# Patient Record
Sex: MALE | Race: WHITE | NOT HISPANIC OR LATINO | Employment: UNEMPLOYED | URBAN - METROPOLITAN AREA
[De-identification: names, ages, dates, MRNs, and addresses within clinical notes are randomized per-mention and may not be internally consistent; named-entity substitution may affect disease eponyms.]

---

## 2021-01-19 ENCOUNTER — OFFICE VISIT (OUTPATIENT)
Dept: URGENT CARE | Facility: CLINIC | Age: 51
End: 2021-01-19
Payer: COMMERCIAL

## 2021-01-19 ENCOUNTER — APPOINTMENT (OUTPATIENT)
Dept: RADIOLOGY | Facility: IMAGING CENTER | Age: 51
End: 2021-01-19
Attending: PHYSICIAN ASSISTANT
Payer: COMMERCIAL

## 2021-01-19 VITALS
HEART RATE: 76 BPM | WEIGHT: 199 LBS | HEIGHT: 70 IN | SYSTOLIC BLOOD PRESSURE: 128 MMHG | RESPIRATION RATE: 16 BRPM | TEMPERATURE: 98.4 F | BODY MASS INDEX: 28.49 KG/M2 | DIASTOLIC BLOOD PRESSURE: 62 MMHG | OXYGEN SATURATION: 97 %

## 2021-01-19 DIAGNOSIS — R06.02 SOB (SHORTNESS OF BREATH): ICD-10-CM

## 2021-01-19 DIAGNOSIS — R07.81 PAIN IN RIB: ICD-10-CM

## 2021-01-19 DIAGNOSIS — S23.41XA SPRAIN OF COSTAL CARTILAGE, INITIAL ENCOUNTER: ICD-10-CM

## 2021-01-19 PROCEDURE — 99204 OFFICE O/P NEW MOD 45 MIN: CPT | Performed by: PHYSICIAN ASSISTANT

## 2021-01-19 PROCEDURE — 71101 X-RAY EXAM UNILAT RIBS/CHEST: CPT | Mod: TC,LT | Performed by: PHYSICIAN ASSISTANT

## 2021-01-19 ASSESSMENT — ENCOUNTER SYMPTOMS
SENSORY CHANGE: 0
CHILLS: 0
WHEEZING: 0
COUGH: 0
TINGLING: 0
SHORTNESS OF BREATH: 1
FEVER: 0

## 2021-01-19 NOTE — PROGRESS NOTES
"Subjective:      Bishop Bucio is a 50 y.o. male who presents with rib pain          Of note patient was at work when he noted the discomfort however is very adamant that he does not want to conduct visit under Workmen's Comp. at this time.      Patient is a 50-year-old male who presents to urgent care with left-sided lower rib discomfort after reaching up putting a hook on a forklift this morning.  He admits that he immediately had slight discomfort to the left lower ribs-side with associated shortness of breath.  He admits that he sits still he does not have the discomfort more short of breath however if he begins to lean or apply pressure to this region he does have associated pain along with the shortness of breath.  He feels as though at that time he is unable to take a deep breath.  He has not taken anything for his symptoms.  He denies history of same.  He did not feel a pop that he is aware of.  He denies any abdominal pain.    Rib Injury  This is a new problem. The current episode started today. The problem occurs constantly. The problem has been unchanged. Pertinent negatives include no chest pain, chills, coughing or fever. Exacerbated by: Movement. He has tried nothing for the symptoms.       Review of Systems   Constitutional: Negative for chills and fever.   Respiratory: Positive for shortness of breath. Negative for cough and wheezing.    Cardiovascular: Negative for chest pain and leg swelling.   Neurological: Negative for tingling and sensory change.   All other systems reviewed and are negative.         Objective:     /62   Pulse 76   Temp 36.9 °C (98.4 °F)   Resp 16   Ht 1.778 m (5' 10\")   Wt 90.3 kg (199 lb)   SpO2 97%   BMI 28.55 kg/m²      Physical Exam  Vitals signs reviewed.   Constitutional:       General: He is not in acute distress.     Appearance: He is well-developed.   HENT:      Head: Normocephalic and atraumatic.   Eyes:      Conjunctiva/sclera: Conjunctivae normal.      " Pupils: Pupils are equal, round, and reactive to light.   Neck:      Musculoskeletal: Normal range of motion and neck supple.      Trachea: No tracheal deviation.   Cardiovascular:      Rate and Rhythm: Normal rate and regular rhythm.      Heart sounds: No murmur.   Pulmonary:      Effort: Pulmonary effort is normal. No respiratory distress.      Breath sounds: Normal breath sounds.       Chest:      Chest wall: Tenderness present.       Abdominal:          Comments: Diffuse tenderness to the Left lower ribs- without bony step off or deformity.    Musculoskeletal: Normal range of motion.   Skin:     General: Skin is warm.      Findings: No rash.   Neurological:      Mental Status: He is alert and oriented to person, place, and time.      Coordination: Coordination normal.   Psychiatric:         Behavior: Behavior normal.         Thought Content: Thought content normal.         Judgment: Judgment normal.                 RADIOLOGY RESULTS   [id number]-[id number] (with 1-view Cxr) Left    Result Date: 1/19/2021 1/19/2021 9:55 AM HISTORY/REASON FOR EXAM:  Left sided rib pain. TECHNIQUE/EXAM DESCRIPTION AND NUMBER OF VIEWS:  3 images of the left ribs and chest. COMPARISON: None. FINDINGS: No acute displaced rib fractures are identified. No pneumothorax. The lungs are clear, except for linear atelectasis/scarring in the left lower lung. Unremarkable cardiomediastinal silhouette.     No displaced rib fractures or pneumothorax. No acute cardiopulmonary abnormality.       Reviewed x-ray of which I agree with the official read.  Discussed such with the patient today.  Assessment/Plan:        1. Pain in rib  - [id number] (WITH 1-VIEW CXR) LEFT; Future    2. SOB (shortness of breath)  - [id number] (WITH 1-VIEW CXR) LEFT; Future    3. Sprain of costal cartilage, initial encounter    Discussed importance of NSAID utilization for discomfort, along with breathing exercises.  No evidence of bony abnormality to the rib  or the lungs indicating pneumo.  Encouraged ibuprofen 600 to 800 mg every 8 hours as patient denies prior history of renal insufficiency.  Work note was provided.  Patient and/or guardian given precautionary s/sx that mandate immediate follow up and evaluation in the ED. Advised of risks of not doing so.  Side effects of OTC or prescribed medications discussed.   DDX, Supportive care, and indications for immediate follow-up discussed with patient.    Instructed to return to clinic or nearest emergency department if we are not available for any change in condition, further concerns, or worsening of symptoms.    The patient and/or guardian demonstrated a good understanding and agreed with the treatment plan.    Please note that this dictation was created using voice recognition software. I have made every reasonable attempt to correct obvious errors, but I expect that there are errors of grammar and possibly content that I did not discover before finalizing the note.

## 2021-01-19 NOTE — LETTER
January 19, 2021         Patient: Bishop Bucio   YOB: 1970   Date of Visit: 1/19/2021           To Whom it May Concern:    Bishop Bucio was seen in my clinic on 1/19/2021. Please excuse this patient from work due to recent injury- he will be out through Thursday.   If you have any questions or concerns, please don't hesitate to call.        Sincerely,           Aristeo Partida P.A.-C.  Electronically Signed

## 2021-04-08 ENCOUNTER — OCCUPATIONAL MEDICINE (OUTPATIENT)
Dept: OCCUPATIONAL MEDICINE | Facility: CLINIC | Age: 51
End: 2021-04-08
Payer: COMMERCIAL

## 2021-04-08 ENCOUNTER — APPOINTMENT (OUTPATIENT)
Dept: RADIOLOGY | Facility: IMAGING CENTER | Age: 51
End: 2021-04-08
Attending: PREVENTIVE MEDICINE
Payer: COMMERCIAL

## 2021-04-08 VITALS
BODY MASS INDEX: 28.92 KG/M2 | HEART RATE: 76 BPM | OXYGEN SATURATION: 96 % | SYSTOLIC BLOOD PRESSURE: 126 MMHG | RESPIRATION RATE: 16 BRPM | TEMPERATURE: 97.9 F | WEIGHT: 202 LBS | HEIGHT: 70 IN | DIASTOLIC BLOOD PRESSURE: 84 MMHG

## 2021-04-08 DIAGNOSIS — S60.032A CONTUSION OF LEFT MIDDLE FINGER WITHOUT DAMAGE TO NAIL, INITIAL ENCOUNTER: ICD-10-CM

## 2021-04-08 PROCEDURE — 73140 X-RAY EXAM OF FINGER(S): CPT | Mod: TC,LT | Performed by: PREVENTIVE MEDICINE

## 2021-04-08 PROCEDURE — 99203 OFFICE O/P NEW LOW 30 MIN: CPT | Performed by: PREVENTIVE MEDICINE

## 2021-04-08 NOTE — PROGRESS NOTES
"Subjective:     Bishop Bucio is a 50 y.o. male who presents for Follow-Up (WC DOI: 4/8/2021 Middle finger; Same RM 17)      DOI 3/29/2021: 49 yo injured worker presents with left hand injury. SAJI: He was pushing plastic into a bale machine when the plastic bounced back and hit his fingers on the left hand.  He states the pain was mild to moderate but he was able to continue to work.  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  He states the pain is mild at rest but more severe when trying to flex the left middle finger.  Had a little bit of intermittent numbness and tingling in the left middle finger as well.  Denies prior history of injuries to this hand.  Not taking any medications for this condition.    ROS: All systems were reviewed on intake form, form was reviewed and signed. See scanned documents in media. Pertinent positives and negatives included in HPI.    PMH: No pertinent past medical history to this problem  MEDS: Medications were reviewed in Epic  ALLERGIES:   Allergies   Allergen Reactions   • Pcn [Penicillins] Rash     Per patient      SOCHX: Works as day lead at [employer]  FH: No pertinent family history to this problem       Objective:     /84   Pulse 76   Temp 36.6 °C (97.9 °F)   Resp 16   Ht 1.778 m (5' 10\")   Wt 91.6 kg (202 lb)   SpO2 96%   BMI 28.98 kg/m²     Constitutional: Patient is in no acute distress. Appears well-developed and well-nourished.   HENT: Normocephalic and atraumatic. EOM are normal. No scleral icterus.   Cardiovascular: Normal rate.    Pulmonary/Chest: Effort normal. No respiratory distress.   Neurological: Patient is alert and oriented to person, place, and time.   Skin: Skin is warm " and dry.   Psychiatric: Normal mood and affect. Behavior is normal.     Left hand: No gross deformity.  Mild to moderate tenderness over the PIP joint of the left middle finger.  Somewhat reduced flexion of the PIP joint with pain with resisted movements of this joint as well.  No other pain or tenderness at the other fingers.    X-ray left middle finger: No acute deformity    Assessment/Plan:       1. Contusion of left middle finger without damage to nail, initial encounter  - DX-FINGER(S) 2+ LEFT; Future    Released to Restricted Duty FROM 4/8/2021 TO 4/16/2021  Limit forceful gripping and grasping of the left hand as tolerated.  Limit left hand to less than 25 pounds lift/push/pull  Recommend gentle range of motion exercises demonstrated  OTC ibuprofen as needed  Ice as needed  Restricted duty  Follow-up 1 week    Differential diagnosis, natural history, supportive care, and indications for immediate follow-up discussed.    Approximately 35 minutes were spent in reviewing notes, preparing for visit, obtaining history, exam and evaluation, patient counseling/education and post visit documentation/orders.

## 2021-04-08 NOTE — LETTER
"EMPLOYEE’S CLAIM FOR COMPENSATION/ REPORT OF INITIAL TREATMENT  FORM C-4    EMPLOYEE’S CLAIM - PROVIDE ALL INFORMATION REQUESTED   First Name  Bishop Last Name  Rupinder Birthdate                    1970                Sex  male Claim Number   Home Address  [address] Age  50 y.o. 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 (if applicable)  filling bale machine    How did this injury or occupational disease occur? (Be specific an answer in detail. Use additional sheet if necessary)  I was pushing plastic in bale machine and some of it bounced back and got my fingers   If you believe that you have an occupational disease, when did you first have knowledge of the disability and it relationship to your employment?  NA Witnesses to the Accident  NA      Nature of Injury or Occupational Disease  Contusion  Part(s) of Body Injured or Affected  Hand (L), Finger (L),     I certify that the above is true and correct to the best of my knowledge and that I have provided this information in order to obtain the benefits " of [place]  I hereby authorize any physician, chiropractor, surgeon, practitioner, or other person, any hospital, including [facility], any medical service organization, any insurance company, or other institution or organization to release to each other, any medical or other information, including benefits paid or payable, pertinent to this injury or disease, except information relative to diagnosis, treatment and/or counseling for AIDS, psychological conditions, alcohol or controlled substances, for which I must give specific authorization.  A Photostat of this authorization shall be as valid as the original.     Date   Place   Employee’s Signature   THIS REPORT MUST BE COMPLETED AND MAILED WITHIN 3 WORKING DAYS OF TREATMENT   Place  [facility]  Name of [facility]   Date  4/8/2021 Diagnosis  (S60.032A) Contusion of left middle finger without damage to nail, initial encounter Is there evidence the injured employee was under the              influence of alcohol and/or another controlled substance at the time of accident?   Hour  10:50 AM Description of Injury or Disease  The encounter diagnosis was Contusion of left middle finger without damage to nail, initial encounter. No   Treatment  None  Have you advised the patient to remain off work five days or     more? No   X-Ray Findings  Negative   If Yes   From Date  To Date      From information given by the employee, together with medical evidence, can you directly connect this injury or occupational disease as job incurred?  Yes If No Full Duty    No Modified Duty  Yes   Is additional medical care by a physician indicated?  Yes If Modified Duty, Specify any Limitations / Restrictions  <25 lbs left hand lift/push/pull   Do you know of any previous injury or disease contributing to this condition  "or occupational disease?                            No   Date  4/8/2021 Print Doctor’s Name   Urbano Spence D.O. I certify the employer’s copy of  this form was mailed on:   Address  [address],   [address] Insurer’s Use Only     [place] Zip  [address]    Provider’s Tax ID Number  [id number] Telephone  Dept: [phone]      e-SignTAYLORURBANO D.O.  Signature:     Degree          ORIGINAL-TREATING PHYSICIAN OR CHIROPRACTOR    PAGE 2-INSURER/TPA    PAGE 3-EMPLOYER    PAGE 4-EMPLOYEE        Form C-4 (rev.10/07)           BRIEF DESCRIPTION OF RIGHTS AND BENEFITS  (Pursuant to [place] 616[place])    Notice of Injury or Occupational Disease (Incident Report Form C-1): If an injury or occupational disease (OD) arises out of and in the course of employment, you must provide written notice to your employer as soon as practicable, but no later than 7 days after the accident or OD. Your employer shall maintain a sufficient supply of the required forms.    Claim for Compensation (Form C-4): If medical treatment is sought, the form C-4 is available at the place of initial treatment. A completed \"Claim for Compensation\" (Form C-4) must be filed within 90 days after an accident or OD. The treating physician or chiropractor must, within 3 working days after treatment, complete and mail to the employer, the employer's insurer and third-party , the Claim for Compensation.    Medical Treatment: If you require medical treatment for your on-the-job injury or OD, you may be required to select a physician or chiropractor from a list provided by your workers’ compensation insurer, if it has contracted with an Organization for Managed Care (MCO) or Preferred Provider Organization (PPO) or providers of health care. If your employer has not entered into a contract with an MCO or PPO, you may select a physician or chiropractor from the Panel of Physicians and Chiropractors. Any medical costs related to your industrial " injury or OD will be paid by your insurer.    Temporary Total Disability (TTD): If your doctor has certified that you are unable to work for a period of at least 5 consecutive days, or 5 cumulative days in a 20-day period, or places restrictions on you that your employer does not accommodate, you may be entitled to TTD compensation.    Temporary Partial Disability (TPD): If the wage you receive upon reemployment is less than the compensation for TTD to which you are entitled, the insurer may be required to pay you TPD compensation to make up the difference. TPD can only be paid for a maximum of 24 months.    Permanent Partial Disability (PPD): When your medical condition is stable and there is an indication of a PPD as a result of your injury or OD, within 30 days, your insurer must arrange for an evaluation by a rating physician or chiropractor to determine the degree of your PPD. The amount of your PPD award depends on the date of injury, the results of the PPD evaluation, your age and wage.    Permanent Total Disability (PTD): If you are medically certified by a treating physician or chiropractor as permanently and totally disabled and have been granted a PTD status by your insurer, you are entitled to receive monthly benefits not to exceed 66 2/3% of your average monthly wage. The amount of your PTD payments is subject to reduction if you previously received a lump-sum PPD award.    Vocational Rehabilitation Services: You may be eligible for vocational rehabilitation services if you are unable to return to the job due to a permanent physical impairment or permanent restrictions as a result of your injury or occupational disease.    Transportation and Per Barbara Reimbursement: You may be eligible for travel expenses and per barbara associated with medical treatment.    Reopening: You may be able to reopen your claim if your condition worsens after claim closure.     Appeal Process: If you disagree with a written  determination issued by the insurer or the insurer does not respond to your request, you may appeal to the Department of Administration, , by following the instructions contained in your determination letter. You must appeal the determination within 70 days from the date of the determination letter at [address], [address], [place], [place] [address], or [address], [address], [place], [place] [address]. If you disagree with the  decision, you may appeal to the Department of Administration, . You must file your appeal within 30 days from the date of the  decision letter at [address], [address], [place], [place] [address], or [address], [address], [place], [place] [address]. If you disagree with a decision of an , you may file a petition for judicial review with the District Court. You must do so within 30 days of the Appeal Officer’s decision. You may be represented by an  at your own expense or you may contact the [facility] for possible representation.    [place]  for Injured Workers (NAIW): If you disagree with a  decision, you may request that NAIW represent you without charge at an  Hearing. For information regarding denial of benefits, you may contact the [facility] at: [address], [address], [place] [address], [phone], or [address], [address], [place] [address], [phone]    To File a Complaint with the Division: If you wish to file a complaint with the  of the Division of Industrial Relations (DIR),  please contact the Workers’ Compensation Section, [address], [address], [place], [place] [address], telephone ([phone]) [phone], or [address], [address], [place], [place] [address], telephone [phone].    For assistance with Workers’ Compensation Issues: You may contact the [facility]  Office for Consumer Health Assistance, [address] [place] [address], [facility] [address], [place] [address], Toll Free 1-793.707.5944, Web site: http://[facility].[place].gov/Programs/LUIS E-mail: luis@[email or web address].nv.[facility]              __________________________________________________________________                                    _________________            Employee Name / Signature                                                                                                                            Date                                                                                                                                                                                                                              D-2 (rev. 10/20)

## 2021-04-08 NOTE — LETTER
[address],   [address] [place] [address]  Phone:  [phone] - Fax:  [phone]   Occupational Health [facility] Progress Report and Disability Certification  Date of Service: 4/8/2021   No Show:  No  Date / Time of Next Visit: 4/16/2021 @ 10am   Claim Information   Patient Name: Bishop Bucio  Claim Number:     Employer:   [employer] Date of Injury: 3/29/2021     Insurer / TPA: Rema Soto [facility]  ID / SSN:     Occupation: DAY SHIFT LEAD  Diagnosis: The encounter diagnosis was Contusion of left middle finger without damage to nail, initial encounter.    Medical Information   Related to Industrial Injury? Yes    Subjective Complaints:  DOI 3/29/2021: 51 yo injured worker presents with left hand injury. SAJI: He was pushing plastic into a bale machine when the plastic bounced back and hit his fingers on the left hand.  He states the pain was mild to moderate but he was able to continue to work.  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  He states the pain is mild at rest but more severe when trying to flex the left middle finger.  Had a little bit of intermittent numbness and tingling in the left middle finger as well.  Denies prior history of injuries to this hand.  Not taking any medications for this condition.   Objective Findings: Left hand: No gross deformity.  Mild to moderate tenderness over the PIP joint of the left middle finger.  Somewhat reduced flexion of the PIP joint with pain with resisted movements of this joint as well.  No other pain or tenderness at the other fingers.    X-ray left middle finger: No acute deformity   Pre-Existing Condition(s):     Assessment:    Initial Visit    Status: Additional Care Required  Permanent Disability:No    Plan:      Diagnostics:      Comments:  Recommend gentle range of motion exercises demonstrated  OTC ibuprofen as needed  Ice as needed  Restricted duty  Follow-up 1 week    Disability Information   Status: Released to Restricted Duty    From:  4/8/2021  Through: 4/16/2021 Restrictions are: Temporary   Physical Restrictions   Sitting:    Standing:    Stooping:    Bending:      Squatting:    Walking:    Climbing:    Pushing:      Pulling:    Other:    Reaching Above Shoulder (L):   Reaching Above Shoulder (R):       Reaching Below Shoulder (L):    Reaching Below Shoulder (R):      Not to exceed Weight Limits   Carrying(hrs):   Weight Limit(lb):   Lifting(hrs):   Weight  Limit(lb):     Comments: Limit forceful gripping and grasping of the left hand as tolerated.  Limit left hand to less than 25 pounds lift/push/pull    Repetitive Actions   Hands: i.e. Fine Manipulations from Grasping:     Feet: i.e. Operating Foot Controls:     Driving / Operate Machinery:     Provider Name:   Urbano Spence D.O. Physician Signature:  Physician Name:     Clinic Name / Location: [address],   [address] [address] Clinic Phone Number: Dept: [phone]   Appointment Time: 10:45 Am Visit Start Time: 10:50 AM   Check-In Time:  10:47 Am Visit Discharge Time:  11:50am   Original-Treating Physician or Chiropractor    Page 2-Insurer/TPA    Page 3-Employer    Page 4-Employee

## 2021-04-16 ENCOUNTER — OCCUPATIONAL MEDICINE (OUTPATIENT)
Dept: OCCUPATIONAL MEDICINE | Facility: CLINIC | Age: 51
End: 2021-04-16
Payer: COMMERCIAL

## 2021-04-16 VITALS
WEIGHT: 202 LBS | SYSTOLIC BLOOD PRESSURE: 116 MMHG | HEART RATE: 80 BPM | OXYGEN SATURATION: 97 % | RESPIRATION RATE: 16 BRPM | DIASTOLIC BLOOD PRESSURE: 84 MMHG | HEIGHT: 70 IN | TEMPERATURE: 97.8 F | BODY MASS INDEX: 28.92 KG/M2

## 2021-04-16 DIAGNOSIS — S60.032A CONTUSION OF LEFT MIDDLE FINGER WITHOUT DAMAGE TO NAIL, INITIAL ENCOUNTER: ICD-10-CM

## 2021-04-16 PROCEDURE — 99212 OFFICE O/P EST SF 10 MIN: CPT | Performed by: PREVENTIVE MEDICINE

## 2021-04-16 NOTE — PROGRESS NOTES
"Subjective:     Bishop Bucio is a 50 y.o. 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 SAJI: He was pushing plastic into a bale machine when the plastic bounced back and hit his fingers on the left hand.  Patient states overall symptoms are about the same.  He has little bit of improvement with full extension but still has pain with flexion of the middle finger.  He has been working light duty.  He is doing the range of motion exercises daily.    ROS  .       Objective:     /84   Pulse 80   Temp 36.6 °C (97.8 °F)   Resp 16   Ht 1.778 m (5' 10\")   Wt 91.6 kg (202 lb)   SpO2 97%   BMI 28.98 kg/m²     Constitutional: Patient is in no acute distress. Appears well-developed and well-nourished.   Cardiovascular: Normal rate.    Pulmonary/Chest: Effort normal. No respiratory distress.   Neurological: Patient is alert and oriented to person, place, and time.   Skin: Skin is warm and dry.   Psychiatric: Normal mood and affect. Behavior is normal.     Left hand: No gross deformity.  Mild to moderate tenderness over the PIP joint of the left middle finger.  Somewhat reduced flexion of the PIP joint with pain with resisted movements of this joint as well.  Full extension of the joint without pain or difficulty.      Assessment/Plan:       1. Contusion of left middle finger without damage to nail, initial encounter    Released to Restricted Duty FROM 4/16/2021 TO 4/30/2021  Limit forceful gripping and grasping of the left hand as tolerated.  Limit left hand to less than 25 pounds lift/push/pull   Continue gentle range of motion exercises demonstrated  OTC ibuprofen as needed  Ice as needed  Restricted duty  Follow-up 2 weeks    Differential diagnosis, natural history, supportive care, and indications for immediate follow-up discussed.    Approximately 15 minutes was spent in preparing for visit, obtaining history, " exam and evaluation, patient counseling/education and post visit documentation/orders.

## 2021-04-16 NOTE — LETTER
[address],   [address] [place] [address]  Phone:  [phone] - Fax:  [phone]   Occupational Health [facility] Progress Report and Disability Certification  Date of Service: 4/16/2021   No Show:  No  Date / Time of Next Visit: 4/30/2021 @ 10 AM    Claim Information   Patient Name: Bishop Bucio  Claim Number:     Employer:  [employer]  Date of Injury: 3/29/2021     Insurer / TPA: Rema Soto [facility]  ID / SSN:     Occupation: DAY SHIFT LEAD  Diagnosis: The encounter diagnosis was Contusion of left middle finger without damage to nail, initial encounter.    Medical Information   Related to Industrial Injury? 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 SAJI: He was pushing plastic into a bale machine when the plastic bounced back and hit his fingers on the left hand.  Patient states overall symptoms are about the same.  He has little bit of improvement with full extension but still has pain with flexion of the middle finger.  He has been working light duty.  He is doing the range of motion exercises daily.   Objective Findings: Left hand: No gross deformity.  Mild to moderate tenderness over the PIP joint of the left middle finger.  Somewhat reduced flexion of the PIP joint with pain with resisted movements of this joint as well.  No other pain or tenderness at the other fingers.   Pre-Existing Condition(s):     Assessment:   Condition Same    Status: Additional Care Required  Permanent Disability:No    Plan:      Diagnostics:      Comments:  Continue gentle range of motion exercises demonstrated  OTC ibuprofen as needed  Ice as needed  Restricted duty  Follow-up 2 weeks    Disability Information   Status: Released to Restricted Duty    From:  4/16/2021  Through: 4/30/2021 Restrictions are: Temporary   Physical Restrictions   Sitting:    Standing:    Stooping:    Bending:         Squatting:    Walking:    Climbing:    Pushing:      Pulling:    Other:    Reaching Above Shoulder (L):   Reaching Above Shoulder (R):       Reaching Below Shoulder (L):    Reaching Below Shoulder (R):      Not to exceed Weight Limits   Carrying(hrs):   Weight Limit(lb):   Lifting(hrs):   Weight  Limit(lb):     Comments: Limit forceful gripping and grasping of the left hand as tolerated.  Limit left hand to less than 25 pounds lift/push/pull     Repetitive Actions   Hands: i.e. Fine Manipulations from Grasping:     Feet: i.e. Operating Foot Controls:     Driving / Operate Machinery:     Provider Name:   Urbano Spence D.O. Physician Signature:  Physician Name:     Clinic Name / Location: [address],   [address] [address] Clinic Phone Number: Dept: [phone]   Appointment Time: 10:00 Am Visit Start Time: 9:50 AM   Check-In Time:  9:47 Am Visit Discharge Time: 10:15 AM    Original-Treating Physician or Chiropractor    Page 2-Insurer/TPA    Page 3-Employer    Page 4-Employee

## 2021-04-30 ENCOUNTER — OCCUPATIONAL MEDICINE (OUTPATIENT)
Dept: OCCUPATIONAL MEDICINE | Facility: CLINIC | Age: 51
End: 2021-04-30
Payer: COMMERCIAL

## 2021-04-30 VITALS
DIASTOLIC BLOOD PRESSURE: 90 MMHG | HEART RATE: 84 BPM | RESPIRATION RATE: 16 BRPM | SYSTOLIC BLOOD PRESSURE: 142 MMHG | HEIGHT: 70 IN | TEMPERATURE: 97.3 F | WEIGHT: 205 LBS | OXYGEN SATURATION: 96 % | BODY MASS INDEX: 29.35 KG/M2

## 2021-04-30 DIAGNOSIS — S60.032A CONTUSION OF LEFT MIDDLE FINGER WITHOUT DAMAGE TO NAIL, INITIAL ENCOUNTER: ICD-10-CM

## 2021-04-30 PROCEDURE — 99212 OFFICE O/P EST SF 10 MIN: CPT | Performed by: PREVENTIVE MEDICINE

## 2021-04-30 NOTE — PROGRESS NOTES
"Subjective:     Bishop Bucio is a 50 y.o. 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 SAJI: He was pushing plastic into a bale machine when the plastic bounced back and hit his fingers on the left hand.  Patient states that overall pain in the middle finger has been gradually improving.  He still has 1 area of sensitivity over the PIP joint.  He notes minimal pain with use.  Feels ready for release and full duty.    ROS         Objective:     /90   Pulse 84   Temp 36.3 °C (97.3 °F)   Resp 16   Ht 1.778 m (5' 10\")   Wt 93 kg (205 lb)   SpO2 96%   BMI 29.41 kg/m²     Constitutional: Patient is in no acute distress. Appears well-developed and well-nourished.   Cardiovascular: Normal rate.    Pulmonary/Chest: Effort normal. No respiratory distress.   Neurological: Patient is alert and oriented to person, place, and time.   Skin: Skin is warm and dry.   Psychiatric: Normal mood and affect. Behavior is normal.     Left hand: No gross deformity.  Mild tenderness over the PIP joint of the left middle finger.  Minimally reduced range of motion at PIP joint with flexion.     Assessment/Plan:       1. Contusion of left middle finger without damage to nail, initial encounter    Released to Full Duty FROM 4/30/2021 TO       Released from care  Full duty  Follow-up as needed  Expect gradual resolution of symptoms over the next 1 to 2 months.    Differential diagnosis, natural history, supportive care, and indications for immediate follow-up discussed.    Approximately 15 minutes was spent in preparing for visit, obtaining history, exam and evaluation, patient counseling/education and post visit documentation/orders.  "

## 2021-04-30 NOTE — LETTER
[address],   [address] [place] [address]  Phone:  [phone] - Fax:  [phone]   Occupational Health [facility] Progress Report and Disability Certification  Date of Service: 4/30/2021   No Show:  No  Date / Time of Next Visit:  Release from care   Claim Information   Patient Name: Bishop Bucio  Claim Number:     Employer:    [employer]  Date of Injury: 3/29/2021     Insurer / TPA: [employer] [facility]  ID / SSN:     Occupation: DAY SHIFT LEAD  Diagnosis: The encounter diagnosis was Contusion of left middle finger without damage to nail, initial encounter.    Medical Information   Related to Industrial Injury? 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 SAJI: He was pushing plastic into a bale machine when the plastic bounced back and hit his fingers on the left hand.  Patient states that overall pain in the middle finger has been gradually improving.  He still has 1 area of sensitivity over the PIP joint.  He notes minimal pain with use.  Feels ready for release and full duty.   Objective Findings: Left hand: No gross deformity.  Mild tenderness over the PIP joint of the left middle finger.  Minimally reduced range of motion at PIP joint with flexion.    Pre-Existing Condition(s):     Assessment:   Condition Improved    Status: Discharged /  MMI  Permanent Disability:No    Plan:      Diagnostics:      Comments:  Released from care  Full duty  Follow-up as needed  Expect gradual resolution of symptoms over the next 1 to 2 months.    Disability Information   Status: Released to Full Duty    From:  4/30/2021  Through:   Restrictions are:     Physical Restrictions   Sitting:    Standing:    Stooping:    Bending:      Squatting:    Walking:    Climbing:    Pushing:      Pulling:    Other:    Reaching Above Shoulder (L):   Reaching Above Shoulder (R):       Reaching Below Shoulder (L):     Reaching Below Shoulder (R):      Not to exceed Weight Limits   Carrying(hrs):   Weight Limit(lb):   Lifting(hrs):   Weight  Limit(lb):     Comments:      Repetitive Actions   Hands: i.e. Fine Manipulations from Grasping:     Feet: i.e. Operating Foot Controls:     Driving / Operate Machinery:     Provider Name:   Urbano Spence D.O. Physician Signature:  Physician Name:     Clinic Name / Location: [address],   [address] [place] [address] Clinic Phone Number: Dept: [phone]   Appointment Time: 10:00 Am Visit Start Time: 10:01 AM   Check-In Time:  9:57 Am Visit Discharge Time:  10:20 AM    Original-Treating Physician or Chiropractor    Page 2-Insurer/TPA    Page 3-Employer    Page 4-Employee

## 2022-08-29 ENCOUNTER — APPOINTMENT (OUTPATIENT)
Dept: URGENT CARE | Facility: CLINIC | Age: 52
End: 2022-08-29
Payer: COMMERCIAL

## 2022-08-30 ENCOUNTER — HOSPITAL ENCOUNTER (EMERGENCY)
Facility: MEDICAL CENTER | Age: 52
End: 2022-08-30
Attending: EMERGENCY MEDICINE
Payer: COMMERCIAL

## 2022-08-30 VITALS
SYSTOLIC BLOOD PRESSURE: 134 MMHG | HEART RATE: 94 BPM | DIASTOLIC BLOOD PRESSURE: 88 MMHG | RESPIRATION RATE: 16 BRPM | OXYGEN SATURATION: 95 % | TEMPERATURE: 98 F | BODY MASS INDEX: 30.02 KG/M2 | WEIGHT: 209.66 LBS | HEIGHT: 70 IN

## 2022-08-30 DIAGNOSIS — R19.7 DIARRHEA, UNSPECIFIED TYPE: ICD-10-CM

## 2022-08-30 LAB
ALBUMIN SERPL BCP-MCNC: 4.4 G/DL (ref 3.2–4.9)
ALBUMIN/GLOB SERPL: 1.8 G/DL
ALP SERPL-CCNC: 70 U/L (ref 30–99)
ALT SERPL-CCNC: 38 U/L (ref 2–50)
ANION GAP SERPL CALC-SCNC: 12 MMOL/L (ref 7–16)
AST SERPL-CCNC: 29 U/L (ref 12–45)
BASOPHILS # BLD AUTO: 0.3 % (ref 0–1.8)
BASOPHILS # BLD: 0.03 K/UL (ref 0–0.12)
BILIRUB SERPL-MCNC: 0.5 MG/DL (ref 0.1–1.5)
BUN SERPL-MCNC: 10 MG/DL (ref 8–22)
CALCIUM SERPL-MCNC: 9.2 MG/DL (ref 8.5–10.5)
CHLORIDE SERPL-SCNC: 103 MMOL/L (ref 96–112)
CO2 SERPL-SCNC: 23 MMOL/L (ref 20–33)
CREAT SERPL-MCNC: 0.86 MG/DL (ref 0.5–1.4)
EOSINOPHIL # BLD AUTO: 0.04 K/UL (ref 0–0.51)
EOSINOPHIL NFR BLD: 0.4 % (ref 0–6.9)
ERYTHROCYTE [DISTWIDTH] IN BLOOD BY AUTOMATED COUNT: 42.6 FL (ref 35.9–50)
GFR SERPLBLD CREATININE-BSD FMLA CKD-EPI: 103 ML/MIN/1.73 M 2
GLOBULIN SER CALC-MCNC: 2.4 G/DL (ref 1.9–3.5)
GLUCOSE SERPL-MCNC: 95 MG/DL (ref 65–99)
HCT VFR BLD AUTO: 43.8 % (ref 42–52)
HGB BLD-MCNC: 15.8 G/DL (ref 14–18)
IMM GRANULOCYTES # BLD AUTO: 0.05 K/UL (ref 0–0.11)
IMM GRANULOCYTES NFR BLD AUTO: 0.5 % (ref 0–0.9)
LIPASE SERPL-CCNC: 50 U/L (ref 11–82)
LYMPHOCYTES # BLD AUTO: 1.83 K/UL (ref 1–4.8)
LYMPHOCYTES NFR BLD: 16.8 % (ref 22–41)
MAGNESIUM SERPL-MCNC: 1.9 MG/DL (ref 1.5–2.5)
MCH RBC QN AUTO: 32.1 PG (ref 27–33)
MCHC RBC AUTO-ENTMCNC: 36.1 G/DL (ref 33.7–35.3)
MCV RBC AUTO: 89 FL (ref 81.4–97.8)
MONOCYTES # BLD AUTO: 0.61 K/UL (ref 0–0.85)
MONOCYTES NFR BLD AUTO: 5.6 % (ref 0–13.4)
NEUTROPHILS # BLD AUTO: 8.36 K/UL (ref 1.82–7.42)
NEUTROPHILS NFR BLD: 76.4 % (ref 44–72)
NRBC # BLD AUTO: 0 K/UL
NRBC BLD-RTO: 0 /100 WBC
PLATELET # BLD AUTO: 289 K/UL (ref 164–446)
PMV BLD AUTO: 9.9 FL (ref 9–12.9)
POTASSIUM SERPL-SCNC: 3.8 MMOL/L (ref 3.6–5.5)
PROT SERPL-MCNC: 6.8 G/DL (ref 6–8.2)
RBC # BLD AUTO: 4.92 M/UL (ref 4.7–6.1)
SODIUM SERPL-SCNC: 138 MMOL/L (ref 135–145)
WBC # BLD AUTO: 10.9 K/UL (ref 4.8–10.8)

## 2022-08-30 PROCEDURE — 85025 COMPLETE CBC W/AUTO DIFF WBC: CPT

## 2022-08-30 PROCEDURE — 36415 COLL VENOUS BLD VENIPUNCTURE: CPT

## 2022-08-30 PROCEDURE — 99283 EMERGENCY DEPT VISIT LOW MDM: CPT

## 2022-08-30 PROCEDURE — 83690 ASSAY OF LIPASE: CPT

## 2022-08-30 PROCEDURE — 83735 ASSAY OF MAGNESIUM: CPT

## 2022-08-30 PROCEDURE — 80053 COMPREHEN METABOLIC PANEL: CPT

## 2022-08-30 RX ORDER — LOPERAMIDE HYDROCHLORIDE 2 MG/1
2 CAPSULE ORAL 4 TIMES DAILY PRN
Qty: 30 CAPSULE | Refills: 0 | Status: SHIPPED | OUTPATIENT
Start: 2022-08-30 | End: 2022-08-30 | Stop reason: SDUPTHER

## 2022-08-30 RX ORDER — LOPERAMIDE HYDROCHLORIDE 2 MG/1
2 CAPSULE ORAL 4 TIMES DAILY PRN
Qty: 30 CAPSULE | Refills: 0 | Status: SHIPPED | OUTPATIENT
Start: 2022-08-30 | End: 2022-09-04

## 2022-08-30 NOTE — ED NOTES
Patient presents after a friend had a stomach bug about 3 weeks ago. He reports that he has since been having decreased appetite, N/D

## 2022-08-30 NOTE — ED PROVIDER NOTES
ED Provider Note    ER PROVIDER NOTE      CHIEF COMPLAINT  Chief Complaint   Patient presents with    Diarrhea       HPI  Bishop Bucio is a 52 y.o. male who presents to the emergency department complaining of diarrhea.  Patient has had symptoms for 3 weeks, initially with nausea, vomiting and diarrhea for 3-4 days.  He states he had contacts with similar symptoms as well.  He has had no nausea or vomiting since his initial few days and states he has been eating and drinking although with decreased appetite.  He continues to have daily diarrhea although he did have a normal bowel movement this morning.  No blood in his stool or dark tarry stool.  He reports no abdominal pain although he does have a bloating sensation sometimes.  No fevers or chills.  No recent travel or camping.  No recent antibiotic use.  No other recent illness, cough, congestion, chest pain or shortness of breath    REVIEW OF SYSTEMS  Pertinent positives include diarrhea. Pertinent negatives include no abdominal pain. See HPI for details. All other systems reviewed and are negative.    PAST MEDICAL HISTORY       SURGICAL HISTORY  patient denies any surgical history    FAMILY HISTORY  History reviewed. No pertinent family history.    SOCIAL HISTORY  Social History     Socioeconomic History    Marital status: Single   Tobacco Use    Smoking status: Never    Smokeless tobacco: Never   Substance and Sexual Activity    Alcohol use: Never    Drug use: Never      Social History     Substance and Sexual Activity   Drug Use Never       CURRENT MEDICATIONS  Home Medications       Reviewed by Jenny Herrera R.N. (Registered Nurse) on 08/30/22 at 153  Med List Status: Partial     Medication Last Dose Status        Patient Horacio Taking any Medications                           ALLERGIES  Allergies   Allergen Reactions    Pcn [Penicillins] Rash     Per patient        PHYSICAL EXAM  VITAL SIGNS: /88   Pulse 94   Temp 36.7 °C (98 °F) (Temporal)    "Resp 16   Ht 1.778 m (5' 10\")   Wt 95.1 kg (209 lb 10.5 oz)   SpO2 95%   BMI 30.08 kg/m²   Pulse ox interpretation: I interpret this pulse ox as normal.    Constitutional: Alert in no apparent distress.  HENT: No signs of trauma, Bilateral external ears normal, Nose normal.   Eyes: Pupils are equal and reactive, Conjunctiva normal, Non-icteric.   Neck: Normal range of motion, No tenderness, Supple, No stridor.   Cardiovascular: tachycardic, no murmurs.   Thorax & Lungs: Normal breath sounds, No respiratory distress, No wheezing, No chest tenderness.   Abdomen: Bowel sounds normal, Soft, No tenderness, No masses, No pulsatile masses. No peritoneal signs.  Skin: Warm, Dry, No erythema, No rash.   Back: No bony tenderness, No CVA tenderness.   Extremities: Intact distal pulses, No edema, No tenderness, No cyanosis, Negative Josemanuel's sign.  Musculoskeletal: Good range of motion in all major joints. No tenderness to palpation or major deformities noted.   Neurologic: Alert , Normal motor function, Normal sensory function, No focal deficits noted.   Psychiatric: Affect normal, Judgment normal, Mood normal.     DIAGNOSTIC STUDIES / PROCEDURES        LABS  Labs Reviewed   CBC WITH DIFFERENTIAL - Abnormal; Notable for the following components:       Result Value    WBC 10.9 (*)     MCHC 36.1 (*)     Neutrophils-Polys 76.40 (*)     Lymphocytes 16.80 (*)     Neutrophils (Absolute) 8.36 (*)     All other components within normal limits   COMP METABOLIC PANEL   LIPASE   MAGNESIUM   ESTIMATED GFR   CULTURE STOOL   COMPLETE O&P       All labs reviewed by me.    RADIOLOGY  No orders to display     The radiologist's interpretation of all radiological studies have been reviewed and images independently viewed by me.    COURSE & MEDICAL DECISION MAKING  Nursing notes, VS, PMSFHx reviewed in chart.    4:19 PM Patient seen and examined at bedside. Patient will be treated with IV fluid. Ordered for CBC, CMP, lipase, stool culture and " ova and parasites to evaluate his symptoms.     745 PM  Patient is reevaluated, updated on results, he is comfortable at this time, his not been able to provide a stool sample, he is agreeable to discharge    HYDRATION: Based on the patient's presentation of Acute Diarrhea the patient was given IV fluids. IV Hydration was used because oral hydration was not as rapid as required. Upon recheck following hydration, the patient was improved.    Decision Making:  This is a 52 y.o. male present with diarrhea over the last 3 weeks.  He is overall well-appearing without any fevers and appears well-hydrated.  He has no abdominal pain or tenderness suggestive of surgical intra-abdominal pathology at this time.  He has no red flags for enteroinvasive disease.  No metabolic or electrolyte derangement.  He is started on loperamide, advised on probiotics appropriate hydration and food choices and our  will call to arrange for primary care follow-up    The patient will return for new or worsening symptoms and is stable at the time of discharge.    The patient is referred to a primary physician for blood pressure management, diabetic screening, and for all other preventative health concerns.        DISPOSITION:  Patient will be discharged home in stable condition.    FOLLOW UP:  [address] [address]  [phone]  Schedule an appointment as soon as possible for a visit       OUTPATIENT MEDICATIONS:  New Prescriptions    LOPERAMIDE (IMODIUM) 2 MG CAP    Take 1 Capsule by mouth 4 times a day as needed for Diarrhea for up to 5 days.         FINAL IMPRESSION  1. Diarrhea, unspecified type      The note accurately reflects work and decisions made by me.  Luis Carlos Song M.D.  8/30/2022  8:08 PM

## 2022-09-01 ENCOUNTER — PATIENT OUTREACH (OUTPATIENT)
Dept: SCHEDULING | Facility: IMAGING CENTER | Age: 52
End: 2022-09-01
Payer: COMMERCIAL

## 2023-05-23 ENCOUNTER — APPOINTMENT (OUTPATIENT)
Dept: RADIOLOGY | Facility: MEDICAL CENTER | Age: 53
DRG: 917 | End: 2023-05-23
Attending: INTERNAL MEDICINE
Payer: MEDICAID

## 2023-05-23 ENCOUNTER — APPOINTMENT (OUTPATIENT)
Dept: RADIOLOGY | Facility: MEDICAL CENTER | Age: 53
DRG: 917 | End: 2023-05-23
Attending: EMERGENCY MEDICINE
Payer: MEDICAID

## 2023-05-23 ENCOUNTER — HOSPITAL ENCOUNTER (INPATIENT)
Facility: MEDICAL CENTER | Age: 53
LOS: 29 days | DRG: 917 | End: 2023-06-21
Attending: EMERGENCY MEDICINE | Admitting: INTERNAL MEDICINE
Payer: MEDICAID

## 2023-05-23 DIAGNOSIS — R41.82 ALTERED MENTAL STATUS, UNSPECIFIED ALTERED MENTAL STATUS TYPE: ICD-10-CM

## 2023-05-23 DIAGNOSIS — F19.929: ICD-10-CM

## 2023-05-23 PROBLEM — R74.8 ELEVATED CPK: Status: ACTIVE | Noted: 2023-05-23

## 2023-05-23 PROBLEM — E87.20 METABOLIC ACIDOSIS: Status: ACTIVE | Noted: 2023-05-23

## 2023-05-23 PROBLEM — J96.01 ACUTE RESPIRATORY FAILURE WITH HYPOXIA (HCC): Status: ACTIVE | Noted: 2023-05-23

## 2023-05-23 PROBLEM — E87.6 HYPOKALEMIA: Status: ACTIVE | Noted: 2023-05-23

## 2023-05-23 LAB
ALBUMIN SERPL BCP-MCNC: 4.5 G/DL (ref 3.2–4.9)
ALBUMIN/GLOB SERPL: 1.5 G/DL
ALP SERPL-CCNC: 82 U/L (ref 30–99)
ALT SERPL-CCNC: 45 U/L (ref 2–50)
AMPHET UR QL SCN: NEGATIVE
ANION GAP SERPL CALC-SCNC: 18 MMOL/L (ref 7–16)
APAP SERPL-MCNC: <5 UG/ML (ref 10–30)
APPEARANCE UR: CLEAR
AST SERPL-CCNC: 39 U/L (ref 12–45)
BARBITURATES UR QL SCN: NEGATIVE
BASE EXCESS BLDA CALC-SCNC: -1 MMOL/L (ref -4–3)
BASE EXCESS BLDA CALC-SCNC: -1 MMOL/L (ref -4–3)
BASE EXCESS BLDA CALC-SCNC: -2 MMOL/L (ref -4–3)
BASOPHILS # BLD AUTO: 0.3 % (ref 0–1.8)
BASOPHILS # BLD: 0.03 K/UL (ref 0–0.12)
BENZODIAZ UR QL SCN: NEGATIVE
BILIRUB SERPL-MCNC: 0.9 MG/DL (ref 0.1–1.5)
BILIRUB UR QL STRIP.AUTO: NEGATIVE
BODY TEMPERATURE: ABNORMAL DEGREES
BREATHS SETTING VENT: 18
BREATHS SETTING VENT: 18
BREATHS SETTING VENT: 20
BUN SERPL-MCNC: 13 MG/DL (ref 8–22)
BZE UR QL SCN: NEGATIVE
CALCIUM ALBUM COR SERPL-MCNC: 9 MG/DL (ref 8.5–10.5)
CALCIUM SERPL-MCNC: 9.4 MG/DL (ref 8.5–10.5)
CANNABINOIDS UR QL SCN: POSITIVE
CHLORIDE SERPL-SCNC: 102 MMOL/L (ref 96–112)
CK SERPL-CCNC: 1939 U/L (ref 0–154)
CK SERPL-CCNC: 2258 U/L (ref 0–154)
CK SERPL-CCNC: 734 U/L (ref 0–154)
CO2 BLDA-SCNC: 23 MMOL/L (ref 20–33)
CO2 BLDA-SCNC: 24 MMOL/L (ref 20–33)
CO2 BLDA-SCNC: 24 MMOL/L (ref 20–33)
CO2 SERPL-SCNC: 19 MMOL/L (ref 20–33)
COLOR UR: YELLOW
CREAT SERPL-MCNC: 0.94 MG/DL (ref 0.5–1.4)
DELSYS IDSYS: ABNORMAL
EKG IMPRESSION: NORMAL
END TIDAL CARBON DIOXIDE IECO2: 30 MMHG
EOSINOPHIL # BLD AUTO: 0.01 K/UL (ref 0–0.51)
EOSINOPHIL NFR BLD: 0.1 % (ref 0–6.9)
ERYTHROCYTE [DISTWIDTH] IN BLOOD BY AUTOMATED COUNT: 40.3 FL (ref 35.9–50)
ETHANOL BLD-MCNC: <10.1 MG/DL
FENTANYL UR QL: NEGATIVE
GFR SERPLBLD CREATININE-BSD FMLA CKD-EPI: 96 ML/MIN/1.73 M 2
GLOBULIN SER CALC-MCNC: 3.1 G/DL (ref 1.9–3.5)
GLUCOSE BLD STRIP.AUTO-MCNC: 81 MG/DL (ref 65–99)
GLUCOSE BLD STRIP.AUTO-MCNC: 85 MG/DL (ref 65–99)
GLUCOSE SERPL-MCNC: 152 MG/DL (ref 65–99)
GLUCOSE UR STRIP.AUTO-MCNC: NEGATIVE MG/DL
HCO3 BLDA-SCNC: 21.7 MMOL/L (ref 17–25)
HCO3 BLDA-SCNC: 23.3 MMOL/L (ref 17–25)
HCO3 BLDA-SCNC: 23.3 MMOL/L (ref 17–25)
HCT VFR BLD AUTO: 45.4 % (ref 42–52)
HGB BLD-MCNC: 16 G/DL (ref 14–18)
HOROWITZ INDEX BLDA+IHG-RTO: 240 MM[HG]
HOROWITZ INDEX BLDA+IHG-RTO: 295 MM[HG]
HOROWITZ INDEX BLDA+IHG-RTO: 325 MM[HG]
IMM GRANULOCYTES # BLD AUTO: 0.05 K/UL (ref 0–0.11)
IMM GRANULOCYTES NFR BLD AUTO: 0.5 % (ref 0–0.9)
KETONES UR STRIP.AUTO-MCNC: ABNORMAL MG/DL
LACTATE BLD-SCNC: 0.6 MMOL/L (ref 0.5–2)
LACTATE BLD-SCNC: 1.1 MMOL/L (ref 0.5–2)
LACTATE SERPL-SCNC: 1.4 MMOL/L (ref 0.5–2)
LACTATE SERPL-SCNC: 2.9 MMOL/L (ref 0.5–2)
LEUKOCYTE ESTERASE UR QL STRIP.AUTO: NEGATIVE
LYMPHOCYTES # BLD AUTO: 1.36 K/UL (ref 1–4.8)
LYMPHOCYTES NFR BLD: 12.3 % (ref 22–41)
MAGNESIUM SERPL-MCNC: 2.2 MG/DL (ref 1.5–2.5)
MCH RBC QN AUTO: 30.8 PG (ref 27–33)
MCHC RBC AUTO-ENTMCNC: 35.2 G/DL (ref 32.3–36.5)
MCV RBC AUTO: 87.5 FL (ref 81.4–97.8)
METHADONE UR QL SCN: NEGATIVE
MICRO URNS: ABNORMAL
MODE IMODE: ABNORMAL
MONOCYTES # BLD AUTO: 0.39 K/UL (ref 0–0.85)
MONOCYTES NFR BLD AUTO: 3.5 % (ref 0–13.4)
NEUTROPHILS # BLD AUTO: 9.26 K/UL (ref 1.82–7.42)
NEUTROPHILS NFR BLD: 83.3 % (ref 44–72)
NITRITE UR QL STRIP.AUTO: NEGATIVE
NRBC # BLD AUTO: 0 K/UL
NRBC BLD-RTO: 0 /100 WBC (ref 0–0.2)
O2/TOTAL GAS SETTING VFR VENT: 40 %
OPIATES UR QL SCN: NEGATIVE
OSMOLALITY SERPL: 292 MOSM/KG H2O (ref 278–298)
OXYCODONE UR QL SCN: NEGATIVE
PCO2 BLDA: 30.5 MMHG (ref 26–37)
PCO2 BLDA: 37.1 MMHG (ref 26–37)
PCO2 BLDA: 38.7 MMHG (ref 26–37)
PCO2 TEMP ADJ BLDA: 28.7 MMHG (ref 26–37)
PCO2 TEMP ADJ BLDA: 35.2 MMHG (ref 26–37)
PCO2 TEMP ADJ BLDA: 36.9 MMHG (ref 26–37)
PCP UR QL SCN: NEGATIVE
PEEP END EXPIRATORY PRESSURE IPEEP: 8 CMH20
PH BLDA: 7.39 [PH] (ref 7.4–7.5)
PH BLDA: 7.41 [PH] (ref 7.4–7.5)
PH BLDA: 7.46 [PH] (ref 7.4–7.5)
PH TEMP ADJ BLDA: 7.4 [PH] (ref 7.4–7.5)
PH TEMP ADJ BLDA: 7.42 [PH] (ref 7.4–7.5)
PH TEMP ADJ BLDA: 7.48 [PH] (ref 7.4–7.5)
PH UR STRIP.AUTO: 6.5 [PH] (ref 5–8)
PHOSPHATE SERPL-MCNC: 3.7 MG/DL (ref 2.5–4.5)
PLATELET # BLD AUTO: 340 K/UL (ref 164–446)
PMV BLD AUTO: 9.6 FL (ref 9–12.9)
PO2 BLDA: 118 MMHG (ref 64–87)
PO2 BLDA: 130 MMHG (ref 64–87)
PO2 BLDA: 96 MMHG (ref 64–87)
PO2 TEMP ADJ BLDA: 111 MMHG (ref 64–87)
PO2 TEMP ADJ BLDA: 121 MMHG (ref 64–87)
PO2 TEMP ADJ BLDA: 90 MMHG (ref 64–87)
POTASSIUM SERPL-SCNC: 2.8 MMOL/L (ref 3.6–5.5)
POTASSIUM SERPL-SCNC: 3.7 MMOL/L (ref 3.6–5.5)
PROPOXYPH UR QL SCN: NEGATIVE
PROT SERPL-MCNC: 7.6 G/DL (ref 6–8.2)
PROT UR QL STRIP: NEGATIVE MG/DL
RBC # BLD AUTO: 5.19 M/UL (ref 4.7–6.1)
RBC UR QL AUTO: NEGATIVE
SALICYLATES SERPL-MCNC: <1 MG/DL (ref 15–25)
SAO2 % BLDA: 97 % (ref 93–99)
SAO2 % BLDA: 99 % (ref 93–99)
SAO2 % BLDA: 99 % (ref 93–99)
SODIUM SERPL-SCNC: 139 MMOL/L (ref 135–145)
SP GR UR STRIP.AUTO: 1.01
SPECIMEN DRAWN FROM PATIENT: ABNORMAL
TIDAL VOLUME IVT: 40 ML
TIDAL VOLUME IVT: 440 ML
TIDAL VOLUME IVT: 440 ML
TROPONIN T SERPL-MCNC: <6 NG/L (ref 6–19)
UROBILINOGEN UR STRIP.AUTO-MCNC: 0.2 MG/DL
WBC # BLD AUTO: 11.1 K/UL (ref 4.8–10.8)

## 2023-05-23 PROCEDURE — 51702 INSERT TEMP BLADDER CATH: CPT

## 2023-05-23 PROCEDURE — 85025 COMPLETE CBC W/AUTO DIFF WBC: CPT

## 2023-05-23 PROCEDURE — 82077 ASSAY SPEC XCP UR&BREATH IA: CPT

## 2023-05-23 PROCEDURE — 700111 HCHG RX REV CODE 636 W/ 250 OVERRIDE (IP): Performed by: INTERNAL MEDICINE

## 2023-05-23 PROCEDURE — 93005 ELECTROCARDIOGRAM TRACING: CPT | Performed by: EMERGENCY MEDICINE

## 2023-05-23 PROCEDURE — 700111 HCHG RX REV CODE 636 W/ 250 OVERRIDE (IP): Performed by: EMERGENCY MEDICINE

## 2023-05-23 PROCEDURE — 80053 COMPREHEN METABOLIC PANEL: CPT

## 2023-05-23 PROCEDURE — 83930 ASSAY OF BLOOD OSMOLALITY: CPT

## 2023-05-23 PROCEDURE — 84100 ASSAY OF PHOSPHORUS: CPT

## 2023-05-23 PROCEDURE — 84484 ASSAY OF TROPONIN QUANT: CPT

## 2023-05-23 PROCEDURE — 94002 VENT MGMT INPAT INIT DAY: CPT

## 2023-05-23 PROCEDURE — 700105 HCHG RX REV CODE 258: Performed by: INTERNAL MEDICINE

## 2023-05-23 PROCEDURE — 96365 THER/PROPH/DIAG IV INF INIT: CPT

## 2023-05-23 PROCEDURE — 700105 HCHG RX REV CODE 258: Performed by: EMERGENCY MEDICINE

## 2023-05-23 PROCEDURE — 83605 ASSAY OF LACTIC ACID: CPT | Mod: 91

## 2023-05-23 PROCEDURE — 71045 X-RAY EXAM CHEST 1 VIEW: CPT

## 2023-05-23 PROCEDURE — 82962 GLUCOSE BLOOD TEST: CPT | Mod: 91

## 2023-05-23 PROCEDURE — 96376 TX/PRO/DX INJ SAME DRUG ADON: CPT

## 2023-05-23 PROCEDURE — 99291 CRITICAL CARE FIRST HOUR: CPT

## 2023-05-23 PROCEDURE — 87040 BLOOD CULTURE FOR BACTERIA: CPT

## 2023-05-23 PROCEDURE — 80143 DRUG ASSAY ACETAMINOPHEN: CPT

## 2023-05-23 PROCEDURE — 84132 ASSAY OF SERUM POTASSIUM: CPT

## 2023-05-23 PROCEDURE — 302214 INTUBATION BOX: Performed by: INTERNAL MEDICINE

## 2023-05-23 PROCEDURE — 4A00X4Z MEASUREMENT OF CENTRAL NERVOUS ELECTRICAL ACTIVITY, EXTERNAL APPROACH: ICD-10-PCS | Performed by: STUDENT IN AN ORGANIZED HEALTH CARE EDUCATION/TRAINING PROGRAM

## 2023-05-23 PROCEDURE — 83735 ASSAY OF MAGNESIUM: CPT

## 2023-05-23 PROCEDURE — 770022 HCHG ROOM/CARE - ICU (200)

## 2023-05-23 PROCEDURE — 82803 BLOOD GASES ANY COMBINATION: CPT | Mod: 91

## 2023-05-23 PROCEDURE — 99291 CRITICAL CARE FIRST HOUR: CPT | Performed by: INTERNAL MEDICINE

## 2023-05-23 PROCEDURE — 94760 N-INVAS EAR/PLS OXIMETRY 1: CPT

## 2023-05-23 PROCEDURE — 95720 EEG PHY/QHP EA INCR W/VEEG: CPT | Performed by: STUDENT IN AN ORGANIZED HEALTH CARE EDUCATION/TRAINING PROGRAM

## 2023-05-23 PROCEDURE — 94003 VENT MGMT INPAT SUBQ DAY: CPT

## 2023-05-23 PROCEDURE — 95714 VEEG EA 12-26 HR UNMNTR: CPT | Performed by: STUDENT IN AN ORGANIZED HEALTH CARE EDUCATION/TRAINING PROGRAM

## 2023-05-23 PROCEDURE — 5A1935Z RESPIRATORY VENTILATION, LESS THAN 24 CONSECUTIVE HOURS: ICD-10-PCS | Performed by: EMERGENCY MEDICINE

## 2023-05-23 PROCEDURE — 36415 COLL VENOUS BLD VENIPUNCTURE: CPT

## 2023-05-23 PROCEDURE — 0BH17EZ INSERTION OF ENDOTRACHEAL AIRWAY INTO TRACHEA, VIA NATURAL OR ARTIFICIAL OPENING: ICD-10-PCS | Performed by: EMERGENCY MEDICINE

## 2023-05-23 PROCEDURE — 96367 TX/PROPH/DG ADDL SEQ IV INF: CPT

## 2023-05-23 PROCEDURE — 80048 BASIC METABOLIC PNL TOTAL CA: CPT

## 2023-05-23 PROCEDURE — 95700 EEG CONT REC W/VID EEG TECH: CPT | Performed by: STUDENT IN AN ORGANIZED HEALTH CARE EDUCATION/TRAINING PROGRAM

## 2023-05-23 PROCEDURE — 80307 DRUG TEST PRSMV CHEM ANLYZR: CPT

## 2023-05-23 PROCEDURE — 80179 DRUG ASSAY SALICYLATE: CPT

## 2023-05-23 PROCEDURE — 96375 TX/PRO/DX INJ NEW DRUG ADDON: CPT

## 2023-05-23 PROCEDURE — 81003 URINALYSIS AUTO W/O SCOPE: CPT

## 2023-05-23 PROCEDURE — 36600 WITHDRAWAL OF ARTERIAL BLOOD: CPT

## 2023-05-23 PROCEDURE — 70450 CT HEAD/BRAIN W/O DYE: CPT

## 2023-05-23 PROCEDURE — 93005 ELECTROCARDIOGRAM TRACING: CPT | Performed by: INTERNAL MEDICINE

## 2023-05-23 PROCEDURE — 82550 ASSAY OF CK (CPK): CPT

## 2023-05-23 PROCEDURE — 31500 INSERT EMERGENCY AIRWAY: CPT

## 2023-05-23 PROCEDURE — 99254 IP/OBS CNSLTJ NEW/EST MOD 60: CPT | Mod: 25 | Performed by: PSYCHIATRY & NEUROLOGY

## 2023-05-23 PROCEDURE — 303105 HCHG CATHETER EXTRA

## 2023-05-23 RX ORDER — SODIUM CHLORIDE 9 MG/ML
1000 INJECTION, SOLUTION INTRAVENOUS ONCE
Status: COMPLETED | OUTPATIENT
Start: 2023-05-23 | End: 2023-05-23

## 2023-05-23 RX ORDER — AMOXICILLIN 250 MG
2 CAPSULE ORAL 2 TIMES DAILY
Status: DISCONTINUED | OUTPATIENT
Start: 2023-05-23 | End: 2023-05-25

## 2023-05-23 RX ORDER — SODIUM CHLORIDE, SODIUM LACTATE, POTASSIUM CHLORIDE, AND CALCIUM CHLORIDE .6; .31; .03; .02 G/100ML; G/100ML; G/100ML; G/100ML
30 INJECTION, SOLUTION INTRAVENOUS ONCE
Status: COMPLETED | OUTPATIENT
Start: 2023-05-23 | End: 2023-05-23

## 2023-05-23 RX ORDER — PROPOFOL 10 MG/ML
80 INJECTION, EMULSION INTRAVENOUS ONCE
Status: COMPLETED | OUTPATIENT
Start: 2023-05-23 | End: 2023-05-23

## 2023-05-23 RX ORDER — LORAZEPAM 2 MG/ML
1 INJECTION INTRAMUSCULAR
Status: DISCONTINUED | OUTPATIENT
Start: 2023-05-23 | End: 2023-05-24

## 2023-05-23 RX ORDER — LORAZEPAM 2 MG/ML
2 INJECTION INTRAMUSCULAR
Status: DISCONTINUED | OUTPATIENT
Start: 2023-05-23 | End: 2023-05-24

## 2023-05-23 RX ORDER — LEVETIRACETAM 500 MG/5ML
1500 INJECTION, SOLUTION, CONCENTRATE INTRAVENOUS ONCE
Status: COMPLETED | OUTPATIENT
Start: 2023-05-23 | End: 2023-05-23

## 2023-05-23 RX ORDER — POLYETHYLENE GLYCOL 3350 17 G/17G
1 POWDER, FOR SOLUTION ORAL
Status: DISCONTINUED | OUTPATIENT
Start: 2023-05-23 | End: 2023-05-25

## 2023-05-23 RX ORDER — POTASSIUM CHLORIDE 7.45 MG/ML
10 INJECTION INTRAVENOUS
Status: DISPENSED | OUTPATIENT
Start: 2023-05-23 | End: 2023-05-23

## 2023-05-23 RX ORDER — SUCCINYLCHOLINE CHLORIDE 20 MG/ML
150 INJECTION INTRAMUSCULAR; INTRAVENOUS ONCE
Status: COMPLETED | OUTPATIENT
Start: 2023-05-23 | End: 2023-05-23

## 2023-05-23 RX ORDER — LEVETIRACETAM 500 MG/5ML
1000 INJECTION, SOLUTION, CONCENTRATE INTRAVENOUS EVERY 12 HOURS
Status: DISCONTINUED | OUTPATIENT
Start: 2023-05-23 | End: 2023-05-24

## 2023-05-23 RX ORDER — POTASSIUM CHLORIDE 7.45 MG/ML
10 INJECTION INTRAVENOUS ONCE
Status: COMPLETED | OUTPATIENT
Start: 2023-05-23 | End: 2023-05-23

## 2023-05-23 RX ORDER — HEPARIN SODIUM 5000 [USP'U]/ML
5000 INJECTION, SOLUTION INTRAVENOUS; SUBCUTANEOUS EVERY 8 HOURS
Status: DISCONTINUED | OUTPATIENT
Start: 2023-05-24 | End: 2023-05-24

## 2023-05-23 RX ORDER — LORAZEPAM 2 MG/ML
1 INJECTION INTRAMUSCULAR ONCE
Status: COMPLETED | OUTPATIENT
Start: 2023-05-23 | End: 2023-05-23

## 2023-05-23 RX ORDER — SODIUM CHLORIDE, SODIUM LACTATE, POTASSIUM CHLORIDE, CALCIUM CHLORIDE 600; 310; 30; 20 MG/100ML; MG/100ML; MG/100ML; MG/100ML
INJECTION, SOLUTION INTRAVENOUS CONTINUOUS
Status: DISCONTINUED | OUTPATIENT
Start: 2023-05-23 | End: 2023-05-24

## 2023-05-23 RX ORDER — FAMOTIDINE 20 MG/1
20 TABLET, FILM COATED ORAL EVERY 12 HOURS
Status: DISCONTINUED | OUTPATIENT
Start: 2023-05-23 | End: 2023-05-24

## 2023-05-23 RX ORDER — LORAZEPAM 2 MG/ML
2 INJECTION INTRAMUSCULAR ONCE
Status: COMPLETED | OUTPATIENT
Start: 2023-05-23 | End: 2023-05-23

## 2023-05-23 RX ORDER — BISACODYL 10 MG
10 SUPPOSITORY, RECTAL RECTAL
Status: DISCONTINUED | OUTPATIENT
Start: 2023-05-23 | End: 2023-05-25

## 2023-05-23 RX ADMIN — LEVETIRACETAM 1500 MG: 100 INJECTION, SOLUTION, CONCENTRATE INTRAVENOUS at 09:21

## 2023-05-23 RX ADMIN — FENTANYL CITRATE 200 MCG: 50 INJECTION, SOLUTION INTRAMUSCULAR; INTRAVENOUS at 12:12

## 2023-05-23 RX ADMIN — POTASSIUM CHLORIDE 10 MEQ: 7.46 INJECTION, SOLUTION INTRAVENOUS at 14:31

## 2023-05-23 RX ADMIN — SODIUM CHLORIDE, POTASSIUM CHLORIDE, SODIUM LACTATE AND CALCIUM CHLORIDE 125 ML: 600; 310; 30; 20 INJECTION, SOLUTION INTRAVENOUS at 14:51

## 2023-05-23 RX ADMIN — POTASSIUM CHLORIDE 10 MEQ: 7.46 INJECTION, SOLUTION INTRAVENOUS at 10:59

## 2023-05-23 RX ADMIN — PROPOFOL 80 MG: 10 INJECTION, EMULSION INTRAVENOUS at 12:15

## 2023-05-23 RX ADMIN — PROPOFOL 5 MCG/KG/MIN: 10 INJECTION, EMULSION INTRAVENOUS at 11:58

## 2023-05-23 RX ADMIN — Medication 50 MCG/HR: at 12:52

## 2023-05-23 RX ADMIN — PROPOFOL 50 MCG/KG/MIN: 10 INJECTION, EMULSION INTRAVENOUS at 15:14

## 2023-05-23 RX ADMIN — POTASSIUM CHLORIDE 10 MEQ: 7.46 INJECTION, SOLUTION INTRAVENOUS at 16:09

## 2023-05-23 RX ADMIN — SODIUM CHLORIDE, POTASSIUM CHLORIDE, SODIUM LACTATE AND CALCIUM CHLORIDE 2844 ML: 600; 310; 30; 20 INJECTION, SOLUTION INTRAVENOUS at 11:41

## 2023-05-23 RX ADMIN — PROPOFOL 40 MCG/KG/MIN: 10 INJECTION, EMULSION INTRAVENOUS at 22:11

## 2023-05-23 RX ADMIN — SUCCINYLCHOLINE CHLORIDE 150 MG: 20 INJECTION, SOLUTION INTRAMUSCULAR; INTRAVENOUS at 11:46

## 2023-05-23 RX ADMIN — PROPOFOL 60 MCG/KG/MIN: 10 INJECTION, EMULSION INTRAVENOUS at 13:27

## 2023-05-23 RX ADMIN — LEVETIRACETAM 1000 MG: 100 INJECTION, SOLUTION, CONCENTRATE INTRAVENOUS at 18:08

## 2023-05-23 RX ADMIN — SODIUM CHLORIDE 1000 ML: 9 INJECTION, SOLUTION INTRAVENOUS at 08:44

## 2023-05-23 RX ADMIN — LORAZEPAM 2 MG: 2 INJECTION INTRAMUSCULAR; INTRAVENOUS at 09:18

## 2023-05-23 RX ADMIN — FAMOTIDINE 20 MG: 10 INJECTION, SOLUTION INTRAVENOUS at 18:09

## 2023-05-23 RX ADMIN — PROPOFOL 15 MCG/KG/MIN: 10 INJECTION, EMULSION INTRAVENOUS at 12:07

## 2023-05-23 RX ADMIN — LORAZEPAM 1 MG: 2 INJECTION INTRAMUSCULAR; INTRAVENOUS at 08:44

## 2023-05-23 ASSESSMENT — PAIN DESCRIPTION - PAIN TYPE
TYPE: ACUTE PAIN

## 2023-05-23 ASSESSMENT — FIBROSIS 4 INDEX
FIB4 SCORE: 0.91
FIB4 SCORE: 0.86

## 2023-05-23 NOTE — ED NOTES
Pt becoming increasingly more altered at this time, pt awake to painful stimuli but not answering questions. Respiratory called to intubate

## 2023-05-23 NOTE — CONSULTS
Pulmonary/Critical Care Consultation    Date of consult: 5/23/2023    Referring Physician  Susanne Gonzalez M.D.    Reason for Consultation  Suspected seizures/ epileptic?    History of Presenting Illness  52 y.o. male who presented 5/23/2023 with a hx of [place], incontinent of urine and was brought by EMS to the ED. At initial interaction was described the patient with AMS but alert and oriented to self.  Later a relative/friend reported that he apparently bought benadryl and empty boxes were found. It was suspected a diphenhydramine intoxication and poison control was called.  At my initial interaction I found patient with Franny 6 pts E1, V1, M4 for what I discussed with ED provider to intubate the patient for airway protection.    Code Status  Full Code    Review of Systems  Review of Systems   Unable to perform ROS: Mental acuity       Past Medical History   has no past medical history on file.    Surgical History   has no past surgical history on file.    Family History  family history is not on file.    Social History   reports that he has never smoked. He has never used smokeless tobacco. He reports that he does not drink alcohol and does not use drugs.    Medications  Home Medications       Reviewed by Gabriela Forbes (Pharmacy Tech) on 05/23/23 at 0946  Med List Status: Complete     Medication Last Dose Status        Patient Horacio Taking any Medications                         Current Facility-Administered Medications   Medication Dose Route Frequency Provider Last Rate Last Admin    Respiratory Therapy Consult   Nebulization Continuous RT Reggie Kay M.D.        famotidine (PEPCID) tablet 20 mg  20 mg Enteral Tube Q12HRS Reggie Kay M.D.        Or    famotidine (PEPCID) injection 20 mg  20 mg Intravenous Q12HRS Reggie Kay M.D.   20 mg at 05/23/23 5690    MD Alert...ICU Electrolyte Replacement per Pharmacy   Other PHARMACY TO DOSE  Reggie Kay M.D.        lidocaine (XYLOCAINE) 1 % injection 2 mL  2 mL Tracheal Tube Q30 MIN PRN Reggie Kay M.D.        senna-docusate (PERICOLACE or SENOKOT S) 8.6-50 MG per tablet 2 Tablet  2 Tablet Enteral Tube BID Reggie Kay M.D.        And    polyethylene glycol/lytes (MIRALAX) PACKET 1 Packet  1 Packet Enteral Tube QDAY PRN Reggie Kay M.D.        And    magnesium hydroxide (MILK OF MAGNESIA) suspension 30 mL  30 mL Enteral Tube QDAY PRN Reggie Kay M.D.        And    bisacodyl (DULCOLAX) suppository 10 mg  10 mg Rectal QDAY PRN Reggie Kay M.D.        lactated ringers infusion   Intravenous Continuous Reggie Kay M.D. 125 mL/hr at 05/23/23 1451 125 mL at 05/23/23 1451    [START ON 5/24/2023] heparin injection 5,000 Units  5,000 Units Subcutaneous Q8HRS Reggie Kay M.D.        fentaNYL (SUBLIMAZE) injection 100 mcg  100 mcg Intravenous Q15 MIN PRN Reggie Kay M.D.        And    fentaNYL (SUBLIMAZE) injection 200 mcg  200 mcg Intravenous Q15 MIN PRN Reggie Kay M.D.   200 mcg at 05/23/23 1212    And    fentaNYL (SUBLIMAZE) 50 mcg/mL in 50mL (Continuous Infusion)   Intravenous Continuous Reggie Kay M.D. 1 mL/hr at 05/23/23 1252 50 mcg/hr at 05/23/23 1252    And    propofol (DIPRIVAN) injection  0-80 mcg/kg/min (Ideal) Intravenous Continuous Reggie Kay M.D. 21.9 mL/hr at 05/23/23 1514 50 mcg/kg/min at 05/23/23 1514    insulin regular (HumuLIN R,NovoLIN R) injection  1-6 Units Subcutaneous Q6HRS Reggie Kay M.D.        And    dextrose 10 % BOLUS 25 g  25 g Intravenous Q15 MIN PRN Reggie Kay M.D.        LORazepam (ATIVAN) injection 1 mg  1 mg Intravenous Once PRN Reggie Kay M.D.        Or    LORazepam (ATIVAN) injection 2 mg  2 mg Intravenous Once PRN Reggie Kay M.D.        levETIRAcetam (Keppra) injection 1,000 mg  1,000 mg Intravenous Q12HRS Reggie Kay M.D.   1,000 mg at  05/23/23 1808       Allergies  Allergies   Allergen Reactions    Pcn [Penicillins] Rash     Per patient        Vital Signs last 24 hours  Temp:  [35.9 °C (96.6 °F)-36.4 °C (97.6 °F)] 36 °C (96.8 °F)  Pulse:  [] 55  Resp:  [16-30] 18  BP: (107-180)/() 107/69  SpO2:  [93 %-100 %] 99 %    Physical Exam  Physical Exam  Vitals and nursing note reviewed.   Constitutional:       General: He is not in acute distress.     Appearance: He is ill-appearing. He is not toxic-appearing.   HENT:      Head: Normocephalic and atraumatic.      Nose: Nose normal.      Mouth/Throat:      Mouth: Mucous membranes are dry.   Eyes:      Comments: Dilated pupils reactive to light and accomodation ~4mm   Cardiovascular:      Rate and Rhythm: Normal rate and regular rhythm.      Pulses: Normal pulses.      Heart sounds: No murmur heard.     No friction rub. No gallop.   Pulmonary:      Effort: Pulmonary effort is normal. No respiratory distress.      Breath sounds: Normal breath sounds. No stridor. No wheezing or rhonchi.   Abdominal:      General: There is no distension.      Tenderness: There is no abdominal tenderness. There is no guarding or rebound.   Musculoskeletal:      Cervical back: Normal range of motion. No rigidity.      Right lower leg: No edema.      Left lower leg: No edema.   Skin:     General: Skin is warm.      Capillary Refill: Capillary refill takes less than 2 seconds.      Findings: No rash.   Neurological:      Comments: Franny 6 pts E1, V1, M4         Fluids    Intake/Output Summary (Last 24 hours) at 5/23/2023 1951  Last data filed at 5/23/2023 1800  Gross per 24 hour   Intake 1672.85 ml   Output 1525 ml   Net 147.85 ml       Laboratory  Recent Results (from the past 48 hour(s))   CBC WITH DIFFERENTIAL    Collection Time: 05/23/23  8:30 AM   Result Value Ref Range    WBC 11.1 (H) 4.8 - 10.8 K/uL    RBC 5.19 4.70 - 6.10 M/uL    Hemoglobin 16.0 14.0 - 18.0 g/dL    Hematocrit 45.4 42.0 - 52.0 %    MCV 87.5  81.4 - 97.8 fL    MCH 30.8 27.0 - 33.0 pg    MCHC 35.2 32.3 - 36.5 g/dL    RDW 40.3 35.9 - 50.0 fL    Platelet Count 340 164 - 446 K/uL    MPV 9.6 9.0 - 12.9 fL    Neutrophils-Polys 83.30 (H) 44.00 - 72.00 %    Lymphocytes 12.30 (L) 22.00 - 41.00 %    Monocytes 3.50 0.00 - 13.40 %    Eosinophils 0.10 0.00 - 6.90 %    Basophils 0.30 0.00 - 1.80 %    Immature Granulocytes 0.50 0.00 - 0.90 %    Nucleated RBC 0.00 0.00 - 0.20 /100 WBC    Neutrophils (Absolute) 9.26 (H) 1.82 - 7.42 K/uL    Lymphs (Absolute) 1.36 1.00 - 4.80 K/uL    Monos (Absolute) 0.39 0.00 - 0.85 K/uL    Eos (Absolute) 0.01 0.00 - 0.51 K/uL    Baso (Absolute) 0.03 0.00 - 0.12 K/uL    Immature Granulocytes (abs) 0.05 0.00 - 0.11 K/uL    NRBC (Absolute) 0.00 K/uL   COMP METABOLIC PANEL    Collection Time: 05/23/23  8:30 AM   Result Value Ref Range    Sodium 139 135 - 145 mmol/L    Potassium 2.8 (L) 3.6 - 5.5 mmol/L    Chloride 102 96 - 112 mmol/L    Co2 19 (L) 20 - 33 mmol/L    Anion Gap 18.0 (H) 7.0 - 16.0    Glucose 152 (H) 65 - 99 mg/dL    Bun 13 8 - 22 mg/dL    Creatinine 0.94 0.50 - 1.40 mg/dL    Calcium 9.4 8.5 - 10.5 mg/dL    AST(SGOT) 39 12 - 45 U/L    ALT(SGPT) 45 2 - 50 U/L    Alkaline Phosphatase 82 30 - 99 U/L    Total Bilirubin 0.9 0.1 - 1.5 mg/dL    Albumin 4.5 3.2 - 4.9 g/dL    Total Protein 7.6 6.0 - 8.2 g/dL    Globulin 3.1 1.9 - 3.5 g/dL    A-G Ratio 1.5 g/dL   TROPONIN    Collection Time: 05/23/23  8:30 AM   Result Value Ref Range    Troponin T <6 6 - 19 ng/L   LACTIC ACID    Collection Time: 05/23/23  8:30 AM   Result Value Ref Range    Lactic Acid 2.9 (H) 0.5 - 2.0 mmol/L   CREATINE KINASE    Collection Time: 05/23/23  8:30 AM   Result Value Ref Range    CPK Total 734 (H) 0 - 154 U/L   MAGNESIUM    Collection Time: 05/23/23  8:30 AM   Result Value Ref Range    Magnesium 2.2 1.5 - 2.5 mg/dL   DIAGNOSTIC ALCOHOL    Collection Time: 05/23/23  8:30 AM   Result Value Ref Range    Diagnostic Alcohol <10.1 <10.1 mg/dL   CORRECTED CALCIUM     Collection Time: 23  8:30 AM   Result Value Ref Range    Correct Calcium 9.0 8.5 - 10.5 mg/dL   ESTIMATED GFR    Collection Time: 23  8:30 AM   Result Value Ref Range    GFR (CKD-EPI) 96 >60 mL/min/1.73 m 2   ACETAMINOPHEN    Collection Time: 23  9:14 AM   Result Value Ref Range    Acetaminophen -Tylenol <5.0 (L) 10.0 - 30.0 ug/mL   Salicylate    Collection Time: 23  9:14 AM   Result Value Ref Range    Salicylates, Quant. <1.0 (L) 15.0 - 25.0 mg/dL   URINALYSIS CULTURE, IF INDICATED    Collection Time: 23  9:30 AM    Specimen: Urine, Clean Catch; Blood   Result Value Ref Range    Color Yellow     Character Clear     Specific Gravity 1.009 <1.035    Ph 6.5 5.0 - 8.0    Glucose Negative Negative mg/dL    Ketones Trace (A) Negative mg/dL    Protein Negative Negative mg/dL    Bilirubin Negative Negative    Urobilinogen, Urine 0.2 Negative    Nitrite Negative Negative    Leukocyte Esterase Negative Negative    Occult Blood Negative Negative    Micro Urine Req see below    URINE DRUG SCREEN    Collection Time: 23  9:30 AM   Result Value Ref Range    Amphetamines Urine Negative Negative    Barbiturates Negative Negative    Benzodiazepines Negative Negative    Cocaine Metabolite Negative Negative    Fentanyl, Urine Negative Negative    Methadone Negative Negative    Opiates Negative Negative    Oxycodone Negative Negative    Phencyclidine -Pcp Negative Negative    Propoxyphene Negative Negative    Cannabinoid Metab Positive (A) Negative   EKG    Collection Time: 23 11:10 AM   Result Value Ref Range    Report       [facility]    Test Date:  2023  Pt Name:    MARIVEL VILLAFANA                Department: ER  MRN:        [id number]                      Room:       [facility]  Gender:     Male                         Technician: [id number]  :        1970                   Requested By:JULIANA CHILDERS  Order #:    [id number]                    Arsalan MD:  JULIANA CHILDERS MD    Measurements  Intervals                                Axis  Rate:       83                           P:          52  OH:         137                          QRS:        19  QRSD:       80                           T:          6  QT:         408  QTc:        480    Interpretive Statements  Sinus rhythm  Probable left atrial enlargement  Borderline T abnormalities, inferior leads  Borderline prolonged QT interval  No previous ECG available for comparison  Electronically Signed On 5- 11:27:40 PDT by JULIANA CHILDERS MD     POCT glucose device results    Collection Time: 05/23/23 12:44 PM   Result Value Ref Range    POC Glucose, Blood 85 65 - 99 mg/dL   Lactic Acid    Collection Time: 05/23/23  1:03 PM   Result Value Ref Range    Lactic Acid 1.4 0.5 - 2.0 mmol/L   CREATINE KINASE    Collection Time: 05/23/23  1:03 PM   Result Value Ref Range    CPK Total 2258 (HH) 0 - 154 U/L   OSMOLALITY SERUM    Collection Time: 05/23/23  1:03 PM   Result Value Ref Range    Osmolality Serum 292 278 - 298 mOsm/kg H2O   PHOSPHORUS    Collection Time: 05/23/23  1:03 PM   Result Value Ref Range    Phosphorus 3.7 2.5 - 4.5 mg/dL   POCT arterial blood gas device results    Collection Time: 05/23/23  2:21 PM   Result Value Ref Range    Ph 7.460 7.400 - 7.500    Pco2 30.5 26.0 - 37.0 mmHg    Po2 130 (H) 64 - 87 mmHg    Tco2 23 20 - 33 mmol/L    S02 99 93 - 99 %    Hco3 21.7 17.0 - 25.0 mmol/L    BE -1 -4 - 3 mmol/L    Body Temp 96.1 F degrees    O2 Therapy 40 %    iPF Ratio 325     Ph Temp [place] 7.481 7.400 - 7.500    Pco2 Temp Co 28.7 26.0 - 37.0 mmHg    Po2 Temp Cor 121 (H) 64 - 87 mmHg    Specimen Arterial     DelSys Vent     Tidal Volume 440 mL    Peep End Expiratory Pressure 8 cmh20    Set Rate 20     Mode APV-CMV    POCT lactate device results    Collection Time: 05/23/23  2:21 PM   Result Value Ref Range    iStat Lactate 1.1 0.5 - 2.0 mmol/L   POCT arterial blood gas device results    Collection Time:  05/23/23  4:29 PM   Result Value Ref Range    Ph 7.406 7.400 - 7.500    Pco2 37.1 (H) 26.0 - 37.0 mmHg    Po2 118 (H) 64 - 87 mmHg    Tco2 24 20 - 33 mmol/L    S02 99 93 - 99 %    Hco3 23.3 17.0 - 25.0 mmol/L    BE -1 -4 - 3 mmol/L    Body Temp 96.5 F degrees    O2 Therapy 40 %    iPF Ratio 295     Ph Temp [place] 7.423 7.400 - 7.500    Pco2 Temp Co 35.2 26.0 - 37.0 mmHg    Po2 Temp Cor 111 (H) 64 - 87 mmHg    Specimen Arterial     DelSys Vent     End Tidal Carbon Dioxide 30 mmhg    Tidal Volume 40 mL    Peep End Expiratory Pressure 8 cmh20    Set Rate 18     Mode APV-CMV    POCT lactate device results    Collection Time: 05/23/23  4:29 PM   Result Value Ref Range    iStat Lactate 0.6 0.5 - 2.0 mmol/L   POCT glucose device results    Collection Time: 05/23/23  6:17 PM   Result Value Ref Range    POC Glucose, Blood 81 65 - 99 mg/dL       Imaging  DX-CHEST-PORTABLE (1 VIEW)   Final Result      1.  Interval intubation; the endotracheal tube tip is just above the level of the clavicular heads   2.  Lung volumes are low.      CT-HEAD W/O   Final Result      1.  No CT evidence of acute infarct, hemorrhage or mass.   2.  Moderate mucosal thickening of the maxillary, sphenoid and ethmoid sinuses.      DX-CHEST-PORTABLE (1 VIEW)   Final Result      Predominately linear bibasilar opacities, likely atelectasis. No focal consolidation. No effusions.         MR-BRAIN-W/O    (Results Pending)   DX-ABDOMEN FOR TUBE PLACEMENT    (Results Pending)       Assessment/Plan  * Drug intoxication with complication, with unspecified complication (HCC)- (present on admission)  Assessment & Plan  Patient was admitted with suspected dyphenhydramine intoxication with unknown amount of tablets and unknown time of normal last seen. Symptoms related includes cutaneous vasodilation (more obvious in the face), mydriasis, and reported halluciations with possible urinary retention. Certainly he developed AMS and respiratory failure. Differential  diagnosis include other intoxications, seizures, meningitis, sepsis, etc.  For now will base management on supportive care, consider physostigmine but given suspected seizure and possible concomitant other drug intoxication, will hold for now.  Will treat seizures with benzos, consider keppra  Poison control case #[id number]  If QRS is greater than 120 will treat with bicarb ggt.  Blood glucose was wnl  CT head wo contrast  UDS  EKG revieed with borderline prolonged QT interval, borderline t abnormalities.  Will obtain a continous EEG  Consult neurology  Consider brain MRI  He was intubated for airway protection  Get acetaminophen levels, salicilate levels  Get diagnostic alcohol  Obtain troponins      AMS (altered mental status)  Assessment & Plan  Most likely due to underlying drug intoxication  No liver problems  Metabolic acidosis as below.  No clear source of infection obvious at this point    Hypokalemia- (present on admission)  Assessment & Plan  Replace electrolytes as needed  Will re check labs in PM    Metabolic acidosis- (present on admission)  Assessment & Plan  Mild anion gap metabolic acidosis  CO2 19  Gap 18-10=8  Check ABG  Will obtain osmolality  Alcohol levels  -I believe the underlying process is increased generation of acid as there are no signs of diarrhea they will make us think increase bicarbonate loss and he does not seem to have a renal tubular acidosis (inability to excrete acid).  -Current albumin is 4.5, so there is no need to correct the anion gap.   -respiratory status: intubated as above.  -consider to Hold any and all sources of acetaminophen in case there is a component of oxoproline causing acidosis  -differential diagnosis of high anion gap metabolic acidosis include Glycols intoxication (propylene and ethylene), oxoproline (prior glutamic acid), lactate, D lactate (short-bowel syndrome), methanol, aspirin intoxication, renal failure, rhabdomyolysis and ketoacidosis.    Elevated CPK-  (present on admission)  Assessment & Plan  Initial value of 2258  Will continue to trend  S/p IVF  Will continue with LR    Acute respiratory failure with hypoxia (HCC)  Assessment & Plan  Most likely due to drug intoxication  Continue with mechanical ventilation  Lung protective strategies  ABCDEF bundle per protocol and AST-SBT as appropriate  cxr in am  Get abg and titrate accordingly        Discussed patient condition and risk of morbidity and/or mortality with RN, RT, and Pharmacy.    The patient remains critically ill.  Critical care time = 64 minutes in directly providing and coordinating critical care and extensive data review.  No time overlap and excludes procedures.    Reggie Kay MD FACP  Pulmonary/Critical Care

## 2023-05-23 NOTE — ED NOTES
Pharmacy Note: Poison Control updated for presumed DIPHENHYDRAMINE overdose  Poison control case #: [id number]    [place], altered and twitching. FSBS 129. Patient had a seizure after arrival to the ER. Multiple receipts from recent purchases of diphenhydramine, as well as many empty blister packets of diphenhydramine were found in his belongings. He has been admitted for a presumed diphenhydramine overdose.     Labs:       05/23/23  0830   SODIUM 139   POTASSIUM 2.8*   CHLORIDE 102   CO2 19*   BUN 13   CREATININE 0.94   GLUCOSE 152*   CALCIUM 9.4   MAGNESIUM 2.2   ASTSGOT 39   ALTSGPT 45   ALBUMIN 4.5   TBILIRUBIN 0.9         05/23/23 09:30   Amphetamines Urine Negative   Barbiturates Negative   Benzodiazepines Negative   Cannabinoid Metab Positive !   Cocaine Metabolite Negative   Methadone Negative   Opiates Negative   Oxycodone Negative   Phencyclidine -Pcp Negative   Propoxyphene Negative       Levels:   05/23/23 08:30 05/23/23 09:14   Acetaminophen -Tylenol  <5.0 (L)   Diagnostic Alcohol <10.1    Salicylates, Quant.  <1.0 (L)       EKG:   - Sinus rhythm, rate 83 bpm   - QRS 80, QTc 480       Recommended Plan:  1. If QRS widens to > 120 msec, push  mEq of sodium bicarbonate. If bicarbonate administration is necessary, repeat EKG to ensure that QRS narrows to less than 120 msec. If it remains long, repeat bicarbonate administration.   2. Treat seizures, acute agitation with benzodiazepines.   3. Supportive care (IVFs for BP support as needed, control hyperthermia, maintain oxygenation, electrolyte replacement when indicated).   4. Physostigmine is no longer manufactured or available for use.     Simon Robb, PharmD, BCCCP

## 2023-05-23 NOTE — ED NOTES
Kd and RN at bedside attempting to get an EKG, pt appears to be having a seizure, ERP at bedside. Pt given ativan and keppra

## 2023-05-23 NOTE — ED TRIAGE NOTES
Pt to rm B21 .  Chief Complaint   Patient presents with    ALOC     Pt [place] with AMS and twitching. Pt is awake, alert, and oriented x 1. Incontinent of urine. Pt denies hst of SZ

## 2023-05-23 NOTE — ED PROVIDER NOTES
"ED Provider Note    CHIEF COMPLAINT  Chief Complaint   Patient presents with    ALOC     Pt found by river with AMS and twitching. Pt is awake, alert, and oriented x 1. Incontinent of urine. Pt denies hst of SZ        EXTERNAL RECORDS REVIEWED  ER visit from 8/30/2022 reviewed.  Patient presented with diarrhea at that time.  No seizure history noted.    HPI/ROS  LIMITATION TO HISTORY   Select: Altered mental status / Confusion  OUTSIDE HISTORIAN(S):  EMS as below    Bishop Bucoi is a 52 y.o. male who presents for evaluation of altered mental state.  Patient was found by the river (not in the river) twitching and altered.  Patient was incontinent of urine.        Patient unable to offer history.    PAST MEDICAL HISTORY     No sz history    SURGICAL HISTORY  patient denies any surgical history    FAMILY HISTORY  No family history on file.    SOCIAL HISTORY  Social History     Tobacco Use    Smoking status: Never    Smokeless tobacco: Never   Substance and Sexual Activity    Alcohol use: Never    Drug use: Never    Sexual activity: Not on file       CURRENT MEDICATIONS  Home Medications       Reviewed by Gabriela Forbes (Pharmacy Tech) on 05/23/23 at 0946  Med List Status: Complete     Medication Last Dose Status        Patient Horacio Taking any Medications                           ALLERGIES  Allergies   Allergen Reactions    Pcn [Penicillins] Rash     Per patient        PHYSICAL EXAM  VITAL SIGNS: BP (!) 151/92   Pulse 86   Temp 36.1 °C (97 °F) (Temporal)   Resp 16   Ht 1.778 m (5' 10\")   Wt 94.8 kg (209 lb)   SpO2 100%   BMI 29.99 kg/m²    General:  WDWN male, appears confused, pulling at lines; anxious; alert, able to state the month and that he is at the hospital, V/S as above; tachycardic and hypertensive, not hypoxic  Skin: warm and dry; good color; no rash  HEENT: NCAT; EOMs intact; pupils are 4 mm and equal; PERRL; no scleral icterus; no oral trauma  Neck: FROM; no LAD, no stridor, no " meningismus  Cardiovascular: Tachycardic heart rate and regular rhythm.  No murmurs, rubs, or gallops; pulses 2+ bilaterally radially and DP areas  Lungs: No respiratory distress or tachypnea; Clear to auscultation with good air movement bilaterally.  No wheezes, rhonchi, or rales.   Abdomen: BS present; soft; NTND; no rebound, guarding, or rigidity.  No organomegaly or pulsatile mass; no CVAT   Extremities: JOHNS x 4; cool legs without mottling; no e/o trauma; no pedal edema  Neurologic: CNs III-XII grossly intact; speech stuttering; distal sensation intact; strength 5/5 UE/LEs  Psychiatric: Anxious mood, startles, twitching    DIAGNOSTIC STUDIES / PROCEDURES  EKG  I have independently interpreted this EKG. no acute ST elevation.  Minimal ST depression laterally.  No QRS widening.    LABS  Results for orders placed or performed during the hospital encounter of 05/23/23   CBC WITH DIFFERENTIAL   Result Value Ref Range    WBC 11.1 (H) 4.8 - 10.8 K/uL    RBC 5.19 4.70 - 6.10 M/uL    Hemoglobin 16.0 14.0 - 18.0 g/dL    Hematocrit 45.4 42.0 - 52.0 %    MCV 87.5 81.4 - 97.8 fL    MCH 30.8 27.0 - 33.0 pg    MCHC 35.2 32.3 - 36.5 g/dL    RDW 40.3 35.9 - 50.0 fL    Platelet Count 340 164 - 446 K/uL    MPV 9.6 9.0 - 12.9 fL    Neutrophils-Polys 83.30 (H) 44.00 - 72.00 %    Lymphocytes 12.30 (L) 22.00 - 41.00 %    Monocytes 3.50 0.00 - 13.40 %    Eosinophils 0.10 0.00 - 6.90 %    Basophils 0.30 0.00 - 1.80 %    Immature Granulocytes 0.50 0.00 - 0.90 %    Nucleated RBC 0.00 0.00 - 0.20 /100 WBC    Neutrophils (Absolute) 9.26 (H) 1.82 - 7.42 K/uL    Lymphs (Absolute) 1.36 1.00 - 4.80 K/uL    Monos (Absolute) 0.39 0.00 - 0.85 K/uL    Eos (Absolute) 0.01 0.00 - 0.51 K/uL    Baso (Absolute) 0.03 0.00 - 0.12 K/uL    Immature Granulocytes (abs) 0.05 0.00 - 0.11 K/uL    NRBC (Absolute) 0.00 K/uL   COMP METABOLIC PANEL   Result Value Ref Range    Sodium 139 135 - 145 mmol/L    Potassium 2.8 (L) 3.6 - 5.5 mmol/L    Chloride 102 96 - 112  mmol/L    Co2 19 (L) 20 - 33 mmol/L    Anion Gap 18.0 (H) 7.0 - 16.0    Glucose 152 (H) 65 - 99 mg/dL    Bun 13 8 - 22 mg/dL    Creatinine 0.94 0.50 - 1.40 mg/dL    Calcium 9.4 8.5 - 10.5 mg/dL    AST(SGOT) 39 12 - 45 U/L    ALT(SGPT) 45 2 - 50 U/L    Alkaline Phosphatase 82 30 - 99 U/L    Total Bilirubin 0.9 0.1 - 1.5 mg/dL    Albumin 4.5 3.2 - 4.9 g/dL    Total Protein 7.6 6.0 - 8.2 g/dL    Globulin 3.1 1.9 - 3.5 g/dL    A-G Ratio 1.5 g/dL   TROPONIN   Result Value Ref Range    Troponin T <6 6 - 19 ng/L   LACTIC ACID   Result Value Ref Range    Lactic Acid 2.9 (H) 0.5 - 2.0 mmol/L   URINALYSIS CULTURE, IF INDICATED    Specimen: Urine, Clean Catch; Blood   Result Value Ref Range    Color Yellow     Character Clear     Specific Gravity 1.009 <1.035    Ph 6.5 5.0 - 8.0    Glucose Negative Negative mg/dL    Ketones Trace (A) Negative mg/dL    Protein Negative Negative mg/dL    Bilirubin Negative Negative    Urobilinogen, Urine 0.2 Negative    Nitrite Negative Negative    Leukocyte Esterase Negative Negative    Occult Blood Negative Negative    Micro Urine Req see below    CREATINE KINASE   Result Value Ref Range    CPK Total 734 (H) 0 - 154 U/L   MAGNESIUM   Result Value Ref Range    Magnesium 2.2 1.5 - 2.5 mg/dL   DIAGNOSTIC ALCOHOL   Result Value Ref Range    Diagnostic Alcohol <10.1 <10.1 mg/dL   URINE DRUG SCREEN   Result Value Ref Range    Amphetamines Urine Negative Negative    Barbiturates Negative Negative    Benzodiazepines Negative Negative    Cocaine Metabolite Negative Negative    Fentanyl, Urine Negative Negative    Methadone Negative Negative    Opiates Negative Negative    Oxycodone Negative Negative    Phencyclidine -Pcp Negative Negative    Propoxyphene Negative Negative    Cannabinoid Metab Positive (A) Negative   CORRECTED CALCIUM   Result Value Ref Range    Correct Calcium 9.0 8.5 - 10.5 mg/dL   ESTIMATED GFR   Result Value Ref Range    GFR (CKD-EPI) 96 >60 mL/min/1.73 m 2   EKG   Result Value Ref  Range    Report       [facility] Emergency Dept.    Test Date:  2023  Pt Name:    MARIVEL VILLAFANA                Department: ER  MRN:        [id number]                      Room:       BL 21  Gender:     Male                         Technician: [id number]  :        1970                   Requested By:JULIANA CHILDERS  Order #:    [id number]                    Reading MD: JULIANA CHILDERS MD    Measurements  Intervals                                Axis  Rate:       83                           P:          52  IA:         137                          QRS:        19  QRSD:       80                           T:          6  QT:         408  QTc:        480    Interpretive Statements  Sinus rhythm  Probable left atrial enlargement  Borderline T abnormalities, inferior leads  Borderline prolonged QT interval  No previous ECG available for comparison  Electronically Signed On 2023 11:27:40 PDT by JULIANA CHILDERS MD           RADIOLOGY  I have independently interpreted the diagnostic imaging associated with this visit and am waiting the final reading from the radiologist.   My preliminary interpretation is as follows: No intracranial hemorrhage noted  Radiologist interpretation:   CT-HEAD W/O   Final Result      1.  No CT evidence of acute infarct, hemorrhage or mass.   2.  Moderate mucosal thickening of the maxillary, sphenoid and ethmoid sinuses.      DX-CHEST-PORTABLE (1 VIEW)   Final Result      Predominately linear bibasilar opacities, likely atelectasis. No focal consolidation. No effusions.         DX-CHEST-PORTABLE (1 VIEW)    (Results Pending)         COURSE & MEDICAL DECISION MAKING    ED Observation Status? Yes; I am placing the patient in to an observation status due to a diagnostic uncertainty as well as therapeutic intensity. Patient placed in observation status at 8:42 AM, 2023.     Observation plan is as follows: Labs, imaging, rehydration, observation for improvement of  altered mental state    Upon Reevaluation, the patient's condition has: not improved; and will be escalated to hospitalization.    Patient discharged from ED Observation status at 11:45 AM  (Time) 5/23/23 (Date).     INITIAL ASSESSMENT, COURSE AND PLAN  Care Narrative: This is a 52-year-old male who presents via EMS for evaluation of altered mental state.  Patient was noted to be shaking by someone across the river from him.  Unclear if this was a seizure.  Patient is tachycardic and hypertensive.  Seems to be clearing with time and better able to answer questions.  Ativan 1 mg IV and normal saline ordered along with labs, imaging, EKG to investigate for electrolyte abnormality, dehydration, intracranial process, intoxication, sepsis.  I do not suspect meningitis.    9:18 AM  Patient with deviated gaze and nonverbal per RN and tach.  Suspect seizure.  Ativan 2 mg IV and Keppra loading dose ordered.  Paging neurology for EEG.    10:20 AM  Patient now returned from CT.  No obvious ICH on my read.  I discussed the case with the ICU physician.  We will await CT head results before dispo, ICU versus neuro ICU.    10:27 AM  Social work used the patient's phone to contact the patient's brother who states the patient has no seizure history but has been depressed recently.  Further inspection of the patient's belongings reveals empty packet of Benadryl 25mg #6 as well as a receipt for Benadryl Ultratabs x 2 for $7.99 each.  Calling Poison Control.  Added Acetaminophen and Salicylate.    I suspect patient's lactic acidosis is secondary to dehydration.  He has trace ketones in the urine and a CPK of 734 with a low potassium.    Potassium ordered    10:42 AM  [facility] case #[id number]  If QRS is greater than 120, bicarb indicated.  Otherwise benzodiazepines and supportive care.    11:28 AM  Neurology has seen the patient.  Agrees with benzos and Keppra.  Dr. Kay from the ICU was here to evaluate the patient.  He recommends  intubation.  Pharmacy aware.  RT aware.    11:49 AM  Pt intubated.  CXR pending.  Aspirin and salicylate levels are still pending.  ED is positive for cannabinoids.    Intubation Procedure Note    Indication: potential airway compromise and airway protection    Consent: Unable to be obtained due to patient's condition.    Medications Used: propofol 80 mg intravenously and succinycholine 100 mg intravenously    Procedure: The patient was placed in the appropriate position.  Cricoid pressure was not required.  Intubation was performed by direct laryngoscopy using a 4-0 MAC laryngoscope and an 8.0 cuffed endotracheal tube.  The cuff was then inflated and the tube was secured appropriately at a distance of 24 cm to the dental ridge.  Initial confirmation of placement included bilateral breath sounds, an end tidal CO2 detector, absence of sounds over the stomach, tube fogging, adequate chest rise, and adequate pulse oximetry reading.  A chest x-ray to verify correct placement of the tube has been ordered but is still pending.    The patient tolerated the procedure well.     Complications: None; dentures removed prior to intubation      The total critical care time spent caring for this patient totaled 61 minutes due to the high probability of imminent deterioration and life-threatening condition. This time included frequent reassessments and intervening with necessary action such as benzodiazepines, Keppra, having conversations with multiple consultants including neurology, intensivist, poison control,, social work.    HYDRATION: Based on the patient's presentation of Tachycardia the patient was given IV fluids. IV Hydration was used because oral hydration failed due to altered mental state. Upon recheck following hydration, the patient was less tachy.      DISPOSITION AND DISCUSSIONS  I have discussed management of the patient with the following physicians and YOCASTA's:    Holly Kay    Discussion of management with  other QHP or appropriate source(s): Pharmacy for meds, RT intubation, and Social Work speaking with family      FINAL DIAGNOSIS  1. Altered mental status, unspecified altered mental status type      Electronically signed by: Susanne Gonzalez M.D., 5/23/2023 8:26 AM

## 2023-05-23 NOTE — PROGRESS NOTES
4 Eyes Skin Assessment Completed by HUBER Arreguin and HUBER Carty.    Head EEG wrap already in place, will need to re-evaluate once wrap is off.   Ears WDL  Nose WDL  Mouth WDL  Neck WDL  Breast/Chest Rash  Shoulder Blades WDL  Spine WDL  (R) Arm/Elbow/Hand Redness and Blanching  (L) Arm/Elbow/Hand Redness and Blanching  Abdomen WDL  Groin WDL  Scrotum/Coccyx/Buttocks Redness and Blanching  (R) Leg WDL  (L) Leg WDL  (R) Heel/Foot/Toe WDL  (L) Heel/Foot/Toe  right heel abrasion          Devices In Places ECG, Blood Pressure Cuff, Pulse Ox, Chavez, SCD's, and EEG      Interventions In Place Gray Ear Foams, Sacral Mepilex, Pillows, Q2 Turns, Low Air Loss Mattress, and Heels Loaded W/Pillows    Possible Skin Injury No    Pictures Uploaded Into Epic Yes  Wound Consult Placed N/A  RN Wound Prevention Protocol Ordered No

## 2023-05-23 NOTE — CARE PLAN
Problem: Ventilation  Goal: Ability to achieve and maintain unassisted ventilation or tolerate decreased levels of ventilator support  Description: Target End Date:  4 days     Document on Vent flowsheet    1.  Support and monitor invasive and noninvasive mechanical ventilation  2.  Monitor ventilator weaning response  3.  Perform ventilator associated pneumonia prevention interventions  4.  Manage ventilation therapy by monitoring diagnostic test results  Outcome: Not Met     Ventilator Daily Summary    Vent Day #1    ETT:  8.0 @ 24 gum     Ventilator settings:   CMV R20 440 8 40%  Weaning trials:  None  Respiratory Procedures:  None   Plan: Continue current ventilator settings and wean mechanical ventilation as tolerated per physician orders.

## 2023-05-23 NOTE — DISCHARGE PLANNING
Pt brother Isaias arrived to Emergency Department. He was taken to the family room where he was updated Medically by ERP. He was taken to the [facility] ICU waiting room.    SW will update floor .

## 2023-05-23 NOTE — ED NOTES
Pt leaning to left and having what appears to be off/on seizure activity, with snoring respirations. MD at bedside. Nasal trumpet placed along with oxy mask at 6 liters. Bed lined with seizure pads.

## 2023-05-23 NOTE — PROCEDURES
CONTINUOUS VIDEO ELECTROENCEPHALOGRAM REPORT    REFERRING PROVIDER: Dr. Mckeon  DOS: 5/23/2023  ROOM: [facility]/   TOTAL RECORDING TIME: 20 hours and 27 minutes of total recording time    INDICATION:  Bishop Bucio 52 y.o. male presenting with altered mental status    CURRENT ANTI-SEIZURE MEDICATIONS AND OTHER RELEVANT TREATMENTS:   sodium phosphate 15 mmol in dextrose 5% 250 mL ivpb, 15 mmol, Once  famotidine, 20 mg, Q12HRS   Or  famotidine, 20 mg, Q12HRS  MD Alert...Adult ICU Electrolyte Replacement per Pharmacy, , PHARMACY TO DOSE  senna-docusate, 2 Tablet, BID  heparin, 5,000 Units, Q8HRS  insulin regular, 1-6 Units, Q6HRS  levETIRAcetam (Keppra) IV, 1,000 mg, Q12HRS      LR, Last Rate: 125 mL/hr at 05/24/23 0613  fentaNYL, Last Rate: Stopped (05/24/23 0609)   And  propofol, Last Rate: Stopped (05/24/23 0607)        TECHNIQUE:   CVEEG was set up by a Neurodiagnostic technologist who performed education to the patient and staff. A minimum of 23 electrodes and 23 channel recording was setup and performed by Neurodiagnostic technologist, in accordance with the international 10-20 system. Impedence, electrode integrity, and technical impressions were documented a minimum of every 2-24 hour period by a Neurodiagnostic Technologist and reviewed by Interpreting Physician. The study was reviewed in bipolar and referential montages. The recording examined the patient in the awake, drowsy/sleep and/or comatose state(s).     DESCRIPTION OF THE RECORD:  The background was intermittently discontinuous, symmetrical, low voltage, with mostly delta waveforms with overriding alpha/beta activity with higher doses of sedation.  With the lowering and eventual discontinuation of sedation the background became more organized, with the emergence of a anterior posterior gradient, a 9 to 10 Hz posterior dominant rhythm, and faster frequencies during maximal wakefulness.  Also with the lowering and stoppage of sedation reactivity and state  changes were present.  Normal N2 sleep architecture in the form of vertex waves and sleep spindles were seen.    ICTAL AND INTERICTAL FINDINGS:   No focal or generalized epileptiform activity noted.     No persistent regional slowing was seen during this routine study.      No electroclinical or electrographic seizures were reported or recorded during the study.     ACTIVATION PROCEDURES:   Intermittent Photic stimulation was performed in a stepwise fashion from 1 to 30 Hz and did not elicited any abnormalities on EEG.     EKG: Sampling of the EKG recording did not demonstrate any abnormalities    EVENTS:  No clinical events recorded or reported    INTERPRETATION:  Normal video EEG recording in the sedated, drowsy, sleep, awake states:  -Moderate background slowing with seen throughout the study due to sedation effects.  With the lowering and then eventual discontinuation of the sedation the background normalized.  -No persistent focal asymmetry, epileptiform activity, or seizures were seen.            Brayden Euceda MD  Department of Neurology at [facility]  General Neurologist and Epileptologist  Director of [facility]'s Level III Comprehensive Epilepsy Program  Professor of Clinical Neurology, [facility].   Phone: [phone]  Fax: [phone]  E-mail: [email or web address]

## 2023-05-23 NOTE — CONSULTS
Neurology Initial Consult H&P  Neurohospitalist Service, [facility]    Referring Physician: Reggie Kay M.D.    Chief Complaint   Patient presents with    ALOC     Pt found by river with AMS and twitching. Pt is awake, alert, and oriented x 1. Incontinent of urine. Pt denies hst of SZ        HPI:   51 yo man found down with generalized convulsion by the river. He was taken to the ED and proceeded to have multiple additional GTCs with L gaze deviation. There was a receipt in his pocket for benadryl, and an overdose is suspected. Discussed with Dr. Gonzalez, who states that he improved briefly before seizing again, and during that interval was able to speak and following commands, moving all extremities. Currently, the patient is obtunded and unable to participate in the history.     I spoke with his brother, who denies hx of seizures, TBI, meningoencephalitis, etoh abuse, cocaine and amphetamine use.     Review of systems: In addition to what is detailed in the HPI above, (and scanned into the chart if and when applicable), all other systems reviewed and are negative.    Past Medical History:    has no past medical history on file.    FHx:  family history is not on file.    SHx:   reports that he has never smoked. He has never used smokeless tobacco. He reports that he does not drink alcohol and does not use drugs.    Allergies:  Allergies   Allergen Reactions    Pcn [Penicillins] Rash     Per patient        Medications:    Current Facility-Administered Medications:     Respiratory Therapy Consult, , Nebulization, Continuous RT, Reggie Kay M.D.    famotidine (PEPCID) tablet 20 mg, 20 mg, Enteral Tube, Q12HRS **OR** famotidine (PEPCID) injection 20 mg, 20 mg, Intravenous, Q12HRS, Reggie Kay M.D.    MD Alert...ICU Electrolyte Replacement per Pharmacy, , Other, PHARMACY TO DOSE, Reggie Kay M.D.    lidocaine (XYLOCAINE) 1 % injection 2 mL, 2 mL, Tracheal Tube, Q30  MIN PRN, Reggie Kay M.D.    senna-docusate (PERICOLACE or SENOKOT S) 8.6-50 MG per tablet 2 Tablet, 2 Tablet, Enteral Tube, BID **AND** polyethylene glycol/lytes (MIRALAX) PACKET 1 Packet, 1 Packet, Enteral Tube, QDAY PRN **AND** magnesium hydroxide (MILK OF MAGNESIA) suspension 30 mL, 30 mL, Enteral Tube, QDAY PRN **AND** bisacodyl (DULCOLAX) suppository 10 mg, 10 mg, Rectal, QDAY PRN, Reggie Kay M.D.    lactated ringers infusion, , Intravenous, Continuous, Reggie Kay M.D.    [START ON 5/24/2023] heparin injection 5,000 Units, 5,000 Units, Subcutaneous, Q8HRS, Reggie Kay M.D.    fentaNYL (SUBLIMAZE) injection 100 mcg, 100 mcg, Intravenous, Q15 MIN PRN **AND** fentaNYL (SUBLIMAZE) injection 200 mcg, 200 mcg, Intravenous, Q15 MIN PRN, 200 mcg at 05/23/23 1212 **AND** fentaNYL (SUBLIMAZE) 50 mcg/mL in 50mL (Continuous Infusion), , Intravenous, Continuous **AND** propofol (DIPRIVAN) injection, 0-80 mcg/kg/min (Ideal), Intravenous, Continuous **AND** [START ON 5/24/2023] Triglyceride, , , Every 3 Days (0300), Reggie Kay M.D.    insulin regular (HumuLIN R,NovoLIN R) injection, 1-6 Units, Subcutaneous, Q6HRS **AND** POC blood glucose manual result, , , Q6H **AND** NOTIFY MD and PharmD, , , Once **AND** Administer 20 grams of glucose (approximately 8 ounces of fruit juice) every 15 minutes PRN FSBG less than 70 mg/dL, , , PRN **AND** dextrose 10 % BOLUS 25 g, 25 g, Intravenous, Q15 MIN PRN, Reggie Kay M.D.    LORazepam (ATIVAN) injection 1 mg, 1 mg, Intravenous, Once PRN **OR** LORazepam (ATIVAN) injection 2 mg, 2 mg, Intravenous, Once PRN, Reggie Kay M.D.    potassium chloride (KCL) ivpb 10 mEq, 10 mEq, Intravenous, Q HOUR, Reggie Kay M.D.    Physical Examination:     Vitals:    05/23/23 1208 05/23/23 1225 05/23/23 1235 05/23/23 1238   BP: (!) 180/113      Pulse: 100 68     Resp:  20     Temp:   36.1 °C (96.9 °F)    TempSrc:   Temporal    SpO2: 98%  "100%     Weight:   95.3 kg (210 lb 1.6 oz)    Height:    1.778 m (5' 10\")       General: Patient is awake and in no acute distress    NEUROLOGICAL EXAM:     MS: eyes closed; no response to voice; grimace and withdraws to pain  CN: PEERL, slight side to side movements - no gaze preference, unable to test VF, no obvious facial asymmetry; unable to test uvular lift and tongue protrusion, SCM and trap; Hearing intact grossly.  Fundus not visualized  MOT: Nl bulk and tone.  Moves all extremities to noxious stimulation  SENS: responds to pain throughout as above  DTR: 2+ and symmetric; toes down  COOR: unable to test  Gait: unable to test      Objective Data:    Labs:  No results found for: PROTHROMBTM, INR   Lab Results   Component Value Date/Time    WBC 11.1 (H) 05/23/2023 08:30 AM    RBC 5.19 05/23/2023 08:30 AM    HEMOGLOBIN 16.0 05/23/2023 08:30 AM    HEMATOCRIT 45.4 05/23/2023 08:30 AM    MCV 87.5 05/23/2023 08:30 AM    MCH 30.8 05/23/2023 08:30 AM    MCHC 35.2 05/23/2023 08:30 AM    MPV 9.6 05/23/2023 08:30 AM    NEUTSPOLYS 83.30 (H) 05/23/2023 08:30 AM    LYMPHOCYTES 12.30 (L) 05/23/2023 08:30 AM    MONOCYTES 3.50 05/23/2023 08:30 AM    EOSINOPHILS 0.10 05/23/2023 08:30 AM    BASOPHILS 0.30 05/23/2023 08:30 AM      Lab Results   Component Value Date/Time    SODIUM 139 05/23/2023 08:30 AM    POTASSIUM 2.8 (L) 05/23/2023 08:30 AM    CHLORIDE 102 05/23/2023 08:30 AM    CO2 19 (L) 05/23/2023 08:30 AM    GLUCOSE 152 (H) 05/23/2023 08:30 AM    BUN 13 05/23/2023 08:30 AM    CREATININE 0.94 05/23/2023 08:30 AM      No results found for: CHOLSTRLTOT, LDL, HDL, TRIGLYCERIDE    Lab Results   Component Value Date/Time    ALKPHOSPHAT 82 05/23/2023 08:30 AM    ASTSGOT 39 05/23/2023 08:30 AM    ALTSGPT 45 05/23/2023 08:30 AM    TBILIRUBIN 0.9 05/23/2023 08:30 AM        Imaging/Testing:  CT head unremarkable    Assessment and Plan:    Acute, recurrent generalized convulsion with intervening periods of lethargy and obtundation - " generalized seizures, possibly due to benadryl intoxication. EEG pending. Agree with keppra load. Also recommend MRI brain when able.     Plan:  MRI brain  EEG  Keppra 1000 mg bid  No driving, operating heavy machinery, swimming alone, climbing ladders, or engaging in any activity that could increase the risk of injury or death in the event of a seizure   Continue supportive care.        This chart was partially generated using voice recognition technology and sound alike word replacement may be present, best efforts were made to make the chart accurate.    Bartolome Barrera MD  Board Certified Neurology, ABPN

## 2023-05-23 NOTE — DISCHARGE PLANNING
RODRICK asked to assist in locating family:     [employer]     Juan PAO Lockwood  : Sep 1952   Age: 70   [phone] (number not in service)   [email or web address]   [address]  [place] [address]  (10/27/2016 - CURRENT)        Jonathan Rodriguez  : Mar 1972   Age: 51   [phone] (voice mail left)   None found   [address]  [place] [address]  (3/30/2005 - CURRENT)     RODRICK went to Pt room and found backpack-RODRICK went through Backpack and found a cell phone. Cell phone has 13 missed calls from Isaias. RODRICK able to contact Isaias [phone].  Isaias states he is Pt. Brother. He states no medical history for Pt. , no medications. States Pt does not drink alcohol, smokes a little weed. Pt has been depressed. Isaias is en route to the hospital. Contact sheet has been updated.

## 2023-05-23 NOTE — ED NOTES
at bedside, pts phone found and brother contacted who is on his way. Pt's brother states he has no medical hst and does not take any medication. Pt has been recently depressed per brother. Two receipts found in backpack indicating he bought 2 packages of benadryl ultra tabs and a bottle of Nyquil concern that he may have taken unknown amount of benadryl at this time. ERP aware and contacting poison control.

## 2023-05-24 ENCOUNTER — APPOINTMENT (OUTPATIENT)
Dept: RADIOLOGY | Facility: MEDICAL CENTER | Age: 53
DRG: 917 | End: 2023-05-24
Attending: INTERNAL MEDICINE
Payer: MEDICAID

## 2023-05-24 LAB
ANION GAP SERPL CALC-SCNC: 8 MMOL/L (ref 7–16)
ANION GAP SERPL CALC-SCNC: 8 MMOL/L (ref 7–16)
ANION GAP SERPL CALC-SCNC: 9 MMOL/L (ref 7–16)
BASE EXCESS BLDA CALC-SCNC: -1 MMOL/L (ref -4–3)
BASOPHILS # BLD AUTO: 0.2 % (ref 0–1.8)
BASOPHILS # BLD: 0.02 K/UL (ref 0–0.12)
BODY TEMPERATURE: ABNORMAL DEGREES
BREATHS SETTING VENT: 18
BUN SERPL-MCNC: 6 MG/DL (ref 8–22)
BUN SERPL-MCNC: 8 MG/DL (ref 8–22)
BUN SERPL-MCNC: 9 MG/DL (ref 8–22)
CALCIUM SERPL-MCNC: 7.6 MG/DL (ref 8.5–10.5)
CALCIUM SERPL-MCNC: 8 MG/DL (ref 8.5–10.5)
CALCIUM SERPL-MCNC: 8.2 MG/DL (ref 8.5–10.5)
CHLORIDE SERPL-SCNC: 106 MMOL/L (ref 96–112)
CHLORIDE SERPL-SCNC: 108 MMOL/L (ref 96–112)
CHLORIDE SERPL-SCNC: 115 MMOL/L (ref 96–112)
CK SERPL-CCNC: 1074 U/L (ref 0–154)
CK SERPL-CCNC: 1098 U/L (ref 0–154)
CK SERPL-CCNC: 1606 U/L (ref 0–154)
CK SERPL-CCNC: 2145 U/L (ref 0–154)
CO2 BLDA-SCNC: 25 MMOL/L (ref 20–33)
CO2 SERPL-SCNC: 22 MMOL/L (ref 20–33)
CO2 SERPL-SCNC: 23 MMOL/L (ref 20–33)
CO2 SERPL-SCNC: 24 MMOL/L (ref 20–33)
CREAT SERPL-MCNC: 0.91 MG/DL (ref 0.5–1.4)
CREAT SERPL-MCNC: 0.91 MG/DL (ref 0.5–1.4)
CREAT SERPL-MCNC: 1.06 MG/DL (ref 0.5–1.4)
DELSYS IDSYS: ABNORMAL
EOSINOPHIL # BLD AUTO: 0.06 K/UL (ref 0–0.51)
EOSINOPHIL NFR BLD: 0.6 % (ref 0–6.9)
ERYTHROCYTE [DISTWIDTH] IN BLOOD BY AUTOMATED COUNT: 42.8 FL (ref 35.9–50)
GFR SERPLBLD CREATININE-BSD FMLA CKD-EPI: 100 ML/MIN/1.73 M 2
GFR SERPLBLD CREATININE-BSD FMLA CKD-EPI: 100 ML/MIN/1.73 M 2
GFR SERPLBLD CREATININE-BSD FMLA CKD-EPI: 84 ML/MIN/1.73 M 2
GLUCOSE BLD STRIP.AUTO-MCNC: 104 MG/DL (ref 65–99)
GLUCOSE BLD STRIP.AUTO-MCNC: 99 MG/DL (ref 65–99)
GLUCOSE SERPL-MCNC: 103 MG/DL (ref 65–99)
GLUCOSE SERPL-MCNC: 85 MG/DL (ref 65–99)
GLUCOSE SERPL-MCNC: 90 MG/DL (ref 65–99)
HCO3 BLDA-SCNC: 24.1 MMOL/L (ref 17–25)
HCT VFR BLD AUTO: 35.7 % (ref 42–52)
HGB BLD-MCNC: 12 G/DL (ref 14–18)
HOROWITZ INDEX BLDA+IHG-RTO: 230 MM[HG]
IMM GRANULOCYTES # BLD AUTO: 0.04 K/UL (ref 0–0.11)
IMM GRANULOCYTES NFR BLD AUTO: 0.4 % (ref 0–0.9)
LYMPHOCYTES # BLD AUTO: 1.3 K/UL (ref 1–4.8)
LYMPHOCYTES NFR BLD: 13.8 % (ref 22–41)
MAGNESIUM SERPL-MCNC: 1.8 MG/DL (ref 1.5–2.5)
MAGNESIUM SERPL-MCNC: 2.5 MG/DL (ref 1.5–2.5)
MCH RBC QN AUTO: 30.6 PG (ref 27–33)
MCHC RBC AUTO-ENTMCNC: 33.6 G/DL (ref 32.3–36.5)
MCV RBC AUTO: 91.1 FL (ref 81.4–97.8)
MODE IMODE: ABNORMAL
MONOCYTES # BLD AUTO: 0.65 K/UL (ref 0–0.85)
MONOCYTES NFR BLD AUTO: 6.9 % (ref 0–13.4)
NEUTROPHILS # BLD AUTO: 7.37 K/UL (ref 1.82–7.42)
NEUTROPHILS NFR BLD: 78.1 % (ref 44–72)
NRBC # BLD AUTO: 0 K/UL
NRBC BLD-RTO: 0 /100 WBC (ref 0–0.2)
O2/TOTAL GAS SETTING VFR VENT: 40 %
PCO2 BLDA: 42.4 MMHG (ref 26–37)
PCO2 TEMP ADJ BLDA: 40.6 MMHG (ref 26–37)
PEEP END EXPIRATORY PRESSURE IPEEP: 8 CMH20
PH BLDA: 7.36 [PH] (ref 7.4–7.5)
PH TEMP ADJ BLDA: 7.38 [PH] (ref 7.4–7.5)
PHOSPHATE SERPL-MCNC: 2.3 MG/DL (ref 2.5–4.5)
PHOSPHATE SERPL-MCNC: 2.8 MG/DL (ref 2.5–4.5)
PLATELET # BLD AUTO: 256 K/UL (ref 164–446)
PMV BLD AUTO: 9.8 FL (ref 9–12.9)
PO2 BLDA: 92 MMHG (ref 64–87)
PO2 TEMP ADJ BLDA: 87 MMHG (ref 64–87)
POTASSIUM SERPL-SCNC: 3.9 MMOL/L (ref 3.6–5.5)
POTASSIUM SERPL-SCNC: 4 MMOL/L (ref 3.6–5.5)
POTASSIUM SERPL-SCNC: 4 MMOL/L (ref 3.6–5.5)
RBC # BLD AUTO: 3.92 M/UL (ref 4.7–6.1)
SAO2 % BLDA: 97 % (ref 93–99)
SODIUM SERPL-SCNC: 137 MMOL/L (ref 135–145)
SODIUM SERPL-SCNC: 139 MMOL/L (ref 135–145)
SODIUM SERPL-SCNC: 147 MMOL/L (ref 135–145)
SPECIMEN DRAWN FROM PATIENT: ABNORMAL
TIDAL VOLUME IVT: 440 ML
WBC # BLD AUTO: 9.4 K/UL (ref 4.8–10.8)

## 2023-05-24 PROCEDURE — 94003 VENT MGMT INPAT SUBQ DAY: CPT

## 2023-05-24 PROCEDURE — 700105 HCHG RX REV CODE 258: Performed by: NURSE PRACTITIONER

## 2023-05-24 PROCEDURE — 770022 HCHG ROOM/CARE - ICU (200)

## 2023-05-24 PROCEDURE — 700101 HCHG RX REV CODE 250: Performed by: EMERGENCY MEDICINE

## 2023-05-24 PROCEDURE — 84100 ASSAY OF PHOSPHORUS: CPT

## 2023-05-24 PROCEDURE — 82550 ASSAY OF CK (CPK): CPT

## 2023-05-24 PROCEDURE — A9270 NON-COVERED ITEM OR SERVICE: HCPCS | Performed by: INTERNAL MEDICINE

## 2023-05-24 PROCEDURE — 82803 BLOOD GASES ANY COMBINATION: CPT

## 2023-05-24 PROCEDURE — 99232 SBSQ HOSP IP/OBS MODERATE 35: CPT | Performed by: PSYCHIATRY & NEUROLOGY

## 2023-05-24 PROCEDURE — 700111 HCHG RX REV CODE 636 W/ 250 OVERRIDE (IP): Performed by: EMERGENCY MEDICINE

## 2023-05-24 PROCEDURE — 85025 COMPLETE CBC W/AUTO DIFF WBC: CPT

## 2023-05-24 PROCEDURE — 82962 GLUCOSE BLOOD TEST: CPT

## 2023-05-24 PROCEDURE — 700105 HCHG RX REV CODE 258: Performed by: EMERGENCY MEDICINE

## 2023-05-24 PROCEDURE — 700102 HCHG RX REV CODE 250 W/ 637 OVERRIDE(OP): Performed by: EMERGENCY MEDICINE

## 2023-05-24 PROCEDURE — 80048 BASIC METABOLIC PNL TOTAL CA: CPT

## 2023-05-24 PROCEDURE — 36600 WITHDRAWAL OF ARTERIAL BLOOD: CPT

## 2023-05-24 PROCEDURE — 700111 HCHG RX REV CODE 636 W/ 250 OVERRIDE (IP): Performed by: INTERNAL MEDICINE

## 2023-05-24 PROCEDURE — 83735 ASSAY OF MAGNESIUM: CPT

## 2023-05-24 PROCEDURE — 70551 MRI BRAIN STEM W/O DYE: CPT

## 2023-05-24 PROCEDURE — 700102 HCHG RX REV CODE 250 W/ 637 OVERRIDE(OP): Performed by: INTERNAL MEDICINE

## 2023-05-24 PROCEDURE — 99291 CRITICAL CARE FIRST HOUR: CPT | Performed by: EMERGENCY MEDICINE

## 2023-05-24 PROCEDURE — A9270 NON-COVERED ITEM OR SERVICE: HCPCS | Performed by: EMERGENCY MEDICINE

## 2023-05-24 PROCEDURE — 700111 HCHG RX REV CODE 636 W/ 250 OVERRIDE (IP): Performed by: NURSE PRACTITIONER

## 2023-05-24 PROCEDURE — 94150 VITAL CAPACITY TEST: CPT

## 2023-05-24 RX ORDER — ACETAMINOPHEN 325 MG/1
650 TABLET ORAL EVERY 4 HOURS PRN
Status: DISCONTINUED | OUTPATIENT
Start: 2023-05-24 | End: 2023-06-21 | Stop reason: HOSPADM

## 2023-05-24 RX ORDER — ENOXAPARIN SODIUM 100 MG/ML
40 INJECTION SUBCUTANEOUS DAILY
Status: DISCONTINUED | OUTPATIENT
Start: 2023-05-24 | End: 2023-06-21 | Stop reason: HOSPADM

## 2023-05-24 RX ORDER — MAGNESIUM SULFATE HEPTAHYDRATE 40 MG/ML
2 INJECTION, SOLUTION INTRAVENOUS ONCE
Status: COMPLETED | OUTPATIENT
Start: 2023-05-24 | End: 2023-05-24

## 2023-05-24 RX ORDER — LEVETIRACETAM 500 MG/5ML
1000 INJECTION, SOLUTION, CONCENTRATE INTRAVENOUS EVERY 12 HOURS
Status: DISCONTINUED | OUTPATIENT
Start: 2023-05-24 | End: 2023-05-25

## 2023-05-24 RX ORDER — ESCITALOPRAM OXALATE 10 MG/1
10 TABLET ORAL DAILY
Status: DISCONTINUED | OUTPATIENT
Start: 2023-05-24 | End: 2023-05-28

## 2023-05-24 RX ORDER — SODIUM CHLORIDE, SODIUM LACTATE, POTASSIUM CHLORIDE, AND CALCIUM CHLORIDE .6; .31; .03; .02 G/100ML; G/100ML; G/100ML; G/100ML
1000 INJECTION, SOLUTION INTRAVENOUS ONCE
Status: COMPLETED | OUTPATIENT
Start: 2023-05-24 | End: 2023-05-24

## 2023-05-24 RX ADMIN — SODIUM PHOSPHATE, MONOBASIC, MONOHYDRATE AND SODIUM PHOSPHATE, DIBASIC, ANHYDROUS 15 MMOL: 276; 142 INJECTION, SOLUTION INTRAVENOUS at 08:10

## 2023-05-24 RX ADMIN — HEPARIN SODIUM 5000 UNITS: 5000 INJECTION, SOLUTION INTRAVENOUS; SUBCUTANEOUS at 05:26

## 2023-05-24 RX ADMIN — PROPOFOL 30 MCG/KG/MIN: 10 INJECTION, EMULSION INTRAVENOUS at 03:58

## 2023-05-24 RX ADMIN — FAMOTIDINE 20 MG: 20 TABLET, FILM COATED ORAL at 05:26

## 2023-05-24 RX ADMIN — SENNOSIDES AND DOCUSATE SODIUM 2 TABLET: 50; 8.6 TABLET ORAL at 05:26

## 2023-05-24 RX ADMIN — SODIUM CHLORIDE, POTASSIUM CHLORIDE, SODIUM LACTATE AND CALCIUM CHLORIDE 1000 ML: 600; 310; 30; 20 INJECTION, SOLUTION INTRAVENOUS at 01:17

## 2023-05-24 RX ADMIN — ACETAMINOPHEN 650 MG: 325 TABLET, FILM COATED ORAL at 17:18

## 2023-05-24 RX ADMIN — MAGNESIUM SULFATE HEPTAHYDRATE 2 G: 2 INJECTION, SOLUTION INTRAVENOUS at 01:20

## 2023-05-24 RX ADMIN — ENOXAPARIN SODIUM 40 MG: 100 INJECTION SUBCUTANEOUS at 17:19

## 2023-05-24 RX ADMIN — LEVETIRACETAM 1000 MG: 100 INJECTION, SOLUTION, CONCENTRATE INTRAVENOUS at 05:26

## 2023-05-24 RX ADMIN — LEVETIRACETAM 1000 MG: 100 INJECTION, SOLUTION, CONCENTRATE INTRAVENOUS at 17:19

## 2023-05-24 RX ADMIN — ESCITALOPRAM OXALATE 10 MG: 10 TABLET ORAL at 17:18

## 2023-05-24 ASSESSMENT — PATIENT HEALTH QUESTIONNAIRE - PHQ9
9. THOUGHTS THAT YOU WOULD BE BETTER OFF DEAD, OR OF HURTING YOURSELF: NEARLY EVERY DAY
3. TROUBLE FALLING OR STAYING ASLEEP OR SLEEPING TOO MUCH: NEARLY EVERY DAY
1. LITTLE INTEREST OR PLEASURE IN DOING THINGS: MORE THAN HALF THE DAYS
4. FEELING TIRED OR HAVING LITTLE ENERGY: NOT AT ALL
SUM OF ALL RESPONSES TO PHQ9 QUESTIONS 1 AND 2: 5
SUM OF ALL RESPONSES TO PHQ QUESTIONS 1-9: 16
7. TROUBLE CONCENTRATING ON THINGS, SUCH AS READING THE NEWSPAPER OR WATCHING TELEVISION: NOT AT ALL
2. FEELING DOWN, DEPRESSED, IRRITABLE, OR HOPELESS: NEARLY EVERY DAY
5. POOR APPETITE OR OVEREATING: MORE THAN HALF THE DAYS
8. MOVING OR SPEAKING SO SLOWLY THAT OTHER PEOPLE COULD HAVE NOTICED. OR THE OPPOSITE, BEING SO FIGETY OR RESTLESS THAT YOU HAVE BEEN MOVING AROUND A LOT MORE THAN USUAL: SEVERAL DAYS
6. FEELING BAD ABOUT YOURSELF - OR THAT YOU ARE A FAILURE OR HAVE LET YOURSELF OR YOUR FAMILY DOWN: MORE THAN HALF THE DAYS

## 2023-05-24 ASSESSMENT — PAIN DESCRIPTION - PAIN TYPE
TYPE: ACUTE PAIN

## 2023-05-24 ASSESSMENT — PULMONARY FUNCTION TESTS: FVC: 1.1

## 2023-05-24 ASSESSMENT — FIBROSIS 4 INDEX: FIB4 SCORE: 0.91

## 2023-05-24 ASSESSMENT — COPD QUESTIONNAIRES
DO YOU EVER COUGH UP ANY MUCUS OR PHLEGM?: NO/ONLY WITH OCCASIONAL COLDS OR INFECTIONS
COPD SCREENING SCORE: 1
DURING THE PAST 4 WEEKS HOW MUCH DID YOU FEEL SHORT OF BREATH: NONE/LITTLE OF THE TIME
HAVE YOU SMOKED AT LEAST 100 CIGARETTES IN YOUR ENTIRE LIFE: NO/DON'T KNOW

## 2023-05-24 NOTE — PROGRESS NOTES
tech from Lab called with critical result of CPK 1939 at 2012. Critical lab result read back to HUBER Marino.   Dr. Leone notified of critical lab result at 2116.  Critical lab result read back by Dr. Leone. No changes at this time to current treatment plan per Vasu. CPK trending down      tech from Lab called with critical result of CPK 2145 at 0102. Critical lab result read back to HUBER Marino.   BEAR Hoffmann notified of critical lab result at 0104.  Critical lab result read back by BEAR Hoffmann.  -1L bolus ordered

## 2023-05-24 NOTE — ASSESSMENT & PLAN NOTE
Mild anion gap metabolic acidosis  Metabolic profile and acid-base profile improved after resuscitation      -Current albumin is 4.5, so there is no need to correct the anion gap.   -respiratory status: intubated as above.  -consider to Hold any and all sources of acetaminophen in case there is a component of oxoproline causing acidosis

## 2023-05-24 NOTE — CARE PLAN
Problem: Ventilation  Goal: Ability to achieve and maintain unassisted ventilation or tolerate decreased levels of ventilator support  Description: Target End Date:  4 days     Document on Vent flowsheet    1.  Support and monitor invasive and noninvasive mechanical ventilation  2.  Monitor ventilator weaning response  3.  Perform ventilator associated pneumonia prevention interventions  4.  Manage ventilation therapy by monitoring diagnostic test results  Outcome: Not Met     Ventilator Daily Summary    Vent Day # 2    ETT: 8@26    Ventilator settings: APVcmv/18/440/+8/40%    Plan: Continue current ventilator settings and wean mechanical ventilation as tolerated per physician orders.

## 2023-05-24 NOTE — ASSESSMENT & PLAN NOTE
Downtrending with fluid resuscitation, no evidence of kidney injury  S/p IVF  Will continue with LR

## 2023-05-24 NOTE — PROGRESS NOTES
"Critical Care Progress Note    Date of admission  5/23/2023    Chief Complaint  \"52 y.o. male who presented 5/23/2023 with a [place], incontinent of urine and was brought by EMS to the ED. At initial interaction was described the patient with AMS but alert and oriented to self.  Later a relative/friend reported that he apparently bought benadryl and empty boxes were found. It was suspected a diphenhydramine intoxication and poison control was called.  At my initial interaction I found patient with Franny 6 pts E1, V1, M4 for what I discussed with ED provider to intubate the patient for airway protection.\"    Hospital Course  5/24: extubated, endorses SI, placed on hold    Interval Problem Update  Reviewed last 24 hour events:  No events on mechanical ventilation overnight, no further seizures, no benzo requirement    Neuro:   Prop 30-40 overnight  No seizures overnight    CV:  HR 60-80s  SBP     Resp:   APV  18/400/8/0.4    GI:     I/O: +2.5L  UOP: 2535    Tmax: AF  Heme: WBC 9.4  Abx: NI  Micro: NA  Endo: BG ok    LDA: PIVs, Chavez  SUP: H2RA  VTE: SQH  Diet: N.p.o.        Review of Systems  Review of Systems   All other systems reviewed and are negative.       Vital Signs for last 24 hours   Temp:  [35.9 °C (96.6 °F)-36.7 °C (98 °F)] 36.5 °C (97.7 °F)  Pulse:  [55-99] 76  Resp:  [14-29] 20  BP: ()/(52-86) 131/77  SpO2:  [94 %-100 %] 96 %    Hemodynamic parameters for last 24 hours       Respiratory Information for the last 24 hours  Vent Mode: Spont  Rate (breaths/min): 18  Vt Target (mL): 440  PEEP/CPAP: 8  P Support: 5  MAP: 10  Length of Weaning Trial (Hours): 1 hour  Control VTE (exp VT): 642    Physical Exam   Physical Exam  Constitutional:       Comments: Mildly sedated but alert and responsive   HENT:      Head: Normocephalic and atraumatic.      Nose: Nose normal.      Mouth/Throat:      Mouth: Mucous membranes are moist.      Pharynx: No " oropharyngeal exudate.   Eyes:      Extraocular Movements: Extraocular movements intact.      Pupils: Pupils are equal, round, and reactive to light.   Cardiovascular:      Rate and Rhythm: Normal rate and regular rhythm.      Pulses: Normal pulses.   Pulmonary:      Effort: Pulmonary effort is normal.      Breath sounds: Normal breath sounds.   Abdominal:      General: Abdomen is flat.      Palpations: Abdomen is soft.   Musculoskeletal:         General: No swelling. Normal range of motion.      Cervical back: Neck supple.      Right lower leg: No edema.      Left lower leg: No edema.   Skin:     General: Skin is warm.      Capillary Refill: Capillary refill takes less than 2 seconds.   Neurological:      General: No focal deficit present.      Mental Status: He is alert and oriented to person, place, and time. Mental status is at baseline.         Medications  Current Facility-Administered Medications   Medication Dose Route Frequency Provider Last Rate Last Admin    enoxaparin (Lovenox) inj 40 mg  40 mg Subcutaneous DAILY AT 1800 Joe Mckeon M.D.        escitalopram (Lexapro) tablet 10 mg  10 mg Oral DAILY Joe Mcekon M.D.        Respiratory Therapy Consult   Nebulization Continuous RT Reggie aKy M.D.        MD Alert...ICU Electrolyte Replacement per Pharmacy   Other PHARMACY TO DOSE Reggie Kay M.D.        senna-docusate (PERICOLACE or SENOKOT S) 8.6-50 MG per tablet 2 Tablet  2 Tablet Enteral Tube BID Reggie Kay M.D.   2 Tablet at 05/24/23 0526    And    polyethylene glycol/lytes (MIRALAX) PACKET 1 Packet  1 Packet Enteral Tube QDAY PRN Reggie Kay M.D.        And    magnesium hydroxide (MILK OF MAGNESIA) suspension 30 mL  30 mL Enteral Tube QDAY PRN Reggie Kay M.D.        And    bisacodyl (DULCOLAX) suppository 10 mg  10 mg Rectal QDAY PRN Reggie Kay M.D.        lactated ringers infusion   Intravenous Continuous Reggie Kay M.D. 125  mL/hr at 05/24/23 0613 Rate Verify at 05/24/23 0613    LORazepam (ATIVAN) injection 1 mg  1 mg Intravenous Once PRN Reggie Kay M.D.        Or    LORazepam (ATIVAN) injection 2 mg  2 mg Intravenous Once PRN Reggie Kay M.D.        levETIRAcetam (Keppra) injection 1,000 mg  1,000 mg Intravenous Q12HRS Reggie Kay M.D.   1,000 mg at 05/24/23 0526       Fluids    Intake/Output Summary (Last 24 hours) at 5/24/2023 1555  Last data filed at 5/24/2023 1200  Gross per 24 hour   Intake 4619.59 ml   Output 3510 ml   Net 1109.59 ml       Laboratory  Recent Labs     05/23/23  1629 05/23/23  2056 05/24/23  0408   ISTATAPH 7.406 7.387* 7.363*   ISTATAPCO2 37.1* 38.7* 42.4*   ISTATAPO2 118* 96* 92*   ISTATATCO2 24 24 25   TVNFZYQ9LYC 99 97 97   ISTATARTHCO3 23.3 23.3 24.1   ISTATARTBE -1 -2 -1   ISTATTEMP 96.5 F 96.6 F 96.8 F   ISTATFIO2 40 40 40   ISTATSPEC Arterial Arterial Arterial   ISTATAPHTC 7.423 7.403 7.377*   UCCQBMEC1DR 111* 90* 87     Recent Labs     05/23/23  2355 05/24/23  0535 05/24/23  1143   CPKTOTAL 2145* 1606* 1098*     Recent Labs     05/23/23  0830 05/23/23  1303 05/23/23  1818 05/23/23  2355 05/24/23  0535   SODIUM 139  --   --  137 139   POTASSIUM 2.8*  --  3.7 3.9 4.0   CHLORIDE 102  --   --  106 108   CO2 19*  --   --  22 23   BUN 13  --   --  9 8   CREATININE 0.94  --   --  0.91 1.06   MAGNESIUM 2.2  --   --  1.8 2.5   PHOSPHORUS  --  3.7  --  2.8 2.3*   CALCIUM 9.4  --   --  8.0* 8.2*     Recent Labs     05/23/23  0830 05/23/23 2355 05/24/23 0535   ALTSGPT 45  --   --    ASTSGOT 39  --   --    ALKPHOSPHAT 82  --   --    TBILIRUBIN 0.9  --   --    GLUCOSE 152* 85 103*     Recent Labs     05/23/23  0830 05/24/23 0535   WBC 11.1* 9.4   NEUTSPOLYS 83.30* 78.10*   LYMPHOCYTES 12.30* 13.80*   MONOCYTES 3.50 6.90   EOSINOPHILS 0.10 0.60   BASOPHILS 0.30 0.20   ASTSGOT 39  --    ALTSGPT 45  --    ALKPHOSPHAT 82  --    TBILIRUBIN 0.9  --      Recent Labs     05/23/23 0830 05/24/23 0535    RBC 5.19 3.92*   HEMOGLOBIN 16.0 12.0*   HEMATOCRIT 45.4 35.7*   PLATELETCT 340 256       Imaging  X-Ray:  I have personally reviewed the images and compared with prior images.    Assessment/Plan  * Drug intoxication with complication, with unspecified complication (HCC)- (present on admission)  Assessment & Plan  Patient was admitted with suspected dyphenhydramine intoxication with unknown amount of tablets and unknown time of normal last seen. Symptoms related includes cutaneous vasodilation (more obvious in the face), mydriasis, and reported halluciations with possible urinary retention. Certainly he developed AMS and respiratory failure. Differential diagnosis include other intoxications, seizures, meningitis, sepsis, etc.  For now will base management on supportive care, consider physostigmine but given suspected seizure and possible concomitant other drug intoxication, will hold for now.  Will treat seizures with benzos, consider keppra  Poison control case #[id number]  If QRS is greater than 120 will treat with bicarb ggt.  Blood glucose was wnl  CT head wo contrast  UDS  EKG revieed with borderline prolonged QT interval, borderline t abnormalities.  Will obtain a continous EEG  Consult neurology  Consider brain MRI  He was intubated for airway protection  Get acetaminophen levels, salicilate levels  Get diagnostic alcohol  Obtain troponins      AMS (altered mental status)  Assessment & Plan  Resolved this morning normal mental status    Hypokalemia- (present on admission)  Assessment & Plan  Replace electrolytes as needed  Will re check labs in PM    Metabolic acidosis- (present on admission)  Assessment & Plan  Mild anion gap metabolic acidosis  Metabolic profile and acid-base profile improved after resuscitation      -Current albumin is 4.5, so there is no need to correct the anion gap.   -respiratory status: intubated as above.  -consider to Hold any and all sources of acetaminophen in case there is a  component of oxoproline causing acidosis      Elevated CPK- (present on admission)  Assessment & Plan  Downtrending with fluid resuscitation, no evidence of kidney injury  S/p IVF  Will continue with LR    Acute respiratory failure with hypoxia (HCC)  Assessment & Plan  Most likely due to drug intoxication  Continue with mechanical ventilation  Lung protective strategies  ABCDEF bundle per protocol and AST-SBT as appropriate  cxr in am  Get abg and titrate accordingly         VTE:  Lovenox  Ulcer: H2 Antagonist  Lines: Chavez Catheter  Ongoing indication addressed    I have performed a physical exam and reviewed and updated ROS and Plan today (5/24/2023). In review of yesterday's note (5/23/2023), there are no changes except as documented above.     Discussed patient condition and risk of morbidity and/or mortality with RN, RT, Therapies, Pharmacy, and Patient  The patient remains critically ill.  Critical care time = 40 minutes in directly providing and coordinating critical care and extensive data review.  No time overlap and excludes procedures.

## 2023-05-24 NOTE — ASSESSMENT & PLAN NOTE
Most likely due to drug intoxication  Continue with mechanical ventilation  Lung protective strategies  ABCDEF bundle per protocol and AST-SBT as appropriate  cxr in am  Get abg and titrate accordingly

## 2023-05-24 NOTE — PROGRESS NOTES
Patient extubated this morning to room air, no events.  The patient's EEG was negative overnight, EEG leads discontinued.  The patient was endorsing suicidality and major depressive symptoms following extubation after I was able to talk to him.    Was placed on a medical hold, psychiatry consultation was initiated    The patient was started on escitalopram 10 mg daily per recommendations from psychiatry    Patient's medical illness has resolved, his electrolyte and acid-base abnormalities have resolved, no evidence of kidney injury, CPK is downtrending, no EKG or conduction disease or arrhythmias.  Neurologically he is at baseline intact.  Neurology has recommended the patient be placed on Keppra, can probably discontinue this tomorrow as this was likely a tox induced event, will discuss with neurology.    Patient is medically cleared for psychiatric transfer/evaluation and admission as soon as able.

## 2023-05-24 NOTE — CARE PLAN
The patient is Watcher - Medium risk of patient condition declining or worsening    Shift Goals  Clinical Goals: absence of seizures, wean sedation  Patient Goals: RAINA  Family Goals: RAINA    Progress made toward(s) clinical / shift goals:    Problem: Safety - Medical Restraint  Goal: Remains free of injury from restraints (Restraint for Interference with Medical Device)  Description: INTERVENTIONS:  1. Determine that other, less restrictive measures have been tried or would not be effective before applying the restraint  2. Evaluate the patient's condition at the time of restraint application  3. Educate patient/family regarding the reason for restraint  4. Q2H: Monitor safety, psychosocial status, comfort, circulation, respiratory status, LOC, nutrition and hydration  Outcome: Progressing     Problem: Knowledge Deficit - Standard  Goal: Patient and family/care givers will demonstrate understanding of plan of care, disease process/condition, diagnostic tests and medications  Description: Target End Date:  1-3 days or as soon as patient condition allows    Document in Patient Education    1.  Patient and family/caregiver oriented to unit, equipment, visitation policy and means for communicating concern  2.  Complete/review Learning Assessment  3.  Assess knowledge level of disease process/condition, treatment plan, diagnostic tests and medications  4.  Explain disease process/condition, treatment plan, diagnostic tests and medications  Outcome: Progressing     Problem: Pain - Standard  Goal: Alleviation of pain or a reduction in pain to the patient’s comfort goal  Description: Target End Date:  Prior to discharge or change in level of care    Document on Vitals flowsheet    1.  Document pain using the appropriate pain scale per order or unit policy  2.  Educate and implement non-pharmacologic comfort measures (i.e. relaxation, distraction, massage, cold/heat therapy, etc.)  3.  Pain management medications as  ordered  4.  Reassess pain after pain med administration per policy  5.  If opiods administered assess patient's response to pain medication is appropriate per POSS sedation scale  6.  Follow pain management plan developed in collaboration with patient and interdisciplinary team (including palliative care or pain specialists if applicable)  Outcome: Progressing       Patient is not progressing towards the following goals:
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
## 2023-05-24 NOTE — PROGRESS NOTES
Patient's UO elevated. MD Mckeon made aware. IVF discontinued and BMP ordered with next set of labs at 1800.

## 2023-05-24 NOTE — PROGRESS NOTES
Neurology Progress Note  Neurohospitalist Service, [facility]    Referring Physician: Joe Mckeon M.D.    Chief Complaint   Patient presents with    ALOC     Pt found by river with AMS and twitching. Pt is awake, alert, and oriented x 1. Incontinent of urine. Pt denies hst of SZ        HPI: Refer to initial documented Neurology H&P, as detailed in the patient's chart.    Interval History:   Extubated this am. Awake and talking; admits to benadryl OD. No further seizures overnight.     Review of systems: In addition to what is detailed in the HPI and/or updated in the interval history, all other systems reviewed and are negative.    Past Medical History:    has no past medical history on file.    FHx:  family history is not on file.    SHx:   reports that he has never smoked. He has never used smokeless tobacco. He reports that he does not drink alcohol and does not use drugs.    Medications:    Current Facility-Administered Medications:     enoxaparin (Lovenox) inj 40 mg, 40 mg, Subcutaneous, DAILY AT 1800, Joe Mckeon M.D.    Respiratory Therapy Consult, , Nebulization, Continuous RT, Reggie Kay M.D.    MD Alert...ICU Electrolyte Replacement per Pharmacy, , Other, PHARMACY TO DOSE, Reggie Kay M.D.    senna-docusate (PERICOLACE or SENOKOT S) 8.6-50 MG per tablet 2 Tablet, 2 Tablet, Enteral Tube, BID, 2 Tablet at 05/24/23 0526 **AND** polyethylene glycol/lytes (MIRALAX) PACKET 1 Packet, 1 Packet, Enteral Tube, QDAY PRN **AND** magnesium hydroxide (MILK OF MAGNESIA) suspension 30 mL, 30 mL, Enteral Tube, QDAY PRN **AND** bisacodyl (DULCOLAX) suppository 10 mg, 10 mg, Rectal, QDAY PRN, Reggie Kay M.D.    lactated ringers infusion, , Intravenous, Continuous, Reggie Kay M.D., Last Rate: 125 mL/hr at 05/24/23 0613, Rate Verify at 05/24/23 0613    LORazepam (ATIVAN) injection 1 mg, 1 mg, Intravenous, Once PRN **OR** LORazepam (ATIVAN) injection 2 mg,  2 mg, Intravenous, Once PRN, Reggie Kay M.D.    levETIRAcetam (Keppra) injection 1,000 mg, 1,000 mg, Intravenous, Q12HRS, Reggie Kay M.D., 1,000 mg at 05/24/23 0526    Physical Examination:     Vitals:    05/24/23 0900 05/24/23 1000 05/24/23 1100 05/24/23 1200   BP: (!) 140/72 127/70 132/71 131/77   Pulse: 77 73 97 76   Resp: 14 16 14 20   Temp:  36.5 °C (97.7 °F)     TempSrc:  Temporal     SpO2: 99% 94% 96% 96%   Weight:       Height:           General: Patient is awake and in no acute distress    NEUROLOGICAL EXAM:     Mental status: Awake, alert and fully oriented, follows commands  Speech and language: speech is clear and fluent. The patient is able to name and repeat.  Cranial nerve exam: Pupils are equal, round and reactive to light bilaterally. Visual fields are full. Extraocular muscles are intact. Sensation in the face is intact to light touch. Face is symmetric. Hearing to finger rub equal. Palate elevates symmetrically. Shoulder shrug is full. Tongue is midline.  Motor exam: antigravity strength throughout  Sensory exam: No sensory deficits identified       Objective Data:    Labs:  No results found for: PROTHROMBTM, INR   Lab Results   Component Value Date/Time    WBC 9.4 05/24/2023 05:35 AM    RBC 3.92 (L) 05/24/2023 05:35 AM    HEMOGLOBIN 12.0 (L) 05/24/2023 05:35 AM    HEMATOCRIT 35.7 (L) 05/24/2023 05:35 AM    MCV 91.1 05/24/2023 05:35 AM    MCH 30.6 05/24/2023 05:35 AM    MCHC 33.6 05/24/2023 05:35 AM    MPV 9.8 05/24/2023 05:35 AM    NEUTSPOLYS 78.10 (H) 05/24/2023 05:35 AM    LYMPHOCYTES 13.80 (L) 05/24/2023 05:35 AM    MONOCYTES 6.90 05/24/2023 05:35 AM    EOSINOPHILS 0.60 05/24/2023 05:35 AM    BASOPHILS 0.20 05/24/2023 05:35 AM      Lab Results   Component Value Date/Time    SODIUM 139 05/24/2023 05:35 AM    POTASSIUM 4.0 05/24/2023 05:35 AM    CHLORIDE 108 05/24/2023 05:35 AM    CO2 23 05/24/2023 05:35 AM    GLUCOSE 103 (H) 05/24/2023 05:35 AM    BUN 8 05/24/2023 05:35 AM     CREATININE 1.06 05/24/2023 05:35 AM      No results found for: CHOLSTRLTOT, LDL, HDL, TRIGLYCERIDE    Lab Results   Component Value Date/Time    ALKPHOSPHAT 82 05/23/2023 08:30 AM    ASTSGOT 39 05/23/2023 08:30 AM    ALTSGPT 45 05/23/2023 08:30 AM    TBILIRUBIN 0.9 05/23/2023 08:30 AM        Imaging/Testing:    MRI brain pending    Assessment and Plan:    Acute, recurrent generalized convulsion with intervening periods of lethargy and obtundation - generalized seizures, likely provoked by benadryl intoxication. EEG unremarkable. MRI brain pending. Continue keppra until tomorrow - plan to taper      Plan:  MRI brain pending  EEG as above  Keppra 1000 mg bid; taper tomorrow  No driving, operating heavy machinery, swimming alone, climbing ladders, or engaging in any activity that could increase the risk of injury or death in the event of a seizure   Continue supportive care.  Will follow      This chart was partially generated using voice recognition technology and sound alike word replacement may be present, best efforts were made to make the chart accurate.    Bartolome Barrera MD  Board Certified Neurology, ABPN

## 2023-05-24 NOTE — PROGRESS NOTES
tech from Lab called with critical result of CPK 1606 at 0630. Critical lab result read back to HUBER Marino.   Dr. Sheppard notified of critical lab result at 0638.  Critical lab result read back by Dr. Mckeon.  No interventions at this time

## 2023-05-24 NOTE — EEG PROGRESS NOTE
Patient was hooked for continuous video EEG with CT compatible/MRI conditional leads. These can go to both CT and MRI when disconnected properly.

## 2023-05-24 NOTE — ASSESSMENT & PLAN NOTE
Patient was admitted with suspected dyphenhydramine intoxication with unknown amount of tablets and unknown time of normal last seen. Symptoms related includes cutaneous vasodilation (more obvious in the face), mydriasis, and reported halluciations with possible urinary retention. Certainly he developed AMS and respiratory failure. Differential diagnosis include other intoxications, seizures, meningitis, sepsis, etc.  For now will base management on supportive care, consider physostigmine but given suspected seizure and possible concomitant other drug intoxication, will hold for now.  Will treat seizures with benzos, consider keppra  Poison control case #[id number]  If QRS is greater than 120 will treat with bicarb ggt.  Blood glucose was wnl  CT head wo contrast  UDS  EKG revieed with borderline prolonged QT interval, borderline t abnormalities.  Will obtain a continous EEG  Consult neurology  Consider brain MRI  He was intubated for airway protection  Get acetaminophen levels, salicilate levels  Get diagnostic alcohol  Obtain troponins

## 2023-05-24 NOTE — PROGRESS NOTES
Initial neuro assessment for pt 2 mm pupils, and brisk positive corneals, no cough or gag. No movement to painful stimuli to BUE, withdrawal to pain to BLE. Pt is sedated on propofol and fentanyl. Per Jose, if pt is not having seizures, ok to wean sedation. Per Kinsey no seizure activity. Will begin weaning sedation at this time.

## 2023-05-25 PROBLEM — T50.902A SUICIDE ATTEMPT BY DRUG INGESTION (HCC): Status: ACTIVE | Noted: 2023-05-25

## 2023-05-25 LAB
ANION GAP SERPL CALC-SCNC: 10 MMOL/L (ref 7–16)
BASOPHILS # BLD AUTO: 0.2 % (ref 0–1.8)
BASOPHILS # BLD: 0.02 K/UL (ref 0–0.12)
BUN SERPL-MCNC: 8 MG/DL (ref 8–22)
CALCIUM SERPL-MCNC: 8.4 MG/DL (ref 8.5–10.5)
CHLORIDE SERPL-SCNC: 107 MMOL/L (ref 96–112)
CK SERPL-CCNC: 622 U/L (ref 0–154)
CK SERPL-CCNC: 820 U/L (ref 0–154)
CO2 SERPL-SCNC: 23 MMOL/L (ref 20–33)
CREAT SERPL-MCNC: 0.94 MG/DL (ref 0.5–1.4)
EKG IMPRESSION: NORMAL
EOSINOPHIL # BLD AUTO: 0.03 K/UL (ref 0–0.51)
EOSINOPHIL NFR BLD: 0.3 % (ref 0–6.9)
ERYTHROCYTE [DISTWIDTH] IN BLOOD BY AUTOMATED COUNT: 43.8 FL (ref 35.9–50)
GFR SERPLBLD CREATININE-BSD FMLA CKD-EPI: 96 ML/MIN/1.73 M 2
GLUCOSE SERPL-MCNC: 93 MG/DL (ref 65–99)
HCT VFR BLD AUTO: 40.4 % (ref 42–52)
HGB BLD-MCNC: 13.3 G/DL (ref 14–18)
IMM GRANULOCYTES # BLD AUTO: 0.04 K/UL (ref 0–0.11)
IMM GRANULOCYTES NFR BLD AUTO: 0.5 % (ref 0–0.9)
LYMPHOCYTES # BLD AUTO: 1.46 K/UL (ref 1–4.8)
LYMPHOCYTES NFR BLD: 16.8 % (ref 22–41)
MAGNESIUM SERPL-MCNC: 2.3 MG/DL (ref 1.5–2.5)
MCH RBC QN AUTO: 30.6 PG (ref 27–33)
MCHC RBC AUTO-ENTMCNC: 32.9 G/DL (ref 32.3–36.5)
MCV RBC AUTO: 93.1 FL (ref 81.4–97.8)
MONOCYTES # BLD AUTO: 0.54 K/UL (ref 0–0.85)
MONOCYTES NFR BLD AUTO: 6.2 % (ref 0–13.4)
NEUTROPHILS # BLD AUTO: 6.58 K/UL (ref 1.82–7.42)
NEUTROPHILS NFR BLD: 76 % (ref 44–72)
NRBC # BLD AUTO: 0 K/UL
NRBC BLD-RTO: 0 /100 WBC (ref 0–0.2)
PHOSPHATE SERPL-MCNC: 2.1 MG/DL (ref 2.5–4.5)
PLATELET # BLD AUTO: 260 K/UL (ref 164–446)
PMV BLD AUTO: 10 FL (ref 9–12.9)
POTASSIUM SERPL-SCNC: 4.1 MMOL/L (ref 3.6–5.5)
RBC # BLD AUTO: 4.34 M/UL (ref 4.7–6.1)
SODIUM SERPL-SCNC: 140 MMOL/L (ref 135–145)
WBC # BLD AUTO: 8.7 K/UL (ref 4.8–10.8)

## 2023-05-25 PROCEDURE — 99255 IP/OBS CONSLTJ NEW/EST HI 80: CPT | Performed by: HOSPITALIST

## 2023-05-25 PROCEDURE — 93010 ELECTROCARDIOGRAM REPORT: CPT | Performed by: INTERNAL MEDICINE

## 2023-05-25 PROCEDURE — 700111 HCHG RX REV CODE 636 W/ 250 OVERRIDE (IP): Performed by: EMERGENCY MEDICINE

## 2023-05-25 PROCEDURE — 83735 ASSAY OF MAGNESIUM: CPT

## 2023-05-25 PROCEDURE — 99232 SBSQ HOSP IP/OBS MODERATE 35: CPT | Performed by: PSYCHIATRY & NEUROLOGY

## 2023-05-25 PROCEDURE — 700102 HCHG RX REV CODE 250 W/ 637 OVERRIDE(OP): Performed by: HOSPITALIST

## 2023-05-25 PROCEDURE — 84100 ASSAY OF PHOSPHORUS: CPT

## 2023-05-25 PROCEDURE — A9270 NON-COVERED ITEM OR SERVICE: HCPCS | Performed by: EMERGENCY MEDICINE

## 2023-05-25 PROCEDURE — 770001 HCHG ROOM/CARE - MED/SURG/GYN PRIV*

## 2023-05-25 PROCEDURE — 700102 HCHG RX REV CODE 250 W/ 637 OVERRIDE(OP): Performed by: EMERGENCY MEDICINE

## 2023-05-25 PROCEDURE — 80048 BASIC METABOLIC PNL TOTAL CA: CPT

## 2023-05-25 PROCEDURE — 85025 COMPLETE CBC W/AUTO DIFF WBC: CPT

## 2023-05-25 PROCEDURE — 82550 ASSAY OF CK (CPK): CPT | Mod: 91

## 2023-05-25 PROCEDURE — A9270 NON-COVERED ITEM OR SERVICE: HCPCS | Performed by: HOSPITALIST

## 2023-05-25 RX ORDER — BISACODYL 10 MG
10 SUPPOSITORY, RECTAL RECTAL
Status: DISCONTINUED | OUTPATIENT
Start: 2023-05-25 | End: 2023-06-18

## 2023-05-25 RX ORDER — LEVETIRACETAM 500 MG/5ML
500 INJECTION, SOLUTION, CONCENTRATE INTRAVENOUS EVERY 12 HOURS
Status: DISCONTINUED | OUTPATIENT
Start: 2023-05-25 | End: 2023-05-25

## 2023-05-25 RX ORDER — AMOXICILLIN 250 MG
2 CAPSULE ORAL 2 TIMES DAILY
Status: DISCONTINUED | OUTPATIENT
Start: 2023-05-25 | End: 2023-06-18

## 2023-05-25 RX ORDER — POLYETHYLENE GLYCOL 3350 17 G/17G
1 POWDER, FOR SOLUTION ORAL
Status: DISCONTINUED | OUTPATIENT
Start: 2023-05-25 | End: 2023-06-18

## 2023-05-25 RX ORDER — LEVETIRACETAM 250 MG/1
250 TABLET ORAL 2 TIMES DAILY
Status: COMPLETED | OUTPATIENT
Start: 2023-05-26 | End: 2023-05-27

## 2023-05-25 RX ORDER — LEVETIRACETAM 500 MG/1
500 TABLET ORAL 2 TIMES DAILY
Status: COMPLETED | OUTPATIENT
Start: 2023-05-25 | End: 2023-05-26

## 2023-05-25 RX ADMIN — LEVETIRACETAM 1000 MG: 100 INJECTION, SOLUTION, CONCENTRATE INTRAVENOUS at 05:35

## 2023-05-25 RX ADMIN — ENOXAPARIN SODIUM 40 MG: 100 INJECTION SUBCUTANEOUS at 17:52

## 2023-05-25 RX ADMIN — LEVETIRACETAM 500 MG: 500 TABLET, FILM COATED ORAL at 17:51

## 2023-05-25 RX ADMIN — SENNOSIDES AND DOCUSATE SODIUM 2 TABLET: 50; 8.6 TABLET ORAL at 17:51

## 2023-05-25 RX ADMIN — DIBASIC SODIUM PHOSPHATE, MONOBASIC POTASSIUM PHOSPHATE AND MONOBASIC SODIUM PHOSPHATE 500 MG: 852; 155; 130 TABLET ORAL at 17:51

## 2023-05-25 RX ADMIN — DIBASIC SODIUM PHOSPHATE, MONOBASIC POTASSIUM PHOSPHATE AND MONOBASIC SODIUM PHOSPHATE 500 MG: 852; 155; 130 TABLET ORAL at 10:30

## 2023-05-25 RX ADMIN — ESCITALOPRAM OXALATE 10 MG: 10 TABLET ORAL at 05:35

## 2023-05-25 RX ADMIN — ACETAMINOPHEN 650 MG: 325 TABLET, FILM COATED ORAL at 17:57

## 2023-05-25 ASSESSMENT — COGNITIVE AND FUNCTIONAL STATUS - GENERAL
DRESSING REGULAR UPPER BODY CLOTHING: A LITTLE
MOVING FROM LYING ON BACK TO SITTING ON SIDE OF FLAT BED: A LITTLE
SUGGESTED CMS G CODE MODIFIER MOBILITY: CK
TOILETING: A LITTLE
DRESSING REGULAR LOWER BODY CLOTHING: A LITTLE
MOBILITY SCORE: 19
SUGGESTED CMS G CODE MODIFIER DAILY ACTIVITY: CJ
DAILY ACTIVITIY SCORE: 20
HELP NEEDED FOR BATHING: A LITTLE
WALKING IN HOSPITAL ROOM: A LITTLE
STANDING UP FROM CHAIR USING ARMS: A LITTLE
CLIMB 3 TO 5 STEPS WITH RAILING: A LOT

## 2023-05-25 ASSESSMENT — FIBROSIS 4 INDEX: FIB4 SCORE: 1.19

## 2023-05-25 ASSESSMENT — PAIN DESCRIPTION - PAIN TYPE: TYPE: ACUTE PAIN

## 2023-05-25 ASSESSMENT — ENCOUNTER SYMPTOMS
DEPRESSION: 1
WEAKNESS: 1
MUSCULOSKELETAL NEGATIVE: 1
CARDIOVASCULAR NEGATIVE: 1
EYES NEGATIVE: 1
RESPIRATORY NEGATIVE: 1
GASTROINTESTINAL NEGATIVE: 1

## 2023-05-25 ASSESSMENT — LIFESTYLE VARIABLES: SUBSTANCE_ABUSE: 1

## 2023-05-25 NOTE — ASSESSMENT & PLAN NOTE
RESOLVED  Downtrending with fluid resuscitation, no evidence of kidney injury  -Currently s/p IVF  - (WNL)

## 2023-05-25 NOTE — ASSESSMENT & PLAN NOTE
RESOLVED  Secondary to overdose in form of Benadryl. Was reported to poison control  -Resolved with supportive care

## 2023-05-25 NOTE — ASSESSMENT & PLAN NOTE
RESOLVED  Patient was admitted with suspected dyphenhydramine intoxication with unknown amount of tablets and unknown time of normal last seen. Symptoms related includes cutaneous vasodilation (more obvious in the face), mydriasis, and reported halluciations with possible urinary retention. Certainly he developed AMS and respiratory failure. Differential diagnosis include other intoxications, seizures, meningitis, sepsis, etc.  For now will base management on supportive care, consider physostigmine but given suspected seizure and possible concomitant other drug intoxication, will hold for now.  -Initial treatment for seizures with benzos, Keppra  -Poison control case #[id number]  -CT head, MRI negative  -UDS reported  -Neurology, psychiatry consulted  -Continue to appreciate recommendations from psychiatry.   -Improved and currently medically clear, the patient remains on a legal hold  -Plan for inpatient psych transfer

## 2023-05-25 NOTE — ASSESSMENT & PLAN NOTE
With Benadryl. Medically treated and improved. Placed on legal hold, referred to psychiatry for evaluation  -Psychiatry following, appreciate support  -SI has resolved on 5/28  -The patient will likely need inpatient psychiatric care  -Continue Zoloft per psych recs  -Deferring hydroxyzine in setting of recent antihistamine overdose

## 2023-05-25 NOTE — PROGRESS NOTES
Neurology Progress Note  Neurohospitalist Service, [facility]    Referring Physician: Joe Mckeon M.D.    Chief Complaint   Patient presents with    ALOC     Pt found by river with AMS and twitching. Pt is awake, alert, and oriented x 1. Incontinent of urine. Pt denies hst of SZ        HPI: Refer to initial documented Neurology H&P, as detailed in the patient's chart.    Interval History:   Awake and talking; admits to benadryl OD. No further seizures overnight.     Review of systems: In addition to what is detailed in the HPI and/or updated in the interval history, all other systems reviewed and are negative.    Past Medical History:    has no past medical history on file.    FHx:  family history is not on file.    SHx:   reports that he has never smoked. He has never used smokeless tobacco. He reports that he does not drink alcohol and does not use drugs.    Medications:    Current Facility-Administered Medications:     [START ON 5/26/2023] levETIRAcetam (KEPPRA) tablet 250 mg, 250 mg, Oral, BID, Bartolome Barrera M.D.    levETIRAcetam (KEPPRA) tablet 500 mg, 500 mg, Oral, BID, Troy Mathis M.D.    phosphorus (K-Phos-Neutral) per tablet 500 mg, 500 mg, Oral, BID, Troy Mathis M.D., 500 mg at 05/25/23 1030    senna-docusate (PERICOLACE or SENOKOT S) 8.6-50 MG per tablet 2 Tablet, 2 Tablet, Oral, BID **AND** polyethylene glycol/lytes (MIRALAX) PACKET 1 Packet, 1 Packet, Oral, QDAY PRN **AND** magnesium hydroxide (MILK OF MAGNESIA) suspension 30 mL, 30 mL, Oral, QDAY PRN **AND** bisacodyl (DULCOLAX) suppository 10 mg, 10 mg, Rectal, QDAY PRN, Troy Mathis M.D.    enoxaparin (Lovenox) inj 40 mg, 40 mg, Subcutaneous, DAILY AT 1800, Joe Mckeon M.D., 40 mg at 05/24/23 1719    escitalopram (Lexapro) tablet 10 mg, 10 mg, Oral, DAILY, Joe Mckeon M.D., 10 mg at 05/25/23 0535    acetaminophen (Tylenol) tablet 650 mg, 650 mg, Oral, Q4HRS PRN, Joe NGUYEN  MUMTAZ Mckeon, 650 mg at 05/24/23 1718    Physical Examination:     Vitals:    05/25/23 0700 05/25/23 0800 05/25/23 0900 05/25/23 1000   BP: 110/66 116/64 133/70 126/64   Pulse: 76 74 75 76   Resp: 17 15 16 15   Temp:   36.6 °C (97.8 °F)    TempSrc:   Temporal    SpO2: 93% 93% 92% 93%   Weight:   99.4 kg (219 lb 2.2 oz)    Height:           General: Patient is awake and in no acute distress    NEUROLOGICAL EXAM:     Mental status: Awake, alert and fully oriented, follows commands  Speech and language: speech is clear and fluent. The patient is able to name and repeat.  Cranial nerve exam: Pupils are equal, round and reactive to light bilaterally. Visual fields are full. Extraocular muscles are intact. Sensation in the face is intact to light touch. Face is symmetric. Hearing to finger rub equal. Palate elevates symmetrically. Shoulder shrug is full. Tongue is midline.  Motor exam: antigravity strength throughout  Sensory exam: No sensory deficits identified       Objective Data:    Labs:  No results found for: PROTHROMBTM, INR   Lab Results   Component Value Date/Time    WBC 8.7 05/25/2023 05:45 AM    RBC 4.34 (L) 05/25/2023 05:45 AM    HEMOGLOBIN 13.3 (L) 05/25/2023 05:45 AM    HEMATOCRIT 40.4 (L) 05/25/2023 05:45 AM    MCV 93.1 05/25/2023 05:45 AM    MCH 30.6 05/25/2023 05:45 AM    MCHC 32.9 05/25/2023 05:45 AM    MPV 10.0 05/25/2023 05:45 AM    NEUTSPOLYS 76.00 (H) 05/25/2023 05:45 AM    LYMPHOCYTES 16.80 (L) 05/25/2023 05:45 AM    MONOCYTES 6.20 05/25/2023 05:45 AM    EOSINOPHILS 0.30 05/25/2023 05:45 AM    BASOPHILS 0.20 05/25/2023 05:45 AM      Lab Results   Component Value Date/Time    SODIUM 140 05/25/2023 05:45 AM    POTASSIUM 4.1 05/25/2023 05:45 AM    CHLORIDE 107 05/25/2023 05:45 AM    CO2 23 05/25/2023 05:45 AM    GLUCOSE 93 05/25/2023 05:45 AM    BUN 8 05/25/2023 05:45 AM    CREATININE 0.94 05/25/2023 05:45 AM      No results found for: CHOLSTRLTOT, LDL, HDL, TRIGLYCERIDE    Lab Results   Component  Value Date/Time    ALKPHOSPHAT 82 05/23/2023 08:30 AM    ASTSGOT 39 05/23/2023 08:30 AM    ALTSGPT 45 05/23/2023 08:30 AM    TBILIRUBIN 0.9 05/23/2023 08:30 AM        Imaging/Testing:    MRI brain pending    Assessment and Plan:    Acute, recurrent generalized convulsion with intervening periods of lethargy and obtundation - generalized seizures, likely provoked by benadryl intoxication. EEG unremarkable. MRI brain pending. Continue keppra until tomorrow - plan to taper      Plan:  MRI brain unremarkable  EEG as above  Keppra taper - orders written  No driving, operating heavy machinery, swimming alone, climbing ladders, or engaging in any activity that could increase the risk of injury or death in the event of a seizure   Continue supportive care.  Will sign off      This chart was partially generated using voice recognition technology and sound alike word replacement may be present, best efforts were made to make the chart accurate.    Bartolome Barrera MD  Board Certified Neurology, ABPN
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
## 2023-05-25 NOTE — PROGRESS NOTES
4 Eyes Skin Assessment Completed by Alejandra RN and HUBER Rodriguez.    Head WDL  Ears WDL  Nose WDL  Mouth WDL  Neck WDL  Breast/Chest WDL  Shoulder Blades WDL  Spine WDL  (R) Arm/Elbow/Hand WDL  (L) Arm/Elbow/Hand WDL  Abdomen WDL  Groin WDL  Scrotum/Coccyx/Buttocks Redness and Blanching  (R) Leg WDL  (L) Leg WDL  (R) Heel/Foot/Toe WDL  (L) Heel/Foot/Toe WDL          Devices In Places Nasal Cannula      Interventions In Place N/A    Possible Skin Injury No    Pictures Uploaded Into Epic N/A  Wound Consult Placed N/A  RN Wound Prevention Protocol Ordered No

## 2023-05-25 NOTE — ASSESSMENT & PLAN NOTE
RESOLVED  Mild anion gap metabolic acidosis  -Metabolic profile and acid-base profile improved after resuscitation

## 2023-05-25 NOTE — PROGRESS NOTES
Patient seen and examined this morning, no complaints, he is taking orals, labs reviewed this morning CPK is normalized, urine output adequate, DC'd IV fluids, no medical therapy is indicated    Physical exam is unremarkable the patient is flat affect did with persistent depressive symptoms and suicidality, lungs clear, cardiac exam unremarkable, ambulatory no evidence of trauma    Started on escitalopram yesterday per psychiatric recommendation, initiated 10 mg daily.    He is cleared for psychiatric transfer and no longer requires medical therapies.  [facility]

## 2023-05-25 NOTE — PROGRESS NOTES
1205: Patient has 4 bags of belongings that were in holding.  Belongings transferred with patient on bed.  Transportation and sitter present during transport to [facility].  SI concerns & precautions communicated to Psych MD at bedside & oncoming nurse.    Cardiac Monitor: TOMASA

## 2023-05-25 NOTE — ASSESSMENT & PLAN NOTE
RESOLVED  Most likely due to drug intoxication, initially intubated for airway protection  -Successfully extubated  -Currently saturating adequately on room air  -Sore throat from extubation - PRN lozenges and Chloraseptic spray

## 2023-05-25 NOTE — CONSULTS
[facility] [place] PSYCHIATRIC FOLLOW-UP:(established)    DOS: 05/25/23     Reason for Admission: 52 y.o. male who presented 5/23/2023 with a hx of being [place] but not in the river, incontinent of urine and was brought by EMS to the ED. At initial interaction was described the patient with AMS but alert and oriented to self.  Later a relative/friend reported that he apparently bought benadryl and empty boxes were found. It was suspected a diphenhydramine intoxication and poison control was called.  Legal Hold Status: on legal hold      Chief Complaint:   Chief Complaint   Patient presents with    ALOC     Pt [place] river with AMS and twitching. Pt is awake, alert, and oriented x 1. Incontinent of urine. Pt denies hst of SZ                S:  Seen today as f/u psych consult. Observed resting, easy to awake. Reports decreased sleep, energy, appetite. Denies dizziness, HA, GI distress; appears to be tolerating medications. Admits SI, denies planning, vocalizing hopelessness. Denies HI, A/VH, no Sx of psychosis/joanne reported or observed.       O: Medical ROS (as pertinent): No new changes reported to this writer.    Recent Labs     05/23/23  0830 05/24/23  0535 05/25/23  0545   WBC 11.1* 9.4 8.7   RBC 5.19 3.92* 4.34*   HEMOGLOBIN 16.0 12.0* 13.3*   HEMATOCRIT 45.4 35.7* 40.4*   MCV 87.5 91.1 93.1   MCH 30.8 30.6 30.6   RDW 40.3 42.8 43.8   PLATELETCT 340 256 260   MPV 9.6 9.8 10.0   NEUTSPOLYS 83.30* 78.10* 76.00*   LYMPHOCYTES 12.30* 13.80* 16.80*   MONOCYTES 3.50 6.90 6.20   EOSINOPHILS 0.10 0.60 0.30   BASOPHILS 0.30 0.20 0.20     Recent Labs     05/24/23  0535 05/24/23  1143 05/24/23  1750 05/25/23  0015 05/25/23  0545   SODIUM 139  --  147*  --  140   POTASSIUM 4.0  --  4.0  --  4.1   CHLORIDE 108  --  115*  --  107   CO2 23  --  24  --  23   GLUCOSE 103*  --  90  --  93   BUN 8  --  6*  --  8   CPKTOTAL 1606*   < > 1074* 820* 622*    < > = values in this interval not displayed.  "    Recent Labs     05/23/23  0830 05/23/23  2355 05/24/23  0535 05/24/23  1750 05/25/23  0545   ALBUMIN 4.5  --   --   --   --    TBILIRUBIN 0.9  --   --   --   --    ALKPHOSPHAT 82  --   --   --   --    TOTPROTEIN 7.6  --   --   --   --    ALTSGPT 45  --   --   --   --    ASTSGOT 39  --   --   --   --    CREATININE 0.94   < > 1.06 0.91 0.94    < > = values in this interval not displayed.         PSYCHIATRIC EXAM (MSE):   /70   Pulse 75   Temp 36.6 °C (97.8 °F) (Temporal)   Resp 16   Ht 1.778 m (5' 10\")   Wt 99.4 kg (219 lb 2.2 oz)   SpO2 92%       Constitutional: as noted above  General Appearance/Behavior: 52 y.o. appears stated age, obese good eye contact cooperative, No behavioral disturbances  Abnormal Movements: none, no PMA/PMR or tremor observed.  Gait and Posture: not observed  Musculoskeletal: as noted above  Mood: \"Alright\"  Affect: Mood/Congruent and Appropriate   Speech: quiet  Language:  spontaneous, comprehends spoken commands, and fluent   Thought Process: Goal Directed  Thought Content: Denies SI/HI. Denies A/VH, no e/o delusions, PI, or internal preoccupation.   Insight/Judgement:  fair  Alert/Orientation: alert, oriented to person, place and time  Attn/Concentration: normal  Fund of Knowledge: not tested  Memory recent/remote: not tested  MMSE: deferred this visit          Meds Current:  Scheduled Medications   Medication Dose Frequency    enoxaparin (LOVENOX) injection  40 mg DAILY AT 1800    escitalopram  10 mg DAILY    levETIRAcetam (Keppra) IV  1,000 mg Q12HRS    senna-docusate  2 Tablet BID     Allergies:   Allergies   Allergen Reactions    Pcn [Penicillins] Rash     Per patient           ASSESSMENT:   1. Altered mental status, unspecified altered mental status type       Psychiatric:   Major depressive disorder vs Adjustment disorder     Medical: as noted by the medical treatment team.   Acute respiratory failure with hypoxia  Altered mental status  Seizure disorder   "     RECOMMENDATIONS:  Legal Status: on legal hold    Please transfer patient to inpatient psychiatric hospital when medically cleared and bed is available.    Observation status:   Line of site with sitter    Visitors: No   Personal belongings: No     Discussed/voalted: EFE Mathis MD    Medication Recommendations: Final orders as per Treatment Team  No change indicated, continue escitalopram 10mg PO daily for depression   Risks/benefits/side effects discussed, patient verbalized understanding    Reviewed safety plan: 911, ER, PCM, MHC, suicide crisis line, nursing staff while inpatient.    Will Continue to Follow.

## 2023-05-25 NOTE — PROGRESS NOTES
PHQ and CSSR scale completed. CSSR high risk. Aisha UMAÑA and YA notified, all unnecessary eqipment removed from the room. Observation status changed to direct. Sitter implemented, telesiter discontinued.

## 2023-05-26 PROBLEM — M79.601 RIGHT ARM PAIN: Status: ACTIVE | Noted: 2023-05-26

## 2023-05-26 LAB
ANION GAP SERPL CALC-SCNC: 13 MMOL/L (ref 7–16)
BUN SERPL-MCNC: 11 MG/DL (ref 8–22)
CALCIUM SERPL-MCNC: 8.9 MG/DL (ref 8.5–10.5)
CHLORIDE SERPL-SCNC: 105 MMOL/L (ref 96–112)
CK SERPL-CCNC: 298 U/L (ref 0–154)
CO2 SERPL-SCNC: 22 MMOL/L (ref 20–33)
CREAT SERPL-MCNC: 0.86 MG/DL (ref 0.5–1.4)
ERYTHROCYTE [DISTWIDTH] IN BLOOD BY AUTOMATED COUNT: 41.7 FL (ref 35.9–50)
GFR SERPLBLD CREATININE-BSD FMLA CKD-EPI: 103 ML/MIN/1.73 M 2
GLUCOSE SERPL-MCNC: 89 MG/DL (ref 65–99)
HCT VFR BLD AUTO: 42.7 % (ref 42–52)
HGB BLD-MCNC: 14.6 G/DL (ref 14–18)
MAGNESIUM SERPL-MCNC: 2.1 MG/DL (ref 1.5–2.5)
MCH RBC QN AUTO: 30.5 PG (ref 27–33)
MCHC RBC AUTO-ENTMCNC: 34.2 G/DL (ref 32.3–36.5)
MCV RBC AUTO: 89.3 FL (ref 81.4–97.8)
PHOSPHATE SERPL-MCNC: 2.7 MG/DL (ref 2.5–4.5)
PLATELET # BLD AUTO: 273 K/UL (ref 164–446)
PMV BLD AUTO: 10 FL (ref 9–12.9)
POTASSIUM SERPL-SCNC: 3.9 MMOL/L (ref 3.6–5.5)
RBC # BLD AUTO: 4.78 M/UL (ref 4.7–6.1)
SODIUM SERPL-SCNC: 140 MMOL/L (ref 135–145)
TSH SERPL DL<=0.005 MIU/L-ACNC: 2.48 UIU/ML (ref 0.38–5.33)
WBC # BLD AUTO: 8.4 K/UL (ref 4.8–10.8)

## 2023-05-26 PROCEDURE — 83735 ASSAY OF MAGNESIUM: CPT

## 2023-05-26 PROCEDURE — 80048 BASIC METABOLIC PNL TOTAL CA: CPT

## 2023-05-26 PROCEDURE — 700102 HCHG RX REV CODE 250 W/ 637 OVERRIDE(OP): Performed by: EMERGENCY MEDICINE

## 2023-05-26 PROCEDURE — 84100 ASSAY OF PHOSPHORUS: CPT

## 2023-05-26 PROCEDURE — A9270 NON-COVERED ITEM OR SERVICE: HCPCS | Performed by: HOSPITALIST

## 2023-05-26 PROCEDURE — 700102 HCHG RX REV CODE 250 W/ 637 OVERRIDE(OP): Performed by: INTERNAL MEDICINE

## 2023-05-26 PROCEDURE — 99232 SBSQ HOSP IP/OBS MODERATE 35: CPT | Mod: GC | Performed by: INTERNAL MEDICINE

## 2023-05-26 PROCEDURE — 84443 ASSAY THYROID STIM HORMONE: CPT

## 2023-05-26 PROCEDURE — 85027 COMPLETE CBC AUTOMATED: CPT

## 2023-05-26 PROCEDURE — 700111 HCHG RX REV CODE 636 W/ 250 OVERRIDE (IP): Performed by: EMERGENCY MEDICINE

## 2023-05-26 PROCEDURE — A9270 NON-COVERED ITEM OR SERVICE: HCPCS | Performed by: EMERGENCY MEDICINE

## 2023-05-26 PROCEDURE — 700102 HCHG RX REV CODE 250 W/ 637 OVERRIDE(OP): Performed by: HOSPITALIST

## 2023-05-26 PROCEDURE — 770001 HCHG ROOM/CARE - MED/SURG/GYN PRIV*

## 2023-05-26 PROCEDURE — A9270 NON-COVERED ITEM OR SERVICE: HCPCS | Performed by: INTERNAL MEDICINE

## 2023-05-26 PROCEDURE — 36415 COLL VENOUS BLD VENIPUNCTURE: CPT

## 2023-05-26 PROCEDURE — 700102 HCHG RX REV CODE 250 W/ 637 OVERRIDE(OP): Performed by: PSYCHIATRY & NEUROLOGY

## 2023-05-26 PROCEDURE — A9270 NON-COVERED ITEM OR SERVICE: HCPCS | Performed by: PSYCHIATRY & NEUROLOGY

## 2023-05-26 PROCEDURE — 82550 ASSAY OF CK (CPK): CPT

## 2023-05-26 PROCEDURE — 700111 HCHG RX REV CODE 636 W/ 250 OVERRIDE (IP)

## 2023-05-26 RX ORDER — MIRTAZAPINE 15 MG/1
7.5 TABLET, FILM COATED ORAL
Status: DISCONTINUED | OUTPATIENT
Start: 2023-05-26 | End: 2023-06-01

## 2023-05-26 RX ORDER — FUROSEMIDE 10 MG/ML
40 INJECTION INTRAMUSCULAR; INTRAVENOUS ONCE
Status: COMPLETED | OUTPATIENT
Start: 2023-05-26 | End: 2023-05-26

## 2023-05-26 RX ORDER — HYDROXYZINE HYDROCHLORIDE 25 MG/1
50 TABLET, FILM COATED ORAL 3 TIMES DAILY PRN
Status: DISCONTINUED | OUTPATIENT
Start: 2023-05-26 | End: 2023-06-21 | Stop reason: HOSPADM

## 2023-05-26 RX ADMIN — SERTRALINE 25 MG: 50 TABLET, FILM COATED ORAL at 17:24

## 2023-05-26 RX ADMIN — ENOXAPARIN SODIUM 40 MG: 100 INJECTION SUBCUTANEOUS at 17:24

## 2023-05-26 RX ADMIN — LEVETIRACETAM 500 MG: 500 TABLET, FILM COATED ORAL at 04:45

## 2023-05-26 RX ADMIN — ESCITALOPRAM OXALATE 10 MG: 10 TABLET ORAL at 04:45

## 2023-05-26 RX ADMIN — SENNOSIDES AND DOCUSATE SODIUM 2 TABLET: 50; 8.6 TABLET ORAL at 17:24

## 2023-05-26 RX ADMIN — LEVETIRACETAM 250 MG: 250 TABLET, FILM COATED ORAL at 17:24

## 2023-05-26 RX ADMIN — FUROSEMIDE 40 MG: 10 INJECTION, SOLUTION INTRAVENOUS at 11:45

## 2023-05-26 ASSESSMENT — PATIENT HEALTH QUESTIONNAIRE - PHQ9
7. TROUBLE CONCENTRATING ON THINGS, SUCH AS READING THE NEWSPAPER OR WATCHING TELEVISION: SEVERAL DAYS
1. LITTLE INTEREST OR PLEASURE IN DOING THINGS: MORE THAN HALF THE DAYS
7. TROUBLE CONCENTRATING ON THINGS, SUCH AS READING THE NEWSPAPER OR WATCHING TELEVISION: SEVERAL DAYS
6. FEELING BAD ABOUT YOURSELF - OR THAT YOU ARE A FAILURE OR HAVE LET YOURSELF OR YOUR FAMILY DOWN: SEVERAL DAYS
9. THOUGHTS THAT YOU WOULD BE BETTER OFF DEAD, OR OF HURTING YOURSELF: NEARLY EVERY DAY
4. FEELING TIRED OR HAVING LITTLE ENERGY: MORE THAN HALF THE DAYS
2. FEELING DOWN, DEPRESSED, IRRITABLE, OR HOPELESS: NEARLY EVERY DAY
8. MOVING OR SPEAKING SO SLOWLY THAT OTHER PEOPLE COULD HAVE NOTICED. OR THE OPPOSITE, BEING SO FIGETY OR RESTLESS THAT YOU HAVE BEEN MOVING AROUND A LOT MORE THAN USUAL: NOT AT ALL
9. THOUGHTS THAT YOU WOULD BE BETTER OFF DEAD, OR OF HURTING YOURSELF: NEARLY EVERY DAY
5. POOR APPETITE OR OVEREATING: SEVERAL DAYS
SUM OF ALL RESPONSES TO PHQ QUESTIONS 1-9: 14
SUM OF ALL RESPONSES TO PHQ QUESTIONS 1-9: 13
8. MOVING OR SPEAKING SO SLOWLY THAT OTHER PEOPLE COULD HAVE NOTICED. OR THE OPPOSITE, BEING SO FIGETY OR RESTLESS THAT YOU HAVE BEEN MOVING AROUND A LOT MORE THAN USUAL: NOT AT ALL
5. POOR APPETITE OR OVEREATING: SEVERAL DAYS
SUM OF ALL RESPONSES TO PHQ9 QUESTIONS 1 AND 2: 5
SUM OF ALL RESPONSES TO PHQ9 QUESTIONS 1 AND 2: 5
6. FEELING BAD ABOUT YOURSELF - OR THAT YOU ARE A FAILURE OR HAVE LET YOURSELF OR YOUR FAMILY DOWN: SEVERAL DAYS
3. TROUBLE FALLING OR STAYING ASLEEP OR SLEEPING TOO MUCH: NOT AT ALL
2. FEELING DOWN, DEPRESSED, IRRITABLE, OR HOPELESS: NEARLY EVERY DAY
4. FEELING TIRED OR HAVING LITTLE ENERGY: MORE THAN HALF THE DAYS
1. LITTLE INTEREST OR PLEASURE IN DOING THINGS: MORE THAN HALF THE DAYS
3. TROUBLE FALLING OR STAYING ASLEEP OR SLEEPING TOO MUCH: SEVERAL DAYS

## 2023-05-26 ASSESSMENT — ENCOUNTER SYMPTOMS
TINGLING: 0
FEVER: 0
FOCAL WEAKNESS: 0
HALLUCINATIONS: 0
WEIGHT LOSS: 0
NERVOUS/ANXIOUS: 0
ABDOMINAL PAIN: 0
PALPITATIONS: 0
DIARRHEA: 0
VOMITING: 0
CHILLS: 0
DEPRESSION: 0
NAUSEA: 0
SENSORY CHANGE: 0
HEADACHES: 0
DIZZINESS: 0
DIAPHORESIS: 0
COUGH: 0
SPUTUM PRODUCTION: 0
SPEECH CHANGE: 0
BLURRED VISION: 0
WEAKNESS: 0
CONSTIPATION: 0
TREMORS: 0
SHORTNESS OF BREATH: 0
SEIZURES: 0

## 2023-05-26 ASSESSMENT — FIBROSIS 4 INDEX: FIB4 SCORE: 1.13

## 2023-05-26 ASSESSMENT — LIFESTYLE VARIABLES: SUBSTANCE_ABUSE: 0

## 2023-05-26 ASSESSMENT — PAIN DESCRIPTION - PAIN TYPE: TYPE: ACUTE PAIN

## 2023-05-26 NOTE — PROGRESS NOTES
[facility] Internal Medicine Daily Progress Note    Date of Service  5/26/2023    [facility] Team: [facility] IM Green Team   Attending: Aicha Melgoza M.d.  Senior Resident: Dr. Marissa Loaiza MD  Intern:  Dr. Hilario   Contact Number: [phone]    Chief Complaint  Bishop Bucio is a 52 y.o. male admitted 5/23/2023 with altered mental status secondary to Benadryl intoxication and overdose.    Hospital Course  Bishop Bucio is  52 year-old male with past medical history of depression who presented to the ED 5/23/2023 [place] and brought in by EMS for altered mental status with suspicion of Benadryl overdose after empty bottle of Benadryl found at his side. Patient had GCS 6 and intubated in ED for airway control. MRI and EEG unremarkable. Patient placed on Keppra taper by neurology. Psychiatry placed patient on legal hold secondary to suicidal ideation and medication overdose with intention.    Interval Problem Update  No acute events overnight.  When I talked with patient this morning, he states he does not feel good overall due to personal issues.  These personal issues are why he wants to kill himself.  States when he is discharged, he will just try to kill himself again.   Complains of right arm pain. No known injury. Unsure how he hurt himself, but states he took the Benadryl pills laying down.   Denies any fevers, chills, nausea, vomiting, chest pain, shortness of breath or abdominal pain.  Patient currently on legal hold.     I have discussed this patient's plan of care and discharge plan at IDT rounds today with Case Management, Nursing, Nursing leadership, and other members of the IDT team.    Consultants/Specialty  neurology and psychiatry    Code Status  Full Code    Disposition  The patient is not medically cleared for discharge to home or a post-acute facility.      I have placed the appropriate orders for post-discharge needs.    Review of Systems  Review of Systems   Constitutional:  Negative for chills,  diaphoresis, fever, malaise/fatigue and weight loss.   HENT:  Negative for tinnitus.    Eyes:  Negative for blurred vision.   Respiratory:  Negative for cough, sputum production and shortness of breath.    Cardiovascular:  Negative for chest pain, palpitations and leg swelling.   Gastrointestinal:  Negative for abdominal pain, constipation, diarrhea, nausea and vomiting.   Genitourinary:  Negative for dysuria, frequency and urgency.   Musculoskeletal:         Right Arm pain    Skin:  Negative for rash.   Neurological:  Negative for dizziness, tingling, tremors, sensory change, speech change, focal weakness, seizures, weakness and headaches.   Psychiatric/Behavioral:  Positive for suicidal ideas. Negative for depression, hallucinations and substance abuse. The patient is not nervous/anxious.         Physical Exam  Temp:  [36.1 °C (97 °F)-37.1 °C (98.8 °F)] 36.8 °C (98.2 °F)  Pulse:  [72-84] 72  Resp:  [18] 18  BP: (124-129)/(69-81) 126/81  SpO2:  [93 %-97 %] 97 %    Physical Exam  Constitutional:       General: He is not in acute distress.     Appearance: Normal appearance. He is not ill-appearing.   HENT:      Head: Normocephalic and atraumatic.      Right Ear: Tympanic membrane normal.      Left Ear: Tympanic membrane normal.      Mouth/Throat:      Mouth: Mucous membranes are moist.      Pharynx: Oropharynx is clear.   Eyes:      Extraocular Movements: Extraocular movements intact.      Conjunctiva/sclera: Conjunctivae normal.      Pupils: Pupils are equal, round, and reactive to light.   Cardiovascular:      Rate and Rhythm: Normal rate and regular rhythm.      Pulses: Normal pulses.   Pulmonary:      Effort: Pulmonary effort is normal.      Breath sounds: Normal breath sounds.   Abdominal:      General: Abdomen is flat. Bowel sounds are normal.      Palpations: Abdomen is soft.   Musculoskeletal:         General: Swelling and tenderness present. No deformity. Normal range of motion.      Cervical back: Normal  range of motion and neck supple.      Right lower leg: No edema.      Left lower leg: No edema.   Skin:     General: Skin is warm and dry.      Findings: Erythema (face and upper chest) present.   Neurological:      Mental Status: He is oriented to person, place, and time.   Psychiatric:         Behavior: Behavior normal.         Fluids    Intake/Output Summary (Last 24 hours) at 5/26/2023 1401  Last data filed at 5/26/2023 0748  Gross per 24 hour   Intake 240 ml   Output --   Net 240 ml       Laboratory  Recent Labs     05/24/23  0535 05/25/23  0545 05/26/23  0042   WBC 9.4 8.7 8.4   RBC 3.92* 4.34* 4.78   HEMOGLOBIN 12.0* 13.3* 14.6   HEMATOCRIT 35.7* 40.4* 42.7   MCV 91.1 93.1 89.3   MCH 30.6 30.6 30.5   MCHC 33.6 32.9 34.2   RDW 42.8 43.8 41.7   PLATELETCT 256 260 273   MPV 9.8 10.0 10.0     Recent Labs     05/24/23  1750 05/25/23  0545 05/26/23  0042   SODIUM 147* 140 140   POTASSIUM 4.0 4.1 3.9   CHLORIDE 115* 107 105   CO2 24 23 22   GLUCOSE 90 93 89   BUN 6* 8 11   CREATININE 0.91 0.94 0.86   CALCIUM 7.6* 8.4* 8.9                   Imaging  MR-BRAIN-W/O   Final Result         No acute intracranial process.      Inflammatory changes involving the paranasal sinuses. Right mastoid effusion.      DX-CHEST-PORTABLE (1 VIEW)   Final Result      Endotracheal tube has been advanced and now terminates in good position above the bhavin      Retrocardiac opacity could be from atelectasis or consolidation      DX-ABDOMEN FOR TUBE PLACEMENT   Final Result      NG tube terminates over the stomach.      DX-CHEST-PORTABLE (1 VIEW)   Final Result      1.  Interval intubation; the endotracheal tube tip is just above the level of the clavicular heads   2.  Lung volumes are low.      CT-HEAD W/O   Final Result      1.  No CT evidence of acute infarct, hemorrhage or mass.   2.  Moderate mucosal thickening of the maxillary, sphenoid and ethmoid sinuses.      DX-CHEST-PORTABLE (1 VIEW)   Final Result      Predominately linear  bibasilar opacities, likely atelectasis. No focal consolidation. No effusions.         US-EXTREMITY VENOUS UPPER UNILAT RIGHT    (Results Pending)        Assessment/Plan  Problem Representation:    * Drug intoxication with complication, with unspecified complication (HCC)- (present on admission)  Assessment & Plan  Patient was admitted with suspected dyphenhydramine intoxication with unknown amount of tablets and unknown time of normal last seen. Symptoms related includes cutaneous vasodilation (more obvious in the face), mydriasis, and reported halluciations with possible urinary retention. Certainly he developed AMS and respiratory failure. Differential diagnosis include other intoxications, seizures, meningitis, sepsis, etc.  For now will base management on supportive care, consider physostigmine but given suspected seizure and possible concomitant other drug intoxication, will hold for now.  Initial treatment for seizures with benzos, Keppra  Poison control case #[id number]  CT head, MRI negative  UDS reported  Neurology, psychiatry consulted  Improved, the patient remains on a legal hold    Right arm pain  Assessment & Plan  Non traumatic swelling  Ordered US of right arm to rule out DVT    Suicide attempt by drug ingestion (HCC)- (present on admission)  Assessment & Plan  With Benadryl  Medically treated and improved  Placed on legal hold, referred to psychiatry for evaluation  The patient will likely need inpatient psychiatric care    AMS (altered mental status)- (present on admission)  Assessment & Plan  Secondary to occasional overdose in form of Benadryl  Was reported to poison control  Resolved with supportive care    Hypokalemia- (present on admission)  Assessment & Plan  Replace electrolytes as needed    Metabolic acidosis- (present on admission)  Assessment & Plan  RESOLVED  Mild anion gap metabolic acidosis  Metabolic profile and acid-base profile improved after resuscitation    Elevated CPK- (present on  admission)  Assessment & Plan  Downtrending with fluid resuscitation, no evidence of kidney injury  S/p IVF    Acute respiratory failure with hypoxia (HCC)- (present on admission)  Assessment & Plan  Most likely due to drug intoxication, initially intubated for airway protection  Successfully extubated  Wean off oxygen        VTE prophylaxis: enoxaparin ppx  Thank you very much for allowing me to participate in this patient's care.I have performed a physical exam and reviewed and updated ROS and Plan today (5/26/2023). In review of yesterday's note (5/25/2023), there are no changes except as documented above. All plans of care were discussed with the attending physician. Please contact me with any questions.    Marissa Loaiza M.D.  Internal Medicine Resident

## 2023-05-26 NOTE — HOSPITAL COURSE
"Bishop Bucio is  52 year-old male with past medical history of depression who presented [place] 5/23/2023 [place]. Admitted for altered mental status secondary to benadryl overdose (empty bottle found at his side). Patient had GCS 6 and intubated in ED for airway control, successfully extubated on 5/24/2023. Seizure event was suspected as patient had \"deviated gaze and nonverbal\" per emergency department staff so he was started on ativan and keppra. MRI and EEG were unremarkable. He was started on keppra 1000mg BID in the critical care unit. He then underwent Keppra taper by neurology and was successfully weaned off. His metabolic acidosis on admission was resolved by discharge. Hypokalemia also resolved. Psychiatry placed patient on legal hold secondary to suicidal ideation and medication overdose with intention. They continued to follow patient and he was started on sertraline 100mg (administer at night as patient complains of drowsiness, fogginess) which he will be discharged with. Hospitalization was complicated by US of RUE showing superficial thrombophlebitis, however did not need any intervention and showed improvement with supportive management with warm compresses, etc. He was medically clear for discharge on 5/27/2023 but unfortunately inpatient stay was prolonged due to lack of bed availability at the [facility].  Plan was to discharge patient to [facility] however due to positive COVID test on 6/7, patient required inpatient stay for 5 more days per facility protocol and medically clear on 6/11.  "

## 2023-05-26 NOTE — DISCHARGE PLANNING
Case Management Discharge Planning    Admission Date: 5/23/2023  GMLOS: 3.6  ALOS: 3    6-Clicks ADL Score: 20  6-Clicks Mobility Score: 19      Anticipated Discharge Dispo: Discharge Disposition: D/T to psych hosp or distinct part unit (65)    DME Needed: No    Action(s) Taken: OTHER    Patient on legal hold due to suicide attempt. Patient is uninsured and pending [place] Medicaid.    Patient discussed during IDT rounds. Patient is not medically clear. Patient will need psych placement once medically clear. Alert Team to assist with placement needs.    Escalations Completed: None    Medically Clear: No    Next Steps: LSW to follow up with patient and medical team regarding discharge needs and barriers.     Barriers to Discharge: Medical Clearance, Pending Insurance

## 2023-05-26 NOTE — CONSULTS
BRIEF PSYCHIATRIC CONSULT NOTE: patient seen, full note to follow.  -Legal Hold:extended  -start zoloft 25 mg/d  -remeron 7.5 mg prn insomnia  -atarax 50 mg tid prn anxiety  -dicussed with EFE Melgoza MD  -sky

## 2023-05-26 NOTE — CARE PLAN
Problem: Safety - Medical Restraint  Goal: Remains free of injury from restraints (Restraint for Interference with Medical Device)  Outcome: Progressing  Goal: Free from restraint(s) (Restraint for Interference with Medical Device)  Outcome: Progressing     Problem: Knowledge Deficit - Standard  Goal: Patient and family/care givers will demonstrate understanding of plan of care, disease process/condition, diagnostic tests and medications  Outcome: Progressing     Problem: Skin Integrity  Goal: Skin integrity is maintained or improved  Outcome: Progressing     Problem: Fall Risk  Goal: Patient will remain free from falls  Outcome: Progressing     Problem: Pain - Standard  Goal: Alleviation of pain or a reduction in pain to the patient’s comfort goal  Outcome: Progressing   The patient is Watcher - Medium risk of patient condition declining or worsening    Shift Goals  Clinical Goals: monitor safety  Patient Goals: to find out why arm hurts  Family Goals: RAINA    Progress made toward(s) clinical / shift goals:  Plan of care discussed with patient.     Patient is not progressing towards the following goals:

## 2023-05-26 NOTE — ASSESSMENT & PLAN NOTE
RESOLVED  Non traumatic swelling of RUE. US demonstrated acute to subacute occlusive SVT. Swelling has now resolved.  -Warm compresses as needed

## 2023-05-26 NOTE — PROGRESS NOTES
"At 0400 pt reports still wanting to end his life. When I asked why, he stated \" life \" . I asked if he would try suicide again , he stated yes. I gave emotional support .Will pass onto day shift. Talked with sky and I educated her on protocols . She verbalized understanding  "

## 2023-05-26 NOTE — CARE PLAN
The patient is Stable - Low risk of patient condition declining or worsening    Shift Goals  Clinical Goals: Less lethargy  Patient Goals: Rest  Family Goals: RAINA    Progress made toward(s) clinical / shift goals:    Problem: Psychosocial  Goal: Patient's ability to identify and develop effective coping behaviors will improve  5/26/2023 0230 by Andie Montalvo R.N.  Outcome: Not Progressing  Note: Flat effect and did not want to talk to brother  5/26/2023 0230 by KEVIN EscobarNOdin  Note: Flat effect and did not want to talk to brother  Goal: Patient's ability to identify and utilize available support systems will improve  5/26/2023 0230 by KEVIN EscobarNOdin  Outcome: Not Progressing  Note: Very little communicating, would not talk to his brother  5/26/2023 0230 by Andie Montalvo R.N.  Note: Very little communicating, would not talk to his brother     Problem: Safety - Medical Restraint  Goal: Remains free of injury from restraints (Restraint for Interference with Medical Device)  5/26/2023 0230 by Andie Montalvo R.N.  Outcome: Progressing  Flowsheets (Taken 5/26/2023 0230)  Addressed this shift: Remains free of injury from restraints (restraint for interference with medical device): Every 2 hours: Monitor safety, psychosocial status, comfort, nutrition and hydration  Note: No need for retraints  5/26/2023 0230 by Andie Montalvo R.NOdin  Note: No need for retraints  Goal: Free from restraint(s) (Restraint for Interference with Medical Device)  Outcome: Progressing     Problem: Knowledge Deficit - Standard  Goal: Patient and family/care givers will demonstrate understanding of plan of care, disease process/condition, diagnostic tests and medications  5/26/2023 0230 by PAO Escobar.[email or web address]  Outcome: Progressing  Note: A & O x 4  5/26/2023 0230 by Andie Montalvo R.N.  Note: A & O x 4     Problem: Pain - Standard  Goal: Alleviation of pain or a reduction in pain to the patient’s  comfort goal  5/26/2023 0230 by Andie Montalvo R.N.  Outcome: Progressing  Note: Pain free  5/26/2023 0230 by Andie Montalvo R.N.  Note: Pain free     Problem: Provide Safe Environment  Goal: Suicide environmental safety, protocols, policies, and practices will be implemented  5/26/2023 0230 by Andie Montalvo R.N.  Flowsheets (Taken 5/26/2023 0230)  Safety Interventions:   Patient Room Close to Nursing Station   Patient Wearing Hospital Clothing   Personal Clothing / Belongings Removed (Comment: Storage Location)   Potentially Dangerous Items Removed from room   Plastic Utensils / Paper Ware Ordered   Provided Safety Education   Discussed no self harm or elopement and safe behavior with patient   Medically necessary equipment present, hourly room safety check, and post-meal tray check.  Note: Sitter 1:1  5/26/2023 0230 by Andie Montalvo R.N.  Note: Sitter 1:1     Problem: Provide Safe Environment  Goal: Suicide environmental safety, protocols, policies, and practices will be implemented  5/26/2023 0230 by Andie Montalvo R.N.  Flowsheets (Taken 5/26/2023 0230)  Safety Interventions:   Patient Room Close to Nursing Station   Patient Wearing Hospital Clothing   Personal Clothing / Belongings Removed (Comment: Storage Location)   Potentially Dangerous Items Removed from room   Plastic Utensils / Paper Ware Ordered   Provided Safety Education   Discussed no self harm or elopement and safe behavior with patient   Medically necessary equipment present, hourly room safety check, and post-meal tray check.  Note: Sitter 1:1       Patient is not progressing towards the following goals:      Problem: Psychosocial  Goal: Patient's ability to identify and develop effective coping behaviors will improve  5/26/2023 0230 by Andie Montalvo R.N.  Outcome: Not Progressing  Note: Flat effect and did not want to talk to brother  5/26/2023 0230 by Andie Montalvo R.N.  Note: Flat effect and did not want  to talk to brother  Goal: Patient's ability to identify and utilize available support systems will improve  5/26/2023 0230 by Andie Montalvo R.N.  Outcome: Not Progressing  Note: Very little communicating, would not talk to his brother  5/26/2023 0230 by Andie Montalvo R.N.  Note: Very little communicating, would not talk to his brother

## 2023-05-27 ENCOUNTER — APPOINTMENT (OUTPATIENT)
Dept: RADIOLOGY | Facility: MEDICAL CENTER | Age: 53
DRG: 917 | End: 2023-05-27
Payer: MEDICAID

## 2023-05-27 PROBLEM — I82.611 SUPERFICIAL VENOUS THROMBOSIS OF ARM, RIGHT: Status: ACTIVE | Noted: 2023-05-26

## 2023-05-27 LAB — CK SERPL-CCNC: 100 U/L (ref 0–154)

## 2023-05-27 PROCEDURE — A9270 NON-COVERED ITEM OR SERVICE: HCPCS | Performed by: PSYCHIATRY & NEUROLOGY

## 2023-05-27 PROCEDURE — 700102 HCHG RX REV CODE 250 W/ 637 OVERRIDE(OP): Performed by: INTERNAL MEDICINE

## 2023-05-27 PROCEDURE — A9270 NON-COVERED ITEM OR SERVICE: HCPCS | Performed by: EMERGENCY MEDICINE

## 2023-05-27 PROCEDURE — A9270 NON-COVERED ITEM OR SERVICE: HCPCS | Performed by: INTERNAL MEDICINE

## 2023-05-27 PROCEDURE — 700111 HCHG RX REV CODE 636 W/ 250 OVERRIDE (IP): Performed by: EMERGENCY MEDICINE

## 2023-05-27 PROCEDURE — 700102 HCHG RX REV CODE 250 W/ 637 OVERRIDE(OP): Performed by: EMERGENCY MEDICINE

## 2023-05-27 PROCEDURE — 770001 HCHG ROOM/CARE - MED/SURG/GYN PRIV*

## 2023-05-27 PROCEDURE — 93971 EXTREMITY STUDY: CPT | Mod: RT

## 2023-05-27 PROCEDURE — 93971 EXTREMITY STUDY: CPT | Mod: 26,RT | Performed by: INTERNAL MEDICINE

## 2023-05-27 PROCEDURE — 36415 COLL VENOUS BLD VENIPUNCTURE: CPT

## 2023-05-27 PROCEDURE — 82550 ASSAY OF CK (CPK): CPT

## 2023-05-27 PROCEDURE — 700102 HCHG RX REV CODE 250 W/ 637 OVERRIDE(OP): Performed by: PSYCHIATRY & NEUROLOGY

## 2023-05-27 PROCEDURE — 99232 SBSQ HOSP IP/OBS MODERATE 35: CPT | Mod: GC | Performed by: INTERNAL MEDICINE

## 2023-05-27 RX ADMIN — LEVETIRACETAM 250 MG: 250 TABLET, FILM COATED ORAL at 06:34

## 2023-05-27 RX ADMIN — HYDROXYZINE HYDROCHLORIDE 50 MG: 25 TABLET, FILM COATED ORAL at 20:46

## 2023-05-27 RX ADMIN — ESCITALOPRAM OXALATE 10 MG: 10 TABLET ORAL at 06:34

## 2023-05-27 RX ADMIN — ENOXAPARIN SODIUM 40 MG: 100 INJECTION SUBCUTANEOUS at 18:28

## 2023-05-27 RX ADMIN — MIRTAZAPINE 7.5 MG: 15 TABLET, FILM COATED ORAL at 20:46

## 2023-05-27 RX ADMIN — SERTRALINE 25 MG: 50 TABLET, FILM COATED ORAL at 06:34

## 2023-05-27 ASSESSMENT — PATIENT HEALTH QUESTIONNAIRE - PHQ9
1. LITTLE INTEREST OR PLEASURE IN DOING THINGS: MORE THAN HALF THE DAYS
SUM OF ALL RESPONSES TO PHQ QUESTIONS 1-9: 9
8. MOVING OR SPEAKING SO SLOWLY THAT OTHER PEOPLE COULD HAVE NOTICED. OR THE OPPOSITE, BEING SO FIGETY OR RESTLESS THAT YOU HAVE BEEN MOVING AROUND A LOT MORE THAN USUAL: NOT AT ALL
3. TROUBLE FALLING OR STAYING ASLEEP OR SLEEPING TOO MUCH: SEVERAL DAYS
2. FEELING DOWN, DEPRESSED, IRRITABLE, OR HOPELESS: MORE THAN HALF THE DAYS
9. THOUGHTS THAT YOU WOULD BE BETTER OFF DEAD, OR OF HURTING YOURSELF: SEVERAL DAYS
SUM OF ALL RESPONSES TO PHQ9 QUESTIONS 1 AND 2: 4
6. FEELING BAD ABOUT YOURSELF - OR THAT YOU ARE A FAILURE OR HAVE LET YOURSELF OR YOUR FAMILY DOWN: SEVERAL DAYS
5. POOR APPETITE OR OVEREATING: NOT AT ALL
4. FEELING TIRED OR HAVING LITTLE ENERGY: SEVERAL DAYS
7. TROUBLE CONCENTRATING ON THINGS, SUCH AS READING THE NEWSPAPER OR WATCHING TELEVISION: SEVERAL DAYS

## 2023-05-27 ASSESSMENT — ENCOUNTER SYMPTOMS
WEAKNESS: 0
SEIZURES: 0
DEPRESSION: 0
NERVOUS/ANXIOUS: 0
VOMITING: 0
HALLUCINATIONS: 0
CONSTIPATION: 0
HEADACHES: 0
NAUSEA: 0
DIZZINESS: 0
DIAPHORESIS: 0
PALPITATIONS: 0
SPUTUM PRODUCTION: 0
TINGLING: 0
CHILLS: 0
BLURRED VISION: 0
SHORTNESS OF BREATH: 0
TREMORS: 0
WEIGHT LOSS: 0
FOCAL WEAKNESS: 0
FEVER: 0
DIARRHEA: 1
ABDOMINAL PAIN: 0
COUGH: 0
SENSORY CHANGE: 0
SPEECH CHANGE: 0

## 2023-05-27 ASSESSMENT — LIFESTYLE VARIABLES: SUBSTANCE_ABUSE: 0
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
## 2023-05-27 NOTE — PROGRESS NOTES
"[facility] Internal Medicine Daily Progress Note    Date of Service  5/27/2023    [employer] Team: R IM Green Team   Attending: Aicha Melgoza M.d.  Senior Resident: Dr. Marissa Loaiza MD  Intern:  Dr. Boyd DO   Contact Number: [phone]    Chief Complaint  Bishop Bucio is a 52 y.o. male admitted 5/23/2023 with altered mental status secondary to Benadryl intoxication and overdose.    Hospital Course  Bishop Bucio is  52 year-old male with past medical history of depression who [place] ED [place] EMS for altered mental status with suspicion of Benadryl overdose after empty bottle of Benadryl found at his side. Patient had GCS 6 and intubated in ED for airway control, successfully extubated. MRI and EEG unremarkable. Patient placed on Keppra taper by neurology. Psychiatry placed patient on legal hold secondary to suicidal ideation and medication overdose with intention.    Interval Problem Update  No acute events overnight.  Vital signs are stable.  Patient still expressing suicidal ideations, however no homicidal ideations at this time.  Also notes diffuse body burning, as well as mild dizziness.  States he is having symptoms of \"hot flashes\".  Medically cleared, however still currently on legal hold with possible pending placement to psychiatric hospital.    I have discussed this patient's plan of care and discharge plan at IDT rounds today with Case Management, Nursing, Nursing leadership, and other members of the IDT team.    Consultants/Specialty  neurology and psychiatry    Code Status  Full Code    Disposition  The patient is medically cleared for discharge to home or a post-acute facility.  Anticipate discharge to: a psychiatric hospital    I have placed the appropriate orders for post-discharge needs.    Review of Systems  Review of Systems   Constitutional:  Negative for chills, diaphoresis, fever, malaise/fatigue and weight loss.   HENT:  Negative for tinnitus.  "   Eyes:  Negative for blurred vision.   Respiratory:  Negative for cough, sputum production and shortness of breath.    Cardiovascular:  Negative for chest pain, palpitations and leg swelling.   Gastrointestinal:  Positive for diarrhea. Negative for abdominal pain, constipation, nausea and vomiting.   Genitourinary:  Negative for dysuria, frequency and urgency.   Musculoskeletal:         Right Arm pain    Skin:  Negative for rash.   Neurological:  Negative for dizziness, tingling, tremors, sensory change, speech change, focal weakness, seizures, weakness and headaches.   Psychiatric/Behavioral:  Positive for suicidal ideas. Negative for depression, hallucinations and substance abuse. The patient is not nervous/anxious.         Physical Exam  Temp:  [36.2 °C (97.1 °F)-37.4 °C (99.3 °F)] 36.2 °C (97.1 °F)  Pulse:  [77-98] 98  Resp:  [18] 18  BP: (131-144)/(78-89) 144/89  SpO2:  [92 %-95 %] 95 %    Physical Exam  Constitutional:       General: He is not in acute distress.     Appearance: Normal appearance. He is not ill-appearing.   HENT:      Head: Normocephalic and atraumatic.      Right Ear: Tympanic membrane normal.      Left Ear: Tympanic membrane normal.      Mouth/Throat:      Mouth: Mucous membranes are moist.      Pharynx: Oropharynx is clear.   Eyes:      Extraocular Movements: Extraocular movements intact.      Conjunctiva/sclera: Conjunctivae normal.      Pupils: Pupils are equal, round, and reactive to light.   Cardiovascular:      Rate and Rhythm: Normal rate and regular rhythm.      Pulses: Normal pulses.   Pulmonary:      Effort: Pulmonary effort is normal.      Breath sounds: Normal breath sounds.   Abdominal:      General: Abdomen is flat. Bowel sounds are normal.      Palpations: Abdomen is soft.   Musculoskeletal:         General: Swelling and tenderness present. No deformity. Normal range of motion.      Cervical back: Normal range of motion and neck supple.      Right lower leg: No edema.       Left lower leg: No edema.   Skin:     General: Skin is warm and dry.      Findings: Erythema (face and upper chest) present.   Neurological:      Mental Status: He is oriented to person, place, and time.   Psychiatric:         Behavior: Behavior normal.         Fluids    Intake/Output Summary (Last 24 hours) at 5/27/2023 1218  Last data filed at 5/27/2023 0900  Gross per 24 hour   Intake 250 ml   Output --   Net 250 ml       Laboratory  Recent Labs     05/25/23  0545 05/26/23  0042   WBC 8.7 8.4   RBC 4.34* 4.78   HEMOGLOBIN 13.3* 14.6   HEMATOCRIT 40.4* 42.7   MCV 93.1 89.3   MCH 30.6 30.5   MCHC 32.9 34.2   RDW 43.8 41.7   PLATELETCT 260 273   MPV 10.0 10.0     Recent Labs     05/24/23  1750 05/25/23  0545 05/26/23  0042   SODIUM 147* 140 140   POTASSIUM 4.0 4.1 3.9   CHLORIDE 115* 107 105   CO2 24 23 22   GLUCOSE 90 93 89   BUN 6* 8 11   CREATININE 0.91 0.94 0.86   CALCIUM 7.6* 8.4* 8.9                   Imaging  US-EXTREMITY VENOUS UPPER UNILAT RIGHT   Final Result      MR-BRAIN-W/O   Final Result         No acute intracranial process.      Inflammatory changes involving the paranasal sinuses. Right mastoid effusion.      DX-CHEST-PORTABLE (1 VIEW)   Final Result      Endotracheal tube has been advanced and now terminates in good position above the bhavin      Retrocardiac opacity could be from atelectasis or consolidation      DX-ABDOMEN FOR TUBE PLACEMENT   Final Result      NG tube terminates over the stomach.      DX-CHEST-PORTABLE (1 VIEW)   Final Result      1.  Interval intubation; the endotracheal tube tip is just above the level of the clavicular heads   2.  Lung volumes are low.      CT-HEAD W/O   Final Result      1.  No CT evidence of acute infarct, hemorrhage or mass.   2.  Moderate mucosal thickening of the maxillary, sphenoid and ethmoid sinuses.      DX-CHEST-PORTABLE (1 VIEW)   Final Result      Predominately linear bibasilar opacities, likely atelectasis. No focal consolidation. No effusions.               Assessment/Plan  Problem Representation:    * Drug intoxication with complication, with unspecified complication (HCC)- (present on admission)  Assessment & Plan  Patient was admitted with suspected dyphenhydramine intoxication with unknown amount of tablets and unknown time of normal last seen. Symptoms related includes cutaneous vasodilation (more obvious in the face), mydriasis, and reported halluciations with possible urinary retention. Certainly he developed AMS and respiratory failure. Differential diagnosis include other intoxications, seizures, meningitis, sepsis, etc.  For now will base management on supportive care, consider physostigmine but given suspected seizure and possible concomitant other drug intoxication, will hold for now.  -Initial treatment for seizures with benzos, Keppra  -Poison control case #[id number]  -CT head, MRI negative  -UDS reported  -Neurology, psychiatry consulted  -Improved and currently medically clear, the patient remains on a legal hold    Suicide attempt by drug ingestion (HCC)- (present on admission)  Assessment & Plan  With Benadryl. Medically treated and improved. Placed on legal hold, referred to psychiatry for evaluation  -Psychiatry following, appreciate support  -Currently has suicidal ideation  -The patient will likely need inpatient psychiatric care  -Continue Remeron, Lexapro, and Zoloft  -Deferring hydroxyzine in setting of recent antihistamine overdose    Superficial venous thrombosis of arm, right  Assessment & Plan  Non traumatic swelling of RUE. US demonstrated acute to subacute occlusive SVT  -Warm compresses  -Continue to monitor    AMS (altered mental status)- (present on admission)  Assessment & Plan  RESOLVED  Secondary to overdose in form of Benadryl. Was reported to poison control  -Resolved with supportive care    Hypokalemia- (present on admission)  Assessment & Plan  Replace electrolytes as needed    Metabolic acidosis- (present on  admission)  Assessment & Plan  RESOLVED  Mild anion gap metabolic acidosis  -Metabolic profile and acid-base profile improved after resuscitation    Elevated CPK- (present on admission)  Assessment & Plan  RESOLVED  Downtrending with fluid resuscitation, no evidence of kidney injury  -Currently s/p IVF  - (WNL)    Acute respiratory failure with hypoxia (HCC)- (present on admission)  Assessment & Plan  RESOLVED  Most likely due to drug intoxication, initially intubated for airway protection  -Successfully extubated  -Currently saturating adequately on room air       VTE prophylaxis: enoxaparin ppx  Thank you very much for allowing me to participate in this patient's care.I have performed a physical exam and reviewed and updated ROS and Plan today (5/27/2023). In review of yesterday's note (5/26/2023), there are no changes except as documented above. All plans of care were discussed with the attending physician. Please contact me with any questions.    Raciel Nix, DO  Internal Medicine Resident

## 2023-05-27 NOTE — CARE PLAN
The patient is Stable - Low risk of patient condition declining or worsening    Shift Goals  Clinical Goals: Safety  Patient Goals: Rest, phone  Family Goals: RAINA    Progress made toward(s) clinical / shift goals:      Problem: Knowledge Deficit - Standard  Goal: Patient and family/care givers will demonstrate understanding of plan of care, disease process/condition, diagnostic tests and medications  Outcome: Progressing  Note: Patient actively participated in POC. Discussed legal hold with patient, as well as new prn medications added to help with anxiety and sleep. Patient is frustrated that he is unable to have his phone. Discussed with patient that psych will see him again and will be the ones to decide when he is ok to have more of his belongings.      Problem: Skin Integrity  Goal: Skin integrity is maintained or improved  Outcome: Progressing  Note: Skin remains intact this shift. There are no new wounds or skin issues noted overnight.  Patient ambulating frequently from the bed to the chair.      Problem: Fall Risk  Goal: Patient will remain free from falls  Outcome: Progressing  Patient did not experience a fall within shift.      Problem: Pain - Standard  Goal: Alleviation of pain or a reduction in pain to the patient’s comfort goal  Outcome: Progressing     Problem: Psychosocial  Goal: Patient's ability to identify and develop effective coping behaviors will improve  Outcome: Progressing  Note: Patient encouraged to discuss feelings. Stayed in patients room for some time to discuss his frustrations/feeling. Patient appears to enjoy talking with the sitter.

## 2023-05-28 PROBLEM — R09.81 COMPLAINT OF NASAL CONGESTION: Status: ACTIVE | Noted: 2023-05-28

## 2023-05-28 LAB
BACTERIA BLD CULT: NORMAL
BACTERIA BLD CULT: NORMAL
SIGNIFICANT IND 70042: NORMAL
SIGNIFICANT IND 70042: NORMAL
SITE SITE: NORMAL
SITE SITE: NORMAL
SOURCE SOURCE: NORMAL
SOURCE SOURCE: NORMAL

## 2023-05-28 PROCEDURE — 700111 HCHG RX REV CODE 636 W/ 250 OVERRIDE (IP): Performed by: EMERGENCY MEDICINE

## 2023-05-28 PROCEDURE — 99232 SBSQ HOSP IP/OBS MODERATE 35: CPT | Mod: GC | Performed by: INTERNAL MEDICINE

## 2023-05-28 PROCEDURE — A9270 NON-COVERED ITEM OR SERVICE: HCPCS | Performed by: EMERGENCY MEDICINE

## 2023-05-28 PROCEDURE — 770001 HCHG ROOM/CARE - MED/SURG/GYN PRIV*

## 2023-05-28 PROCEDURE — 700102 HCHG RX REV CODE 250 W/ 637 OVERRIDE(OP): Performed by: INTERNAL MEDICINE

## 2023-05-28 PROCEDURE — A9270 NON-COVERED ITEM OR SERVICE: HCPCS | Performed by: INTERNAL MEDICINE

## 2023-05-28 PROCEDURE — 700102 HCHG RX REV CODE 250 W/ 637 OVERRIDE(OP): Performed by: EMERGENCY MEDICINE

## 2023-05-28 RX ORDER — ECHINACEA PURPUREA EXTRACT 125 MG
2 TABLET ORAL
Status: DISCONTINUED | OUTPATIENT
Start: 2023-05-28 | End: 2023-06-21 | Stop reason: HOSPADM

## 2023-05-28 RX ADMIN — ENOXAPARIN SODIUM 40 MG: 100 INJECTION SUBCUTANEOUS at 18:00

## 2023-05-28 RX ADMIN — ESCITALOPRAM OXALATE 10 MG: 10 TABLET ORAL at 06:34

## 2023-05-28 RX ADMIN — SERTRALINE 25 MG: 50 TABLET, FILM COATED ORAL at 06:34

## 2023-05-28 RX ADMIN — MIRTAZAPINE 7.5 MG: 15 TABLET, FILM COATED ORAL at 21:59

## 2023-05-28 ASSESSMENT — ENCOUNTER SYMPTOMS
TREMORS: 0
SENSORY CHANGE: 0
NAUSEA: 0
CHILLS: 0
WEIGHT LOSS: 0
COUGH: 0
SPUTUM PRODUCTION: 0
CONSTIPATION: 0
DEPRESSION: 0
DIARRHEA: 1
PALPITATIONS: 0
BLURRED VISION: 0
DIZZINESS: 0
SEIZURES: 0
FEVER: 0
DIAPHORESIS: 0
SHORTNESS OF BREATH: 0
WEAKNESS: 0
HALLUCINATIONS: 0
VOMITING: 0
TINGLING: 0
FOCAL WEAKNESS: 0
HEADACHES: 0
SPEECH CHANGE: 0
NERVOUS/ANXIOUS: 0
ABDOMINAL PAIN: 0

## 2023-05-28 ASSESSMENT — PAIN DESCRIPTION - PAIN TYPE: TYPE: ACUTE PAIN

## 2023-05-28 ASSESSMENT — LIFESTYLE VARIABLES: SUBSTANCE_ABUSE: 0

## 2023-05-28 NOTE — PROGRESS NOTES
[facility] Internal Medicine Daily Progress Note    Date of Service  5/28/2023    [facility] Team: [employer] IM Green Team   Attending: Aicha Melgoza M.d.  Senior Resident: Dr. Josse MD  Intern:  Dr. Boyd DO   Contact Number: [phone]    Chief Complaint  Bishop Bucio is a 52 y.o. male admitted 5/23/2023 with altered mental status secondary to Benadryl intoxication and overdose.    Hospital Course  Bishop Bucio is  52 year-old male with past medical history of depression who [place] ED [place] EMS for altered mental status with suspicion of Benadryl overdose after empty bottle of Benadryl found at his side. Patient had GCS 6 and intubated in ED for airway control, successfully extubated. MRI and EEG unremarkable. Patient placed on Keppra taper by neurology. Psychiatry placed patient on legal hold secondary to suicidal ideation and medication overdose with intention.    Interval Problem Update  No acute events overnight, vital signs are stable.  Patient now having improved mood with lack of suicidal ideation.  Does note mild congestion, attributing to seasonal allergies, however otherwise feels improved swelling/pain in his right arm and overall and better mentation.  Currently pending placement to psychiatric hospital.    I have discussed this patient's plan of care and discharge plan at IDT rounds today with Case Management, Nursing, Nursing leadership, and other members of the IDT team.    Consultants/Specialty  neurology and psychiatry    Code Status  Full Code    Disposition    Anticipate discharge to: a psychiatric hospital    I have placed the appropriate orders for post-discharge needs.    Review of Systems  Review of Systems   Constitutional:  Negative for chills, diaphoresis, fever, malaise/fatigue and weight loss.   HENT:  Negative for tinnitus.    Eyes:  Negative for blurred vision.   Respiratory:  Negative for cough, sputum production and shortness of breath.     Cardiovascular:  Negative for chest pain, palpitations and leg swelling.   Gastrointestinal:  Positive for diarrhea. Negative for abdominal pain, constipation, nausea and vomiting.   Genitourinary:  Negative for dysuria, frequency and urgency.   Musculoskeletal:         Right Arm pain    Skin:  Negative for rash.   Neurological:  Negative for dizziness, tingling, tremors, sensory change, speech change, focal weakness, seizures, weakness and headaches.   Psychiatric/Behavioral:  Positive for suicidal ideas. Negative for depression, hallucinations and substance abuse. The patient is not nervous/anxious.         Physical Exam  Temp:  [36.1 °C (96.9 °F)-36.7 °C (98 °F)] 36.1 °C (96.9 °F)  Pulse:  [] 99  Resp:  [18-20] 18  BP: ()/(64-84) 139/84  SpO2:  [89 %-97 %] 97 %    Physical Exam  Constitutional:       General: He is not in acute distress.     Appearance: Normal appearance. He is not ill-appearing.   HENT:      Head: Normocephalic and atraumatic.      Right Ear: Tympanic membrane normal.      Left Ear: Tympanic membrane normal.      Mouth/Throat:      Mouth: Mucous membranes are moist.      Pharynx: Oropharynx is clear.   Eyes:      Extraocular Movements: Extraocular movements intact.      Conjunctiva/sclera: Conjunctivae normal.      Pupils: Pupils are equal, round, and reactive to light.   Cardiovascular:      Rate and Rhythm: Normal rate and regular rhythm.      Pulses: Normal pulses.   Pulmonary:      Effort: Pulmonary effort is normal.      Breath sounds: Normal breath sounds.   Abdominal:      General: Abdomen is flat. Bowel sounds are normal.      Palpations: Abdomen is soft.   Musculoskeletal:         General: Swelling and tenderness present. No deformity. Normal range of motion.      Cervical back: Normal range of motion and neck supple.      Right lower leg: No edema.      Left lower leg: No edema.   Skin:     General: Skin is warm and dry.      Findings: Erythema (face and upper chest)  present.   Neurological:      Mental Status: He is oriented to person, place, and time.   Psychiatric:         Behavior: Behavior normal.         Fluids    Intake/Output Summary (Last 24 hours) at 5/28/2023 1147  Last data filed at 5/28/2023 0800  Gross per 24 hour   Intake 850 ml   Output --   Net 850 ml       Laboratory  Recent Labs     05/26/23  0042   WBC 8.4   RBC 4.78   HEMOGLOBIN 14.6   HEMATOCRIT 42.7   MCV 89.3   MCH 30.5   MCHC 34.2   RDW 41.7   PLATELETCT 273   MPV 10.0     Recent Labs     05/26/23  0042   SODIUM 140   POTASSIUM 3.9   CHLORIDE 105   CO2 22   GLUCOSE 89   BUN 11   CREATININE 0.86   CALCIUM 8.9                   Imaging  US-EXTREMITY VENOUS UPPER UNILAT RIGHT   Final Result      MR-BRAIN-W/O   Final Result         No acute intracranial process.      Inflammatory changes involving the paranasal sinuses. Right mastoid effusion.      DX-CHEST-PORTABLE (1 VIEW)   Final Result      Endotracheal tube has been advanced and now terminates in good position above the bhavin      Retrocardiac opacity could be from atelectasis or consolidation      DX-ABDOMEN FOR TUBE PLACEMENT   Final Result      NG tube terminates over the stomach.      DX-CHEST-PORTABLE (1 VIEW)   Final Result      1.  Interval intubation; the endotracheal tube tip is just above the level of the clavicular heads   2.  Lung volumes are low.      CT-HEAD W/O   Final Result      1.  No CT evidence of acute infarct, hemorrhage or mass.   2.  Moderate mucosal thickening of the maxillary, sphenoid and ethmoid sinuses.      DX-CHEST-PORTABLE (1 VIEW)   Final Result      Predominately linear bibasilar opacities, likely atelectasis. No focal consolidation. No effusions.              Assessment/Plan  Problem Representation:    * Drug intoxication with complication, with unspecified complication (HCC)- (present on admission)  Assessment & Plan  Patient was admitted with suspected dyphenhydramine intoxication with unknown amount of tablets and  unknown time of normal last seen. Symptoms related includes cutaneous vasodilation (more obvious in the face), mydriasis, and reported halluciations with possible urinary retention. Certainly he developed AMS and respiratory failure. Differential diagnosis include other intoxications, seizures, meningitis, sepsis, etc.  For now will base management on supportive care, consider physostigmine but given suspected seizure and possible concomitant other drug intoxication, will hold for now.  -Initial treatment for seizures with benzos Keppra  -Poison control case #[id number]  -CT head, MRI negative  -UDS reported  -Neurology, psychiatry consulted  -Improved and currently medically clear, the patient remains on a legal hold    Suicide attempt by drug ingestion (HCC)- (present on admission)  Assessment & Plan  With Benadryl. Medically treated and improved. Placed on legal hold, referred to psychiatry for evaluation  -Psychiatry following, appreciate support  -Currently has suicidal ideation  -The patient will likely need inpatient psychiatric care  -Continue Remeron, Lexapro, and Zoloft  -Deferring hydroxyzine in setting of recent antihistamine overdose    Superficial venous thrombosis of arm, right  Assessment & Plan  Non traumatic swelling of RUE. US demonstrated acute to subacute occlusive SVT  -Warm compresses  -Continue to monitor    AMS (altered mental status)- (present on admission)  Assessment & Plan  RESOLVED  Secondary to overdose in form of Benadryl. Was reported to poison control  -Resolved with supportive care    Hypokalemia- (present on admission)  Assessment & Plan  Replace electrolytes as needed    Metabolic acidosis- (present on admission)  Assessment & Plan  RESOLVED  Mild anion gap metabolic acidosis  -Metabolic profile and acid-base profile improved after resuscitation    Elevated CPK- (present on admission)  Assessment & Plan  RESOLVED  Downtrending with fluid resuscitation, no evidence of kidney  injury  -Currently s/p IVF  - (WNL)    Acute respiratory failure with hypoxia (HCC)- (present on admission)  Assessment & Plan  RESOLVED  Most likely due to drug intoxication, initially intubated for airway protection  -Successfully extubated  -Currently saturating adequately on room air    Complaint of nasal congestion  Assessment & Plan  Reporting mild nasal congestion, no concerning symptoms for infection at this time  -Saline spray as needed       VTE prophylaxis: enoxaparin ppx  Thank you very much for allowing me to participate in this patient's care.I have performed a physical exam and reviewed and updated ROS and Plan today (5/28/2023). In review of yesterday's note (5/27/2023), there are no changes except as documented above. All plans of care were discussed with the attending physician. Please contact me with any questions.    Raciel Nix, DO  Internal Medicine Resident

## 2023-05-28 NOTE — CONSULTS
"  [facility] PSYCHIATRIC FOLLOW-UP:(established)  *Reason for admission:  [place] with AMS and twitching. Pt is awake, alert, and oriented x 1. Incontinent of urine. Pt denies hst of SZ   *Legal Hold Status: on legal hold                      S:  continues to feel much better, depression being a \"7\". Not SI. The HA went away. Feels \"foggy\". Comments on the \"wonderful staff\" here.    O: Medical ROS (as pertinent):                      *Psychiatric Examination:   Vitals:   Vitals:    05/27/23 1704 05/27/23 2044 05/28/23 0451 05/28/23 0722   BP: 93/79 129/83 112/64 139/84   Pulse: (!) 102 94 89 99   Resp: 20 18 18 18   Temp: 36.3 °C (97.3 °F) 36.3 °C (97.4 °F) 36.7 °C (98 °F) 36.1 °C (96.9 °F)   TempSrc: Temporal Temporal Temporal Temporal   SpO2: 96% 92% 89% 97%   Weight:       Height:         General Appearance:  good eye contact  Abnormal Movements: none   Gait and Posture: not observed  Speech: within normal limits  Thought Process: normal rate  Associations:   linear  Abnormal or Psychotic Thoughts: none  Judgement and Insight: fair  Orientation: grossly intact  Recent and Remote Memory: grossly intact  Attention Span and Concentration: intact  Language:fluent  Fund of Knowledge: not tested  Mood and Affect: euthymic though reports he is still depressed.   SI/HI:  SI less      Current Medications:  Scheduled Medications   Medication Dose Frequency    sertraline  25 mg DAILY    mirtazapine  7.5 mg QHS    senna-docusate  2 Tablet BID    enoxaparin (LOVENOX) injection  40 mg DAILY AT 1800          *ASSESSMENT/RECOMENDATIONS:  1. Major Depression recurrent severe      -R/O bipolar 2  2  R/O borderline pers disorder traits  3  Anxiety disorder unspc: much improved. Has not requested vistaril at all        Medical:     -HTN  -s/p OD on benadryl with sz and intubation on admit        Legal Status: extended    Observation status:   -line of site with sitter     Visitors: y no  Personal " belongings: yes phone, glasses, TV control     Medication and Other Recommendations: final orders as per Tx Tm  Increase zoloft to 50 mg   2   will need a lot of discharge social support if dc'd from here.    Will continue to follow with you.       Discharge recommendations: inpt psych     If released from [facility]: Discharge Instructions:  -Reviewed safety plan: 911, ER, PCM, MHC, Suicide crisis line  -Please assist with outpatient Psychiatric/substance use follow up appointments at discharge once medically cleared.

## 2023-05-28 NOTE — ASSESSMENT & PLAN NOTE
RESOLVED  Reporting mild nasal congestion, no concerning symptoms for infection at this time  -Saline spray as needed

## 2023-05-28 NOTE — CARE PLAN
The patient is Stable - Low risk of patient condition declining or worsening    Shift Goals  Clinical Goals: Emotional support, communication, rest  Patient Goals: Rest  Family Goals: delgado    Progress made toward(s) clinical / shift goals:    Problem: Knowledge Deficit - Standard  Goal: Patient and family/care givers will demonstrate understanding of plan of care, disease process/condition, diagnostic tests and medications  5/28/2023 0244 by Andie Montalvo R.NOdin  Outcome: Progressing  Note: Pt verbalizing feeling, willing to get support and take medication  5/28/2023 0244 by Andie Montalvo R.N.  Note: Pt verbalizing feeling, willing to get support and take medication     Problem: Pain - Standard  Goal: Alleviation of pain or a reduction in pain to the patient’s comfort goal  Outcome: Progressing  Note: No pain reported during shift     Problem: Provide Safe Environment  Goal: Suicide environmental safety, protocols, policies, and practices will be implemented  Outcome: Progressing  Note: Sitter at side 1:1     Problem: Psychosocial  Goal: Patient's ability to identify and develop effective coping behaviors will improve  Outcome: Progressing  Note: Pt sitting in chair taking with sitter , less anxiety     Problem: Fall Risk  Goal: Patient will remain free from falls  5/28/2023 0244 by Andie Montalvo R.N.  Note: No falls, sitter at side  5/28/2023 0244 by Andie Montalvo, R.N.  Note: No falls, sitter at side       Patient is not progressing towards the following goals:

## 2023-05-28 NOTE — CONSULTS
"  [facility] PSYCHIATRIC FOLLOW-UP:(established)  *Reason for admission:  [place] and twitching. Pt is awake, alert, and oriented x 1. Incontinent of urine. Pt denies hst of SZ   *Legal Hold Status: on legal hold                S:  slept, notices a HA since the zoloft started but only if he coughs. This didn't happen before. Feels better, eating, smiling, conversing. Feels less SI and alittle more hopeful. Feels \"dry\", \"thirsty\" did not try hydroxyzine for anxiety.    O: Medical ROS (as pertinent):                      *Psychiatric Examination:   Vitals:   Vitals:    05/27/23 1704 05/27/23 2044 05/28/23 0451 05/28/23 0722   BP: 93/79 129/83 112/64 139/84   Pulse: (!) 102 94 89 99   Resp: 20 18 18 18   Temp: 36.3 °C (97.3 °F) 36.3 °C (97.4 °F) 36.7 °C (98 °F) 36.1 °C (96.9 °F)   TempSrc: Temporal Temporal Temporal Temporal   SpO2: 96% 92% 89% 97%   Weight:       Height:         General Appearance:  good eye contact  Abnormal Movements: none   Gait and Posture: not observed  Speech: within normal limits  Thought Process: normal rate  Associations:   linear  Abnormal or Psychotic Thoughts: none  Judgement and Insight: fair  Orientation: grossly intact  Recent and Remote Memory: grossly intact  Attention Span and Concentration: intact  Language:fluent  Fund of Knowledge: not tested  Mood and Affect: euthymic though reports he is still depressed.   SI/HI:  SI less       Current Medications:  Scheduled Medications   Medication Dose Frequency    sertraline  25 mg DAILY    mirtazapine  7.5 mg QHS    senna-docusate  2 Tablet BID    enoxaparin (LOVENOX) injection  40 mg DAILY AT 1800    escitalopram  10 mg DAILY          *ASSESSMENT/RECOMENDATIONS:  1. Major Depression recurrent severe      -R/O bipolar 2  2  R/O borderline pers disorder traits  3  Anxiety disorder [facility]       Medical:     -HTN  -s/p OD on benadryl with sz and intubation on admit      Legal Status: extended    Observation " status:   -line of site with sitter     Visitors: y no  Personal belongings: yes phone, glasses, TV thing    Medication and Other Recommendations: final orders as per Tx Tm  Continue zoloft and reassess for side effects    Will continue to follow with you.      Discharge recommendations: inpt psych    If released from [facility]: Discharge Instructions:  -Reviewed safety plan: 911, ER, PCM, MHC, Suicide crisis line  -Please assist with outpatient Psychiatric/substance use follow up appointments at discharge once medically cleared.

## 2023-05-29 PROCEDURE — 770001 HCHG ROOM/CARE - MED/SURG/GYN PRIV*

## 2023-05-29 PROCEDURE — 700102 HCHG RX REV CODE 250 W/ 637 OVERRIDE(OP): Performed by: INTERNAL MEDICINE

## 2023-05-29 PROCEDURE — A9270 NON-COVERED ITEM OR SERVICE: HCPCS | Performed by: INTERNAL MEDICINE

## 2023-05-29 PROCEDURE — A9270 NON-COVERED ITEM OR SERVICE: HCPCS | Performed by: HOSPITALIST

## 2023-05-29 PROCEDURE — 99231 SBSQ HOSP IP/OBS SF/LOW 25: CPT | Mod: GC | Performed by: INTERNAL MEDICINE

## 2023-05-29 PROCEDURE — 700111 HCHG RX REV CODE 636 W/ 250 OVERRIDE (IP): Performed by: EMERGENCY MEDICINE

## 2023-05-29 PROCEDURE — A9270 NON-COVERED ITEM OR SERVICE: HCPCS

## 2023-05-29 PROCEDURE — 700102 HCHG RX REV CODE 250 W/ 637 OVERRIDE(OP): Performed by: HOSPITALIST

## 2023-05-29 PROCEDURE — 700102 HCHG RX REV CODE 250 W/ 637 OVERRIDE(OP)

## 2023-05-29 RX ADMIN — MIRTAZAPINE 7.5 MG: 15 TABLET, FILM COATED ORAL at 20:15

## 2023-05-29 RX ADMIN — SENNOSIDES AND DOCUSATE SODIUM 2 TABLET: 50; 8.6 TABLET ORAL at 05:18

## 2023-05-29 RX ADMIN — BENZOCAINE AND MENTHOL, UNSPECIFIED FORM 1 LOZENGE: 15; 3.6 LOZENGE ORAL at 10:29

## 2023-05-29 RX ADMIN — BENZOCAINE AND MENTHOL, UNSPECIFIED FORM 1 LOZENGE: 15; 3.6 LOZENGE ORAL at 15:55

## 2023-05-29 RX ADMIN — SERTRALINE 50 MG: 50 TABLET, FILM COATED ORAL at 05:18

## 2023-05-29 RX ADMIN — PHENOL 1 SPRAY: 1.5 LIQUID ORAL at 20:15

## 2023-05-29 RX ADMIN — ENOXAPARIN SODIUM 40 MG: 100 INJECTION SUBCUTANEOUS at 16:50

## 2023-05-29 ASSESSMENT — ENCOUNTER SYMPTOMS
SENSORY CHANGE: 0
FOCAL WEAKNESS: 0
PALPITATIONS: 0
SPEECH CHANGE: 0
CHILLS: 0
DIZZINESS: 0
TINGLING: 0
NAUSEA: 0
SEIZURES: 0
COUGH: 0
WEIGHT LOSS: 0
DIAPHORESIS: 0
NERVOUS/ANXIOUS: 0
HALLUCINATIONS: 0
DIARRHEA: 1
BLURRED VISION: 0
WEAKNESS: 0
ABDOMINAL PAIN: 0
SHORTNESS OF BREATH: 0
VOMITING: 0
FEVER: 0
HEADACHES: 0
SPUTUM PRODUCTION: 0
DEPRESSION: 0
CONSTIPATION: 0
TREMORS: 0

## 2023-05-29 ASSESSMENT — LIFESTYLE VARIABLES: SUBSTANCE_ABUSE: 0

## 2023-05-29 NOTE — PROGRESS NOTES
"[facility] Internal Medicine Daily Progress Note    Date of Service  5/29/2023    [facility] Team: R IM Green Team   Attending: Aicha Melgoza M.d.  Senior Resident: Dr. Josse MD  Intern:  Dr. Boyd DO   Contact Number: [phone]    Chief Complaint  Bishop Bucio is a 52 y.o. male admitted 5/23/2023 with altered mental status secondary to Benadryl intoxication and overdose.    Hospital Course  Bishop Bucio is  52 year-old male with past medical history of depression who presented to the ED [place] in by EMS for altered mental status with suspicion of Benadryl overdose after empty bottle of Benadryl found at his side. Patient had GCS 6 and intubated in ED for airway control, successfully extubated. MRI and EEG unremarkable. Patient placed on Keppra taper by neurology. Psychiatry placed patient on legal hold secondary to suicidal ideation and medication overdose with intention.    Interval Problem Update  No acute events overnight, vital signs are stable. No current SI or HI. States his head feels \"fuzzy\". Also complains of consistent throat soreness since being extubated. Currently pending placement to psychiatric hospital.    I have discussed this patient's plan of care and discharge plan at IDT rounds today with Case Management, Nursing, Nursing leadership, and other members of the IDT team.    Consultants/Specialty  neurology and psychiatry    Code Status  Full Code    Disposition  The patient is medically cleared for discharge to home or a post-acute facility.  Anticipate discharge to: a psychiatric hospital    I have placed the appropriate orders for post-discharge needs.    Review of Systems  Review of Systems   Constitutional:  Negative for chills, diaphoresis, fever, malaise/fatigue and weight loss.   HENT:  Negative for tinnitus.    Eyes:  Negative for blurred vision.   Respiratory:  Negative for cough, sputum production and shortness of breath.    Cardiovascular:  Negative for " chest pain, palpitations and leg swelling.   Gastrointestinal:  Positive for diarrhea. Negative for abdominal pain, constipation, nausea and vomiting.   Genitourinary:  Negative for dysuria, frequency and urgency.   Musculoskeletal:         Right Arm pain    Skin:  Negative for rash.   Neurological:  Negative for dizziness, tingling, tremors, sensory change, speech change, focal weakness, seizures, weakness and headaches.   Psychiatric/Behavioral:  Positive for suicidal ideas. Negative for depression, hallucinations and substance abuse. The patient is not nervous/anxious.         Physical Exam  Temp:  [36.8 °C (98.2 °F)-36.8 °C (98.3 °F)] 36.8 °C (98.3 °F)  Pulse:  [] 94  Resp:  [18] 18  BP: (124-148)/(73-84) 145/79  SpO2:  [90 %-95 %] 95 %    Physical Exam  Constitutional:       General: He is not in acute distress.     Appearance: Normal appearance. He is not ill-appearing.   HENT:      Head: Normocephalic and atraumatic.      Right Ear: Tympanic membrane normal.      Left Ear: Tympanic membrane normal.      Mouth/Throat:      Mouth: Mucous membranes are moist.      Pharynx: Oropharynx is clear.   Eyes:      Extraocular Movements: Extraocular movements intact.      Conjunctiva/sclera: Conjunctivae normal.      Pupils: Pupils are equal, round, and reactive to light.   Cardiovascular:      Rate and Rhythm: Normal rate and regular rhythm.      Pulses: Normal pulses.   Pulmonary:      Effort: Pulmonary effort is normal.      Breath sounds: Normal breath sounds.   Abdominal:      General: Abdomen is flat. Bowel sounds are normal.      Palpations: Abdomen is soft.   Musculoskeletal:         General: Swelling and tenderness present. No deformity. Normal range of motion.      Cervical back: Normal range of motion and neck supple.      Right lower leg: No edema.      Left lower leg: No edema.   Skin:     General: Skin is warm and dry.      Findings: Erythema (face and upper chest) present.   Neurological:      Mental  Status: He is oriented to person, place, and time.   Psychiatric:         Behavior: Behavior normal.         Fluids    Intake/Output Summary (Last 24 hours) at 5/29/2023 1035  Last data filed at 5/29/2023 0723  Gross per 24 hour   Intake 520 ml   Output --   Net 520 ml       Laboratory                            Imaging  US-EXTREMITY VENOUS UPPER UNILAT RIGHT   Final Result      MR-BRAIN-W/O   Final Result         No acute intracranial process.      Inflammatory changes involving the paranasal sinuses. Right mastoid effusion.      DX-CHEST-PORTABLE (1 VIEW)   Final Result      Endotracheal tube has been advanced and now terminates in good position above the bhavin      Retrocardiac opacity could be from atelectasis or consolidation      DX-ABDOMEN FOR TUBE PLACEMENT   Final Result      NG tube terminates over the stomach.      DX-CHEST-PORTABLE (1 VIEW)   Final Result      1.  Interval intubation; the endotracheal tube tip is just above the level of the clavicular heads   2.  Lung volumes are low.      CT-HEAD W/O   Final Result      1.  No CT evidence of acute infarct, hemorrhage or mass.   2.  Moderate mucosal thickening of the maxillary, sphenoid and ethmoid sinuses.      DX-CHEST-PORTABLE (1 VIEW)   Final Result      Predominately linear bibasilar opacities, likely atelectasis. No focal consolidation. No effusions.              Assessment/Plan  Problem Representation:    * Drug intoxication with complication, with unspecified complication (HCC)- (present on admission)  Assessment & Plan  Patient was admitted with suspected dyphenhydramine intoxication with unknown amount of tablets and unknown time of normal last seen. Symptoms related includes cutaneous vasodilation (more obvious in the face), mydriasis, and reported halluciations with possible urinary retention. Certainly he developed AMS and respiratory failure. Differential diagnosis include other intoxications, seizures, meningitis, sepsis, etc.  For now  will base management on supportive care, consider physostigmine but given suspected seizure and possible concomitant other drug intoxication, will hold for now.  -Initial treatment for seizures with benzos, Keppra  -Poison control case #[id number]  -CT head, MRI negative  -UDS reported  -Neurology, psychiatry consulted  -Improved and currently medically clear, the patient remains on a legal hold    Suicide attempt by drug ingestion (HCC)- (present on admission)  Assessment & Plan  With Benadryl. Medically treated and improved. Placed on legal hold, referred to psychiatry for evaluation  -Psychiatry following, appreciate support  -SI has resolved on 5/28  -The patient will likely need inpatient psychiatric care  -Continue Remeron, Lexapro, and Zoloft  -Deferring hydroxyzine in setting of recent antihistamine overdose    Superficial venous thrombosis of arm, right  Assessment & Plan  Non traumatic swelling of RUE. US demonstrated acute to subacute occlusive SVT  -Warm compresses  -Continue to monitor    AMS (altered mental status)- (present on admission)  Assessment & Plan  RESOLVED  Secondary to overdose in form of Benadryl. Was reported to poison control  -Resolved with supportive care    Hypokalemia- (present on admission)  Assessment & Plan  RESOLVED  Mild, appropriately repleted    Metabolic acidosis- (present on admission)  Assessment & Plan  RESOLVED  Mild anion gap metabolic acidosis  -Metabolic profile and acid-base profile improved after resuscitation    Elevated CPK- (present on admission)  Assessment & Plan  RESOLVED  Downtrending with fluid resuscitation, no evidence of kidney injury  -Currently s/p IVF  - (WNL)    Acute respiratory failure with hypoxia (HCC)- (present on admission)  Assessment & Plan  RESOLVED  Most likely due to drug intoxication, initially intubated for airway protection  -Successfully extubated  -Currently saturating adequately on room air  -Sore throat from extubation - PRN  lozenges    Complaint of nasal congestion  Assessment & Plan  Reporting mild nasal congestion, no concerning symptoms for infection at this time  -Saline spray as needed       VTE prophylaxis: enoxaparin ppx  Thank you very much for allowing me to participate in this patient's care.I have performed a physical exam and reviewed and updated ROS and Plan today (5/29/2023). In review of yesterday's note (5/28/2023), there are no changes except as documented above. All plans of care were discussed with the attending physician. Please contact me with any questions.    Raciel Nix, DO  Internal Medicine Resident

## 2023-05-29 NOTE — CONSULTS
[facility] PSYCHIATRIC FOLLOW-UP:(established)  *Reason for admission: [place] with A[place] and twitching. Pt is awake, alert, and oriented x 1. Incontinent of urine. Pt denies hst of SZ . Had OD on benadryl in [place]  *Legal Hold Status: on legal hold                                  S: heavily asleep. Chart reviewed and spoke with sitter who says he has been sleeping all day. Appears to continue to have loose stools. SI reported in resident note today.    O: Medical ROS (as pertinent):                      *Psychiatric Examination:   Vitals:   Vitals:    05/28/23 1632 05/28/23 1903 05/29/23 0405 05/29/23 0723   BP: (!) 148/73 (!) 141/84 124/74 (!) 145/79   Pulse: 99 100 99 94   Resp: 18 18 18 18   Temp: 36.8 °C (98.2 °F) 36.8 °C (98.2 °F) 36.8 °C (98.2 °F) 36.8 °C (98.3 °F)   TempSrc: Temporal Temporal Temporal Temporal   SpO2: 92% 91% 90% 95%   Weight:       Height:         General Appearance:   asleep  Abnormal Movements: none   Gait and Posture:  lying in bed  Speech: none  Thought Process: unable to assess  Associations:   unable to assess  Abnormal or Psychotic Thoughts: unable to assess  Judgement and Insight: unable to assess  Orientation: unable to determine  Recent and Remote Memory: unable to determine  Attention Span and Concentration: diminished  Language:unable to assess  Fund of Knowledge: unable to assess  Mood and Affect: unable to asssess  SI/HI:  unable to assess        Current Medications:  Scheduled Medications   Medication Dose Frequency    sertraline  50 mg DAILY    mirtazapine  7.5 mg QHS    senna-docusate  2 Tablet BID    enoxaparin (LOVENOX) injection  40 mg DAILY AT 1800          *ASSESSMENT/RECOMENDATIONS:  1.Major Depression recurrent severe      -R/O bipolar 2  2  R/O borderline pers disorder traits  3  Anxiety disorder unspc: much improved. Has not requested vistaril at all        Medical:     -HTN  -s/p OD on benadryl with sz and intubation on admit        Legal  Status: extended    Observation status:   -line of site with sitter     Visitors: y no  Personal belongings: yes phone, glasses, TV control     Medication and Other Recommendations: final orders as per Tx Tm  Could loose stools be related to zoloft?     Will continue to follow with you.       Discharge recommendations: inpt psych     If released from [facility]: Discharge Instructions:  -Reviewed safety plan: 911, ER, PCM, MHC, Suicide crisis line  -Please assist with outpatient Psychiatric/substance use follow up appointments at discharge once medically cleared.        Medical:        Legal hold: extended  Observation status:   -line of site with sitter  -telemonitor  -no sitter needed    Visitors: yes    no  Personal belongings: yes    no    Discussed/voalted:     Medication and Other Recommendations: final orders as per Tx Tm  Could loose stools be zoloft related?    Will continue to follow with you.    Thank you for the consult.   Signing off, please reconsult as needed.       Discharge recommendations:     If released from [facility]: Discharge Instructions:  -Reviewed safety plan: 911, ER, PCM, MHC, Suicide crisis line  -Please assist with outpatient Psychiatric/substance use follow up appointments at discharge once medically cleared.

## 2023-05-29 NOTE — CARE PLAN
The patient is Watcher - Medium risk of patient condition declining or worsening    Problem: Safety - Medical Restraint  Goal: Remains free of injury from restraints (Restraint for Interference with Medical Device)  Outcome: Progressing  Note: Patient remains free of injury  Goal: Free from restraint(s) (Restraint for Interference with Medical Device)  Outcome: Progressing  Note: Patient continues free from restraint      Shift Goals  Clinical Goals: Patient Safety  Patient Goals: Rest  Family Goals: delgado    Progress made toward(s) clinical / shift goals:  Patient able to rest throughout night comfortably

## 2023-05-30 PROCEDURE — 770001 HCHG ROOM/CARE - MED/SURG/GYN PRIV*

## 2023-05-30 PROCEDURE — A9270 NON-COVERED ITEM OR SERVICE: HCPCS | Performed by: INTERNAL MEDICINE

## 2023-05-30 PROCEDURE — 700102 HCHG RX REV CODE 250 W/ 637 OVERRIDE(OP)

## 2023-05-30 PROCEDURE — A9270 NON-COVERED ITEM OR SERVICE: HCPCS

## 2023-05-30 PROCEDURE — 700102 HCHG RX REV CODE 250 W/ 637 OVERRIDE(OP): Performed by: INTERNAL MEDICINE

## 2023-05-30 PROCEDURE — 700111 HCHG RX REV CODE 636 W/ 250 OVERRIDE (IP): Performed by: EMERGENCY MEDICINE

## 2023-05-30 PROCEDURE — 99231 SBSQ HOSP IP/OBS SF/LOW 25: CPT | Mod: GC | Performed by: INTERNAL MEDICINE

## 2023-05-30 RX ADMIN — MIRTAZAPINE 7.5 MG: 15 TABLET, FILM COATED ORAL at 21:26

## 2023-05-30 RX ADMIN — ENOXAPARIN SODIUM 40 MG: 100 INJECTION SUBCUTANEOUS at 16:29

## 2023-05-30 RX ADMIN — SERTRALINE 50 MG: 50 TABLET, FILM COATED ORAL at 04:32

## 2023-05-30 ASSESSMENT — FIBROSIS 4 INDEX: FIB4 SCORE: 1.13

## 2023-05-30 ASSESSMENT — ENCOUNTER SYMPTOMS
CONSTIPATION: 0
DIAPHORESIS: 0
FEVER: 0
BLURRED VISION: 0
TREMORS: 0
WEAKNESS: 0
SEIZURES: 0
COUGH: 0
TINGLING: 0
WEIGHT LOSS: 0
NAUSEA: 0
VOMITING: 0
DIARRHEA: 0
NERVOUS/ANXIOUS: 0
SPUTUM PRODUCTION: 0
DEPRESSION: 0
FOCAL WEAKNESS: 0
HEADACHES: 0
PALPITATIONS: 0
SPEECH CHANGE: 0
CHILLS: 0
DIZZINESS: 0
SENSORY CHANGE: 0
HALLUCINATIONS: 0
SHORTNESS OF BREATH: 0
ABDOMINAL PAIN: 0

## 2023-05-30 ASSESSMENT — PATIENT HEALTH QUESTIONNAIRE - PHQ9
1. LITTLE INTEREST OR PLEASURE IN DOING THINGS: MORE THAN HALF THE DAYS
SUM OF ALL RESPONSES TO PHQ9 QUESTIONS 1 AND 2: 4
2. FEELING DOWN, DEPRESSED, IRRITABLE, OR HOPELESS: MORE THAN HALF THE DAYS

## 2023-05-30 ASSESSMENT — LIFESTYLE VARIABLES: SUBSTANCE_ABUSE: 0

## 2023-05-30 NOTE — CARE PLAN
The patient is Stable - Low risk of patient condition declining or worsening    Shift Goals  Clinical Goals: safety, close observation  Patient Goals: sleep  Family Goals: delgado      Problem: Pain - Standard  Goal: Alleviation of pain or a reduction in pain to the patient’s comfort goal  Outcome: Progressing  Note: Hot compress to affected area, reposition pt, adjust comfortable environment, pt able to sleep, no complaints of pain, pain score 1/10       Problem: Provide Safe Environment  Goal: Suicide environmental safety, protocols, policies, and practices will be implemented  5/30/2023 0208 by Braeden Wild R.N.  Outcome: Progressing  Note: Ensured pt is monitored at all times, keep potentially hazards materials away, nursing rounds done, assess suicide risk , suicide risk is moderate level. Pt is free from harm  5/30/2023 0207 by Braeden Wild R.N.  Outcome: Progressing       Patient is not progressing towards the following goals:

## 2023-05-30 NOTE — PROGRESS NOTES
[facility] Internal Medicine Daily Progress Note    Date of Service  5/30/2023    [facility] Team: [facility] IM Green Team   Attending: Bon Rojas M.d.  Senior Resident: Dr. Claudy MD  Intern:  Dr. Boyd DO   Contact Number: [phone]    Chief Complaint  Bishop Bucio is a 52 y.o. male admitted 5/23/2023 with altered mental status secondary to Benadryl intoxication and overdose.    Hospital Course  Bishop Bucio is  52 year-old male with past medical history of depression who presented to the ED 5/23/2023 [place] and brought in by EMS for altered mental status with suspicion of Benadryl overdose after empty bottle of Benadryl found at his side. Patient had GCS 6 and intubated in ED for airway control, successfully extubated. MRI and EEG unremarkable. Patient placed on Keppra taper by neurology. Psychiatry placed patient on legal hold secondary to suicidal ideation and medication overdose with intention.    Interval Problem Update  No acute events overnight, vital signs are stable.  Denies any current SI, however does state he is feeling a little bit more depressed today.  He believes it is due to a nightmare he felt was very last night.  Otherwise no other constitutional or cardiorespiratory symptoms at this time.  Currently medically clear, pending placement to SNF.    I have discussed this patient's plan of care and discharge plan at IDT rounds today with Case Management, Nursing, Nursing leadership, and other members of the IDT team.    Consultants/Specialty  neurology and psychiatry    Code Status  Full Code    Disposition  Medically Cleared  I have placed the appropriate orders for post-discharge needs.    Review of Systems  Review of Systems   Constitutional:  Negative for chills, diaphoresis, fever, malaise/fatigue and weight loss.   HENT:  Negative for tinnitus.    Eyes:  Negative for blurred vision.   Respiratory:  Negative for cough, sputum production and shortness of breath.    Cardiovascular:   Negative for chest pain, palpitations and leg swelling.   Gastrointestinal:  Negative for abdominal pain, constipation, diarrhea, nausea and vomiting.   Genitourinary:  Negative for dysuria, frequency and urgency.   Musculoskeletal:         Right Arm pain    Skin:  Negative for rash.   Neurological:  Negative for dizziness, tingling, tremors, sensory change, speech change, focal weakness, seizures, weakness and headaches.   Psychiatric/Behavioral:  Negative for depression, hallucinations, substance abuse and suicidal ideas. The patient is not nervous/anxious.         Physical Exam  Temp:  [36.2 °C (97.2 °F)-37.3 °C (99.2 °F)] 36.9 °C (98.4 °F)  Pulse:  [85-97] 94  Resp:  [17-18] 17  BP: (130-149)/(77-85) 143/83  SpO2:  [91 %-95 %] 95 %    Physical Exam  Constitutional:       General: He is not in acute distress.     Appearance: Normal appearance. He is not ill-appearing.   HENT:      Head: Normocephalic and atraumatic.      Right Ear: Tympanic membrane normal.      Left Ear: Tympanic membrane normal.      Mouth/Throat:      Mouth: Mucous membranes are moist.      Pharynx: Oropharynx is clear.   Eyes:      Extraocular Movements: Extraocular movements intact.      Conjunctiva/sclera: Conjunctivae normal.      Pupils: Pupils are equal, round, and reactive to light.   Cardiovascular:      Rate and Rhythm: Normal rate and regular rhythm.      Pulses: Normal pulses.   Pulmonary:      Effort: Pulmonary effort is normal.      Breath sounds: Normal breath sounds.   Abdominal:      General: Abdomen is flat. Bowel sounds are normal.      Palpations: Abdomen is soft.   Musculoskeletal:         General: Swelling and tenderness present. No deformity. Normal range of motion.      Cervical back: Normal range of motion and neck supple.      Right lower leg: No edema.      Left lower leg: No edema.   Skin:     General: Skin is warm and dry.      Findings: Erythema (face and upper chest) present.   Neurological:      Mental Status: He  is oriented to person, place, and time.   Psychiatric:         Behavior: Behavior normal.         Fluids  No intake or output data in the 24 hours ending 05/30/23 1259      Laboratory                            Imaging  US-EXTREMITY VENOUS UPPER UNILAT RIGHT   Final Result      MR-BRAIN-W/O   Final Result         No acute intracranial process.      Inflammatory changes involving the paranasal sinuses. Right mastoid effusion.      DX-CHEST-PORTABLE (1 VIEW)   Final Result      Endotracheal tube has been advanced and now terminates in good position above the bhavin      Retrocardiac opacity could be from atelectasis or consolidation      DX-ABDOMEN FOR TUBE PLACEMENT   Final Result      NG tube terminates over the stomach.      DX-CHEST-PORTABLE (1 VIEW)   Final Result      1.  Interval intubation; the endotracheal tube tip is just above the level of the clavicular heads   2.  Lung volumes are low.      CT-HEAD W/O   Final Result      1.  No CT evidence of acute infarct, hemorrhage or mass.   2.  Moderate mucosal thickening of the maxillary, sphenoid and ethmoid sinuses.      DX-CHEST-PORTABLE (1 VIEW)   Final Result      Predominately linear bibasilar opacities, likely atelectasis. No focal consolidation. No effusions.              Assessment/Plan  Problem Representation:    * Drug intoxication with complication, with unspecified complication (HCC)- (present on admission)  Assessment & Plan  Patient was admitted with suspected dyphenhydramine intoxication with unknown amount of tablets and unknown time of normal last seen. Symptoms related includes cutaneous vasodilation (more obvious in the face), mydriasis, and reported halluciations with possible urinary retention. Certainly he developed AMS and respiratory failure. Differential diagnosis include other intoxications, seizures, meningitis, sepsis, etc.  For now will base management on supportive care, consider physostigmine but given suspected seizure and possible  concomitant other drug intoxication, will hold for now.  -Initial treatment for seizures with benzos, Keppra  -Poison control case #[id number]  -CT head, MRI negative  -UDS reported  -Neurology, psychiatry consulted  -Improved and currently medically clear, the patient remains on a legal hold    Suicide attempt by drug ingestion (HCC)- (present on admission)  Assessment & Plan  With Benadryl. Medically treated and improved. Placed on legal hold, referred to psychiatry for evaluation  -Psychiatry following, appreciate support  -SI has resolved on 5/28  -The patient will likely need inpatient psychiatric care  -Continue Remeron, Lexapro, and Zoloft  -Deferring hydroxyzine in setting of recent antihistamine overdose    Superficial venous thrombosis of arm, right  Assessment & Plan  Non traumatic swelling of RUE. US demonstrated acute to subacute occlusive SVT  -Warm compresses  -Continue to monitor    AMS (altered mental status)- (present on admission)  Assessment & Plan  RESOLVED  Secondary to overdose in form of Benadryl. Was reported to poison control  -Resolved with supportive care    Hypokalemia- (present on admission)  Assessment & Plan  RESOLVED  Mild, appropriately repleted    Metabolic acidosis- (present on admission)  Assessment & Plan  RESOLVED  Mild anion gap metabolic acidosis  -Metabolic profile and acid-base profile improved after resuscitation    Elevated CPK- (present on admission)  Assessment & Plan  RESOLVED  Downtrending with fluid resuscitation, no evidence of kidney injury  -Currently s/p IVF  - (WNL)    Acute respiratory failure with hypoxia (HCC)- (present on admission)  Assessment & Plan  RESOLVED  Most likely due to drug intoxication, initially intubated for airway protection  -Successfully extubated  -Currently saturating adequately on room air  -Sore throat from extubation - PRN lozenges and Chloraseptic spray    Complaint of nasal congestion  Assessment & Plan  Reporting mild nasal  congestion, no concerning symptoms for infection at this time  -Saline spray as needed       VTE prophylaxis: enoxaparin ppx  Thank you very much for allowing me to participate in this patient's care.I have performed a physical exam and reviewed and updated ROS and Plan today (5/30/2023). In review of yesterday's note (5/29/2023), there are no changes except as documented above. All plans of care were discussed with the attending physician. Please contact me with any questions.    Raciel Nix, DO  Internal Medicine Resident

## 2023-05-30 NOTE — DISCHARGE PLANNING
Case Management Discharge Planning    Admission Date: 5/23/2023  GMLOS: 3.6  ALOS: 7    6-Clicks ADL Score: 20  6-Clicks Mobility Score: 19      Anticipated Discharge Dispo: Discharge Disposition: D/T to psych hosp or distinct part unit (65)    DME Needed: No    Action(s) Taken: OTHER    Patient discussed during IDT rounds. Medical team had questions regarding placement progress. LSW reached out to Sharita Castro with the Alert Team. LSW was referred to Rojelio Huerta.    LSW reached out to Rojelio Huerta. Per Rojelio, referrals will be sent out this afternoon. Since patient is not insured he will be placed on waitlist for [facility]. LSW forwarded this message to resident.    Escalations Completed: None    Medically Clear: Yes    Next Steps: LSW to follow up with patient and medical team regarding discharge needs and barriers.     Barriers to Discharge: Pending Placement, Insurance

## 2023-05-30 NOTE — CONSULTS
"  [facility] PSYCHIATRIC FOLLOW-UP:(established)    DOS: 05/30/23     Reason for admission:  [place] but [place] the river, incontinent of urine and was brought by EMS to the ED. At initial interaction was described the patient with AMS but alert and oriented to self.  Later a relative/friend reported that he apparently bought benadryl and empty boxes were found. It was suspected a diphenhydramine intoxication and poison control was called.  Legal Hold Status: extended and on legal hold      Chief Complaint:   Chief Complaint   Patient presents with    ALOC     Pt found by river with AMS and twitching. Pt is awake, alert, and oriented x 1. Incontinent of urine. Pt denies hst of SZ                S:  Seen today as f/u psych consult.   Presents as cooperative. Denies SI/HI, A/VH. Reports decreased sleep d/t nightmare, energy and appetite improving. Denies HA, dizziness, GI upset improving, appears to be tolerating medications. Future focused, expressing a desire to transition into  mental health unit for therapy.      O: Medical ROS (as pertinent): No new changes reported to this writer.    No results for input(s): WBC, RBC, HEMOGLOBIN, HEMATOCRIT, MCV, MCH, RDW, PLATELETCT, MPV, NEUTSPOLYS, LYMPHOCYTES, MONOCYTES, EOSINOPHILS, BASOPHILS, RBCMORPHOLO in the last 72 hours.  No results for input(s): SODIUM, POTASSIUM, CHLORIDE, CO2, GLUCOSE, BUN, CPKTOTAL in the last 72 hours.  No results for input(s): ALBUMIN, TBILIRUBIN, ALKPHOSPHAT, TOTPROTEIN, ALTSGPT, ASTSGOT, CREATININE in the last 72 hours.      PSYCHIATRIC EXAM (MSE):   BP (!) 143/83   Pulse 94   Temp 36.9 °C (98.4 °F) (Temporal)   Resp 17   Ht 1.778 m (5' 10\")   Wt 89.7 kg (197 lb 12 oz)   SpO2 95%       Constitutional: as noted above  General Appearance/Behavior: 52 y.o. appears stated age, good eye contact cooperative, No behavioral disturbances  Abnormal Movements: none, no PMA/PMR or tremor observed.  Gait and " "Posture: not observed  Musculoskeletal: as noted above  Mood: \"same\"  Affect: Mood/Congruent and Appropriate   Speech: normal rate, normal rhythm, normal tone, normal volume, and normal fluency  Language:  spontaneous, comprehends spoken commands, and fluent   Thought Process: Linear, Logical, and Goal Directed,  Future Oriented  Thought Content: Denies SI/HI. Denies A/VH, no e/o delusions, PI, or internal preoccupation.   Insight/Judgement:  fair  Alert/Orientation: alert, oriented to person, place and time  Attn/Concentration: normal  Fund of Knowledge: not tested  Memory recent/remote: not tested  MMSE: deferred this visit          Meds Current:  Scheduled Medications   Medication Dose Frequency    sertraline  50 mg DAILY    mirtazapine  7.5 mg QHS    senna-docusate  2 Tablet BID    enoxaparin (LOVENOX) injection  40 mg DAILY AT 1800     Allergies:   Allergies   Allergen Reactions    Pcn [Penicillins] Rash     Per patient           ASSESSMENT:   1. Altered mental status, unspecified altered mental status type       Psychiatric:   Major depressive disorder, recurrent  R/o adjustment disorder     Medical: as noted by the medical treatment team.   Acute respiratory failure with hypoxia  Altered mental status  Seizure disorder          RECOMMENDATIONS:  Legal Status: extended and on legal hold    Please transfer patient to inpatient psychiatric hospital when medically cleared and bed is available.    Observation status:   Line of site with sitter    Visitors: No   Personal belongings: Yes, limited to phone, glasses, TV remote    Discussed/voalted: YAS Rojas MD    Medication Recommendations: Final orders as per Treatment Team  No medication changed indicated, continue Remeron 7.5mg PO qHS, Zoloft 50mg PO daily  Risks/benefits/side effects discussed, patient verbalized understanding  Medication reconciliation was completed.    Reviewed safety plan: 911, ER, PCM, MHC, suicide crisis line, nursing staff while " inpatient.    Will Continue to Follow.

## 2023-05-30 NOTE — CARE PLAN
The patient is Stable - Low risk of patient condition declining or worsening    Shift Goals  Clinical Goals: Patient Safety  Patient Goals: Rest  Family Goals: RAINA    Progress made toward(s) clinical / shift goals:  on legal hold, sitter present in room. VSS.       Problem: Fall Risk  Goal: Patient will remain free from falls  Outcome: Progressing   Remained free from falls. Sitter at bedside.    Problem: Skin Integrity  Goal: Skin integrity is maintained or improved  Outcome: Progressing   -turns self and up frequently.

## 2023-05-30 NOTE — DISCHARGE PLANNING
[facility] ALERT TEAM DISCHARGE PLANNING NOTE    Date:  5/30/23  Patient Name:  Bishop Orr 52 y.o. - Discharge Planning  MRN:  [id number]   YOB: 1970  ADMISSION DATE:  5/23/2023     Writer forwarded referral packet for inpatient psychiatric care to the following community providers:  [facility] via [employer]    Items included in the referral packet:   __x___Face Sheet   __x___Pages 1 and 2 of completed legal hold   __x___Alert Team/Psych Assessment   __x___H&P   __x___UDS   __x___Blood Alcohol   __x___Vital signs   __n/a___Pregnancy Test (if applicable)   __x___Medications List   _____Covid Screen

## 2023-05-30 NOTE — DISCHARGE PLANNING
Legal Hold     Referral: Legal Hold Court     Intervention: Pt presented for legal hold meeting with  via video conferencing to discuss legal options of contesting hold and meeting with the court doctors tomorrow afternoon, or not contesting legal hold and continuing the hold for one week.  advised that pt's legal hold will be be continued for one week (6/8).       Plan: Pt's legal hold has been continued for one week. Pt awaiting transfer to an in patient psych facility.

## 2023-05-31 PROCEDURE — 99231 SBSQ HOSP IP/OBS SF/LOW 25: CPT | Mod: GC | Performed by: INTERNAL MEDICINE

## 2023-05-31 PROCEDURE — 770001 HCHG ROOM/CARE - MED/SURG/GYN PRIV*

## 2023-05-31 PROCEDURE — A9270 NON-COVERED ITEM OR SERVICE: HCPCS

## 2023-05-31 PROCEDURE — 700102 HCHG RX REV CODE 250 W/ 637 OVERRIDE(OP)

## 2023-05-31 RX ADMIN — SERTRALINE 50 MG: 50 TABLET, FILM COATED ORAL at 05:06

## 2023-05-31 RX ADMIN — PHENOL 1 SPRAY: 1.5 LIQUID ORAL at 07:32

## 2023-05-31 ASSESSMENT — ENCOUNTER SYMPTOMS
WEIGHT LOSS: 0
DIARRHEA: 0
TREMORS: 0
NAUSEA: 0
ABDOMINAL PAIN: 0
CONSTIPATION: 0
SPUTUM PRODUCTION: 0
NERVOUS/ANXIOUS: 0
SENSORY CHANGE: 0
CHILLS: 0
FOCAL WEAKNESS: 0
COUGH: 0
BLURRED VISION: 0
FEVER: 0
VOMITING: 0
HEADACHES: 0
PALPITATIONS: 0
DIAPHORESIS: 0
DEPRESSION: 0
DIZZINESS: 0
TINGLING: 0
WEAKNESS: 0
SEIZURES: 0
SPEECH CHANGE: 0
HALLUCINATIONS: 0
SHORTNESS OF BREATH: 0

## 2023-05-31 ASSESSMENT — LIFESTYLE VARIABLES: SUBSTANCE_ABUSE: 0

## 2023-05-31 NOTE — DISCHARGE PLANNING
Alert Team Note:     Contacted by [facility], spoke to Shaina. Referral is currently being reviewed. Per Shaina, the facility wants to wait on accepting this pt until the pts thrombosis in his arm is marked as resolved. Writer submitted progress and psych notes via [employer] as requested.

## 2023-05-31 NOTE — CONSULTS
"  [facility] PSYCHIATRIC FOLLOW-UP:(established)    DOS: 05/31/23     Reason for admission:  [place] but not [place] the river, incontinent of urine and was brought by EMS to the ED. At initial interaction was described the patient with AMS but alert and oriented to self.  Later a relative/friend reported that he apparently bought benadryl and empty boxes were found. It was suspected a diphenhydramine intoxication and poison control was called.  Legal Hold Status: on legal hold      Chief Complaint:   Chief Complaint   Patient presents with    ALOC     Pt found by river with AMS and twitching. Pt is awake, alert, and oriented x 1. Incontinent of urine. Pt denies hst of SZ                S:  Seen today as f/u psych consult.   Patient observed in bed, easy to wake. Cooperative, denies SI/HI, A/VH, no Sx of psychosis/joanne reported or observed. Decreased sleep, energy, and appetite. Reports having recurrent nightmares for second night, questioning mirtazapine side effect, encouraged to refuse medication tonight. Denies HA, GI distress (improving, held Senna), dizziness; appears to be tolerating medications. Remains focused on transitioning to  mental health facility to help improve mood. Continues to endorse low mood, limited hope.      O: Medical ROS (as pertinent): No new changes reported to this writer.    No results for input(s): WBC, RBC, HEMOGLOBIN, HEMATOCRIT, MCV, MCH, RDW, PLATELETCT, MPV, NEUTSPOLYS, LYMPHOCYTES, MONOCYTES, EOSINOPHILS, BASOPHILS, RBCMORPHOLO in the last 72 hours.  No results for input(s): SODIUM, POTASSIUM, CHLORIDE, CO2, GLUCOSE, BUN, CPKTOTAL in the last 72 hours.  No results for input(s): ALBUMIN, TBILIRUBIN, ALKPHOSPHAT, TOTPROTEIN, ALTSGPT, ASTSGOT, CREATININE in the last 72 hours.      PSYCHIATRIC EXAM (MSE):   BP (!) 133/92   Pulse 91   Temp 36.2 °C (97.2 °F)   Resp 16   Ht 1.778 m (5' 10\")   Wt 89.7 kg (197 lb 12 oz)   SpO2 96% " "      Constitutional: as noted above  General Appearance/Behavior: 52 y.o. appears stated age, good eye contact cooperative, No behavioral disturbances  Abnormal Movements: none, no PMA/PMR or tremor observed.  Gait and Posture: not observed  Musculoskeletal: as noted above  Mood: \"Better\"  Affect: Mood/Congruent and Appropriate   Speech: normal rate, normal rhythm, normal tone, normal volume, and normal fluency  Language:  spontaneous, comprehends spoken commands, and fluent   Thought Process: Linear, Logical, and Goal Directed,  Future Oriented  Thought Content: Denies SI/HI. Denies A/VH, no e/o delusions, PI, or internal preoccupation.   Insight/Judgement:  fair  Alert/Orientation: alert, oriented to person, place and time  Attn/Concentration: normal  Fund of Knowledge: Average  Memory recent/remote: not tested  MMSE: deferred this visit          Meds Current:  Scheduled Medications   Medication Dose Frequency    sertraline  50 mg DAILY    mirtazapine  7.5 mg QHS    senna-docusate  2 Tablet BID    enoxaparin (LOVENOX) injection  40 mg DAILY AT 1800     Allergies:   Allergies   Allergen Reactions    Pcn [Penicillins] Rash     Per patient           ASSESSMENT:   1. Altered mental status, unspecified altered mental status type       Psychiatric:   Major depressive disorder, recurrent  R/o adjustment disorder     Medical: as noted by the medical treatment team.   Acute respiratory failure with hypoxia  Altered mental status  Seizure disorder          RECOMMENDATIONS:  Legal Status: on legal hold    Please transfer patient to inpatient psychiatric hospital when medically cleared and bed is available.    Observation status:   Line of site with sitter    Visitors: No   Personal belongings: Yes    Discussed/voalted: YAS Rojas MD    Medication Recommendations: Final orders as per Treatment Team  No medication changes recommended, continue Zoloft 50mg PO daily, Remeron 7.5mg PO qHS,   Risks/benefits/side effects " discussed, patient verbalized understanding    Reviewed safety plan: 911, ER, PCM, MHC, suicide crisis line, nursing staff while inpatient.    Will Continue to Follow. Thank you for the consult.

## 2023-05-31 NOTE — PROGRESS NOTES
[facility] Internal Medicine Daily Progress Note    Date of Service  5/31/2023    [facility] Team: [facility] IM Green [facility]   Attending: Bon Rojas M.d.  Senior Resident: Dr. Claudy MD  Intern:  Dr. Boyd DO   Contact Number: [phone]    Chief Complaint  Bishop Bucio is a 52 y.o. male admitted 5/23/2023 with altered mental status secondary to Benadryl intoxication and overdose.    Hospital Course  Bishop Bucio is  52 year-old male with past medical history of depression who presented to the ED 5[place] in by EMS for altered mental status with suspicion of Benadryl overdose after empty bottle of Benadryl found at his side. Patient had GCS 6 and intubated in ED for airway control, successfully extubated. MRI and EEG unremarkable. Patient placed on Keppra taper by neurology. Psychiatry placed patient on legal hold secondary to suicidal ideation and medication overdose with intention.    Interval Problem Update  No acute events overnight, vital signs are stable.  Feeling mild depressive symptoms secondary to nightmares, however no SI or HI currently.  Pending placement to inpatient psychiatric hospital    I have discussed this patient's plan of care and discharge plan at IDT rounds today with Case Management, Nursing, Nursing leadership, and other members of the IDT team.    Consultants/Specialty  neurology and psychiatry    Code Status  Full Code    Disposition  The patient is medically cleared for discharge to home or a post-acute facility.  Anticipate discharge to: a psychiatric hospital    I have placed the appropriate orders for post-discharge needs.    Review of Systems  Review of Systems   Constitutional:  Negative for chills, diaphoresis, fever, malaise/fatigue and weight loss.   HENT:  Negative for tinnitus.    Eyes:  Negative for blurred vision.   Respiratory:  Negative for cough, sputum production and shortness of breath.    Cardiovascular:  Negative for chest pain, palpitations  and leg swelling.   Gastrointestinal:  Negative for abdominal pain, constipation, diarrhea, nausea and vomiting.   Genitourinary:  Negative for dysuria, frequency and urgency.   Musculoskeletal:         Right Arm pain    Skin:  Negative for rash.   Neurological:  Negative for dizziness, tingling, tremors, sensory change, speech change, focal weakness, seizures, weakness and headaches.   Psychiatric/Behavioral:  Negative for depression, hallucinations, substance abuse and suicidal ideas. The patient is not nervous/anxious.         Physical Exam  Temp:  [36.2 °C (97.2 °F)-37.4 °C (99.3 °F)] 36.2 °C (97.2 °F)  Pulse:  [87-92] 91  Resp:  [16-17] 16  BP: (133-156)/(80-92) 133/92  SpO2:  [94 %-96 %] 96 %    Physical Exam  Constitutional:       General: He is not in acute distress.     Appearance: Normal appearance. He is not ill-appearing.   HENT:      Head: Normocephalic and atraumatic.      Right Ear: Tympanic membrane normal.      Left Ear: Tympanic membrane normal.      Mouth/Throat:      Mouth: Mucous membranes are moist.      Pharynx: Oropharynx is clear.   Eyes:      Extraocular Movements: Extraocular movements intact.      Conjunctiva/sclera: Conjunctivae normal.      Pupils: Pupils are equal, round, and reactive to light.   Cardiovascular:      Rate and Rhythm: Normal rate and regular rhythm.      Pulses: Normal pulses.   Pulmonary:      Effort: Pulmonary effort is normal.      Breath sounds: Normal breath sounds.   Abdominal:      General: Abdomen is flat. Bowel sounds are normal.      Palpations: Abdomen is soft.   Musculoskeletal:         General: Swelling and tenderness present. No deformity. Normal range of motion.      Cervical back: Normal range of motion and neck supple.      Right lower leg: No edema.      Left lower leg: No edema.   Skin:     General: Skin is warm and dry.      Findings: Erythema (face and upper chest) present.   Neurological:      Mental Status: He is oriented to person, place, and  time.   Psychiatric:         Behavior: Behavior normal.         Fluids    Intake/Output Summary (Last 24 hours) at 5/31/2023 1146  Last data filed at 5/31/2023 0900  Gross per 24 hour   Intake 720 ml   Output --   Net 720 ml         Laboratory                            Imaging  US-EXTREMITY VENOUS UPPER UNILAT RIGHT   Final Result      MR-BRAIN-W/O   Final Result         No acute intracranial process.      Inflammatory changes involving the paranasal sinuses. Right mastoid effusion.      DX-CHEST-PORTABLE (1 VIEW)   Final Result      Endotracheal tube has been advanced and now terminates in good position above the bhavin      Retrocardiac opacity could be from atelectasis or consolidation      DX-ABDOMEN FOR TUBE PLACEMENT   Final Result      NG tube terminates over the stomach.      DX-CHEST-PORTABLE (1 VIEW)   Final Result      1.  Interval intubation; the endotracheal tube tip is just above the level of the clavicular heads   2.  Lung volumes are low.      CT-HEAD W/O   Final Result      1.  No CT evidence of acute infarct, hemorrhage or mass.   2.  Moderate mucosal thickening of the maxillary, sphenoid and ethmoid sinuses.      DX-CHEST-PORTABLE (1 VIEW)   Final Result      Predominately linear bibasilar opacities, likely atelectasis. No focal consolidation. No effusions.                Assessment/Plan  Problem Representation:    * Drug intoxication with complication, with unspecified complication (HCC)- (present on admission)  Assessment & Plan  Patient was admitted with suspected dyphenhydramine intoxication with unknown amount of tablets and unknown time of normal last seen. Symptoms related includes cutaneous vasodilation (more obvious in the face), mydriasis, and reported halluciations with possible urinary retention. Certainly he developed AMS and respiratory failure. Differential diagnosis include other intoxications, seizures, meningitis, sepsis, etc.  For now will base management on supportive care,  consider physostigmine but given suspected seizure and possible concomitant other drug intoxication, will hold for now.  -Initial treatment for seizures with hilary Keppra  -Poison control case #[id number]  -CT head, MRI negative  -UDS reported  -Neurology, psychiatry consulted  -Improved and currently medically clear, the patient remains on a legal hold    Suicide attempt by drug ingestion (HCC)- (present on admission)  Assessment & Plan  With Benadryl. Medically treated and improved. Placed on legal hold, referred to psychiatry for evaluation  -Psychiatry following, appreciate support  -SI has resolved on 5/28  -The patient will likely need inpatient psychiatric care  -Continue Remeron, Lexapro, and Zoloft  -Deferring hydroxyzine in setting of recent antihistamine overdose    Superficial venous thrombosis of arm, right  Assessment & Plan  Non traumatic swelling of RUE. US demonstrated acute to subacute occlusive SVT  -Warm compresses  -Continue to monitor    AMS (altered mental status)- (present on admission)  Assessment & Plan  RESOLVED  Secondary to overdose in form of Benadryl. Was reported to poison control  -Resolved with supportive care    Hypokalemia- (present on admission)  Assessment & Plan  RESOLVED  Mild, appropriately repleted    Metabolic acidosis- (present on admission)  Assessment & Plan  RESOLVED  Mild anion gap metabolic acidosis  -Metabolic profile and acid-base profile improved after resuscitation    Elevated CPK- (present on admission)  Assessment & Plan  RESOLVED  Downtrending with fluid resuscitation, no evidence of kidney injury  -Currently s/p IVF  - (WNL)    Acute respiratory failure with hypoxia (HCC)- (present on admission)  Assessment & Plan  RESOLVED  Most likely due to drug intoxication, initially intubated for airway protection  -Successfully extubated  -Currently saturating adequately on room air  -Sore throat from extubation - PRN lozenges and Chloraseptic spray    Complaint  of nasal congestion  Assessment & Plan  Reporting mild nasal congestion, no concerning symptoms for infection at this time  -Saline spray as needed       VTE prophylaxis: enoxaparin ppx  Thank you very much for allowing me to participate in this patient's care.I have performed a physical exam and reviewed and updated ROS and Plan today (5/31/2023). In review of yesterday's note (5/30/2023), there are no changes except as documented above. All plans of care were discussed with the attending physician. Please contact me with any questions.    Raciel Nix, DO  Internal Medicine Resident

## 2023-05-31 NOTE — CARE PLAN
The patient is   Problem: Provide Safe Environment  Goal: Suicide environmental safety, protocols, policies, and practices will be implemented  Description: Target End Date:  resolve day 1    1.  Remove objects or personal belongings that may cause harm or injury to self or others  2.  Dietary tray modifications (paperware)  3.  Provide a safe environment  4.  Render close patient supervision by sustaining observation or awareness of the patient at all times  Outcome: Progressing  Flowsheets (Taken 5/31/2023 0127)  Safety Interventions:   Patient Room Close to Nursing Station   Patient Wearing Hospital Clothing   Personal Clothing / Belongings Removed (Comment: Storage Location)   Potentially Dangerous Items Removed from room   Plastic Utensils / Paper Ware Ordered   Discussed no self harm or elopement and safe behavior with patient   Patient Accompanied by Unit Staff when off Unit.   Notify Receiving Department of Close Observation Status  Note: Pt on close observation  1:1 sitter     Problem: Depression  Goal: Patient and family/caregiver will verbalize accurate information about at least two of the possible causes of depression, three-four of the signs and symptoms of depression  Description: Target End Date:  1 to 3 days    1.  Assess the patient's and family/caregiver's knowledge regarding depression and its causes.  2.  Explain to the patient and family/caregiver regarding the major symptoms of depression.  3.  Inform the patient and family/caregiver that depression can be treated through medications and psychotherapy.  4.  Allow the patient to express feelings and perceptions  5.  Express hope to the patient with realistic comments about the patient's strengths and resources.  5.  Give positive feedback after a tasks are achieved.  6.  Encourage identification of positive aspects of self.  7.  Educate the patient about crisis intervention services such as suicide hotlines and other resources.  5/31/2023 0127  by Jennie Mccarty R.N.  Outcome: Progressing  Note: Patient on close observation, psychiatric follow up daily.   For discharge to Psychiatric In-Patient facility  5/31/2023 0127 by Jennie Mccarty R.N.  Note: Patient on close observation, psychiatric follow up daily.   For discharge to Psychiatric In-Patient facility       Shift Goals  Clinical Goals: SAFETY  Patient Goals: REST  Family Goals: RAINA    Progress made toward(s) clinical / shift goals:  no harmful eposide during the night    Patient is progressing towards the following goals: resting and sleeping

## 2023-06-01 PROCEDURE — A9270 NON-COVERED ITEM OR SERVICE: HCPCS

## 2023-06-01 PROCEDURE — 770001 HCHG ROOM/CARE - MED/SURG/GYN PRIV*

## 2023-06-01 PROCEDURE — 700111 HCHG RX REV CODE 636 W/ 250 OVERRIDE (IP): Performed by: EMERGENCY MEDICINE

## 2023-06-01 PROCEDURE — 99231 SBSQ HOSP IP/OBS SF/LOW 25: CPT | Mod: GC | Performed by: INTERNAL MEDICINE

## 2023-06-01 PROCEDURE — 700102 HCHG RX REV CODE 250 W/ 637 OVERRIDE(OP)

## 2023-06-01 RX ADMIN — SERTRALINE 50 MG: 50 TABLET, FILM COATED ORAL at 05:50

## 2023-06-01 RX ADMIN — PHENOL 1 SPRAY: 1.5 LIQUID ORAL at 12:16

## 2023-06-01 RX ADMIN — ENOXAPARIN SODIUM 40 MG: 100 INJECTION SUBCUTANEOUS at 16:51

## 2023-06-01 ASSESSMENT — ENCOUNTER SYMPTOMS
DIARRHEA: 0
HEADACHES: 0
BLURRED VISION: 0
VOMITING: 0
SPUTUM PRODUCTION: 0
FOCAL WEAKNESS: 0
WEAKNESS: 0
COUGH: 0
SENSORY CHANGE: 0
WEIGHT LOSS: 0
HALLUCINATIONS: 0
SPEECH CHANGE: 0
CHILLS: 0
SEIZURES: 0
TREMORS: 0
DIZZINESS: 0
FEVER: 0
CONSTIPATION: 0
SHORTNESS OF BREATH: 0
NAUSEA: 0
PALPITATIONS: 0
DEPRESSION: 0
ABDOMINAL PAIN: 0
TINGLING: 0
DIAPHORESIS: 0
NERVOUS/ANXIOUS: 0

## 2023-06-01 ASSESSMENT — PAIN DESCRIPTION - PAIN TYPE: TYPE: ACUTE PAIN

## 2023-06-01 ASSESSMENT — LIFESTYLE VARIABLES: SUBSTANCE_ABUSE: 0

## 2023-06-01 NOTE — CARE PLAN
The patient is Stable - Low risk of patient condition declining or worsening    Shift Goals  Clinical Goals: safety  Patient Goals: rest  Family Goals: RAINA    Progress made toward(s) clinical / shift goals:  1:1 sitter at bedside, legal hold continued.      Problem: Skin Integrity  Goal: Skin integrity is maintained or improved  Outcome: Progressing   -pt ambulates frequently.     Problem: Fall Risk  Goal: Patient will remain free from falls  Outcome: Progressing   -remained free from falls.

## 2023-06-01 NOTE — CONSULTS
"  [facility] PSYCHIATRIC FOLLOW-UP:(established)    DOS: 06/01/23     Reason for admission:  [place] the river, incontinent of urine and was brought by EMS to the ED. At initial interaction was described the patient with AMS but alert and oriented to self.  Later a relative/friend reported that he apparently bought benadryl and empty boxes were found. It was suspected a diphenhydramine intoxication and poison control was called.  Legal Hold Status: on legal hold      Chief Complaint:   Chief Complaint   Patient presents with    ALOC     Pt found by river with AMS and twitching. Pt is awake, alert, and oriented x 1. Incontinent of urine. Pt denies hst of SZ                S:  Seen today as f/u psych consult.   Patient observed in bed, difficult to wake. Cooperative, denies SI/HI, A/VH, no Sx of psychosis/joanne reported or observed. One episode of mood dysregulation when speaking with brother yesterday, able to self soothe without prn medication. Reports adequate sleep denies nightmares; no change to energy, and appetite. Denies HA, GI distress, admits dizziness possible r/t Zoloft.     O: Medical ROS (as pertinent): No new changes reported to this writer.    No results for input(s): WBC, RBC, HEMOGLOBIN, HEMATOCRIT, MCV, MCH, RDW, PLATELETCT, MPV, NEUTSPOLYS, LYMPHOCYTES, MONOCYTES, EOSINOPHILS, BASOPHILS, RBCMORPHOLO in the last 72 hours.  No results for input(s): SODIUM, POTASSIUM, CHLORIDE, CO2, GLUCOSE, BUN, CPKTOTAL in the last 72 hours.  No results for input(s): ALBUMIN, TBILIRUBIN, ALKPHOSPHAT, TOTPROTEIN, ALTSGPT, ASTSGOT, CREATININE in the last 72 hours.      PSYCHIATRIC EXAM (MSE):   BP (!) 143/94   Pulse 90   Temp 36.6 °C (97.9 °F) (Temporal)   Resp 18   Ht 1.778 m (5' 10\")   Wt 89.7 kg (197 lb 12 oz)   SpO2 93%       Constitutional: as noted above  General Appearance/Behavior: 52 y.o. appears stated age, good eye contact cooperative, No behavioral " "disturbances  Abnormal Movements: none, no PMA/PMR or tremor observed.  Gait and Posture: not observed  Musculoskeletal: as noted above  Mood: \"Okay\"  Affect: Mood/Congruent and Appropriate   Speech: normal rate, normal rhythm, normal tone, normal volume, and normal fluency  Language:  spontaneous, comprehends spoken commands, and fluent   Thought Process: Linear, Logical, and Goal Directed,  Future Oriented  Thought Content: Denies SI/HI. Denies A/VH, no e/o delusions, PI, or internal preoccupation.   Insight/Judgement:  fair  Alert/Orientation: alert, oriented to person, place and time  Attn/Concentration: normal  Fund of Knowledge: Average  Memory recent/remote: not tested  MMSE: deferred this visit          Meds Current:  Scheduled Medications   Medication Dose Frequency    sertraline  50 mg DAILY    mirtazapine  7.5 mg QHS    senna-docusate  2 Tablet BID    enoxaparin (LOVENOX) injection  40 mg DAILY AT 1800     Allergies:   Allergies   Allergen Reactions    Pcn [Penicillins] Rash     Per patient           ASSESSMENT:   1. Altered mental status, unspecified altered mental status type       Psychiatric:   Major depressive disorder, recurrent  R/o adjustment disorder     Medical: as noted by the medical treatment team.   Acute respiratory failure with hypoxia  Altered mental status  Seizure disorder           RECOMMENDATIONS:  Legal Status: on legal hold     Please transfer patient to inpatient psychiatric hospital when medically cleared and bed is available.     Observation status:   Line of site with sitter     Visitors: No   Personal belongings: Yes, limited to cell phone, glasses, TV remote     Discussed/voalted: S Boyd DO     Medication Recommendations: Final orders as per Treatment Team  Consider discontinuing Remeron 7.5mg PO qHS possibly contributing to nightmares   Continue Zoloft 50mg PO daily  Risks/benefits/side effects discussed, patient verbalized understanding     Reviewed safety plan: 911, ER, " PCM, MHC, suicide crisis line, nursing staff while inpatient.     Will Continue to Follow. Thank you for the consult.

## 2023-06-01 NOTE — PROGRESS NOTES
[facility] Internal Medicine Daily Progress Note    Date of Service  6/1/2023    [facility] Team: [facility] IM Green Team   Attending: Bon Rojas M.d.  Senior Resident: Dr. Claudy MD  Intern:  Dr. Boyd DO   Contact Number: [phone]    Chief Complaint  Bishop Bucio is a 52 y.o. male admitted 5/23/2023 with altered mental status secondary to Benadryl intoxication and overdose.    Hospital Course  Bishop Bucio is  52 year-old male with past medical history of depression who presented to the ED 5/23/2023 [place] brought in by EMS for altered mental status with suspicion of Benadryl overdose after empty bottle of Benadryl found at his side. Patient had GCS 6 and intubated in ED for airway control, successfully extubated. MRI and EEG unremarkable. Patient placed on Keppra taper by neurology. Psychiatry placed patient on legal hold secondary to suicidal ideation and medication overdose with intention.    Interval Problem Update  No acute events overnight, vital signs are stable. Mood improved today, no SI or HI. RUE thrombophlebitis has now resolved with warm compresses. He is medically cleared for discharge to inpatient psychiatric facility.     I have discussed this patient's plan of care and discharge plan at IDT rounds today with Case Management, Nursing, Nursing leadership, and other members of the IDT team.    Consultants/Specialty  neurology and psychiatry    Code Status  Full Code    Disposition    Anticipate discharge to: a psychiatric hospital    I have placed the appropriate orders for post-discharge needs.    Review of Systems  Review of Systems   Constitutional:  Negative for chills, diaphoresis, fever, malaise/fatigue and weight loss.   HENT:  Negative for tinnitus.    Eyes:  Negative for blurred vision.   Respiratory:  Negative for cough, sputum production and shortness of breath.    Cardiovascular:  Negative for chest pain, palpitations and leg swelling.   Gastrointestinal:  Negative for  abdominal pain, constipation, diarrhea, nausea and vomiting.   Genitourinary:  Negative for dysuria, frequency and urgency.   Musculoskeletal:         Right Arm pain    Skin:  Negative for rash.   Neurological:  Negative for dizziness, tingling, tremors, sensory change, speech change, focal weakness, seizures, weakness and headaches.   Psychiatric/Behavioral:  Negative for depression, hallucinations, substance abuse and suicidal ideas. The patient is not nervous/anxious.         Physical Exam  Temp:  [36.2 °C (97.2 °F)-36.6 °C (97.9 °F)] 36.6 °C (97.9 °F)  Pulse:  [] 90  Resp:  [16-18] 18  BP: (114-157)/(78-94) 143/94  SpO2:  [93 %-99 %] 93 %    Physical Exam  Constitutional:       General: He is not in acute distress.     Appearance: Normal appearance. He is not ill-appearing.   HENT:      Head: Normocephalic and atraumatic.      Right Ear: Tympanic membrane normal.      Left Ear: Tympanic membrane normal.      Mouth/Throat:      Mouth: Mucous membranes are moist.      Pharynx: Oropharynx is clear.   Eyes:      Extraocular Movements: Extraocular movements intact.      Conjunctiva/sclera: Conjunctivae normal.      Pupils: Pupils are equal, round, and reactive to light.   Cardiovascular:      Rate and Rhythm: Normal rate and regular rhythm.      Pulses: Normal pulses.   Pulmonary:      Effort: Pulmonary effort is normal.      Breath sounds: Normal breath sounds.   Abdominal:      General: Abdomen is flat. Bowel sounds are normal.      Palpations: Abdomen is soft.   Musculoskeletal:         General: No swelling, tenderness or deformity. Normal range of motion.      Cervical back: Normal range of motion and neck supple.      Right lower leg: No edema.      Left lower leg: No edema.   Skin:     General: Skin is warm and dry.      Findings: No erythema.   Neurological:      Mental Status: He is oriented to person, place, and time.   Psychiatric:         Behavior: Behavior normal.         Fluids    Intake/Output  Summary (Last 24 hours) at 6/1/2023 1136  Last data filed at 6/1/2023 0959  Gross per 24 hour   Intake 565 ml   Output --   Net 565 ml         Laboratory                            Imaging  US-EXTREMITY VENOUS UPPER UNILAT RIGHT   Final Result      MR-BRAIN-W/O   Final Result         No acute intracranial process.      Inflammatory changes involving the paranasal sinuses. Right mastoid effusion.      DX-CHEST-PORTABLE (1 VIEW)   Final Result      Endotracheal tube has been advanced and now terminates in good position above the bhavin      Retrocardiac opacity could be from atelectasis or consolidation      DX-ABDOMEN FOR TUBE PLACEMENT   Final Result      NG tube terminates over the stomach.      DX-CHEST-PORTABLE (1 VIEW)   Final Result      1.  Interval intubation; the endotracheal tube tip is just above the level of the clavicular heads   2.  Lung volumes are low.      CT-HEAD W/O   Final Result      1.  No CT evidence of acute infarct, hemorrhage or mass.   2.  Moderate mucosal thickening of the maxillary, sphenoid and ethmoid sinuses.      DX-CHEST-PORTABLE (1 VIEW)   Final Result      Predominately linear bibasilar opacities, likely atelectasis. No focal consolidation. No effusions.                Assessment/Plan  Problem Representation:    * Drug intoxication with complication, with unspecified complication (HCC)- (present on admission)  Assessment & Plan  Patient was admitted with suspected dyphenhydramine intoxication with unknown amount of tablets and unknown time of normal last seen. Symptoms related includes cutaneous vasodilation (more obvious in the face), mydriasis, and reported halluciations with possible urinary retention. Certainly he developed AMS and respiratory failure. Differential diagnosis include other intoxications, seizures, meningitis, sepsis, etc.  For now will base management on supportive care, consider physostigmine but given suspected seizure and possible concomitant other drug  intoxication, will hold for now.  -Initial treatment for seizures with benzos, Keppra  -Poison control case #[id number]  -CT head, MRI negative  -UDS reported  -Neurology, psychiatry consulted  -Improved and currently medically clear, the patient remains on a legal hold    Suicide attempt by drug ingestion (HCC)- (present on admission)  Assessment & Plan  With Benadryl. Medically treated and improved. Placed on legal hold, referred to psychiatry for evaluation  -Psychiatry following, appreciate support  -SI has resolved on 5/28  -The patient will likely need inpatient psychiatric care  -Continue Lexapro and Zoloft, discontinued Remeron per psychiatry recs  -Deferring hydroxyzine in setting of recent antihistamine overdose    Superficial venous thrombosis of arm, right  Assessment & Plan  RESOLVED  Non traumatic swelling of RUE. US demonstrated acute to subacute occlusive SVT. Swelling has now resolved.  -Warm compresses as needed      AMS (altered mental status)- (present on admission)  Assessment & Plan  RESOLVED  Secondary to overdose in form of Benadryl. Was reported to poison control  -Resolved with supportive care    Hypokalemia- (present on admission)  Assessment & Plan  RESOLVED  Mild, appropriately repleted    Metabolic acidosis- (present on admission)  Assessment & Plan  RESOLVED  Mild anion gap metabolic acidosis  -Metabolic profile and acid-base profile improved after resuscitation    Elevated CPK- (present on admission)  Assessment & Plan  RESOLVED  Downtrending with fluid resuscitation, no evidence of kidney injury  -Currently s/p IVF  - (WNL)    Acute respiratory failure with hypoxia (HCC)- (present on admission)  Assessment & Plan  RESOLVED  Most likely due to drug intoxication, initially intubated for airway protection  -Successfully extubated  -Currently saturating adequately on room air  -Sore throat from extubation - PRN lozenges and Chloraseptic spray    Complaint of nasal  congestion  Assessment & Plan  Reporting mild nasal congestion, no concerning symptoms for infection at this time  -Saline spray as needed       VTE prophylaxis: enoxaparin ppx  Thank you very much for allowing me to participate in this patient's care.I have performed a physical exam and reviewed and updated ROS and Plan today (6/1/2023). In review of yesterday's note (5/31/2023), there are no changes except as documented above. All plans of care were discussed with the attending physician. Please contact me with any questions.    Raciel Nix, DO  Internal Medicine Resident

## 2023-06-01 NOTE — CARE PLAN
The patient is   Problem: Provide Safe Environment  Goal: Suicide environmental safety, protocols, policies, and practices will be implemented  Description: Target End Date:  resolve day 1    1.  Remove objects or personal belongings that may cause harm or injury to self or others  2.  Dietary tray modifications (paperware)  3.  Provide a safe environment  4.  Render close patient supervision by sustaining observation or awareness of the patient at all times  Outcome: Progressing  Flowsheets (Taken 6/1/2023 0150)  Safety Interventions:   Patient Room Close to Nursing Station   Personal Clothing / Belongings Removed (Comment: Storage Location)   Plastic Utensils / Paper Ware Ordered   Discussed no self harm or elopement and safe behavior with patient   Notify Receiving Department of Close Observation Status   Patient Wearing Hospital Clothing   Potentially Dangerous Items Removed from room   Provided Safety Education       Shift Goals  Clinical Goals: safety  Patient Goals: Rest  Family Goals: RAINA    Progress made toward(s) clinical / shift goals:  progress    Patient is not progressing towards the following goals:

## 2023-06-01 NOTE — DISCHARGE PLANNING
Received Stipulation and Order from the court continuing pt's legal hold until 6/8, scanned copy into pt's chart.

## 2023-06-01 NOTE — DISCHARGE PLANNING
Alert Team Note:    Submitted updated progress notes stating pts thrombosis is resolved to [facility] via [facility].

## 2023-06-01 NOTE — DISCHARGE PLANNING
Case Management Discharge Planning    Admission Date: 5/23/2023  GMLOS: 3.6  ALOS: 9    6-Clicks ADL Score: 20  6-Clicks Mobility Score: 19      Anticipated Discharge Dispo: Discharge Disposition: D/T to psych hosp or distinct part unit (65)    DME Needed: No    Action(s) Taken: OTHER    @0803: LSW noted [facility] acceptance is pending due to thrombosis in pt's arm. LSW reached out to resident Dr. Nix who confirmed it is resolved. Dr. Nix to document thrombosis as resolved.    @0817: Note is in. LSW sent voalte to Rojelio Huerta-Alert Team- with above information.    @0842: Per Rojelio, she will send updated information to [facility].    Escalations Completed: None    Medically Clear: Yes    Next Steps: LSW to follow up with patient and medical team regarding discharge needs and barriers.     Barriers to Discharge: Pending Placement

## 2023-06-02 PROCEDURE — A9270 NON-COVERED ITEM OR SERVICE: HCPCS

## 2023-06-02 PROCEDURE — 99231 SBSQ HOSP IP/OBS SF/LOW 25: CPT | Mod: GC | Performed by: INTERNAL MEDICINE

## 2023-06-02 PROCEDURE — 700111 HCHG RX REV CODE 636 W/ 250 OVERRIDE (IP): Performed by: EMERGENCY MEDICINE

## 2023-06-02 PROCEDURE — 700102 HCHG RX REV CODE 250 W/ 637 OVERRIDE(OP)

## 2023-06-02 PROCEDURE — 770001 HCHG ROOM/CARE - MED/SURG/GYN PRIV*

## 2023-06-02 RX ORDER — SERTRALINE HYDROCHLORIDE 100 MG/1
100 TABLET, FILM COATED ORAL DAILY
Status: DISCONTINUED | OUTPATIENT
Start: 2023-06-03 | End: 2023-06-04

## 2023-06-02 RX ADMIN — ENOXAPARIN SODIUM 40 MG: 100 INJECTION SUBCUTANEOUS at 18:00

## 2023-06-02 RX ADMIN — SERTRALINE 50 MG: 50 TABLET, FILM COATED ORAL at 05:12

## 2023-06-02 ASSESSMENT — ENCOUNTER SYMPTOMS
WEIGHT LOSS: 0
CHILLS: 0
SPEECH CHANGE: 0
DIARRHEA: 0
TINGLING: 0
SENSORY CHANGE: 0
NERVOUS/ANXIOUS: 0
FOCAL WEAKNESS: 0
DIZZINESS: 0
SHORTNESS OF BREATH: 0
WEAKNESS: 0
DEPRESSION: 0
HALLUCINATIONS: 0
TREMORS: 0
CONSTIPATION: 0
PALPITATIONS: 0
NAUSEA: 0
SEIZURES: 0
VOMITING: 0
BLURRED VISION: 0
COUGH: 0
HEADACHES: 0
DIAPHORESIS: 0
SPUTUM PRODUCTION: 0
FEVER: 0
ABDOMINAL PAIN: 0

## 2023-06-02 ASSESSMENT — PAIN DESCRIPTION - PAIN TYPE: TYPE: ACUTE PAIN

## 2023-06-02 ASSESSMENT — LIFESTYLE VARIABLES: SUBSTANCE_ABUSE: 0

## 2023-06-02 NOTE — CARE PLAN
The patient is Stable - Low risk of patient condition declining or worsening    Shift Goals  Clinical Goals: safety  Patient Goals: Rest  Family Goals: RAINA    Progress made toward(s) clinical / shift goals:    Problem: Provide Safe Environment  Goal: Suicide environmental safety, protocols, policies, and practices will be implemented  Outcome: Progressing  Safety Interventions:   Patient Room Close to Nursing Station   Personal Clothing / Belongings Removed (Comment: Storage Location)   Plastic Utensils / Paper Ware Ordered   Discussed no self harm or elopement and safe behavior with patient   Notify Receiving Department of Close Observation Status   Patient Wearing Hospital Clothing   Potentially Dangerous Items Removed from room   Provided Safety Education  Note: Patient on legal hold, safety sitter 1:1 at bedside at all times.      Problem: Psychosocial  Goal: Patient's ability to identify and utilize available support systems will improve  Outcome: Progressing  Note: Patient has a very positive outlook and wanted to get better.

## 2023-06-02 NOTE — DISCHARGE PLANNING
Alert Team/Behavioral Health   Note:      - [facility]: Talked to Intake RN (Maris). No beds currently available. Referral is on pending list. Notified LSW (Falguni COLE.

## 2023-06-02 NOTE — PROGRESS NOTES
[facility] Internal Medicine Daily Progress Note    Date of Service  6/2/2023    [facility] Team: [facility] IM Green Team   Attending: Bon Rojas M.d.  Senior Resident: Dr. Claudy MD  Intern:  Dr. Boyd DO   Contact Number: [phone]    Chief Complaint  Bishop Bucio is a 52 y.o. male admitted 5/23/2023 with altered mental status secondary to Benadryl intoxication and overdose.    Hospital Course  Bishop Bucio is  52 year-old male with past medical history of depression who presented to the ED [place]23[place] in by EMS for altered mental status with suspicion of Benadryl overdose after empty bottle of Benadryl found at his side. Patient had GCS 6 and intubated in ED for airway control, successfully extubated. MRI and EEG unremarkable. Patient placed on Keppra taper by neurology. Psychiatry placed patient on legal hold secondary to suicidal ideation and medication overdose with intention.    Interval Problem Update  No acute events overnight, vital signs are stable. Depression noted, however no SI or HI at this time. He is medically cleared for discharge to inpatient psychiatric facility.     I have discussed this patient's plan of care and discharge plan at IDT rounds today with Case Management, Nursing, Nursing leadership, and other members of the IDT team.    Consultants/Specialty  neurology and psychiatry    Code Status  Full Code    Disposition  The patient is medically cleared for discharge to home or a post-acute facility.  Anticipate discharge to: a psychiatric hospital    I have placed the appropriate orders for post-discharge needs.    Review of Systems  Review of Systems   Constitutional:  Negative for chills, diaphoresis, fever, malaise/fatigue and weight loss.   HENT:  Negative for tinnitus.    Eyes:  Negative for blurred vision.   Respiratory:  Negative for cough, sputum production and shortness of breath.    Cardiovascular:  Negative for chest pain, palpitations and leg swelling.    Gastrointestinal:  Negative for abdominal pain, constipation, diarrhea, nausea and vomiting.   Genitourinary:  Negative for dysuria, frequency and urgency.   Musculoskeletal:         Right Arm pain    Skin:  Negative for rash.   Neurological:  Negative for dizziness, tingling, tremors, sensory change, speech change, focal weakness, seizures, weakness and headaches.   Psychiatric/Behavioral:  Negative for depression, hallucinations, substance abuse and suicidal ideas. The patient is not nervous/anxious.         Physical Exam  Temp:  [35.8 °C (96.5 °F)-37.4 °C (99.4 °F)] 35.8 °C (96.5 °F)  Pulse:  [84-98] 98  Resp:  [16-18] 16  BP: (108-133)/() 128/100  SpO2:  [90 %-94 %] 94 %    Physical Exam  Constitutional:       General: He is not in acute distress.     Appearance: Normal appearance. He is not ill-appearing.   HENT:      Head: Normocephalic and atraumatic.      Right Ear: Tympanic membrane normal.      Left Ear: Tympanic membrane normal.      Mouth/Throat:      Mouth: Mucous membranes are moist.      Pharynx: Oropharynx is clear.   Eyes:      Extraocular Movements: Extraocular movements intact.      Conjunctiva/sclera: Conjunctivae normal.      Pupils: Pupils are equal, round, and reactive to light.   Cardiovascular:      Rate and Rhythm: Normal rate and regular rhythm.      Pulses: Normal pulses.   Pulmonary:      Effort: Pulmonary effort is normal.      Breath sounds: Normal breath sounds.   Abdominal:      General: Abdomen is flat. Bowel sounds are normal.      Palpations: Abdomen is soft.   Musculoskeletal:         General: No swelling, tenderness or deformity. Normal range of motion.      Cervical back: Normal range of motion and neck supple.      Right lower leg: No edema.      Left lower leg: No edema.   Skin:     General: Skin is warm and dry.      Findings: No erythema.   Neurological:      Mental Status: He is oriented to person, place, and time.   Psychiatric:         Behavior: Behavior normal.          Fluids    Intake/Output Summary (Last 24 hours) at 6/2/2023 0923  Last data filed at 6/1/2023 1700  Gross per 24 hour   Intake 627 ml   Output --   Net 627 ml         Laboratory                            Imaging  US-EXTREMITY VENOUS UPPER UNILAT RIGHT   Final Result      MR-BRAIN-W/O   Final Result         No acute intracranial process.      Inflammatory changes involving the paranasal sinuses. Right mastoid effusion.      DX-CHEST-PORTABLE (1 VIEW)   Final Result      Endotracheal tube has been advanced and now terminates in good position above the bhavin      Retrocardiac opacity could be from atelectasis or consolidation      DX-ABDOMEN FOR TUBE PLACEMENT   Final Result      NG tube terminates over the stomach.      DX-CHEST-PORTABLE (1 VIEW)   Final Result      1.  Interval intubation; the endotracheal tube tip is just above the level of the clavicular heads   2.  Lung volumes are low.      CT-HEAD W/O   Final Result      1.  No CT evidence of acute infarct, hemorrhage or mass.   2.  Moderate mucosal thickening of the maxillary, sphenoid and ethmoid sinuses.      DX-CHEST-PORTABLE (1 VIEW)   Final Result      Predominately linear bibasilar opacities, likely atelectasis. No focal consolidation. No effusions.                Assessment/Plan  Problem Representation:    * Drug intoxication with complication, with unspecified complication (HCC)- (present on admission)  Assessment & Plan  RESOLVED  Patient was admitted with suspected dyphenhydramine intoxication with unknown amount of tablets and unknown time of normal last seen. Symptoms related includes cutaneous vasodilation (more obvious in the face), mydriasis, and reported halluciations with possible urinary retention. Certainly he developed AMS and respiratory failure. Differential diagnosis include other intoxications, seizures, meningitis, sepsis, etc.  For now will base management on supportive care, consider physostigmine but given suspected seizure  and possible concomitant other drug intoxication, will hold for now.  -Initial treatment for seizures with benzos, Keppra  -Poison control case #[id number]  -CT head, MRI negative  -UDS reported  -Neurology, psychiatry consulted  -Improved and currently medically clear, the patient remains on a legal hold    Suicide attempt by drug ingestion (HCC)- (present on admission)  Assessment & Plan  With Benadryl. Medically treated and improved. Placed on legal hold, referred to psychiatry for evaluation  -Psychiatry following, appreciate support  -SI has resolved on 5/28  -The patient will likely need inpatient psychiatric care  -Continue Lexapro and Zoloft, discontinued Remeron per psychiatry recs  -Deferring hydroxyzine in setting of recent antihistamine overdose    Superficial venous thrombosis of arm, right  Assessment & Plan  RESOLVED  Non traumatic swelling of RUE. US demonstrated acute to subacute occlusive SVT. Swelling has now resolved.  -Warm compresses as needed      AMS (altered mental status)- (present on admission)  Assessment & Plan  RESOLVED  Secondary to overdose in form of Benadryl. Was reported to poison control  -Resolved with supportive care    Hypokalemia- (present on admission)  Assessment & Plan  RESOLVED  Mild, appropriately repleted    Metabolic acidosis- (present on admission)  Assessment & Plan  RESOLVED  Mild anion gap metabolic acidosis  -Metabolic profile and acid-base profile improved after resuscitation    Elevated CPK- (present on admission)  Assessment & Plan  RESOLVED  Downtrending with fluid resuscitation, no evidence of kidney injury  -Currently s/p IVF  - (WNL)    Acute respiratory failure with hypoxia (HCC)- (present on admission)  Assessment & Plan  RESOLVED  Most likely due to drug intoxication, initially intubated for airway protection  -Successfully extubated  -Currently saturating adequately on room air  -Sore throat from extubation - PRN lozenges and Chloraseptic  spray    Complaint of nasal congestion  Assessment & Plan  Reporting mild nasal congestion, no concerning symptoms for infection at this time  -Saline spray as needed       VTE prophylaxis: enoxaparin ppx  Thank you very much for allowing me to participate in this patient's care.I have performed a physical exam and reviewed and updated ROS and Plan today (6/2/2023). In review of yesterday's note (6/1/2023), there are no changes except as documented above. All plans of care were discussed with the attending physician. Please contact me with any questions.    Raciel Nix, DO  Internal Medicine Resident

## 2023-06-02 NOTE — CARE PLAN
The patient is Stable - Low risk of patient condition declining or worsening    Shift Goals  Clinical Goals: safety  Patient Goals: Rest  Family Goals: RAINA    Progress made toward(s) clinical / shift goals:  Heat pack applied to R FA throughout the day.       Problem: Provide Safe Environment  Goal: Suicide environmental safety, protocols, policies, and practices will be implemented  Outcome: Progressing   -1:1 sitter for legal hold. Items removed from room.

## 2023-06-02 NOTE — CONSULTS
"  [facility] PSYCHIATRIC FOLLOW-UP:(established)  *Reason for admission:  :  Pt [place] with AMS and twitching. Pt is awake, alert, and oriented x 1. Incontinent of urine. Pt denies hst of SZ   *Legal Hold Status: on legal hold                      S:  says nightmares are gone with dc of remeron, he slept well but sweats a bit. Spoke to his brother and got more SI because it was upsetting and \"more depressed\". Discussed that an event can be upsetting, distressing but that does not mean the response should be SI and discussed other ways of coping. He still feels \"airish\" which means \"slowed\" in his brain.     His appetite is still down. No plans. Hoping to get more therapy in general as he thinks that will help a lot.    O: Medical ROS (as pertinent):                      *Psychiatric Examination:   Vitals:   Vitals:    06/01/23 1923 06/01/23 1927 06/02/23 0345 06/02/23 0718   BP: 133/82  108/64 (!) 128/100   Pulse: 97  84 98   Resp:  18 16 16   Temp: 37 °C (98.6 °F)  36.2 °C (97.1 °F) 35.8 °C (96.5 °F)   TempSrc: Temporal  Temporal Temporal   SpO2: 92%  90% 94%   Weight:       Height:            General Appearance:  good eye contact  Abnormal Movements: none   Gait and Posture: not observed  Speech: within normal limits  Thought Process: normal rate  Associations:   linear  Abnormal or Psychotic Thoughts: none  Judgement and Insight: fair  Orientation: grossly intact  Recent and Remote Memory: grossly intact  Attention Span and Concentration: intact  Language:fluent  Fund of Knowledge: not tested  Mood and Affect: euthymic though reports he is still depressed.   SI/HI:  SI less     Current Medications:  Scheduled Medications   Medication Dose Frequency    sertraline  50 mg DAILY    senna-docusate  2 Tablet BID    enoxaparin (LOVENOX) injection  40 mg DAILY AT 1800          *ASSESSMENT/RECOMENDATIONS:  1.Major Depression recurrent severe      -R/O bipolar 2  2  R/O borderline pers disorder " traits: chronic SI, reactive with SI  3  Anxiety disorder [facility]        Medical:     -HTN  -s/p OD on benadryl with sz and intubation on admit        Legal Status: extended    Observation status:   -line of site with sitter     Visitors:   no  Personal belongings: yes phone, glasses, TV thing     Medication and Other Recommendations: final orders as per Tx Tm  Increase zolot to 100 mg     Will continue to follow with you.       Discharge recommendations: inpt psych     If released from [facility]: Discharge Instructions:  -Reviewed safety plan: 911, ER, PCM, MHC, Suicide crisis line  -Please assist with outpatient Psychiatric/substance use follow up appointments at discharge once medically cleared. ally cleared.

## 2023-06-02 NOTE — DISCHARGE PLANNING
Case Management Discharge Planning    Admission Date: 5/23/2023  GMLOS: 3.6  ALOS: 10    6-Clicks ADL Score: 20  6-Clicks Mobility Score: 19      Anticipated Discharge Dispo: Discharge Disposition: D/T to psych hosp or distinct part unit (65)    DME Needed: No    Action(s) Taken: DC Assessment Complete (See below)    LSW completed assessment via chart review. Patient attempted suicide and is on legal hold. He is uninsured, pending Medicaid and pending placement for inpatient psych.     Patient has a brother: Isaias [phone]. Per brother, Isaias, patient has been very depressed. Pt reports he lost housing because of a disagreement with his brother.    Patient admits to marijuana and tobacco use. Denies alcohol and other drugs. Patient is unemployed and homeless.    @1302: Patient discussed during IDT rounds. Patient remains medically clear. LSW followed up with Anais Zuleta with the Alert Team via voalte for an update on placement.    @1313: Per Anais [facility] has no bed availability today. LSW forwarded this information to resident Dr. Nix.    Escalations Completed: None    Medically Clear: Yes    Next Steps: LSW to follow up with patient and medical team regarding discharge needs and barriers.     Barriers to Discharge: Pending Placement and Homelessness        Care Transition Team Assessment    Information Source  Orientation Level: Oriented X4  Information Given By: Other (Comments) (Chart Review)  Who is responsible for making decisions for patient? : Patient    Readmission Evaluation  Is this a readmission?: No    Elopement Risk  Legal Hold: Elopement Risk  Ambulatory or Self Mobile in Wheelchair: Yes  Disoriented: No  Psychiatric Symptoms: None  History of Wandering: No  Elopement this Admit: No  Vocalizing Wanting to Leave: No  Displays Behaviors, Body Language Wanting to Leave: No-Not at Risk for Elopement  Time of Legal Hold: 0856  Date of Legal Hold: 05/24/23  Elopement Risk: Not at Risk for  Elopement  Personal Belongings: Hospital Clothing Only, Possessions Checked for Security / Elopement Risk, Anything Considered Risk for Security or Elopement, Removed and Stored in Secure Area (Specify Area)  Environmental Precautions: Sharp or Dangerous Items Removed, Dietary Notified for Plastic Utensils / Paperware Only  Picture of Patient on Inside Chart Front Cover: Yes    Interdisciplinary Discharge Planning  Lives with - Patient's Self Care Capacity: Unable To Determine At This Time  Patient or legal guardian wants to designate a caregiver: No  Housing / Facility: Homeless  Durable Medical Equipment: Not Applicable    Discharge Preparedness  What is your plan after discharge?: Other (comment) (Inpatient Psych Facility)  What are your discharge supports?: Other (comment) (None)    Finances  Financial Barriers to Discharge: Yes  Average Monthly Income: 0 $ (Unemployed)  Source of Income: None  Prescription Coverage: No  Prescription Coverage Comments: Pending Medicaid    Vision / Hearing Impairment  Right Eye Vision: Wears Glasses, Impaired  Left Eye Vision: Wears Glasses, Impaired    Advance Directive  Advance Directive?: None    Domestic Abuse  Have you ever been the victim of abuse or violence?: No    Psychological Assessment  History of Substance Abuse: Marijuana    Discharge Risks or Barriers  Discharge risks or barriers?: No PCP, Uninsured / underinsured, Medicaid pending, Mental health, Lives alone, no community support, Homeless / couch surfing  Patient risk factors: Homeless, Lack of outside supports, Medicaid pending, Mental health, Vulnerable adult    Anticipated Discharge Information  Discharge Disposition: D/T to psych hosp or distinct part unit (65)

## 2023-06-02 NOTE — PROGRESS NOTES
Pt slept throughout the night, no complaints of pain or discomfort. 1:1 sitter at bedside at all times. Safety environment maintained.

## 2023-06-03 PROCEDURE — 700111 HCHG RX REV CODE 636 W/ 250 OVERRIDE (IP): Performed by: EMERGENCY MEDICINE

## 2023-06-03 PROCEDURE — A9270 NON-COVERED ITEM OR SERVICE: HCPCS | Performed by: INTERNAL MEDICINE

## 2023-06-03 PROCEDURE — 700102 HCHG RX REV CODE 250 W/ 637 OVERRIDE(OP): Performed by: INTERNAL MEDICINE

## 2023-06-03 PROCEDURE — 99231 SBSQ HOSP IP/OBS SF/LOW 25: CPT | Mod: GC | Performed by: INTERNAL MEDICINE

## 2023-06-03 PROCEDURE — 770001 HCHG ROOM/CARE - MED/SURG/GYN PRIV*

## 2023-06-03 RX ADMIN — ENOXAPARIN SODIUM 40 MG: 100 INJECTION SUBCUTANEOUS at 18:56

## 2023-06-03 RX ADMIN — SERTRALINE 100 MG: 100 TABLET, FILM COATED ORAL at 05:48

## 2023-06-03 ASSESSMENT — PATIENT HEALTH QUESTIONNAIRE - PHQ9
4. FEELING TIRED OR HAVING LITTLE ENERGY: SEVERAL DAYS
SUM OF ALL RESPONSES TO PHQ QUESTIONS 1-9: 6
5. POOR APPETITE OR OVEREATING: NOT AT ALL
7. TROUBLE CONCENTRATING ON THINGS, SUCH AS READING THE NEWSPAPER OR WATCHING TELEVISION: NOT AT ALL
8. MOVING OR SPEAKING SO SLOWLY THAT OTHER PEOPLE COULD HAVE NOTICED. OR THE OPPOSITE, BEING SO FIGETY OR RESTLESS THAT YOU HAVE BEEN MOVING AROUND A LOT MORE THAN USUAL: NOT AT ALL
9. THOUGHTS THAT YOU WOULD BE BETTER OFF DEAD, OR OF HURTING YOURSELF: SEVERAL DAYS
2. FEELING DOWN, DEPRESSED, IRRITABLE, OR HOPELESS: MORE THAN HALF THE DAYS
1. LITTLE INTEREST OR PLEASURE IN DOING THINGS: MORE THAN HALF THE DAYS
SUM OF ALL RESPONSES TO PHQ9 QUESTIONS 1 AND 2: 4
6. FEELING BAD ABOUT YOURSELF - OR THAT YOU ARE A FAILURE OR HAVE LET YOURSELF OR YOUR FAMILY DOWN: NOT AL ALL
3. TROUBLE FALLING OR STAYING ASLEEP OR SLEEPING TOO MUCH: NOT AT ALL

## 2023-06-03 ASSESSMENT — ENCOUNTER SYMPTOMS
HEADACHES: 0
SPEECH CHANGE: 0
NERVOUS/ANXIOUS: 0
DIZZINESS: 0
PALPITATIONS: 0
WEIGHT LOSS: 0
DIARRHEA: 0
BLURRED VISION: 0
FEVER: 0
SENSORY CHANGE: 0
DEPRESSION: 0
VOMITING: 0
SHORTNESS OF BREATH: 0
WEAKNESS: 0
HALLUCINATIONS: 0
CONSTIPATION: 0
DIAPHORESIS: 0
SPUTUM PRODUCTION: 0
COUGH: 0
NAUSEA: 0
ABDOMINAL PAIN: 0
TINGLING: 0
FOCAL WEAKNESS: 0
SEIZURES: 0
TREMORS: 0
CHILLS: 0

## 2023-06-03 ASSESSMENT — FIBROSIS 4 INDEX: FIB4 SCORE: 1.13

## 2023-06-03 ASSESSMENT — LIFESTYLE VARIABLES: SUBSTANCE_ABUSE: 0

## 2023-06-03 NOTE — CONSULTS
[facility] PSYCHIATRIC FOLLOW-UP:(established)  *Reason for admission:   [place] with AMS and twitching. Pt is awake, alert, and oriented x 1. Incontinent of urine. Pt denies hst of SZ . SA via OD benadryl  *Legal Hold Status: on legal hold          S:  heavily asleep. Reviewed chart    O: Medical ROS (as pertinent):                      *Psychiatric Examination:   Vitals:   Vitals:    06/02/23 1557 06/02/23 1912 06/03/23 0344 06/03/23 0730   BP: 138/72 (!) 145/83 120/73 109/71   Pulse: 85 83 77 92   Resp: 16 16 16 15   Temp: 36.5 °C (97.7 °F) 36.9 °C (98.5 °F) 36.1 °C (97 °F) 36.8 °C (98.2 °F)   TempSrc: Temporal Temporal Temporal Temporal   SpO2: 95% 96% 98% 97%   Weight:       Height:         General Appearance:   asleep  Abnormal Movements: none   Gait and Posture:  lying in bed  Speech: none  Thought Process: unable to assess  Associations:   unable to assess  Abnormal or Psychotic Thoughts: unable to assess  Judgement and Insight: unable to assess  Orientation: unable to determine  Recent and Remote Memory: unable to determine  Attention Span and Concentration: diminished  Language:unable to assess  Fund of Knowledge: unable to assess  Mood and Affect: unable to asssess  SI/HI:  unable to assess      Current Medications:  Scheduled Medications   Medication Dose Frequency    sertraline  100 mg DAILY    senna-docusate  2 Tablet BID    enoxaparin (LOVENOX) injection  40 mg DAILY AT 1800        *ASSESSMENT/RECOMENDATIONS:  1.Major Depression recurrent severe      -R/O bipolar 2  2  R/O borderline pers disorder traits  3  Anxiety disorder unspc: much improved. Has not requested vistaril at all        Medical:     -HTN  -s/p OD on benadryl with sz and intubation on admit  -superficial thrombus of R forearm      Legal Status: extended    Observation status:   -line of site with sitter     Visitors: y no  Personal belongings: yes phone, glasses, TV control     Medication and Other  Recommendations: final orders as per Tx Tm  No changes today   2   Medically cleared    Will continue to follow with you.       Discharge recommendations: inpt psych     If released from [facility]: Discharge Instructions:  -Reviewed safety plan: 911, ER, PCM, MHC, Suicide crisis line  -Please assist with outpatient Psychiatric/substance use follow up appointments at discharge once medically cleared.      Addendum: went back and he was awake, less outgoing, expressive than usual. Feels zoloft makes him foggy and sleep more but willing to see if these improve. Sleeping well. Hungry.

## 2023-06-03 NOTE — CARE PLAN
Problem: Fall Risk  Goal: Patient will remain free from falls  Description: Target End Date:  Prior to discharge or change in level of care    Document interventions on the Brittani Granger Fall Risk Assessment    1.  Assess for fall risk factors  2.  Implement fall precautions  Note: Patient will remain free from hurting self by end of shift as evidence by hourly rounding, empathetic listening, 1:1 sitter   The patient is Stable - Low risk of patient condition declining or worsening    Shift Goals  Clinical Goals: safety, placement  Patient Goals: placement  Family Goals: RAINA    Progress made toward(s) clinical / shift goals:  Patient will remain safe    Patient is not progressing towards the following goals:

## 2023-06-03 NOTE — PROGRESS NOTES
"[facility] Internal Medicine Daily Progress Note    Date of Service  6/3/2023    [employer] Team: R IM Green Team   Attending: Bon Rojas M.d.  Senior Resident: Dr. Claudy MD  Intern:  Dr. Boyd DO   Contact Number: [phone]    Chief Complaint  Bishop Bucio is a 52 y.o. male admitted 5/23/2023 with altered mental status secondary to Benadryl intoxication and overdose.    Hospital Course  Bishop Bucio is  52 year-old male with past medical history of depression who presented [place] the ED [place] in by EMS for altered mental status with suspicion of Benadryl overdose after empty bottle of Benadryl found at his side. Patient had GCS 6 and intubated in ED for airway control, successfully extubated. MRI and EEG unremarkable. Patient placed on Keppra taper by neurology. Psychiatry placed patient on legal hold secondary to suicidal ideation and medication overdose with intention.    Interval Problem Update  No acute events overnight, vital signs are stable. Feeling a bit \"fuzzy\" with increase in Zoloft. No SI or HI at this time. Currently on legal hold. He is medically cleared for discharge to inpatient psychiatric facility.     I have discussed this patient's plan of care and discharge plan at IDT rounds today with Case Management, Nursing, Nursing leadership, and other members of the IDT team.    Consultants/Specialty  neurology and psychiatry    Code Status  Full Code    Disposition  The patient is medically cleared for discharge to home or a post-acute facility.  Anticipate discharge to: a psychiatric hospital    I have placed the appropriate orders for post-discharge needs.    Review of Systems  Review of Systems   Constitutional:  Negative for chills, diaphoresis, fever, malaise/fatigue and weight loss.   HENT:  Negative for tinnitus.    Eyes:  Negative for blurred vision.   Respiratory:  Negative for cough, sputum production and shortness of breath.    Cardiovascular:  Negative " for chest pain, palpitations and leg swelling.   Gastrointestinal:  Negative for abdominal pain, constipation, diarrhea, nausea and vomiting.   Genitourinary:  Negative for dysuria, frequency and urgency.   Skin:  Negative for rash.   Neurological:  Negative for dizziness, tingling, tremors, sensory change, speech change, focal weakness, seizures, weakness and headaches.   Psychiatric/Behavioral:  Negative for depression, hallucinations, substance abuse and suicidal ideas. The patient is not nervous/anxious.         Physical Exam  Temp:  [36.1 °C (97 °F)-36.9 °C (98.5 °F)] 36.8 °C (98.2 °F)  Pulse:  [77-92] 92  Resp:  [15-16] 15  BP: (109-145)/(71-83) 109/71  SpO2:  [95 %-98 %] 97 %    Physical Exam  Constitutional:       General: He is not in acute distress.     Appearance: Normal appearance. He is not ill-appearing.   HENT:      Head: Normocephalic and atraumatic.      Right Ear: Tympanic membrane normal.      Left Ear: Tympanic membrane normal.      Mouth/Throat:      Mouth: Mucous membranes are moist.      Pharynx: Oropharynx is clear.   Eyes:      Extraocular Movements: Extraocular movements intact.      Conjunctiva/sclera: Conjunctivae normal.      Pupils: Pupils are equal, round, and reactive to light.   Cardiovascular:      Rate and Rhythm: Normal rate and regular rhythm.      Pulses: Normal pulses.   Pulmonary:      Effort: Pulmonary effort is normal.      Breath sounds: Normal breath sounds.   Abdominal:      General: Abdomen is flat. Bowel sounds are normal.      Palpations: Abdomen is soft.   Musculoskeletal:         General: No swelling, tenderness or deformity. Normal range of motion.      Cervical back: Normal range of motion and neck supple.      Right lower leg: No edema.      Left lower leg: No edema.   Skin:     General: Skin is warm and dry.      Findings: No erythema.   Neurological:      Mental Status: He is oriented to person, place, and time.   Psychiatric:         Behavior: Behavior normal.          Fluids    Intake/Output Summary (Last 24 hours) at 6/3/2023 1058  Last data filed at 6/3/2023 0800  Gross per 24 hour   Intake 240 ml   Output --   Net 240 ml           Laboratory                            Imaging  US-EXTREMITY VENOUS UPPER UNILAT RIGHT   Final Result      MR-BRAIN-W/O   Final Result         No acute intracranial process.      Inflammatory changes involving the paranasal sinuses. Right mastoid effusion.      DX-CHEST-PORTABLE (1 VIEW)   Final Result      Endotracheal tube has been advanced and now terminates in good position above the bhavin      Retrocardiac opacity could be from atelectasis or consolidation      DX-ABDOMEN FOR TUBE PLACEMENT   Final Result      NG tube terminates over the stomach.      DX-CHEST-PORTABLE (1 VIEW)   Final Result      1.  Interval intubation; the endotracheal tube tip is just above the level of the clavicular heads   2.  Lung volumes are low.      CT-HEAD W/O   Final Result      1.  No CT evidence of acute infarct, hemorrhage or mass.   2.  Moderate mucosal thickening of the maxillary, sphenoid and ethmoid sinuses.      DX-CHEST-PORTABLE (1 VIEW)   Final Result      Predominately linear bibasilar opacities, likely atelectasis. No focal consolidation. No effusions.                Assessment/Plan  Problem Representation:    * Drug intoxication with complication, with unspecified complication (HCC)- (present on admission)  Assessment & Plan  RESOLVED  Patient was admitted with suspected dyphenhydramine intoxication with unknown amount of tablets and unknown time of normal last seen. Symptoms related includes cutaneous vasodilation (more obvious in the face), mydriasis, and reported halluciations with possible urinary retention. Certainly he developed AMS and respiratory failure. Differential diagnosis include other intoxications, seizures, meningitis, sepsis, etc.  For now will base management on supportive care, consider physostigmine but given suspected  seizure and possible concomitant other drug intoxication, will hold for now.  -Initial treatment for seizures with benzos, Keppra  -Poison control case #[id number]  -CT head, MRI negative  -UDS reported  -Neurology, psychiatry consulted  -Improved and currently medically clear, the patient remains on a legal hold    Suicide attempt by drug ingestion (HCC)- (present on admission)  Assessment & Plan  With Benadryl. Medically treated and improved. Placed on legal hold, referred to psychiatry for evaluation  -Psychiatry following, appreciate support  -SI has resolved on 5/28  -The patient will likely need inpatient psychiatric care  -Continue Zoloft per psych recs  -Deferring hydroxyzine in setting of recent antihistamine overdose    Superficial venous thrombosis of arm, right  Assessment & Plan  RESOLVED  Non traumatic swelling of RUE. US demonstrated acute to subacute occlusive SVT. Swelling has now resolved.  -Warm compresses as needed      AMS (altered mental status)- (present on admission)  Assessment & Plan  RESOLVED  Secondary to overdose in form of Benadryl. Was reported to poison control  -Resolved with supportive care    Hypokalemia- (present on admission)  Assessment & Plan  RESOLVED  Mild, appropriately repleted    Metabolic acidosis- (present on admission)  Assessment & Plan  RESOLVED  Mild anion gap metabolic acidosis  -Metabolic profile and acid-base profile improved after resuscitation    Elevated CPK- (present on admission)  Assessment & Plan  RESOLVED  Downtrending with fluid resuscitation, no evidence of kidney injury  -Currently s/p IVF  - (WNL)    Acute respiratory failure with hypoxia (HCC)- (present on admission)  Assessment & Plan  RESOLVED  Most likely due to drug intoxication, initially intubated for airway protection  -Successfully extubated  -Currently saturating adequately on room air  -Sore throat from extubation - PRN lozenges and Chloraseptic spray    Complaint of nasal  congestion  Assessment & Plan  RESOLVED  Reporting mild nasal congestion, no concerning symptoms for infection at this time  -Saline spray as needed       VTE prophylaxis: enoxaparin ppx  Thank you very much for allowing me to participate in this patient's care.I have performed a physical exam and reviewed and updated ROS and Plan today (6/3/2023). In review of yesterday's note (6/2/2023), there are no changes except as documented above. All plans of care were discussed with the attending physician. Please contact me with any questions.    Raciel Nix, DO  Internal Medicine Resident

## 2023-06-03 NOTE — CARE PLAN
The patient is Stable - Low risk of patient condition declining or worsening    Shift Goals  Clinical Goals: Safety  Patient Goals: Getting discharged  Family Goals: RAINA    Progress made toward(s) clinical / shift goals:      Problem: Fall Risk  Goal: Patient will remain free from falls  Description: Target End Date:  Prior to discharge or change in level of care    Document interventions on the Brittani Granger Fall Risk Assessment    1.  Assess for fall risk factors  2.  Implement fall precautions  6/3/2023 0600 by Hannah Andersen R.N.  Outcome: Progressing  Note: Patient states that he has more strength and is able to ambulate more without assistance. Advised patient to sit down when he urinates if he feels dizzy. Spoke with sitter and asked her to let me know if he starts to feel dizzy. Bed set to lowest position. Patient allowed to have call light, call light within reach.     Problem: Depression  Goal: Patient and family/caregiver will verbalize accurate information about at least two of the possible causes of depression, three-four of the signs and symptoms of depression  Description: Target End Date:  1 to 3 days    1.  Assess the patient's and family/caregiver's knowledge regarding depression and its causes.  2.  Explain to the patient and family/caregiver regarding the major symptoms of depression.  3.  Inform the patient and family/caregiver that depression can be treated through medications and psychotherapy.  4.  Allow the patient to express feelings and perceptions  5.  Express hope to the patient with realistic comments about the patient's strengths and resources.  5.  Give positive feedback after a tasks are achieved.  6.  Encourage identification of positive aspects of self.  7.  Educate the patient about crisis intervention services such as suicide hotlines and other resources.  6/3/2023 0600 by Hannah Andersen R.N.  Outcome: Progressing  Note: Patient educated on change of Zoloft  from 50 mg to 100 mg. Patient states that he understands that he is currently waiting for placement and is no longer having suicidal thoughts. Currently has a one to one sitter and safety checklist checked. Patient slept throughout the night. Checked with sitter frequently and no concerns were stated.

## 2023-06-03 NOTE — DISCHARGE PLANNING
Alert Team/[facility]   Note:        - [facility]: No beds currently available. Referral is on pending list.

## 2023-06-04 PROCEDURE — 700102 HCHG RX REV CODE 250 W/ 637 OVERRIDE(OP): Performed by: INTERNAL MEDICINE

## 2023-06-04 PROCEDURE — 99231 SBSQ HOSP IP/OBS SF/LOW 25: CPT | Mod: GC | Performed by: INTERNAL MEDICINE

## 2023-06-04 PROCEDURE — A9270 NON-COVERED ITEM OR SERVICE: HCPCS | Performed by: INTERNAL MEDICINE

## 2023-06-04 PROCEDURE — 700111 HCHG RX REV CODE 636 W/ 250 OVERRIDE (IP): Performed by: EMERGENCY MEDICINE

## 2023-06-04 PROCEDURE — 770001 HCHG ROOM/CARE - MED/SURG/GYN PRIV*

## 2023-06-04 RX ORDER — SERTRALINE HYDROCHLORIDE 100 MG/1
100 TABLET, FILM COATED ORAL DAILY
Status: DISCONTINUED | OUTPATIENT
Start: 2023-06-05 | End: 2023-06-21 | Stop reason: HOSPADM

## 2023-06-04 RX ADMIN — ENOXAPARIN SODIUM 40 MG: 100 INJECTION SUBCUTANEOUS at 18:00

## 2023-06-04 RX ADMIN — SERTRALINE 100 MG: 100 TABLET, FILM COATED ORAL at 04:43

## 2023-06-04 ASSESSMENT — ENCOUNTER SYMPTOMS
HEADACHES: 0
SHORTNESS OF BREATH: 0
DEPRESSION: 0
DIZZINESS: 0
DIARRHEA: 0
FEVER: 0
PALPITATIONS: 0
WEAKNESS: 0
CHILLS: 0
HALLUCINATIONS: 0
CONSTIPATION: 0
SEIZURES: 0
COUGH: 0
WEIGHT LOSS: 0
SPUTUM PRODUCTION: 0
DIAPHORESIS: 0
TREMORS: 0
SPEECH CHANGE: 0
ABDOMINAL PAIN: 0
SENSORY CHANGE: 0
FOCAL WEAKNESS: 0
VOMITING: 0
NAUSEA: 0
TINGLING: 0
NERVOUS/ANXIOUS: 0
BLURRED VISION: 0

## 2023-06-04 ASSESSMENT — PATIENT HEALTH QUESTIONNAIRE - PHQ9
SUM OF ALL RESPONSES TO PHQ QUESTIONS 1-9: 9
8. MOVING OR SPEAKING SO SLOWLY THAT OTHER PEOPLE COULD HAVE NOTICED. OR THE OPPOSITE, BEING SO FIGETY OR RESTLESS THAT YOU HAVE BEEN MOVING AROUND A LOT MORE THAN USUAL: NOT AT ALL
7. TROUBLE CONCENTRATING ON THINGS, SUCH AS READING THE NEWSPAPER OR WATCHING TELEVISION: MORE THAN HALF THE DAYS
2. FEELING DOWN, DEPRESSED, IRRITABLE, OR HOPELESS: SEVERAL DAYS
SUM OF ALL RESPONSES TO PHQ9 QUESTIONS 1 AND 2: 2
7. TROUBLE CONCENTRATING ON THINGS, SUCH AS READING THE NEWSPAPER OR WATCHING TELEVISION: MORE THAN HALF THE DAYS
3. TROUBLE FALLING OR STAYING ASLEEP OR SLEEPING TOO MUCH: SEVERAL DAYS
4. FEELING TIRED OR HAVING LITTLE ENERGY: SEVERAL DAYS
8. MOVING OR SPEAKING SO SLOWLY THAT OTHER PEOPLE COULD HAVE NOTICED. OR THE OPPOSITE, BEING SO FIGETY OR RESTLESS THAT YOU HAVE BEEN MOVING AROUND A LOT MORE THAN USUAL: NOT AT ALL
3. TROUBLE FALLING OR STAYING ASLEEP OR SLEEPING TOO MUCH: SEVERAL DAYS
5. POOR APPETITE OR OVEREATING: NOT AT ALL
2. FEELING DOWN, DEPRESSED, IRRITABLE, OR HOPELESS: SEVERAL DAYS
1. LITTLE INTEREST OR PLEASURE IN DOING THINGS: SEVERAL DAYS
4. FEELING TIRED OR HAVING LITTLE ENERGY: SEVERAL DAYS
9. THOUGHTS THAT YOU WOULD BE BETTER OFF DEAD, OR OF HURTING YOURSELF: SEVERAL DAYS
1. LITTLE INTEREST OR PLEASURE IN DOING THINGS: SEVERAL DAYS
5. POOR APPETITE OR OVEREATING: NOT AT ALL
6. FEELING BAD ABOUT YOURSELF - OR THAT YOU ARE A FAILURE OR HAVE LET YOURSELF OR YOUR FAMILY DOWN: MORE THAN HALF THE DAYS
9. THOUGHTS THAT YOU WOULD BE BETTER OFF DEAD, OR OF HURTING YOURSELF: SEVERAL DAYS
SUM OF ALL RESPONSES TO PHQ9 QUESTIONS 1 AND 2: 2
SUM OF ALL RESPONSES TO PHQ QUESTIONS 1-9: 9
6. FEELING BAD ABOUT YOURSELF - OR THAT YOU ARE A FAILURE OR HAVE LET YOURSELF OR YOUR FAMILY DOWN: MORE THAN HALF THE DAYS

## 2023-06-04 ASSESSMENT — LIFESTYLE VARIABLES: SUBSTANCE_ABUSE: 0

## 2023-06-04 ASSESSMENT — PAIN DESCRIPTION - PAIN TYPE: TYPE: ACUTE PAIN

## 2023-06-04 NOTE — CONSULTS
[facility] PSYCHIATRIC FOLLOW-UP:(established)  *Reason for admission:  [place] with AMS and twitching. Pt is awake, alert, and oriented x 1. Incontinent of urine. Pt denies hst of SZ . SA via OD benadryl  *Legal Hold Status: on legal hold                  S:  talking about feeling fuzzy in his head.and spent a lot of time last night thinking about this Then talks about his voice is getting back to normal and giving all sorts of details around it. Asked pt about ruminations: pt says he frequently ruminates over many different things.    O: Medical ROS (as pertinent):                      *Psychiatric Examination:   Vitals:   Vitals:    06/03/23 1919 06/04/23 0357 06/04/23 0725 06/04/23 1551   BP: 137/70 125/67 131/68 (!) 146/71   Pulse: 84 89 88 82   Resp: 17 18 18 18   Temp: 37.1 °C (98.7 °F) 36.8 °C (98.3 °F) 36.4 °C (97.5 °F) 36.7 °C (98.1 °F)   TempSrc: Oral Temporal Temporal Temporal   SpO2: 95% 98% 99% 96%   Weight:       Height:         General Appearance:  good eye contact, cooperative  Abnormal Movements: none   Gait and Posture: not observed  Speech: within normal limits  Thought Process: normal rate  Associations:    linear but over inclusive  Abnormal or Psychotic Thoughts: none  Judgement and Insight: fair  Orientation: grossly intact  Recent and Remote Memory: grossly intact  Attention Span and Concentration: intact  Language:fluent  Fund of Knowledge: not tested  Mood and Affect:  depressed but looks euthymic  SI/HI:  not SI/HI        Current Medications:  Scheduled Medications   Medication Dose Frequency    sertraline  100 mg DAILY    senna-docusate  2 Tablet BID    enoxaparin (LOVENOX) injection  40 mg DAILY AT 1800          *ASSESSMENT/RECOMENDATIONS:  1.Major Depression recurrent severe      -R/O bipolar 2  2  R/O borderline pers disorder traits  3  Anxiety disorder unspc: he says it is better and he hasn't asked for vistaril  ut he gets overly concerned and ruminates  on non urgent things.        Medical:     -HTN  -s/p OD on benadryl with sz and intubation on admit  -superficial thrombus of R forearm      Legal Status: extended    Observation status:   -line of site with sitter     Visitors: y no  Personal belongings: yes phone, glasses, TV control    Discussed/voalted: YAS Rojas MD     Medication and Other Recommendations: final orders as per Tx Tm  Change zoloft to HS as he thinks it makes him foggy, lightheaded.... sleepy....   2   Medically cleared     Will continue to follow with you.       Discharge recommendations: inpt psych     If released from [facility]: Discharge Instructions:  -Reviewed safety plan: 911, ER, PCM, [facility], Suicide crisis line  -Please assist with outpatient Psychiatric/substance use follow up appointments at discharge once medically cleared.

## 2023-06-04 NOTE — CARE PLAN
The patient is Stable - Low risk of patient condition declining or worsening    Shift Goals  Clinical Goals: Rest , DC  Patient Goals: Rest  Family Goals: RAINA    Progress made toward(s) clinical / shift goals:    Problem: Depression  Goal: Patient and family/caregiver will verbalize accurate information about at least two of the possible causes of depression, three-four of the signs and symptoms of depression  6/4/2023 0041 by Andie Montalvo, R.N.  Outcome: Not Progressing  Note: Pt still depressed , working through thoughts.  6/4/2023 0041 by Andie Montalvo, R.N.  Note: Pt still depressed , working through thoughts.     Problem: Psychosocial  Goal: Patient's ability to identify and develop effective coping behaviors will improve  6/4/2023 0041 by Andie Montalvo R.N.  Outcome: Progressing  Note: Pt talking and sharing emotions  6/4/2023 0041 by Andie Montalvo, R.N.  Note: Pt talking and sharing emotions       Patient is not progressing towards the following goals:      Problem: Depression  Goal: Patient and family/caregiver will verbalize accurate information about at least two of the possible causes of depression, three-four of the signs and symptoms of depression  6/4/2023 0041 by Andie Montalvo, R.N.  Outcome: Not Progressing  Note: Pt still depressed , working through thoughts.  6/4/2023 0041 by Andie Montalvo, R.N.  Note: Pt still depressed , working through thoughts.

## 2023-06-04 NOTE — PROGRESS NOTES
[facility] Internal Medicine Daily Progress Note    Date of Service  6/4/2023    [employer] Team: [employer] IM Green Team   Attending: Bon Rojas M.d.  Senior Resident: Dr. Claudy MD  Intern:  Dr. Boyd DO   Contact Number: [phone]    Chief Complaint  Bishop Bucio is a 52 y.o. male admitted 5/23/2023 with altered mental status secondary to Benadryl intoxication and overdose.    Hospital Course  Bishop Bucio is  52 year-old male with past medical history of depression who presented [place] the ED [place] in by EMS for altered mental status with suspicion of Benadryl overdose after empty bottle of Benadryl found at his side. Patient had GCS 6 and intubated in ED for airway control, successfully extubated. MRI and EEG unremarkable. Patient placed on Keppra taper by neurology. Psychiatry placed patient on legal hold secondary to suicidal ideation and medication overdose with intention.    Interval Problem Update  No acute event overnight.  The right arm superficial thrombophlebitis has improved.  The patient states that sertraline makes him feel drowsy.  Awaiting inpatient psych placement. Currently on legal hold. He is medically cleared for discharge to inpatient psychiatric facility.     I have discussed this patient's plan of care and discharge plan at IDT rounds today with Case Management, Nursing, Nursing leadership, and other members of the IDT team.    Consultants/Specialty  neurology and psychiatry    Code Status  Full Code    Disposition  Medically Cleared  I have placed the appropriate orders for post-discharge needs.    Review of Systems  Review of Systems   Constitutional:  Negative for chills, diaphoresis, fever, malaise/fatigue and weight loss.   HENT:  Negative for tinnitus.    Eyes:  Negative for blurred vision.   Respiratory:  Negative for cough, sputum production and shortness of breath.    Cardiovascular:  Negative for chest pain, palpitations and leg swelling.    Gastrointestinal:  Negative for abdominal pain, constipation, diarrhea, nausea and vomiting.   Genitourinary:  Negative for dysuria, frequency and urgency.   Skin:  Negative for rash.   Neurological:  Negative for dizziness, tingling, tremors, sensory change, speech change, focal weakness, seizures, weakness and headaches.   Psychiatric/Behavioral:  Negative for depression, hallucinations, substance abuse and suicidal ideas. The patient is not nervous/anxious.         Physical Exam  Temp:  [36.4 °C (97.5 °F)-37.1 °C (98.7 °F)] 36.4 °C (97.5 °F)  Pulse:  [84-89] 88  Resp:  [17-18] 18  BP: (125-139)/(67-87) 131/68  SpO2:  [95 %-99 %] 99 %    Physical Exam  Constitutional:       General: He is not in acute distress.     Appearance: Normal appearance. He is not ill-appearing.   HENT:      Head: Normocephalic and atraumatic.      Right Ear: Tympanic membrane normal.      Left Ear: Tympanic membrane normal.      Mouth/Throat:      Mouth: Mucous membranes are moist.      Pharynx: Oropharynx is clear.   Eyes:      Extraocular Movements: Extraocular movements intact.      Conjunctiva/sclera: Conjunctivae normal.      Pupils: Pupils are equal, round, and reactive to light.   Cardiovascular:      Rate and Rhythm: Normal rate and regular rhythm.      Pulses: Normal pulses.   Pulmonary:      Effort: Pulmonary effort is normal.      Breath sounds: Normal breath sounds.   Abdominal:      General: Abdomen is flat. Bowel sounds are normal.      Palpations: Abdomen is soft.   Musculoskeletal:         General: No swelling, tenderness or deformity. Normal range of motion.      Cervical back: Normal range of motion and neck supple.      Right lower leg: No edema.      Left lower leg: No edema.      Comments: R arm superficial thrombophlebitis improved; no erythema, warmth.  Swelling improved.   Skin:     General: Skin is warm and dry.      Findings: No erythema.   Neurological:      Mental Status: He is oriented to person, place, and  time.   Psychiatric:         Behavior: Behavior normal.         Fluids    Intake/Output Summary (Last 24 hours) at 6/4/2023 1331  Last data filed at 6/4/2023 1000  Gross per 24 hour   Intake 360 ml   Output --   Net 360 ml           Laboratory                            Imaging  US-EXTREMITY VENOUS UPPER UNILAT RIGHT   Final Result      MR-BRAIN-W/O   Final Result         No acute intracranial process.      Inflammatory changes involving the paranasal sinuses. Right mastoid effusion.      DX-CHEST-PORTABLE (1 VIEW)   Final Result      Endotracheal tube has been advanced and now terminates in good position above the bhavin      Retrocardiac opacity could be from atelectasis or consolidation      DX-ABDOMEN FOR TUBE PLACEMENT   Final Result      NG tube terminates over the stomach.      DX-CHEST-PORTABLE (1 VIEW)   Final Result      1.  Interval intubation; the endotracheal tube tip is just above the level of the clavicular heads   2.  Lung volumes are low.      CT-HEAD W/O   Final Result      1.  No CT evidence of acute infarct, hemorrhage or mass.   2.  Moderate mucosal thickening of the maxillary, sphenoid and ethmoid sinuses.      DX-CHEST-PORTABLE (1 VIEW)   Final Result      Predominately linear bibasilar opacities, likely atelectasis. No focal consolidation. No effusions.                Assessment/Plan  Problem Representation:    * Drug intoxication with complication, with unspecified complication (HCC)- (present on admission)  Assessment & Plan  RESOLVED  Patient was admitted with suspected dyphenhydramine intoxication with unknown amount of tablets and unknown time of normal last seen. Symptoms related includes cutaneous vasodilation (more obvious in the face), mydriasis, and reported halluciations with possible urinary retention. Certainly he developed AMS and respiratory failure. Differential diagnosis include other intoxications, seizures, meningitis, sepsis, etc.  For now will base management on  supportive care, consider physostigmine but given suspected seizure and possible concomitant other drug intoxication, will hold for now.  -Initial treatment for seizures with benzos, Keppra  -Poison control case #[id number]  -CT head, MRI negative  -UDS reported  -Neurology, psychiatry consulted  -Improved and currently medically clear, the patient remains on a legal hold    Complaint of nasal congestion  Assessment & Plan  RESOLVED  Reporting mild nasal congestion, no concerning symptoms for infection at this time  -Saline spray as needed    Superficial venous thrombosis of arm, right  Assessment & Plan  RESOLVED  Non traumatic swelling of RUE. US demonstrated acute to subacute occlusive SVT. Swelling has now resolved.  -Warm compresses as needed      Suicide attempt by drug ingestion (HCC)- (present on admission)  Assessment & Plan  With Benadryl. Medically treated and improved. Placed on legal hold, referred to psychiatry for evaluation  -Psychiatry following, appreciate support  -SI has resolved on 5/28  -The patient will likely need inpatient psychiatric care  -Continue Zoloft per psych recs  -Deferring hydroxyzine in setting of recent antihistamine overdose    AMS (altered mental status)- (present on admission)  Assessment & Plan  RESOLVED  Secondary to overdose in form of Benadryl. Was reported to poison control  -Resolved with supportive care    Hypokalemia- (present on admission)  Assessment & Plan  RESOLVED  Mild, appropriately repleted    Metabolic acidosis- (present on admission)  Assessment & Plan  RESOLVED  Mild anion gap metabolic acidosis  -Metabolic profile and acid-base profile improved after resuscitation    Elevated CPK- (present on admission)  Assessment & Plan  RESOLVED  Downtrending with fluid resuscitation, no evidence of kidney injury  -Currently s/p IVF  - (WNL)    Acute respiratory failure with hypoxia (HCC)- (present on admission)  Assessment & Plan  RESOLVED  Most likely due to drug  intoxication, initially intubated for airway protection  -Successfully extubated  -Currently saturating adequately on room air  -Sore throat from extubation - PRN lozenges and Chloraseptic spray       VTE prophylaxis: enoxaparin ppx

## 2023-06-04 NOTE — CARE PLAN
Problem: Safety - Medical Restraint  Goal: Remains free of injury from restraints (Restraint for Interference with Medical Device)  Description: INTERVENTIONS:  1. Determine that other, less restrictive measures have been tried or would not be effective before applying the restraint  2. Evaluate the patient's condition at the time of restraint application  3. Educate patient/family regarding the reason for restraint  4. Q2H: Monitor safety, psychosocial status, comfort, circulation, respiratory status, LOC, nutrition and hydration  Outcome: Progressing   The patient is Stable - Low risk of patient condition declining or worsening    Shift Goals  Clinical Goals: Safety  Patient Goals: Getting discharged  Family Goals: RAINA    Progress made toward(s) clinical / shift goals:  patient has remained free of harm and has not expressed or exhibited any new signs of harm or depression.       Patient is not progressing towards the following goals:

## 2023-06-04 NOTE — CARE PLAN
Problem: Provide Safe Environment  Goal: Suicide environmental safety, protocols, policies, and practices will be implemented  Description: Target End Date:  resolve day 1    1.  Remove objects or personal belongings that may cause harm or injury to self or others  2.  Dietary tray modifications (paperware)  3.  Provide a safe environment  4.  Render close patient supervision by sustaining observation or awareness of the patient at all times  Note: Patient will remain free from self harm by end of shift as evidence by 1;1 sitter and hourly rounding.      Problem: Depression  Goal: Patient and family/caregiver will verbalize accurate information about at least two of the possible causes of depression, three-four of the signs and symptoms of depression  Description: Target End Date:  1 to 3 days    1.  Assess the patient's and family/caregiver's knowledge regarding depression and its causes.  2.  Explain to the patient and family/caregiver regarding the major symptoms of depression.  3.  Inform the patient and family/caregiver that depression can be treated through medications and psychotherapy.  4.  Allow the patient to express feelings and perceptions  5.  Express hope to the patient with realistic comments about the patient's strengths and resources.  5.  Give positive feedback after a tasks are achieved.  6.  Encourage identification of positive aspects of self.  7.  Educate the patient about crisis intervention services such as suicide hotlines and other resources.  Note: Patient will not have increased depression by end of shift as evidence by educating patient on depression and administering PRN pain medication.    The patient is Stable - Low risk of patient condition declining or worsening    Shift Goals  Clinical Goals: Safety  Patient Goals: Getting discharged  Family Goals: RAINA    Progress made toward(s) clinical / shift goals:  Patientremain free from harm    Patient is not progressing towards the  following goals:

## 2023-06-05 PROCEDURE — 700111 HCHG RX REV CODE 636 W/ 250 OVERRIDE (IP): Performed by: EMERGENCY MEDICINE

## 2023-06-05 PROCEDURE — 99231 SBSQ HOSP IP/OBS SF/LOW 25: CPT | Mod: GC | Performed by: INTERNAL MEDICINE

## 2023-06-05 PROCEDURE — A9270 NON-COVERED ITEM OR SERVICE: HCPCS | Performed by: INTERNAL MEDICINE

## 2023-06-05 PROCEDURE — 700102 HCHG RX REV CODE 250 W/ 637 OVERRIDE(OP): Performed by: INTERNAL MEDICINE

## 2023-06-05 PROCEDURE — 770001 HCHG ROOM/CARE - MED/SURG/GYN PRIV*

## 2023-06-05 RX ADMIN — ENOXAPARIN SODIUM 40 MG: 100 INJECTION SUBCUTANEOUS at 17:22

## 2023-06-05 RX ADMIN — SERTRALINE 100 MG: 100 TABLET, FILM COATED ORAL at 20:25

## 2023-06-05 ASSESSMENT — ENCOUNTER SYMPTOMS
NERVOUS/ANXIOUS: 1
FEVER: 0
NAUSEA: 0
ABDOMINAL PAIN: 0
DEPRESSION: 0
TREMORS: 0
BLURRED VISION: 0
SHORTNESS OF BREATH: 0
SPEECH CHANGE: 0
COUGH: 0
TINGLING: 0
DIZZINESS: 0
SPUTUM PRODUCTION: 0
VOMITING: 0
SEIZURES: 0
PALPITATIONS: 0
WEAKNESS: 1
HEADACHES: 0
HALLUCINATIONS: 0
WEIGHT LOSS: 0
FOCAL WEAKNESS: 0
CHILLS: 0
DIARRHEA: 0
CONSTIPATION: 0
DIAPHORESIS: 0
SENSORY CHANGE: 0

## 2023-06-05 ASSESSMENT — LIFESTYLE VARIABLES: SUBSTANCE_ABUSE: 0

## 2023-06-05 ASSESSMENT — PAIN DESCRIPTION - PAIN TYPE
TYPE: ACUTE PAIN
TYPE: ACUTE PAIN

## 2023-06-05 ASSESSMENT — FIBROSIS 4 INDEX: FIB4 SCORE: 1.13

## 2023-06-05 NOTE — DISCHARGE PLANNING
Alert Team Note:    Attempted to contact [facility] to inquire about referral status and bed availability. Writer left a message requesting a call back.

## 2023-06-05 NOTE — PROGRESS NOTES
[facility] Internal Medicine Daily Progress Note    Date of Service  6/5/2023    [facility] Team: R IM Green Team   Attending: Bon Rojas M.d.  Senior Resident: Dr. Jeronimo ESPARZA  Intern:  Dr. She ESPARZA  Contact Number: [phone]    Chief Complaint  Bishop Bucio is a 52 y.o. male admitted 5/23/2023 with altered mental status secondary to Benadryl intoxication and overdose.    Hospital Course  Bishop Bucio is  52 year-old male with past medical history of depression who [place] 5[place]23/[place]. Admitted for altered mental status likely secondary to benadryl overdose (empty bottle found at his side). Patient had GCS 6 and intubated in ED for airway control, successfully extubated. MRI and EEG unremarkable. Patient placed on Keppra taper by neurology. Psychiatry placed patient on legal hold secondary to suicidal ideation and medication overdose with intention.    Interval Problem Update  No acute events overnight. R arm superficial thrombophlebitis continuing to improve. Evaluated by psychiatry yesterday who recommended taking zoloft at night instead of during the day as patient was complaining of fogginess, lightheadedness, fatigue. On legal hold. Medically stable. Pending discharge to inpatient psych facility.    I have discussed this patient's plan of care and discharge plan at IDT rounds today with Case Management, Nursing, Nursing leadership, and other members of the IDT team.    Consultants/Specialty  neurology and psychiatry    Code Status  Full Code    Disposition  Medically Cleared  I have placed the appropriate orders for post-discharge needs.    Review of Systems  Review of Systems   Constitutional:  Negative for chills, diaphoresis, fever, malaise/fatigue and weight loss.   HENT:  Negative for tinnitus.    Eyes:  Negative for blurred vision.   Respiratory:  Negative for cough, sputum production and shortness of breath.    Cardiovascular:  Negative for chest pain,  palpitations and leg swelling.   Gastrointestinal:  Negative for abdominal pain, constipation, diarrhea, nausea and vomiting.   Genitourinary:  Negative for dysuria, frequency and urgency.   Musculoskeletal:         R arm superficial thrombophlebitis   Skin:  Negative for rash.   Neurological:  Positive for weakness. Negative for dizziness, tingling, tremors, sensory change, speech change, focal weakness, seizures and headaches.   Psychiatric/Behavioral:  Negative for depression, hallucinations, substance abuse and suicidal ideas. The patient is nervous/anxious.         Physical Exam  Temp:  [36 °C (96.8 °F)-36.8 °C (98.2 °F)] 36.8 °C (98.2 °F)  Pulse:  [73-89] 81  Resp:  [18-20] 20  BP: (116-146)/(49-82) 126/82  SpO2:  [94 %-97 %] 96 %    Physical Exam  Vitals and nursing note reviewed.   Constitutional:       General: He is not in acute distress.     Appearance: Normal appearance. He is not ill-appearing.   HENT:      Head: Normocephalic and atraumatic.      Right Ear: Tympanic membrane normal.      Left Ear: Tympanic membrane normal.      Mouth/Throat:      Mouth: Mucous membranes are moist.      Pharynx: Oropharynx is clear.   Eyes:      Extraocular Movements: Extraocular movements intact.      Conjunctiva/sclera: Conjunctivae normal.      Pupils: Pupils are equal, round, and reactive to light.   Cardiovascular:      Rate and Rhythm: Normal rate and regular rhythm.      Pulses: Normal pulses.   Pulmonary:      Effort: Pulmonary effort is normal.      Breath sounds: Normal breath sounds.   Abdominal:      General: Abdomen is flat. Bowel sounds are normal.      Palpations: Abdomen is soft.   Musculoskeletal:         General: No swelling, tenderness or deformity. Normal range of motion.      Cervical back: Normal range of motion and neck supple.      Right lower leg: No edema.      Left lower leg: No edema.      Comments: R arm superficial thrombophlebitis improved; no erythema, warmth.  Swelling improved.    Skin:     General: Skin is warm and dry.      Findings: No erythema.   Neurological:      Mental Status: He is oriented to person, place, and time.   Psychiatric:         Behavior: Behavior normal.         Fluids    Intake/Output Summary (Last 24 hours) at 6/5/2023 1209  Last data filed at 6/5/2023 0358  Gross per 24 hour   Intake 480 ml   Output 0 ml   Net 480 ml       Laboratory                        Imaging  US-EXTREMITY VENOUS UPPER UNILAT RIGHT   Final Result      MR-BRAIN-W/O   Final Result         No acute intracranial process.      Inflammatory changes involving the paranasal sinuses. Right mastoid effusion.      DX-CHEST-PORTABLE (1 VIEW)   Final Result      Endotracheal tube has been advanced and now terminates in good position above the bhavin      Retrocardiac opacity could be from atelectasis or consolidation      DX-ABDOMEN FOR TUBE PLACEMENT   Final Result      NG tube terminates over the stomach.      DX-CHEST-PORTABLE (1 VIEW)   Final Result      1.  Interval intubation; the endotracheal tube tip is just above the level of the clavicular heads   2.  Lung volumes are low.      CT-HEAD W/O   Final Result      1.  No CT evidence of acute infarct, hemorrhage or mass.   2.  Moderate mucosal thickening of the maxillary, sphenoid and ethmoid sinuses.      DX-CHEST-PORTABLE (1 VIEW)   Final Result      Predominately linear bibasilar opacities, likely atelectasis. No focal consolidation. No effusions.              Assessment/Plan  Problem Representation:    * Drug intoxication with complication, with unspecified complication (HCC)- (present on admission)  Assessment & Plan  RESOLVED  Patient was admitted with suspected dyphenhydramine intoxication with unknown amount of tablets and unknown time of normal last seen. Symptoms related includes cutaneous vasodilation (more obvious in the face), mydriasis, and reported halluciations with possible urinary retention. Certainly he developed AMS and respiratory  failure. Differential diagnosis include other intoxications, seizures, meningitis, sepsis, etc.  For now will base management on supportive care, consider physostigmine but given suspected seizure and possible concomitant other drug intoxication, will hold for now.  -Initial treatment for seizures with benzos, Keppra  -Poison control case #[id number]  -CT head, MRI negative  -UDS reported  -Neurology, psychiatry consulted  -Improved and currently medically clear, the patient remains on a legal hold    Complaint of nasal congestion  Assessment & Plan  RESOLVED  Reporting mild nasal congestion, no concerning symptoms for infection at this time  -Saline spray as needed    Superficial venous thrombosis of arm, right  Assessment & Plan  RESOLVED  Non traumatic swelling of RUE. US demonstrated acute to subacute occlusive SVT. Swelling has now resolved.  -Warm compresses as needed      Suicide attempt by drug ingestion (HCC)- (present on admission)  Assessment & Plan  With Benadryl. Medically treated and improved. Placed on legal hold, referred to psychiatry for evaluation  -Psychiatry following, appreciate support  -SI has resolved on 5/28  -The patient will likely need inpatient psychiatric care  -Continue Zoloft per psych recs  -Deferring hydroxyzine in setting of recent antihistamine overdose    AMS (altered mental status)- (present on admission)  Assessment & Plan  RESOLVED  Secondary to overdose in form of Benadryl. Was reported to poison control  -Resolved with supportive care    Hypokalemia- (present on admission)  Assessment & Plan  RESOLVED  Mild, appropriately repleted    Metabolic acidosis- (present on admission)  Assessment & Plan  RESOLVED  Mild anion gap metabolic acidosis  -Metabolic profile and acid-base profile improved after resuscitation    Elevated CPK- (present on admission)  Assessment & Plan  RESOLVED  Downtrending with fluid resuscitation, no evidence of kidney injury  -Currently s/p IVF  -  (WNL)    Acute respiratory failure with hypoxia (HCC)- (present on admission)  Assessment & Plan  RESOLVED  Most likely due to drug intoxication, initially intubated for airway protection  -Successfully extubated  -Currently saturating adequately on room air  -Sore throat from extubation - PRN lozenges and Chloraseptic spray         VTE prophylaxis: enoxaparin ppx    I have performed a physical exam and reviewed and updated ROS and Plan today (6/5/2023). In review of yesterday's note (6/4/2023), there are no changes except as documented above.

## 2023-06-05 NOTE — CARE PLAN
Problem: Fall Risk  Goal: Patient will remain free from falls  Description: Target End Date:  Prior to discharge or change in level of care    Document interventions on the Brittani Granger Fall Risk Assessment    1.  Assess for fall risk factors  2.  Implement fall precautions  Note: Patient will remain free from harm by end of shift as evidence by hourly rounding and 1:1 sitter.    The patient is Stable - Low risk of patient condition declining or worsening    Shift Goals  Clinical Goals: rest, placement  Patient Goals: rest  Family Goals: RAINA    Progress made toward(s) clinical / shift goals: Patient has not expressed and thoughts of self arm.     Patient is not progressing towards the following goals:

## 2023-06-05 NOTE — CARE PLAN
The patient is Watcher - Medium risk of patient condition declining or worsening    Shift Goals  Clinical Goals: rest, inpt psych placement, safety  Patient Goals: rest  Family Goals: delgado    Progress made toward(s) clinical / shift goals:        Problem: Safety - Medical Restraint  Goal: Remains free of injury from restraints (Restraint for Interference with Medical Device)  Outcome: Progressing  Note: Patient no longer in restraints. Currently has 1:1 sitter      Problem: Knowledge Deficit - Standard  Goal: Patient and family/care givers will demonstrate understanding of plan of care, disease process/condition, diagnostic tests and medications  6/5/2023 1632 by Florence Nash R.N.  Outcome: Progressing  Note:   Patient demonstrate understanding of plan of care, disease process/condition, diagnostic tests and   Medications. Patient able to verbalized reason for admission, medications being administered and discharge plan.   6/5/2023 1632 by Florence Nash R.N.  Note:   Patient demonstrate understanding of plan of care, disease process/condition, diagnostic tests and   Medications. Patient able to verbalized reason for admission, medications being administered and discharge plan.      Problem: Skin Integrity  Goal: Skin integrity is maintained or improved  6/5/2023 1632 by Florence Nash R.N.  Outcome: Progressing  Note: Skin integrity maintained. Patient is independent in the room. Patient stable and walks in room to and from the bathroom. Continue 1:1 sitter   6/5/2023 1632 by Florence Nash R.N.  Note: Skin integrity maintained. Patient is independent in the room. Patient stable and walks in room to and from the bathroom. Continue 1:1 sitter      Problem: Fall Risk  Goal: Patient will remain free from falls  6/5/2023 1632 by Florence Nash R.N.  Outcome: Progressing  Note: Patient remained free from fall. Close to nurses station, 1:1 sitter in room  6/5/2023 1632 by Florence Nash R.N.  Note: Patient remained free  from fall. Close to nurses station, 1:1 sitter in room       Patient is not progressing towards the following goals:    Waiting on discharge to inpatient psych.

## 2023-06-05 NOTE — CARE PLAN
The patient is Stable - Low risk of patient condition declining or worsening    Shift Goals  Clinical Goals: rest, inpt psych placement, safety  Patient Goals: rest  Family Goals: delgado    Progress made toward(s) clinical / shift goals:    Problem: Knowledge Deficit - Standard  Goal: Patient and family/care givers will demonstrate understanding of plan of care, disease process/condition, diagnostic tests and medications  Outcome: Progressing  Note: Patient verbalizes understanding of plan of care and barriers to discharge.        Problem: Skin Integrity  Goal: Skin integrity is maintained or improved  Outcome: Progressing  Note: Skin remains intact this shift. No new abrasions or wounds observed.        Problem: Provide Safe Environment  Goal: Suicide environmental safety, protocols, policies, and practices will be implemented  Outcome: Progressing  Note: 1:1 sitter at bedside. Provided safety education to patient and sitter.        Patient is not progressing towards the following goals:

## 2023-06-06 PROBLEM — R74.8 ELEVATED CPK: Status: RESOLVED | Noted: 2023-05-23 | Resolved: 2023-06-06

## 2023-06-06 PROBLEM — R09.81 COMPLAINT OF NASAL CONGESTION: Status: RESOLVED | Noted: 2023-05-28 | Resolved: 2023-06-06

## 2023-06-06 PROBLEM — J96.01 ACUTE RESPIRATORY FAILURE WITH HYPOXIA (HCC): Status: RESOLVED | Noted: 2023-05-23 | Resolved: 2023-06-06

## 2023-06-06 PROBLEM — E87.6 HYPOKALEMIA: Status: RESOLVED | Noted: 2023-05-23 | Resolved: 2023-06-06

## 2023-06-06 PROBLEM — F19.929: Status: RESOLVED | Noted: 2023-05-23 | Resolved: 2023-06-06

## 2023-06-06 PROBLEM — T50.902A SUICIDE ATTEMPT BY DRUG INGESTION (HCC): Status: RESOLVED | Noted: 2023-05-25 | Resolved: 2023-06-06

## 2023-06-06 PROBLEM — R41.82 AMS (ALTERED MENTAL STATUS): Status: RESOLVED | Noted: 2023-05-23 | Resolved: 2023-06-06

## 2023-06-06 PROBLEM — E87.20 METABOLIC ACIDOSIS: Status: RESOLVED | Noted: 2023-05-23 | Resolved: 2023-06-06

## 2023-06-06 PROCEDURE — 700102 HCHG RX REV CODE 250 W/ 637 OVERRIDE(OP): Performed by: INTERNAL MEDICINE

## 2023-06-06 PROCEDURE — 700111 HCHG RX REV CODE 636 W/ 250 OVERRIDE (IP): Performed by: EMERGENCY MEDICINE

## 2023-06-06 PROCEDURE — 770001 HCHG ROOM/CARE - MED/SURG/GYN PRIV*

## 2023-06-06 PROCEDURE — 99233 SBSQ HOSP IP/OBS HIGH 50: CPT | Mod: GC | Performed by: HOSPITALIST

## 2023-06-06 PROCEDURE — A9270 NON-COVERED ITEM OR SERVICE: HCPCS | Performed by: INTERNAL MEDICINE

## 2023-06-06 RX ORDER — SERTRALINE HYDROCHLORIDE 100 MG/1
100 TABLET, FILM COATED ORAL DAILY
Qty: 30 TABLET | Refills: 11 | Status: SHIPPED | OUTPATIENT
Start: 2023-06-06 | End: 2023-08-30

## 2023-06-06 RX ADMIN — SERTRALINE 100 MG: 100 TABLET, FILM COATED ORAL at 19:35

## 2023-06-06 RX ADMIN — ENOXAPARIN SODIUM 40 MG: 100 INJECTION SUBCUTANEOUS at 17:41

## 2023-06-06 ASSESSMENT — PATIENT HEALTH QUESTIONNAIRE - PHQ9
9. THOUGHTS THAT YOU WOULD BE BETTER OFF DEAD, OR OF HURTING YOURSELF: SEVERAL DAYS
7. TROUBLE CONCENTRATING ON THINGS, SUCH AS READING THE NEWSPAPER OR WATCHING TELEVISION: MORE THAN HALF THE DAYS
SUM OF ALL RESPONSES TO PHQ QUESTIONS 1-9: 9
3. TROUBLE FALLING OR STAYING ASLEEP OR SLEEPING TOO MUCH: SEVERAL DAYS
SUM OF ALL RESPONSES TO PHQ9 QUESTIONS 1 AND 2: 2
3. TROUBLE FALLING OR STAYING ASLEEP OR SLEEPING TOO MUCH: SEVERAL DAYS
5. POOR APPETITE OR OVEREATING: NOT AT ALL
4. FEELING TIRED OR HAVING LITTLE ENERGY: SEVERAL DAYS
4. FEELING TIRED OR HAVING LITTLE ENERGY: SEVERAL DAYS
SUM OF ALL RESPONSES TO PHQ9 QUESTIONS 1 AND 2: 2
1. LITTLE INTEREST OR PLEASURE IN DOING THINGS: SEVERAL DAYS
8. MOVING OR SPEAKING SO SLOWLY THAT OTHER PEOPLE COULD HAVE NOTICED. OR THE OPPOSITE, BEING SO FIGETY OR RESTLESS THAT YOU HAVE BEEN MOVING AROUND A LOT MORE THAN USUAL: NOT AT ALL
2. FEELING DOWN, DEPRESSED, IRRITABLE, OR HOPELESS: SEVERAL DAYS
5. POOR APPETITE OR OVEREATING: NOT AT ALL
1. LITTLE INTEREST OR PLEASURE IN DOING THINGS: SEVERAL DAYS
SUM OF ALL RESPONSES TO PHQ QUESTIONS 1-9: 9
2. FEELING DOWN, DEPRESSED, IRRITABLE, OR HOPELESS: SEVERAL DAYS
6. FEELING BAD ABOUT YOURSELF - OR THAT YOU ARE A FAILURE OR HAVE LET YOURSELF OR YOUR FAMILY DOWN: MORE THAN HALF THE DAYS
9. THOUGHTS THAT YOU WOULD BE BETTER OFF DEAD, OR OF HURTING YOURSELF: SEVERAL DAYS
8. MOVING OR SPEAKING SO SLOWLY THAT OTHER PEOPLE COULD HAVE NOTICED. OR THE OPPOSITE, BEING SO FIGETY OR RESTLESS THAT YOU HAVE BEEN MOVING AROUND A LOT MORE THAN USUAL: NOT AT ALL
6. FEELING BAD ABOUT YOURSELF - OR THAT YOU ARE A FAILURE OR HAVE LET YOURSELF OR YOUR FAMILY DOWN: MORE THAN HALF THE DAYS
7. TROUBLE CONCENTRATING ON THINGS, SUCH AS READING THE NEWSPAPER OR WATCHING TELEVISION: MORE THAN HALF THE DAYS

## 2023-06-06 ASSESSMENT — ENCOUNTER SYMPTOMS
CONSTIPATION: 0
SENSORY CHANGE: 0
NERVOUS/ANXIOUS: 1
HALLUCINATIONS: 0
DIAPHORESIS: 0
COUGH: 0
TREMORS: 0
BLURRED VISION: 0
HEADACHES: 0
FOCAL WEAKNESS: 0
NAUSEA: 0
SEIZURES: 0
DEPRESSION: 0
SHORTNESS OF BREATH: 0
DIZZINESS: 0
SPEECH CHANGE: 0
CHILLS: 0
ABDOMINAL PAIN: 0
DIARRHEA: 0
FEVER: 0
WEIGHT LOSS: 0
WEAKNESS: 1
TINGLING: 0
SPUTUM PRODUCTION: 0
PALPITATIONS: 0
VOMITING: 0

## 2023-06-06 ASSESSMENT — LIFESTYLE VARIABLES: SUBSTANCE_ABUSE: 0

## 2023-06-06 NOTE — DISCHARGE SUMMARY
"R Internal Medicine Discharge Summary    Attending: Rogelio Webb M.d.  Senior Resident: Dr. Jeronimo ESPARZA  Intern:  Dr. She ESPARZA  Contact Number: [phone]    CHIEF COMPLAINT ON ADMISSION  Chief Complaint   Patient presents with    ALOC     [place] with AMS and twitching. Pt is awake, alert, and oriented x 1. Incontinent of urine. Pt denies hst of SZ        Reason for Admission  Altered mental status in setting of benadryl intoxication and overdose.     Admission Date  5/23/2023    CODE STATUS  Full Code    HPI & HOSPITAL COURSE  Bishop Bucio is  52 year-old male with past medical history of depression who presented on 5/23/2023 [place]. Admitted for altered mental status secondary to benadryl overdose (empty bottle found at his side). Patient had GCS 6 and intubated in ED for airway control, successfully extubated. Seizure was suspected as patient had \"deviated gaze and nonverbal\" per emergency department staff so he was started on ativan and keppra. MRI and EEG were unremarkable. He was started on keppra 1000mg BID in the critical care unit. He then underwent Keppra taper by neurology and was successfully weaned off. His metabolic acidosis on admission was resolved by discharge. Hypokalemia also resolved. Psychiatry placed patient on legal hold secondary to suicidal ideation and medication overdose with intention. They continued to follow patient and he was started on sertraline which he will be discharged with. Unfortunately his hospitalization was prolonged due to bed availability at the [facility]. He was discharged to [facility] on 6/6.    Therefore, he is discharged in fair and stable condition to a psychiatric hospital.    The patient met 2-midnight criteria for an inpatient stay at the time of discharge.    Discharge Date  6/6/2023    Physical Exam on Day of Discharge  Physical Exam  Vitals and nursing note reviewed.   Constitutional:       General: He is not in " acute distress.     Appearance: Normal appearance. He is not ill-appearing.   HENT:      Head: Normocephalic and atraumatic.      Right Ear: Tympanic membrane normal.      Left Ear: Tympanic membrane normal.      Mouth/Throat:      Mouth: Mucous membranes are moist.      Pharynx: Oropharynx is clear.   Eyes:      Extraocular Movements: Extraocular movements intact.      Conjunctiva/sclera: Conjunctivae normal.      Pupils: Pupils are equal, round, and reactive to light.   Cardiovascular:      Rate and Rhythm: Normal rate and regular rhythm.      Pulses: Normal pulses.   Pulmonary:      Effort: Pulmonary effort is normal.      Breath sounds: Normal breath sounds.   Abdominal:      General: Abdomen is flat. Bowel sounds are normal.      Palpations: Abdomen is soft.   Musculoskeletal:         General: No swelling, tenderness or deformity. Normal range of motion.      Cervical back: Normal range of motion and neck supple.      Right lower leg: No edema.      Left lower leg: No edema.      Comments: R arm superficial thrombophlebitis improved; no erythema, warmth.  Swelling improved.   Skin:     General: Skin is warm and dry.      Findings: No erythema.   Neurological:      Mental Status: He is oriented to person, place, and time.   Psychiatric:         Behavior: Behavior normal.         FOLLOW UP ITEMS POST DISCHARGE  -take medications as prescribed    DISCHARGE DIAGNOSES  Principal Problem (Resolved):    Drug intoxication with complication, with unspecified complication (HCC) (POA: Yes)  Active Problems:    Superficial venous thrombosis of arm, right (POA: Unknown)  Resolved Problems:    Acute respiratory failure with hypoxia (HCC) (POA: Yes)    Elevated CPK (POA: Yes)    Metabolic acidosis (POA: Yes)    Hypokalemia (POA: Yes)    AMS (altered mental status) (POA: Yes)    Suicide attempt by drug ingestion (HCC) (POA: Yes)    Complaint of nasal congestion (POA: Unknown)      FOLLOW UP  No future appointments.  No  follow-up provider specified.    MEDICATIONS ON DISCHARGE     Medication List        START taking these medications        Instructions   sertraline 100 MG Tabs  Commonly known as: Zoloft   Take 1 Tablet by mouth every day.  Dose: 100 mg              Allergies  Allergies   Allergen Reactions    Pcn [Penicillins] Rash     Per patient        DIET  Orders Placed This Encounter   Procedures    Diet Order Diet: Regular; Tray Modifications (optional): No Sharps (Paperware)     Paperware only on meal tray.     Standing Status:   Standing     Number of Occurrences:   1     Order Specific Question:   Diet:     Answer:   Regular [1]     Order Specific Question:   Tray Modifications (optional)     Answer:   No Sharps (Paperware)       ACTIVITY  As tolerated.  Weight bearing as tolerated    CONSULTATIONS  Psychiatry, neurology    PROCEDURES  none    LABORATORY  Lab Results   Component Value Date    SODIUM 140 05/26/2023    POTASSIUM 3.9 05/26/2023    CHLORIDE 105 05/26/2023    CO2 22 05/26/2023    GLUCOSE 89 05/26/2023    BUN 11 05/26/2023    CREATININE 0.86 05/26/2023        Lab Results   Component Value Date    WBC 8.4 05/26/2023    HEMOGLOBIN 14.6 05/26/2023    HEMATOCRIT 42.7 05/26/2023    PLATELETCT 273 05/26/2023        Total time of the discharge process exceeds 60 minutes.

## 2023-06-06 NOTE — CARE PLAN
The patient is Watcher - Medium risk of patient condition declining or worsening    Shift Goals  Clinical Goals: inpatient psych treatment, SI prevention, rest  Patient Goals: rest, thrombus education  Family Goals: RAINA    Progress made toward(s) clinical / shift goals:    Problem: Knowledge Deficit - Standard  Goal: Patient and family/care givers will demonstrate understanding of plan of care, disease process/condition, diagnostic tests and medications  Outcome: Progressing  Note: Patient verbalizes and accepts his current legal hold that he is on and understands his discharge plan of being transferred to inpatient psych.      Problem: Skin Integrity  Goal: Skin integrity is maintained or improved  Outcome: Progressing  Note: Patient's skin integrity has stayed intact throughout his hospitalization and the patient understands the importance of ambulation throughout the day, considering his independent mobility status, to prevent skin breakdown.

## 2023-06-06 NOTE — CARE PLAN
The patient is Stable - Low risk of patient condition declining or worsening    Shift Goals  Clinical Goals: SI prevention  Patient Goals: Rest  Family Goals: RAINA    Progress made toward(s) clinical / shift goals:    Problem: Knowledge Deficit - Standard  Goal: Patient and family/care givers will demonstrate understanding of plan of care, disease process/condition, diagnostic tests and medications  Outcome: Progressing  Note: Patient aware of POC stated by interdisciplinary team.     Problem: Provide Safe Environment  Goal: Suicide environmental safety, protocols, policies, and practices will be implemented  Outcome: Progressing  Flowsheets (Taken 6/6/2023 1610)  Safety Interventions:   Patient Room Close to Nursing Station   Patient Wearing Hospital Clothing   Provided Safety Education   Potentially Dangerous Items Removed from room   Personal Clothing / Belongings Removed (Comment: Storage Location)   Plastic Utensils / Paper Ware Ordered   Discussed no self harm or elopement and safe behavior with patient   Patient Accompanied by Unit Staff when off Unit.  Note: Patient has a sitter for 1:1 care.

## 2023-06-06 NOTE — PROGRESS NOTES
Bedside report received and patient care assumed. Pt is resting in bed, A&O4, with no complaints of pain, and is on room air. All appropriate fall precautions are in place, belongings at bedside include phone, , dentures, and call light approved to have by psych.  Pt was updated on POC, no questions or concerns. Pt educated on use of call light for assistance. Patient is on a legal hold, awaiting transfer for inpatient psych treatment. Sitter at bedside.

## 2023-06-06 NOTE — DISCHARGE PLANNING
Legal Hold     Referral: Legal Hold Court     Intervention: Pt presented for legal hold meeting with  via video conferencing to discuss legal options of contesting hold and meeting with the court doctors tomorrow afternoon, or not contesting legal hold and continuing the hold for one week.  advised that pt's legal hold will be be continued for one week (6/15).       Plan: Pt's legal hold has been continued for one week. Pt awaiting transfer to an in patient psych facility.

## 2023-06-06 NOTE — PROGRESS NOTES
Nurse rec'd BSR from night shift nurse, updated on POC. Patient denies any pain or any s/s of distress/discomfort. Nurse to resume care, call light in reach. Legal hold with sitter in place of 1:1 care. WCNAVDEEP

## 2023-06-06 NOTE — PROGRESS NOTES
"[facility] Internal Medicine Daily Progress Note    Date of Service  6/6/2023    R Team: R IM Green Team   Attending: Rogelio Webb M.d.  Senior Resident: Dr. Jeronimo ESPARZA  Intern:  Dr. She ESPARZA  Contact Number: [phone]    Chief Complaint  Bishop Bucio is a 52 y.o. male admitted 5/23/2023 for AMS secondary to Benadryl intoxication and overdose.     Hospital Course  Bishop Bucio is  52 year-old male with past medical history of depression who presented [place] 5[place]23/[place]. Admitted for altered mental status secondary to benadryl overdose (empty bottle found at his side). Patient had GCS 6 and intubated in ED for airway control, successfully extubated. Seizure was suspected as patient had \"deviated gaze and nonverbal\" per emergency department staff so he was started on ativan and keppra. MRI and EEG were unremarkable. He was started on keppra 1000mg BID in the critical care unit. He then underwent Keppra taper by neurology and was successfully weaned off. His metabolic acidosis on admission was resolved by discharge. Hypokalemia also resolved. Psychiatry placed patient on legal hold secondary to suicidal ideation and medication overdose with intention. They continued to follow patient and he was started on sertraline which he will be discharged with. Unfortunately his hospitalization was prolonged due to bed availability at the [facility] hospital. He was discharged to [facility] on 6/6.    Interval Problem Update  No acute events overnight. Psych continuing to follow. On zoloft, no complaints of fogginess and fatigue. Legal hold extended.  No vital signs or labs. Medically stable and pending d/c to inpatient psych facility.    I have discussed this patient's plan of care and discharge plan at IDT rounds today with Case Management, Nursing, Nursing leadership, and other members of the IDT team.    Consultants/Specialty  psychiatry, neurology    Code Status  Full Code    Disposition  The patient is " medically cleared for discharge to home or a post-acute facility.  Anticipate discharge to: a psychiatric hospital    I have placed the appropriate orders for post-discharge needs.    Review of Systems  Review of Systems   Constitutional:  Negative for chills, diaphoresis, fever, malaise/fatigue and weight loss.   HENT:  Negative for tinnitus.    Eyes:  Negative for blurred vision.   Respiratory:  Negative for cough, sputum production and shortness of breath.    Cardiovascular:  Negative for chest pain, palpitations and leg swelling.   Gastrointestinal:  Negative for abdominal pain, constipation, diarrhea, nausea and vomiting.   Genitourinary:  Negative for dysuria, frequency and urgency.   Musculoskeletal:         R arm superficial thrombophlebitis   Skin:  Negative for rash.   Neurological:  Positive for weakness. Negative for dizziness, tingling, tremors, sensory change, speech change, focal weakness, seizures and headaches.   Psychiatric/Behavioral:  Negative for depression, hallucinations, substance abuse and suicidal ideas. The patient is nervous/anxious.    All other systems reviewed and are negative.       Physical Exam  Temp:  [36.1 °C (96.9 °F)-37.2 °C (98.9 °F)] 36.1 °C (96.9 °F)  Pulse:  [64-89] 82  Resp:  [18-20] 18  BP: (112-136)/(65-92) 122/76  SpO2:  [93 %-97 %] 97 %    Physical Exam  Vitals and nursing note reviewed.   Constitutional:       General: He is not in acute distress.     Appearance: Normal appearance. He is not ill-appearing.   HENT:      Head: Normocephalic and atraumatic.      Right Ear: Tympanic membrane normal.      Left Ear: Tympanic membrane normal.      Mouth/Throat:      Mouth: Mucous membranes are moist.      Pharynx: Oropharynx is clear.   Eyes:      Extraocular Movements: Extraocular movements intact.      Conjunctiva/sclera: Conjunctivae normal.      Pupils: Pupils are equal, round, and reactive to light.   Cardiovascular:      Rate and Rhythm: Normal rate and regular rhythm.       Pulses: Normal pulses.   Pulmonary:      Effort: Pulmonary effort is normal.      Breath sounds: Normal breath sounds.   Abdominal:      General: Abdomen is flat. Bowel sounds are normal.      Palpations: Abdomen is soft.   Musculoskeletal:         General: No swelling, tenderness or deformity. Normal range of motion.      Cervical back: Normal range of motion and neck supple.      Right lower leg: No edema.      Left lower leg: No edema.      Comments: R arm superficial thrombophlebitis improved; no erythema, warmth.  Swelling improved.   Skin:     General: Skin is warm and dry.      Findings: No erythema.   Neurological:      Mental Status: He is oriented to person, place, and time.   Psychiatric:         Behavior: Behavior normal.         Fluids    Intake/Output Summary (Last 24 hours) at 6/6/2023 1228  Last data filed at 6/6/2023 0830  Gross per 24 hour   Intake 240 ml   Output --   Net 240 ml       Laboratory                        Imaging  US-EXTREMITY VENOUS UPPER UNILAT RIGHT   Final Result      MR-BRAIN-W/O   Final Result         No acute intracranial process.      Inflammatory changes involving the paranasal sinuses. Right mastoid effusion.      DX-CHEST-PORTABLE (1 VIEW)   Final Result      Endotracheal tube has been advanced and now terminates in good position above the bhavin      Retrocardiac opacity could be from atelectasis or consolidation      DX-ABDOMEN FOR TUBE PLACEMENT   Final Result      NG tube terminates over the stomach.      DX-CHEST-PORTABLE (1 VIEW)   Final Result      1.  Interval intubation; the endotracheal tube tip is just above the level of the clavicular heads   2.  Lung volumes are low.      CT-HEAD W/O   Final Result      1.  No CT evidence of acute infarct, hemorrhage or mass.   2.  Moderate mucosal thickening of the maxillary, sphenoid and ethmoid sinuses.      DX-CHEST-PORTABLE (1 VIEW)   Final Result      Predominately linear bibasilar opacities, likely atelectasis. No  focal consolidation. No effusions.              Assessment/Plan  Problem Representation:    * Drug intoxication with complication, with unspecified complication (HCC)- (present on admission)  Assessment & Plan  RESOLVED  Patient was admitted with suspected dyphenhydramine intoxication with unknown amount of tablets and unknown time of normal last seen. Symptoms related includes cutaneous vasodilation (more obvious in the face), mydriasis, and reported halluciations with possible urinary retention. Certainly he developed AMS and respiratory failure. Differential diagnosis include other intoxications, seizures, meningitis, sepsis, etc.  For now will base management on supportive care, consider physostigmine but given suspected seizure and possible concomitant other drug intoxication, will hold for now.  -Initial treatment for seizures with benzos, Keppra  -Poison control case #[id number]  -CT head, MRI negative  -UDS reported  -Neurology, psychiatry consulted  -Improved and currently medically clear, the patient remains on a legal hold     Complaint of nasal congestion  Assessment & Plan  RESOLVED  Reporting mild nasal congestion, no concerning symptoms for infection at this time  -Saline spray as needed     Superficial venous thrombosis of arm, right  Assessment & Plan  RESOLVED  Non traumatic swelling of RUE. US demonstrated acute to subacute occlusive SVT. Swelling has now resolved.  -Warm compresses as needed     Suicide attempt by drug ingestion (HCC)- (present on admission)  Assessment & Plan  With Benadryl. Medically treated and improved. Placed on legal hold, referred to psychiatry for evaluation  -Psychiatry following, appreciate support  -SI has resolved on 5/28  -The patient will likely need inpatient psychiatric care  -Continue Zoloft per psych recs  -Deferring hydroxyzine in setting of recent antihistamine overdose     AMS (altered mental status)- (present on admission)  Assessment &  Plan  RESOLVED  Secondary to overdose in form of Benadryl. Was reported to poison control  -Resolved with supportive care     Hypokalemia- (present on admission)  Assessment & Plan  RESOLVED  Mild, appropriately repleted     Metabolic acidosis- (present on admission)  Assessment & Plan  RESOLVED  Mild anion gap metabolic acidosis  -Metabolic profile and acid-base profile improved after resuscitation     Elevated CPK- (present on admission)  Assessment & Plan  RESOLVED  Downtrending with fluid resuscitation, no evidence of kidney injury  -Currently s/p IVF  - (WNL)     Acute respiratory failure with hypoxia (HCC)- (present on admission)  Assessment & Plan  RESOLVED  Most likely due to drug intoxication, initially intubated for airway protection  -Successfully extubated  -Currently saturating adequately on room air  -Sore throat from extubation - PRN lozenges and Chloraseptic spray      VTE prophylaxis: enoxaparin ppx    I have performed a physical exam and reviewed and updated ROS and Plan today (6/6/2023). In review of yesterday's note (6/5/2023), there are no changes except as documented above.

## 2023-06-06 NOTE — CONSULTS
"  [facility] PSYCHIATRIC FOLLOW-UP:(established)    DOS: 06/06/23     Reason for [place]: [place] with AMS and twitching. Pt is awake, alert, and oriented x 1. Incontinent of urine. Pt denies hst of SZ . SA via OD benadryl  Legal Hold Status: on legal hold      Chief Complaint:   Chief Complaint   Patient presents with    ALOC     Pt [place] river with AMS and twitching. Pt is awake, alert, and oriented x 1. Incontinent of urine. Pt denies hst of SZ                S:  Seen today as f/u psych consult.   Observed sitting in bed. Reports improved sleep, energy, and appetite. Denies SI/HI, A/VH, no Sx of psychosis/joanne reported or observed. Expressing a plan to keep engaged during day to prevent sleep cycle disruption. Denies dizziness, HA, GI distress, appears to be tolerating medications. Engaged in treatment, able to identify emotional triggers, taking personal involvement in care.      O: Medical ROS (as pertinent): No new changes reported to this writer.    No results for input(s): WBC, RBC, HEMOGLOBIN, HEMATOCRIT, MCV, MCH, RDW, PLATELETCT, MPV, NEUTSPOLYS, LYMPHOCYTES, MONOCYTES, EOSINOPHILS, BASOPHILS, RBCMORPHOLO in the last 72 hours.  No results for input(s): SODIUM, POTASSIUM, CHLORIDE, CO2, GLUCOSE, BUN, CPKTOTAL in the last 72 hours.  No results for input(s): ALBUMIN, TBILIRUBIN, ALKPHOSPHAT, TOTPROTEIN, ALTSGPT, ASTSGOT, CREATININE in the last 72 hours.      PSYCHIATRIC EXAM (MSE):   /76   Pulse 82   Temp 36.1 °C (96.9 °F) (Temporal)   Resp 18   Ht 1.778 m (5' 10\")   Wt 89.6 kg (197 lb 8.5 oz)   SpO2 97%       Constitutional: as noted above  General Appearance/Behavior: 52 y.o. appears stated age, good eye contact cooperative, No behavioral disturbances  Abnormal Movements: none, no PMA/PMR or tremor observed.  Gait and Posture: not observed  Musculoskeletal: as noted above  Mood: \"okay\"  Affect: Mood/Congruent and Appropriate   Speech: normal rate, normal rhythm, " normal tone, normal volume, and normal fluency  Language:  spontaneous, comprehends spoken commands, and fluent   Thought Process: Linear, Logical, and Goal Directed,  Future Oriented  Thought Content: Denies SI/HI. Denies A/VH, no e/o delusions, PI, or internal preoccupation.   Insight/Judgement:  fair  Alert/Orientation: alert, oriented to person, place and time  Attn/Concentration: normal  Fund of Knowledge: Average  Memory recent/remote: not tested  MMSE: deferred this visit          Meds Current:  Scheduled Medications   Medication Dose Frequency    sertraline  100 mg DAILY    senna-docusate  2 Tablet BID    enoxaparin (LOVENOX) injection  40 mg DAILY AT 1800     Allergies:   Allergies   Allergen Reactions    Pcn [Penicillins] Rash     Per patient           ASSESSMENT:   1. Altered mental status, unspecified altered mental status type       Psychiatric:   Major depressive disorder, recurrent  R/o adjustment disorder     Medical: as noted by the medical treatment team.   Acute respiratory failure with hypoxia  Seizure disorder  S/p o/d           RECOMMENDATIONS:  Legal Status: on legal hold     Please transfer patient to inpatient psychiatric hospital when medically cleared and bed is available.     Observation status:   Line of site with sitter     Visitors: No   Personal belongings: Yes, limited to cell phone, glasses, TV remote     Discussed/voalted: LAURA Nowak MD     Medication Recommendations: Final orders as per Treatment Team  No new medication recommendations; continue Zoloft 100mg PO daily  Risks/benefits/side effects discussed, patient verbalized understanding     Reviewed safety plan: 911, ER, PCM, MHC, suicide crisis line, nursing staff while inpatient.     Will Continue to Follow.

## 2023-06-07 LAB
SARS-COV-2 RNA RESP QL NAA+PROBE: DETECTED
SPECIMEN SOURCE: ABNORMAL

## 2023-06-07 PROCEDURE — 770001 HCHG ROOM/CARE - MED/SURG/GYN PRIV*

## 2023-06-07 PROCEDURE — 700111 HCHG RX REV CODE 636 W/ 250 OVERRIDE (IP): Performed by: EMERGENCY MEDICINE

## 2023-06-07 PROCEDURE — 700102 HCHG RX REV CODE 250 W/ 637 OVERRIDE(OP): Performed by: INTERNAL MEDICINE

## 2023-06-07 PROCEDURE — 99231 SBSQ HOSP IP/OBS SF/LOW 25: CPT | Mod: GC | Performed by: HOSPITALIST

## 2023-06-07 PROCEDURE — A9270 NON-COVERED ITEM OR SERVICE: HCPCS | Performed by: INTERNAL MEDICINE

## 2023-06-07 PROCEDURE — 87635 SARS-COV-2 COVID-19 AMP PRB: CPT

## 2023-06-07 RX ADMIN — ENOXAPARIN SODIUM 40 MG: 100 INJECTION SUBCUTANEOUS at 17:26

## 2023-06-07 RX ADMIN — SERTRALINE 100 MG: 100 TABLET, FILM COATED ORAL at 21:55

## 2023-06-07 ASSESSMENT — ENCOUNTER SYMPTOMS
SENSORY CHANGE: 0
TREMORS: 0
CHILLS: 0
HEADACHES: 0
BLURRED VISION: 0
VOMITING: 0
ABDOMINAL PAIN: 0
DIZZINESS: 0
SPUTUM PRODUCTION: 0
WEAKNESS: 1
DIARRHEA: 0
HALLUCINATIONS: 0
COUGH: 0
CONSTIPATION: 0
FEVER: 0
NAUSEA: 0
PALPITATIONS: 0
TINGLING: 0
SPEECH CHANGE: 0
FOCAL WEAKNESS: 0
SEIZURES: 0
DEPRESSION: 0
NERVOUS/ANXIOUS: 1
WEIGHT LOSS: 0
DIAPHORESIS: 0
SHORTNESS OF BREATH: 0

## 2023-06-07 ASSESSMENT — PATIENT HEALTH QUESTIONNAIRE - PHQ9
9. THOUGHTS THAT YOU WOULD BE BETTER OFF DEAD, OR OF HURTING YOURSELF: SEVERAL DAYS
8. MOVING OR SPEAKING SO SLOWLY THAT OTHER PEOPLE COULD HAVE NOTICED. OR THE OPPOSITE, BEING SO FIGETY OR RESTLESS THAT YOU HAVE BEEN MOVING AROUND A LOT MORE THAN USUAL: NOT AT ALL
3. TROUBLE FALLING OR STAYING ASLEEP OR SLEEPING TOO MUCH: SEVERAL DAYS
SUM OF ALL RESPONSES TO PHQ9 QUESTIONS 1 AND 2: 2
4. FEELING TIRED OR HAVING LITTLE ENERGY: SEVERAL DAYS
1. LITTLE INTEREST OR PLEASURE IN DOING THINGS: SEVERAL DAYS
2. FEELING DOWN, DEPRESSED, IRRITABLE, OR HOPELESS: SEVERAL DAYS
5. POOR APPETITE OR OVEREATING: NOT AT ALL
6. FEELING BAD ABOUT YOURSELF - OR THAT YOU ARE A FAILURE OR HAVE LET YOURSELF OR YOUR FAMILY DOWN: MORE THAN HALF THE DAYS
SUM OF ALL RESPONSES TO PHQ QUESTIONS 1-9: 9
7. TROUBLE CONCENTRATING ON THINGS, SUCH AS READING THE NEWSPAPER OR WATCHING TELEVISION: MORE THAN HALF THE DAYS

## 2023-06-07 ASSESSMENT — LIFESTYLE VARIABLES: SUBSTANCE_ABUSE: 0

## 2023-06-07 NOTE — DISCHARGE PLANNING
"Alert Team Note:    Per an [employer] message from Coral with [facility], the facility will reevaluate accepting pt \"5 days if asymptomatic from date of positive COVID test.\" Pt is no longer accepted.   Contacted Deborah with [facility] and cancelled transport.   "

## 2023-06-07 NOTE — PROGRESS NOTES
Nurse rec'd BSR from night shift nurse, updated on POC. Patient denies any pain or any s/s of distress/discomfort. Nurse to resume care, call light in reach. YONY

## 2023-06-07 NOTE — DISCHARGE PLANNING
Legal Hold Transfer     Referral: Legal hold transfer to Mental Health Facility     Intervention: Notified by  Rojelio that pt has been accepted to [facility].     Pt's accepting physcian is Dr. Garza     Transport arranged through [facility] at [facility]     The pt will be picked up at 1130      Notified Bedside RN [place], GARY Walters, and Dr. Nowak of the departure time as well as accepting facility.      Transfer packet and COBRA created with original legal hold and placed on chart.      Plan: Pt will be transferring to [facility] today at 1130 via [facility].

## 2023-06-07 NOTE — DISCHARGE PLANNING
Alert Team Note:    Contacted Lab Admin on Call Marquita Duke. Per Marquita, pts Covid test has come back positive.   Writer contacted [facility] and informed Coral. She will contact her supervisor and inform us if pt is still accepted of not.   Submitted test results to [facility] via [employer].

## 2023-06-07 NOTE — DISCHARGE PLANNING
Received Stipulation and Order from the court continuing pt's legal hold until 6/15, scanned copy into pt's chart.

## 2023-06-07 NOTE — PROGRESS NOTES
"[facility] Internal Medicine Daily Progress Note    Date of Service  6/7/2023    R Team: R IM Green Team   Attending: Rogelio Webb M.d.  Senior Resident: Dr. Jeronimo ESPARZA  Intern:  Dr. She ESPARZA  Contact Number: [phone]    Chief Complaint  Bishop Bucio is a 52 y.o. male admitted 5/23/2023 with AMS secondary to Benadryl intoxication and overdose.     Hospital Course  Bishop Bucio is  52 year-old male with past medical history of depression who [place] 5[place]23[place]. Admitted for altered mental status secondary to benadryl overdose (empty bottle found at his side). Patient had GCS 6 and intubated in ED for airway control, successfully extubated on 5/24/2023. Seizure event was suspected as patient had \"deviated gaze and nonverbal\" per emergency department staff so he was started on ativan and keppra. MRI and EEG were unremarkable. He was started on keppra 1000mg BID in the critical care unit. He then underwent Keppra taper by neurology and was successfully weaned off. His metabolic acidosis on admission was resolved by discharge. Hypokalemia also resolved. Psychiatry placed patient on legal hold secondary to suicidal ideation and medication overdose with intention. They continued to follow patient and he was started on sertraline 100mg (administer at night as patient complains of drowsiness, fogginess) which he will be discharged with. Hospitalization was complicated by US of RUE showing superficial thrombophlebitis, however did not need any intervention and showed improvement with supportive management with warm compresses, etc. He was medically clear for discharge on 5/27/2023 but unfortunately inpatient stay was prolonged due to lack of bed availability at the [facility].  Plan was to discharge patient to [facility] however due to positive COVID test on 6/7, patient will stay inpatient for 5 days per facility protocol.    Interval Problem Update  No acute events overnight.  " Continue on Zoloft.  Seen at bedside this morning, no new complaints.  Has one-to-one sitter.  On legal hold.  COVID swab positive, patient will need to remain inpatient for 5 days-anticipate discharge 6/11.    I have discussed this patient's plan of care and discharge plan at IDT rounds today with Case Management, Nursing, Nursing leadership, and other members of the IDT team.    Consultants/Specialty  psychiatry, neurology    Code Status  Full Code    Disposition  The patient is medically cleared for discharge to home or a post-acute facility.  Anticipate discharge to: a psychiatric hospital    I have placed the appropriate orders for post-discharge needs.    Review of Systems  Review of Systems   Constitutional:  Negative for chills, diaphoresis, fever, malaise/fatigue and weight loss.   HENT:  Negative for tinnitus.    Eyes:  Negative for blurred vision.   Respiratory:  Negative for cough, sputum production and shortness of breath.    Cardiovascular:  Negative for chest pain, palpitations and leg swelling.   Gastrointestinal:  Negative for abdominal pain, constipation, diarrhea, nausea and vomiting.   Genitourinary:  Negative for dysuria, frequency and urgency.   Musculoskeletal:         R arm superficial thrombophlebitis   Skin:  Negative for rash.   Neurological:  Positive for weakness. Negative for dizziness, tingling, tremors, sensory change, speech change, focal weakness, seizures and headaches.   Psychiatric/Behavioral:  Negative for depression, hallucinations, substance abuse and suicidal ideas. The patient is nervous/anxious.    All other systems reviewed and are negative.       Physical Exam  Temp:  [36.3 °C (97.3 °F)-36.7 °C (98.1 °F)] 36.3 °C (97.3 °F)  Pulse:  [78-89] 78  Resp:  [18] 18  BP: (133-147)/(84-87) 133/84  SpO2:  [95 %-97 %] 97 %    Physical Exam  Vitals and nursing note reviewed.   Constitutional:       General: He is not in acute distress.     Appearance: Normal appearance. He is not  ill-appearing.   HENT:      Head: Normocephalic and atraumatic.      Right Ear: Tympanic membrane normal.      Left Ear: Tympanic membrane normal.      Mouth/Throat:      Mouth: Mucous membranes are moist.      Pharynx: Oropharynx is clear.   Eyes:      Extraocular Movements: Extraocular movements intact.      Conjunctiva/sclera: Conjunctivae normal.      Pupils: Pupils are equal, round, and reactive to light.   Cardiovascular:      Rate and Rhythm: Normal rate and regular rhythm.      Pulses: Normal pulses.   Pulmonary:      Effort: Pulmonary effort is normal.      Breath sounds: Normal breath sounds.   Abdominal:      General: Abdomen is flat. Bowel sounds are normal.      Palpations: Abdomen is soft.   Musculoskeletal:         General: No swelling, tenderness or deformity. Normal range of motion.      Cervical back: Normal range of motion and neck supple.      Right lower leg: No edema.      Left lower leg: No edema.      Comments: R arm superficial thrombophlebitis improved; no erythema, warmth.  Swelling improved.   Skin:     General: Skin is warm and dry.      Findings: No erythema.   Neurological:      Mental Status: He is oriented to person, place, and time.   Psychiatric:         Behavior: Behavior normal.         Fluids    Intake/Output Summary (Last 24 hours) at 6/7/2023 1457  Last data filed at 6/7/2023 1145  Gross per 24 hour   Intake 1160 ml   Output --   Net 1160 ml       Laboratory                        Imaging  US-EXTREMITY VENOUS UPPER UNILAT RIGHT   Final Result      MR-BRAIN-W/O   Final Result         No acute intracranial process.      Inflammatory changes involving the paranasal sinuses. Right mastoid effusion.      DX-CHEST-PORTABLE (1 VIEW)   Final Result      Endotracheal tube has been advanced and now terminates in good position above the bhavin      Retrocardiac opacity could be from atelectasis or consolidation      DX-ABDOMEN FOR TUBE PLACEMENT   Final Result      NG tube terminates  over the stomach.      DX-CHEST-PORTABLE (1 VIEW)   Final Result      1.  Interval intubation; the endotracheal tube tip is just above the level of the clavicular heads   2.  Lung volumes are low.      CT-HEAD W/O   Final Result      1.  No CT evidence of acute infarct, hemorrhage or mass.   2.  Moderate mucosal thickening of the maxillary, sphenoid and ethmoid sinuses.      DX-CHEST-PORTABLE (1 VIEW)   Final Result      Predominately linear bibasilar opacities, likely atelectasis. No focal consolidation. No effusions.              Assessment/Plan  Problem Representation:    * Drug intoxication with complication, with unspecified complication (HCC)  Assessment & Plan  RESOLVED  Patient was admitted with suspected dyphenhydramine intoxication with unknown amount of tablets and unknown time of normal last seen. Symptoms related includes cutaneous vasodilation (more obvious in the face), mydriasis, and reported halluciations with possible urinary retention. Certainly he developed AMS and respiratory failure. Differential diagnosis include other intoxications, seizures, meningitis, sepsis, etc.  For now will base management on supportive care, consider physostigmine but given suspected seizure and possible concomitant other drug intoxication, will hold for now.  -Initial treatment for seizures with benzos, Keppra  -Poison control case #[id number]  -CT head, MRI negative  -UDS reported  -Neurology, psychiatry consulted  -Improved and currently medically clear, the patient remains on a legal hold    Superficial venous thrombosis of arm, right  Assessment & Plan  RESOLVED  Non traumatic swelling of RUE. US demonstrated acute to subacute occlusive SVT. Swelling has now resolved.  -Warm compresses as needed           VTE prophylaxis: enoxaparin ppx    I have performed a physical exam and reviewed and updated ROS and Plan today (6/7/2023). In review of yesterday's note (6/6/2023), there are no changes except as documented  above.

## 2023-06-07 NOTE — DISCHARGE PLANNING
Alert Team Note:    Contacted by [facility], spoke to Shaina. Pt referral to be reviewed by med team. Submitted updated MAR as requested.

## 2023-06-07 NOTE — CARE PLAN
The patient is Stable - Low risk of patient condition declining or worsening    Shift Goals  Clinical Goals: safety  Patient Goals: rest  Family Goals: delgado    Progress made toward(s) clinical / shift goals:    Problem: Knowledge Deficit - Standard  Goal: Patient and family/care givers will demonstrate understanding of plan of care, disease process/condition, diagnostic tests and medications  Outcome: Progressing  Note: Patient verbalizes understanding of plan of care and barriers to discharge. Pending inpatient psych bed.       Problem: Skin Integrity  Goal: Skin integrity is maintained or improved  Outcome: Progressing  Note: Skin remains intact this shift. No new abrasions or wounds observed.          Patient is not progressing towards the following goals:

## 2023-06-07 NOTE — CONSULTS
"  [facility] PSYCHIATRIC FOLLOW-UP:(established)    DOS: 06/07/23     Reason for admission: [place] with AMS and twitching. Pt is awake, alert, and oriented x 1. Incontinent of urine. Pt denies hst of SZ . SA via OD benadryl  Legal Hold Status: on legal hold      hief Complaint:   Chief Complaint   Patient presents with    ALOC     Pt [place] river with AMS and twitching. Pt is awake, alert, and oriented x 1. Incontinent of urine. Pt denies hst of SZ                S:  Seen today as f/u psych consult.   Observed sitting in chair. Denies SI/HI, A/VH. Admits some negative thinking, ambivalent about living; expressing if he does not get help this time he probably never will seek help. Reports adequate sleep, energy, and appetite. Denies GI distress, HA, dizziness; appears to be tolerating medications.       O: Medical ROS (as pertinent): No new changes reported to this writer.    No results for input(s): WBC, RBC, HEMOGLOBIN, HEMATOCRIT, MCV, MCH, RDW, PLATELETCT, MPV, NEUTSPOLYS, LYMPHOCYTES, MONOCYTES, EOSINOPHILS, BASOPHILS, RBCMORPHOLO in the last 72 hours.  No results for input(s): SODIUM, POTASSIUM, CHLORIDE, CO2, GLUCOSE, BUN, CPKTOTAL in the last 72 hours.  No results for input(s): ALBUMIN, TBILIRUBIN, ALKPHOSPHAT, TOTPROTEIN, ALTSGPT, ASTSGOT, CREATININE in the last 72 hours.      PSYCHIATRIC EXAM (MSE):   /84   Pulse 78   Temp 36.3 °C (97.3 °F) (Temporal)   Resp 18   Ht 1.778 m (5' 10\")   Wt 89.6 kg (197 lb 8.5 oz)   SpO2 97%       Constitutional: as noted above  General Appearance/Behavior: 52 y.o. appears stated age, good eye contact cooperative, No behavioral disturbances  Abnormal Movements: none, no PMA/PMR or tremor observed.  Gait and Posture: not observed  Musculoskeletal: as noted above  Mood: \"fair\"  Affect: Mood/Congruent and Appropriate   Speech: normal rate, normal rhythm, normal tone, normal volume, and normal fluency  Language:  spontaneous, comprehends " spoken commands, and fluent   Thought Process: Logical and Goal Directed,  Future Oriented  Thought Content: Denies SI/HI. Denies A/VH, no e/o delusions, PI, or internal preoccupation.   Insight/Judgement:  fair  Alert/Orientation: alert, oriented to person, place and time  Attn/Concentration: normal  Fund of Knowledge: not tested  Memory recent/remote: not tested  MMSE: deferred this visit          Meds Current:  Scheduled Medications   Medication Dose Frequency    sertraline  100 mg DAILY    senna-docusate  2 Tablet BID    enoxaparin (LOVENOX) injection  40 mg DAILY AT 1800     Allergies:   Allergies   Allergen Reactions    Pcn [Penicillins] Rash     Per patient           ASSESSMENT:   1. Altered mental status, unspecified altered mental status type       Major depressive disorder, recurrent  R/o adjustment disorder     Medical: as noted by the medical treatment team.   Acute respiratory failure with hypoxia  Seizure disorder  S/p o/d       RECOMMENDATIONS:  Legal Status: extended and on legal hold    Please transfer patient to inpatient psychiatric hospital when medically cleared and bed is available.    Observation status:   Line of site with sitter    Visitors: No   Personal belongings: Yes, limited to cell phone, glasses, TV remote     Discussed/voalted: LAURA Nowak MD     Medication Recommendations: Final orders as per Treatment Team  Continue Zoloft 100mg PO daily  Risks/benefits/side effects discussed, patient verbalized understanding     Reviewed safety plan: 911, ER, PCM, MHC, suicide crisis line, nursing staff while inpatient.     Will Continue to Follow.

## 2023-06-07 NOTE — DISCHARGE PLANNING
Alert Team Note:    Contacted by [facility], spoke to Maris. Pt has accepted pt PENDING a rapid Covid test. Facility has requested transport at 1100.  Informed Dr. Nowak.

## 2023-06-07 NOTE — DISCHARGE SUMMARY
"[facility] Internal Medicine Discharge Summary    Attending: Rogelio Webb M.d.  Senior Resident: Dr. Eduardo Decker MD  Intern:  Dr. Rach Nowak MD  Contact Number: [phone]    CHIEF COMPLAINT ON ADMISSION  Chief Complaint   Patient presents with    ALOC     Pt [place] with AMS and twitching. Pt is awake, alert, and oriented x 1. Incontinent of urine. Pt denies hst of SZ        Reason for Admission  EMS     Admission Date  5/23/2023    CODE STATUS  Full Code    HPI & HOSPITAL COURSE    Bishop Bucio is  52 year-old male with past medical history of depression who presented on 5/23/2023 after being found down by the [employer]. Admitted for altered mental status secondary to benadryl overdose (empty bottle found at his side). Patient had GCS 6 and intubated in ED for airway control, successfully extubated on 5/24/2023. Seizure event was suspected as patient had \"deviated gaze and nonverbal\" per emergency department staff so he was started on ativan and keppra. MRI and EEG were unremarkable. He was started on keppra 1000mg BID in the critical care unit. He then underwent Keppra taper by neurology and was successfully weaned off. His metabolic acidosis on admission was resolved by discharge. Hypokalemia also resolved. Psychiatry placed patient on legal hold secondary to suicidal ideation and medication overdose with intention. They continued to follow patient and he was started on sertraline 100mg (administer at night as patient complains of drowsiness, fogginess) which he will be discharged with. Hospitalization was complicated by US of RUE showing superficial thrombophlebitis, however did not need any intervention and showed improvement with supportive management with warm compresses, etc. He was medically clear for discharge on 5/27/2023 but unfortunately inpatient stay was prolonged due to lack of bed availability at the [facility].  Plan was to discharge patient to [facility] however due to positive COVID test " on 6/7, patient will stay inpatient for 5 days per facility protocol.    Therefore, he is discharged in fair and stable condition to a psychiatric hospital.    The patient met 2-midnight criteria for an inpatient stay at the time of discharge.    Discharge Date  6/12/2023    Physical Exam on Day of Discharge  Physical Exam  Vitals and nursing note reviewed.   Constitutional:       General: He is not in acute distress.     Appearance: Normal appearance. He is not ill-appearing.   HENT:      Head: Normocephalic and atraumatic.      Right Ear: Tympanic membrane normal.      Left Ear: Tympanic membrane normal.      Mouth/Throat:      Mouth: Mucous membranes are moist.      Pharynx: Oropharynx is clear.   Eyes:      Extraocular Movements: Extraocular movements intact.      Conjunctiva/sclera: Conjunctivae normal.      Pupils: Pupils are equal, round, and reactive to light.   Cardiovascular:      Rate and Rhythm: Normal rate and regular rhythm.      Pulses: Normal pulses.   Pulmonary:      Effort: Pulmonary effort is normal.      Breath sounds: Normal breath sounds.   Abdominal:      General: Abdomen is flat. Bowel sounds are normal.      Palpations: Abdomen is soft.   Musculoskeletal:         General: No swelling, tenderness or deformity. Normal range of motion.      Cervical back: Normal range of motion and neck supple.      Right lower leg: No edema.      Left lower leg: No edema.      Comments: R arm superficial thrombophlebitis improved; no erythema, warmth.  Swelling improved.   Skin:     General: Skin is warm and dry.      Findings: No erythema.   Neurological:      Mental Status: He is oriented to person, place, and time.   Psychiatric:         Behavior: Behavior normal.         FOLLOW UP ITEMS POST DISCHARGE  -take medications as prescribed    DISCHARGE DIAGNOSES  Principal Problem:    Drug intoxication with complication, with unspecified complication (HCC) (POA: Unknown)  Active Problems:    Superficial venous  thrombosis of arm, right (POA: Unknown)  Resolved Problems:    Acute respiratory failure with hypoxia (HCC) (POA: Yes)    Elevated CPK (POA: Yes)    Metabolic acidosis (POA: Yes)    Hypokalemia (POA: Yes)    AMS (altered mental status) (POA: Yes)    Suicide attempt by drug ingestion (HCC) (POA: Yes)    Complaint of nasal congestion (POA: Unknown)      FOLLOW UP  No future appointments.  No follow-up provider specified.    MEDICATIONS ON DISCHARGE     Medication List        START taking these medications        Instructions   sertraline 100 MG Tabs  Commonly known as: Zoloft   Take 1 Tablet by mouth every day.  Dose: 100 mg              Allergies  Allergies   Allergen Reactions    Pcn [Penicillins] Rash     Per patient        DIET  Orders Placed This Encounter   Procedures    Diet Order Diet: Regular; Tray Modifications (optional): No Sharps (Paperware)     Paperware only on meal tray.     Standing Status:   Standing     Number of Occurrences:   1     Order Specific Question:   Diet:     Answer:   Regular [1]     Order Specific Question:   Tray Modifications (optional)     Answer:   No Sharps (Paperware)       ACTIVITY  As tolerated.  Weight bearing as tolerated    CONSULTATIONS  Psychiatry    PROCEDURES  None    LABORATORY  Lab Results   Component Value Date    SODIUM 140 05/26/2023    POTASSIUM 3.9 05/26/2023    CHLORIDE 105 05/26/2023    CO2 22 05/26/2023    GLUCOSE 89 05/26/2023    BUN 11 05/26/2023    CREATININE 0.86 05/26/2023        Lab Results   Component Value Date    WBC 8.4 05/26/2023    HEMOGLOBIN 14.6 05/26/2023    HEMATOCRIT 42.7 05/26/2023    PLATELETCT 273 05/26/2023        Total time of the discharge process exceeds 40 minutes.

## 2023-06-07 NOTE — CARE PLAN
The patient is Stable - Low risk of patient condition declining or worsening    Shift Goals  Clinical Goals: Safety  Patient Goals: Rest  Family Goals: RAINA    Progress made toward(s) clinical / shift goals:      Problem: Psychosocial  Goal: Patient's level of anxiety will decrease  Outcome: Progressing  Flowsheets (Taken 6/7/2023 1609)  Decrease Anxiety Level:   Collaborated with patient to identify and develop coping strategies   [facility]  Note: Patient's behavior improved with POC.      Problem: Provide Safe Environment  Goal: Suicide environmental safety, protocols, policies, and practices will be implemented  Outcome: Progressing  Flowsheets (Taken 6/7/2023 1609)  Safety Interventions:   Patient Room Close to Nursing Station   Patient Wearing Hospital Clothing   Personal Clothing / Belongings Removed (Comment: Storage Location)   Potentially Dangerous Items Removed from room   Plastic Utensils / Paper Ware Ordered   Provided Safety Education   Discussed no self harm or elopement and safe behavior with patient  Note: Patient has a sitter in place

## 2023-06-08 PROCEDURE — 700102 HCHG RX REV CODE 250 W/ 637 OVERRIDE(OP): Performed by: INTERNAL MEDICINE

## 2023-06-08 PROCEDURE — 99231 SBSQ HOSP IP/OBS SF/LOW 25: CPT | Mod: GC | Performed by: HOSPITALIST

## 2023-06-08 PROCEDURE — 700111 HCHG RX REV CODE 636 W/ 250 OVERRIDE (IP): Performed by: EMERGENCY MEDICINE

## 2023-06-08 PROCEDURE — A9270 NON-COVERED ITEM OR SERVICE: HCPCS | Performed by: INTERNAL MEDICINE

## 2023-06-08 PROCEDURE — 770001 HCHG ROOM/CARE - MED/SURG/GYN PRIV*

## 2023-06-08 RX ADMIN — SERTRALINE 100 MG: 100 TABLET, FILM COATED ORAL at 20:25

## 2023-06-08 RX ADMIN — ENOXAPARIN SODIUM 40 MG: 100 INJECTION SUBCUTANEOUS at 17:28

## 2023-06-08 ASSESSMENT — ENCOUNTER SYMPTOMS
SEIZURES: 0
DIARRHEA: 0
DIZZINESS: 0
SENSORY CHANGE: 0
WEAKNESS: 1
NERVOUS/ANXIOUS: 1
DEPRESSION: 0
NAUSEA: 0
DIAPHORESIS: 0
HALLUCINATIONS: 0
TREMORS: 0
WEIGHT LOSS: 0
FEVER: 0
CHILLS: 0
COUGH: 0
FOCAL WEAKNESS: 0
ABDOMINAL PAIN: 0
PALPITATIONS: 0
BLURRED VISION: 0
CONSTIPATION: 0
SPEECH CHANGE: 0
VOMITING: 0
TINGLING: 0
SHORTNESS OF BREATH: 0
HEADACHES: 0
SPUTUM PRODUCTION: 0

## 2023-06-08 ASSESSMENT — LIFESTYLE VARIABLES: SUBSTANCE_ABUSE: 0

## 2023-06-08 NOTE — PROGRESS NOTES
"[facility] Internal Medicine Daily Progress Note    Date of Service  6/8/2023    R Team: R IM Green Team   Attending: Rogelio Webb M.d.  Senior Resident: Dr. Jeronimo ESPARZA  Intern:  Dr. She ESPARZA  Contact Number: [phone]    Chief Complaint  Bishop Bucio is a 52 y.o. male admitted 5/23/2023 with AMS secondary to Benadryl intoxication and overdose.     Hospital Course  Bishop Bucio is  52 year-old male with past medical history of depression who [place] 5[place]23[place]. Admitted for altered mental status secondary to benadryl overdose (empty bottle found at his side). Patient had GCS 6 and intubated in ED for airway control, successfully extubated on 5/24/2023. Seizure event was suspected as patient had \"deviated gaze and nonverbal\" per emergency department staff so he was started on ativan and keppra. MRI and EEG were unremarkable. He was started on keppra 1000mg BID in the critical care unit. He then underwent Keppra taper by neurology and was successfully weaned off. His metabolic acidosis on admission was resolved by discharge. Hypokalemia also resolved. Psychiatry placed patient on legal hold secondary to suicidal ideation and medication overdose with intention. They continued to follow patient and he was started on sertraline 100mg (administer at night as patient complains of drowsiness, fogginess) which he will be discharged with. Hospitalization was complicated by US of RUE showing superficial thrombophlebitis, however did not need any intervention and showed improvement with supportive management with warm compresses, etc. He was medically clear for discharge on 5/27/2023 but unfortunately inpatient stay was prolonged due to lack of bed availability at the [facility].  Plan was to discharge patient to [facility] however due to positive COVID test on 6/7, patient will stay inpatient for 5 days per facility protocol.    Interval Problem Update  No acute events overnight. Seen " "at bedside this morning. Mood is \"down\" today. Medically stable for discharge however due to positive covid on 6/7. Pending d/c to [facility] on 6/11.    I have discussed this patient's plan of care and discharge plan at IDT rounds today with Case Management, Nursing, Nursing leadership, and other members of the IDT team.    Consultants/Specialty  Psychiatry    Code Status  Full Code    Disposition  The patient is medically cleared for discharge to home or a post-acute facility.  Anticipate discharge to: a psychiatric hospital    I have placed the appropriate orders for post-discharge needs.    Review of Systems  Review of Systems   Constitutional:  Negative for chills, diaphoresis, fever, malaise/fatigue and weight loss.   HENT:  Negative for tinnitus.    Eyes:  Negative for blurred vision.   Respiratory:  Negative for cough, sputum production and shortness of breath.    Cardiovascular:  Negative for chest pain, palpitations and leg swelling.   Gastrointestinal:  Negative for abdominal pain, constipation, diarrhea, nausea and vomiting.   Genitourinary:  Negative for dysuria, frequency and urgency.   Musculoskeletal:         R arm superficial thrombophlebitis   Skin:  Negative for rash.   Neurological:  Positive for weakness. Negative for dizziness, tingling, tremors, sensory change, speech change, focal weakness, seizures and headaches.   Psychiatric/Behavioral:  Negative for depression, hallucinations, substance abuse and suicidal ideas. The patient is nervous/anxious.    All other systems reviewed and are negative.       Physical Exam  Temp:  [36.3 °C (97.3 °F)-36.8 °C (98.3 °F)] 36.3 °C (97.3 °F)  Pulse:  [77-87] 83  Resp:  [18] 18  BP: (131-142)/(77-82) 131/77  SpO2:  [95 %-97 %] 97 %    Physical Exam  Vitals and nursing note reviewed.   Constitutional:       General: He is not in acute distress.     Appearance: Normal appearance. He is not ill-appearing.   HENT:      Head: Normocephalic and atraumatic.      Right " Ear: Tympanic membrane normal.      Left Ear: Tympanic membrane normal.      Mouth/Throat:      Mouth: Mucous membranes are moist.      Pharynx: Oropharynx is clear.   Eyes:      Extraocular Movements: Extraocular movements intact.      Conjunctiva/sclera: Conjunctivae normal.      Pupils: Pupils are equal, round, and reactive to light.   Cardiovascular:      Rate and Rhythm: Normal rate and regular rhythm.      Pulses: Normal pulses.   Pulmonary:      Effort: Pulmonary effort is normal.      Breath sounds: Normal breath sounds.   Abdominal:      General: Abdomen is flat. Bowel sounds are normal.      Palpations: Abdomen is soft.   Musculoskeletal:         General: No swelling, tenderness or deformity. Normal range of motion.      Cervical back: Normal range of motion and neck supple.      Right lower leg: No edema.      Left lower leg: No edema.      Comments: R arm superficial thrombophlebitis improved; no erythema, warmth.  Swelling improved.   Skin:     General: Skin is warm and dry.      Findings: No erythema.   Neurological:      Mental Status: He is oriented to person, place, and time.   Psychiatric:         Behavior: Behavior normal.         Fluids  No intake or output data in the 24 hours ending 06/08/23 1222    Laboratory                        Imaging  US-EXTREMITY VENOUS UPPER UNILAT RIGHT   Final Result      MR-BRAIN-W/O   Final Result         No acute intracranial process.      Inflammatory changes involving the paranasal sinuses. Right mastoid effusion.      DX-CHEST-PORTABLE (1 VIEW)   Final Result      Endotracheal tube has been advanced and now terminates in good position above the bhavin      Retrocardiac opacity could be from atelectasis or consolidation      DX-ABDOMEN FOR TUBE PLACEMENT   Final Result      NG tube terminates over the stomach.      DX-CHEST-PORTABLE (1 VIEW)   Final Result      1.  Interval intubation; the endotracheal tube tip is just above the level of the clavicular heads    2.  Lung volumes are low.      CT-HEAD W/O   Final Result      1.  No CT evidence of acute infarct, hemorrhage or mass.   2.  Moderate mucosal thickening of the maxillary, sphenoid and ethmoid sinuses.      DX-CHEST-PORTABLE (1 VIEW)   Final Result      Predominately linear bibasilar opacities, likely atelectasis. No focal consolidation. No effusions.              Assessment/Plan  Problem Representation:    * Drug intoxication with complication, with unspecified complication (HCC)  Assessment & Plan  RESOLVED  Patient was admitted with suspected dyphenhydramine intoxication with unknown amount of tablets and unknown time of normal last seen. Symptoms related includes cutaneous vasodilation (more obvious in the face), mydriasis, and reported halluciations with possible urinary retention. Certainly he developed AMS and respiratory failure. Differential diagnosis include other intoxications, seizures, meningitis, sepsis, etc.  For now will base management on supportive care, consider physostigmine but given suspected seizure and possible concomitant other drug intoxication, will hold for now.  -Initial treatment for seizures with benzos, Keppra  -Poison control case #[id number]  -CT head, MRI negative  -UDS reported  -Neurology, psychiatry consulted  -Improved and currently medically clear, the patient remains on a legal hold    Superficial venous thrombosis of arm, right  Assessment & Plan  RESOLVED  Non traumatic swelling of RUE. US demonstrated acute to subacute occlusive SVT. Swelling has now resolved.  -Warm compresses as needed           VTE prophylaxis: enoxaparin ppx    I have performed a physical exam and reviewed and updated ROS and Plan today (6/8/2023). In review of yesterday's note (6/7/2023), there are no changes except as documented above.

## 2023-06-08 NOTE — CONSULTS
"  [facility] PSYCHIATRIC FOLLOW-UP:(established)    DOS: 06/08/23     Reason for [place] and twitching. Pt is awake, alert, and oriented x 1. Incontinent of urine. Pt denies hst of SZ . SA via OD benadryl  Legal Hold Status: on legal hold      Chief Complaint:   Chief Complaint   Patient presents with    ALOC     Pt [place] river with AMS and twitching. Pt is awake, alert, and oriented x 1. Incontinent of urine. Pt denies hst of SZ                S:  Seen today as f/u psych consult.   Observed laying in bed. Reports decreased mood r/t IP admit delay. Remains hopeful, expressing a desire to continue seeking mental health services to improve mood. Denies SI/HI, A/VH, no Sx of psychosis/joanne reported or observed. Appears to be tolerating medications, denies GI distress, dizziness, HA.      O: Medical ROS (as pertinent): No new changes reported to this writer.    No results for input(s): WBC, RBC, HEMOGLOBIN, HEMATOCRIT, MCV, MCH, RDW, PLATELETCT, MPV, NEUTSPOLYS, LYMPHOCYTES, MONOCYTES, EOSINOPHILS, BASOPHILS, RBCMORPHOLO in the last 72 hours.  No results for input(s): SODIUM, POTASSIUM, CHLORIDE, CO2, GLUCOSE, BUN, CPKTOTAL in the last 72 hours.  No results for input(s): ALBUMIN, TBILIRUBIN, ALKPHOSPHAT, TOTPROTEIN, ALTSGPT, ASTSGOT, CREATININE in the last 72 hours.      PSYCHIATRIC EXAM (MSE):   /77   Pulse 83   Temp 36.3 °C (97.3 °F) (Temporal)   Resp 18   Ht 1.778 m (5' 10\")   Wt 89.6 kg (197 lb 8.5 oz)   SpO2 97%       Constitutional: as noted above  General Appearance/Behavior: 52 y.o. appears stated age, good eye contact cooperative, No behavioral disturbances  Abnormal Movements: none, no PMA/PMR or tremor observed.  Gait and Posture: not observed  Musculoskeletal: as noted above  Mood: \"low\"  Affect: Mood/Congruent and Appropriate   Speech: normal rate, normal rhythm, normal tone, normal volume, and normal fluency  Language:  spontaneous, comprehends " spoken commands, and fluent   Thought Process: Linear, Logical, and Goal Directed,  Future Oriented  Thought Content: Denies SI/HI. Denies A/VH, no e/o delusions, PI, or internal preoccupation.   Insight/Judgement:  fair  Alert/Orientation: alert, oriented to person, place and time  Attn/Concentration: normal  Fund of Knowledge: Average  Memory recent/remote: No gross evidence of memory deficits  MMSE: deferred this visit          Meds Current:  Scheduled Medications   Medication Dose Frequency    sertraline  100 mg DAILY    senna-docusate  2 Tablet BID    enoxaparin (LOVENOX) injection  40 mg DAILY AT 1800     Allergies:   Allergies   Allergen Reactions    Pcn [Penicillins] Rash     Per patient           ASSESSMENT:   1. Altered mental status, unspecified altered mental status type       Psychiatric:   Major depressive disorder, recurrent  R/o adjustment disorder     Medical: as noted by the medical treatment team.   Acute respiratory failure with hypoxia  Seizure disorder  S/p o/d       RECOMMENDATIONS:  Legal Status: on legal hold     Please transfer patient to inpatient psychiatric hospital when medically cleared and bed is available.     Observation status:   Line of site with sitter     Visitors: No   Personal belongings: Yes, limited to cell phone, glasses, TV remote     Discussed/voalted: EFE Decker MD     Medication Recommendations: Final orders as per Treatment Team  No new medication recommendations; continue Zoloft 100mg PO daily  Risks/benefits/side effects discussed, patient verbalized understanding     Reviewed safety plan: 911, ER, PCM, MHC, suicide crisis line, nursing staff while inpatient.     Will Continue to Follow.

## 2023-06-08 NOTE — CARE PLAN
The patient is Stable - Low risk of patient condition declining or worsening    Shift Goals  Clinical Goals: Safety  Patient Goals: Rest  Family Goals: RAINA    Progress made toward(s) clinical / shift goals:  Patient progressing toward goals.    Patient is not progressing towards the following goals:N/A  Alert to staff and able to make needs known. Vital signs stable. No complaints of pain or discomfort. Remains on 1:1 sitter precautions. Patient in good spirits and cooperative toward staff and current plan of care. Nursing outside patient's room for ease of access. Continued observation.

## 2023-06-08 NOTE — DISCHARGE PLANNING
Care Transition Team Discharge Planning    Anticipated Discharge Information  Discharge Disposition: D/T to psych hosp or distinct part unit (65)              Discharge Plan:  [facility] reviewed patient chart- continues on legal hold with possible placement at [facility] after 5 days of quarantine for + COVID on 06/07/2023.     RNCM available as needed.

## 2023-06-09 PROCEDURE — 770001 HCHG ROOM/CARE - MED/SURG/GYN PRIV*

## 2023-06-09 PROCEDURE — 700111 HCHG RX REV CODE 636 W/ 250 OVERRIDE (IP): Performed by: EMERGENCY MEDICINE

## 2023-06-09 PROCEDURE — 99231 SBSQ HOSP IP/OBS SF/LOW 25: CPT | Mod: GC | Performed by: HOSPITALIST

## 2023-06-09 PROCEDURE — A9270 NON-COVERED ITEM OR SERVICE: HCPCS | Performed by: INTERNAL MEDICINE

## 2023-06-09 PROCEDURE — 700102 HCHG RX REV CODE 250 W/ 637 OVERRIDE(OP): Performed by: INTERNAL MEDICINE

## 2023-06-09 RX ADMIN — ENOXAPARIN SODIUM 40 MG: 100 INJECTION SUBCUTANEOUS at 18:28

## 2023-06-09 RX ADMIN — SERTRALINE 100 MG: 100 TABLET, FILM COATED ORAL at 20:48

## 2023-06-09 ASSESSMENT — ENCOUNTER SYMPTOMS
FEVER: 0
NERVOUS/ANXIOUS: 1
DIZZINESS: 0
TINGLING: 0
PALPITATIONS: 0
ABDOMINAL PAIN: 0
VOMITING: 0
WEIGHT LOSS: 0
WEAKNESS: 1
DIAPHORESIS: 0
HEADACHES: 0
SPEECH CHANGE: 0
SPUTUM PRODUCTION: 0
FOCAL WEAKNESS: 0
HALLUCINATIONS: 0
DIARRHEA: 0
BLURRED VISION: 0
CONSTIPATION: 0
CHILLS: 0
TREMORS: 0
DEPRESSION: 0
SEIZURES: 0
NAUSEA: 0
SENSORY CHANGE: 0
SHORTNESS OF BREATH: 0
COUGH: 0

## 2023-06-09 ASSESSMENT — PATIENT HEALTH QUESTIONNAIRE - PHQ9
8. MOVING OR SPEAKING SO SLOWLY THAT OTHER PEOPLE COULD HAVE NOTICED. OR THE OPPOSITE, BEING SO FIGETY OR RESTLESS THAT YOU HAVE BEEN MOVING AROUND A LOT MORE THAN USUAL: NOT AT ALL
2. FEELING DOWN, DEPRESSED, IRRITABLE, OR HOPELESS: SEVERAL DAYS
3. TROUBLE FALLING OR STAYING ASLEEP OR SLEEPING TOO MUCH: SEVERAL DAYS
6. FEELING BAD ABOUT YOURSELF - OR THAT YOU ARE A FAILURE OR HAVE LET YOURSELF OR YOUR FAMILY DOWN: MORE THAN HALF THE DAYS
4. FEELING TIRED OR HAVING LITTLE ENERGY: SEVERAL DAYS
5. POOR APPETITE OR OVEREATING: NOT AT ALL
9. THOUGHTS THAT YOU WOULD BE BETTER OFF DEAD, OR OF HURTING YOURSELF: SEVERAL DAYS
SUM OF ALL RESPONSES TO PHQ9 QUESTIONS 1 AND 2: 2
7. TROUBLE CONCENTRATING ON THINGS, SUCH AS READING THE NEWSPAPER OR WATCHING TELEVISION: MORE THAN HALF THE DAYS
SUM OF ALL RESPONSES TO PHQ QUESTIONS 1-9: 9
SUM OF ALL RESPONSES TO PHQ9 QUESTIONS 1 AND 2: 2
8. MOVING OR SPEAKING SO SLOWLY THAT OTHER PEOPLE COULD HAVE NOTICED. OR THE OPPOSITE, BEING SO FIGETY OR RESTLESS THAT YOU HAVE BEEN MOVING AROUND A LOT MORE THAN USUAL: NOT AT ALL
5. POOR APPETITE OR OVEREATING: NOT AT ALL
1. LITTLE INTEREST OR PLEASURE IN DOING THINGS: SEVERAL DAYS
7. TROUBLE CONCENTRATING ON THINGS, SUCH AS READING THE NEWSPAPER OR WATCHING TELEVISION: MORE THAN HALF THE DAYS
6. FEELING BAD ABOUT YOURSELF - OR THAT YOU ARE A FAILURE OR HAVE LET YOURSELF OR YOUR FAMILY DOWN: MORE THAN HALF THE DAYS
9. THOUGHTS THAT YOU WOULD BE BETTER OFF DEAD, OR OF HURTING YOURSELF: SEVERAL DAYS
4. FEELING TIRED OR HAVING LITTLE ENERGY: SEVERAL DAYS
1. LITTLE INTEREST OR PLEASURE IN DOING THINGS: SEVERAL DAYS
2. FEELING DOWN, DEPRESSED, IRRITABLE, OR HOPELESS: SEVERAL DAYS
3. TROUBLE FALLING OR STAYING ASLEEP OR SLEEPING TOO MUCH: SEVERAL DAYS
SUM OF ALL RESPONSES TO PHQ QUESTIONS 1-9: 9

## 2023-06-09 ASSESSMENT — LIFESTYLE VARIABLES: SUBSTANCE_ABUSE: 0

## 2023-06-09 NOTE — DISCHARGE PLANNING
Alert Team/Behavioral Health   Note:      - [facility]: Patient tested positive for Covid on Wed 6/7/23. Patient can be re-evaluated 5 days if asymptomatic from date of positive Covid test which is until Mon 6/12/23.

## 2023-06-09 NOTE — PROGRESS NOTES
"[facility] Internal Medicine Daily Progress Note    Date of Service  6/9/2023    R Team: R IM Green Team   Attending: Rogelio Webb M.d.  Senior Resident: Dr. Jeronimo ESPARZA  Intern:  Dr. She ESPARZA  Contact Number: [phone]    Chief Complaint  Bishop Bucio is a 52 y.o. male admitted 5/23/2023 with AMS secondary to Benadryl intoxication and overdose.     Hospital Course  Bishop Bucio is  52 year-old male with past medical history of depression who [place]23[place]. Admitted for altered mental status secondary to benadryl overdose (empty bottle found at his side). Patient had GCS 6 and intubated in ED for airway control, successfully extubated on 5/24/2023. Seizure event was suspected as patient had \"deviated gaze and nonverbal\" per emergency department staff so he was started on ativan and keppra. MRI and EEG were unremarkable. He was started on keppra 1000mg BID in the critical care unit. He then underwent Keppra taper by neurology and was successfully weaned off. His metabolic acidosis on admission was resolved by discharge. Hypokalemia also resolved. Psychiatry placed patient on legal hold secondary to suicidal ideation and medication overdose with intention. They continued to follow patient and he was started on sertraline 100mg (administer at night as patient complains of drowsiness, fogginess) which he will be discharged with. Hospitalization was complicated by US of RUE showing superficial thrombophlebitis, however did not need any intervention and showed improvement with supportive management with warm compresses, etc. He was medically clear for discharge on 5/27/2023 but unfortunately inpatient stay was prolonged due to lack of bed availability at the [facility].  Plan was to discharge patient to [facility] however due to positive COVID test on 6/7, patient will stay inpatient for 5 days per facility protocol.    Interval Problem Update  No acute events overnight. Seen " at bedside, no new complaints. Medically stable, pending placement (due to +covid, clear 7/11).    I have discussed this patient's plan of care and discharge plan at IDT rounds today with Case Management, Nursing, Nursing leadership, and other members of the IDT team.    Consultants/Specialty  psychiatry    Code Status  Full Code    Disposition  The patient is medically cleared for discharge to home or a post-acute facility.  Anticipate discharge to: a psychiatric hospital    I have placed the appropriate orders for post-discharge needs.    Review of Systems  Review of Systems   Constitutional:  Negative for chills, diaphoresis, fever, malaise/fatigue and weight loss.   HENT:  Negative for tinnitus.    Eyes:  Negative for blurred vision.   Respiratory:  Negative for cough, sputum production and shortness of breath.    Cardiovascular:  Negative for chest pain, palpitations and leg swelling.   Gastrointestinal:  Negative for abdominal pain, constipation, diarrhea, nausea and vomiting.   Genitourinary:  Negative for dysuria, frequency and urgency.   Musculoskeletal:         R arm superficial thrombophlebitis   Skin:  Negative for rash.   Neurological:  Positive for weakness. Negative for dizziness, tingling, tremors, sensory change, speech change, focal weakness, seizures and headaches.   Psychiatric/Behavioral:  Negative for depression, hallucinations, substance abuse and suicidal ideas. The patient is nervous/anxious.    All other systems reviewed and are negative.       Physical Exam  Temp:  [35.9 °C (96.6 °F)-36.8 °C (98.3 °F)] 35.9 °C (96.6 °F)  Pulse:  [70-91] 70  Resp:  [18] 18  BP: (124-133)/(74-84) 124/74  SpO2:  [92 %-96 %] 92 %    Physical Exam  Vitals and nursing note reviewed.   Constitutional:       General: He is not in acute distress.     Appearance: Normal appearance. He is not ill-appearing.   HENT:      Head: Normocephalic and atraumatic.      Right Ear: Tympanic membrane normal.      Left Ear:  Tympanic membrane normal.      Mouth/Throat:      Mouth: Mucous membranes are moist.      Pharynx: Oropharynx is clear.   Eyes:      Extraocular Movements: Extraocular movements intact.      Conjunctiva/sclera: Conjunctivae normal.      Pupils: Pupils are equal, round, and reactive to light.   Cardiovascular:      Rate and Rhythm: Normal rate and regular rhythm.      Pulses: Normal pulses.   Pulmonary:      Effort: Pulmonary effort is normal.      Breath sounds: Normal breath sounds.   Abdominal:      General: Abdomen is flat. Bowel sounds are normal.      Palpations: Abdomen is soft.   Musculoskeletal:         General: No swelling, tenderness or deformity. Normal range of motion.      Cervical back: Normal range of motion and neck supple.      Right lower leg: No edema.      Left lower leg: No edema.      Comments: R arm superficial thrombophlebitis improved; no erythema, warmth.  Swelling improved.   Skin:     General: Skin is warm and dry.      Findings: No erythema.   Neurological:      Mental Status: He is oriented to person, place, and time.   Psychiatric:         Behavior: Behavior normal.         Fluids    Intake/Output Summary (Last 24 hours) at 6/9/2023 1210  Last data filed at 6/9/2023 1132  Gross per 24 hour   Intake 360 ml   Output --   Net 360 ml       Laboratory                        Imaging  US-EXTREMITY VENOUS UPPER UNILAT RIGHT   Final Result      MR-BRAIN-W/O   Final Result         No acute intracranial process.      Inflammatory changes involving the paranasal sinuses. Right mastoid effusion.      DX-CHEST-PORTABLE (1 VIEW)   Final Result      Endotracheal tube has been advanced and now terminates in good position above the bhavin      Retrocardiac opacity could be from atelectasis or consolidation      DX-ABDOMEN FOR TUBE PLACEMENT   Final Result      NG tube terminates over the stomach.      DX-CHEST-PORTABLE (1 VIEW)   Final Result      1.  Interval intubation; the endotracheal tube tip is  just above the level of the clavicular heads   2.  Lung volumes are low.      CT-HEAD W/O   Final Result      1.  No CT evidence of acute infarct, hemorrhage or mass.   2.  Moderate mucosal thickening of the maxillary, sphenoid and ethmoid sinuses.      DX-CHEST-PORTABLE (1 VIEW)   Final Result      Predominately linear bibasilar opacities, likely atelectasis. No focal consolidation. No effusions.              Assessment/Plan  Problem Representation:    * Drug intoxication with complication, with unspecified complication (HCC)  Assessment & Plan  RESOLVED  Patient was admitted with suspected dyphenhydramine intoxication with unknown amount of tablets and unknown time of normal last seen. Symptoms related includes cutaneous vasodilation (more obvious in the face), mydriasis, and reported halluciations with possible urinary retention. Certainly he developed AMS and respiratory failure. Differential diagnosis include other intoxications, seizures, meningitis, sepsis, etc.  For now will base management on supportive care, consider physostigmine but given suspected seizure and possible concomitant other drug intoxication, will hold for now.  -Initial treatment for seizures with benzos, Keppra  -Poison control case #[id number]  -CT head, MRI negative  -UDS reported  -Neurology, psychiatry consulted  -Improved and currently medically clear, the patient remains on a legal hold    Superficial venous thrombosis of arm, right  Assessment & Plan  RESOLVED  Non traumatic swelling of RUE. US demonstrated acute to subacute occlusive SVT. Swelling has now resolved.  -Warm compresses as needed           VTE prophylaxis: enoxaparin ppx    I have performed a physical exam and reviewed and updated ROS and Plan today (6/9/2023). In review of yesterday's note (6/8/2023), there are no changes except as documented above.           Detail Level: Detailed Depth Of Biopsy: dermis Was A Bandage Applied: Yes Size Of Lesion In Cm: 0 Biopsy Type: H and E Biopsy Method: Personna blade Anesthesia Type: 1% lidocaine with epinephrine Anesthesia Volume In Cc: 0.5 Hemostasis: Aluminum Chloride Wound Care: Vaseline Dressing: bandage Destruction After The Procedure: No Type Of Destruction Used: Curettage Curettage Text: The wound bed was treated with curettage after the biopsy was performed. Cryotherapy Text: The wound bed was treated with cryotherapy after the biopsy was performed. Electrodesiccation Text: The wound bed was treated with electrodesiccation after the biopsy was performed. Electrodesiccation And Curettage Text: The wound bed was treated with electrodesiccation and curettage after the biopsy was performed. Silver Nitrate Text: The wound bed was treated with silver nitrate after the biopsy was performed. Lab: [id number] Lab Facility: [id number] Consent: Written consent was obtained and risks were reviewed including but not limited to scarring, infection, bleeding, scabbing, incomplete removal, nerve damage and allergy to anesthesia. Post-Care Instructions: I reviewed with the patient in detail post-care instructions. Patient is to keep the biopsy site dry overnight, wash daily with mild soap water and then apply Vaseline twice daily until healed. Notification Instructions: Patient will be notified of biopsy results. However, patient instructed to call the office if not contacted within 2 weeks. Results will be faxed to PCP once they are available. Billing Type: Third-Party Bill Information: Selecting Yes will display possible errors in your note based on the variables you have selected. This validation is only offered as a suggestion for you. PLEASE NOTE THAT THE VALIDATION TEXT WILL BE REMOVED WHEN YOU FINALIZE YOUR NOTE. IF YOU WANT TO FAX A PRELIMINARY NOTE YOU WILL NEED TO TOGGLE THIS TO 'NO' IF YOU DO NOT WANT IT IN YOUR FAXED NOTE.

## 2023-06-09 NOTE — CARE PLAN
The patient is Stable - Low risk of patient condition declining or worsening    Shift Goals  Clinical Goals: Safety, 1:1 sitter  Patient Goals: seizure control  Family Goals: RAINA    Progress made toward(s) clinical / shift goals:    Problem: Provide Safe Environment  Goal: Suicide environmental safety, protocols, policies, and practices will be implemented  Outcome: Progressing   Patient continues with 1:1 sitter for SI monitoring and safety, Patient encouraged to voice his concerns to help manage symptoms of depression. Patient states he is feeling okay at this time.   Patient continues on Legal hold.

## 2023-06-09 NOTE — CARE PLAN
Problem: Psychosocial  Goal: Patient's level of anxiety will decrease  Outcome: Progressing  Goal: Patient's ability to verbalize feelings about condition will improve  Outcome: Progressing  Goal: Patient's ability to re-evaluate and adapt role responsibilities will improve  Outcome: Progressing  Goal: Patient and family will demonstrate ability to cope with life altering diagnosis and/or procedure  Outcome: Progressing     Problem: Provide Safe Environment  Goal: Suicide environmental safety, protocols, policies, and practices will be implemented  Outcome: Progressing     Problem: Psychosocial  Goal: Patient's ability to identify and develop effective coping behaviors will improve  Outcome: Progressing  Goal: Patient's ability to identify and utilize available support systems will improve  Outcome: Progressing   The patient is Stable - Low risk of patient condition declining or worsening    Shift Goals  Clinical Goals: Safety  Patient Goals: Rest  Family Goals: RAINA    Progress made toward(s) clinical / shift goals:  manage pain, stable vs, no fevers, prog mob     Patient is not progressing towards the following goals: n/a

## 2023-06-09 NOTE — CARE PLAN
The patient is Stable - Low risk of patient condition declining or worsening    Shift Goals  Clinical Goals: Safety  Patient Goals: Rest  Family Goals: RAINA    Progress made toward(s) clinical / shift goals:    Problem: Provide Safe Environment  Goal: Suicide environmental safety, protocols, policies, and practices will be implemented  Outcome: Progressing  Flowsheets (Taken 6/8/2023 1804)  Safety Interventions:   Patient Room Close to Nursing Station   Patient Wearing Hospital Clothing   Personal Clothing / Belongings Removed (Comment: Storage Location)   Potentially Dangerous Items Removed from room   Plastic Utensils / Paper Ware Ordered   Provided Safety Education   Discussed no self harm or elopement and safe behavior with patient  Note: Patient has sitter to provide close obs      Problem: Psychosocial  Goal: Patient's ability to identify and develop effective coping behaviors will improve  Outcome: Progressing   Note: Patient able to process thoughts and able to cope appropriately.

## 2023-06-10 PROCEDURE — 770001 HCHG ROOM/CARE - MED/SURG/GYN PRIV*

## 2023-06-10 PROCEDURE — 700111 HCHG RX REV CODE 636 W/ 250 OVERRIDE (IP): Performed by: EMERGENCY MEDICINE

## 2023-06-10 PROCEDURE — A9270 NON-COVERED ITEM OR SERVICE: HCPCS | Performed by: INTERNAL MEDICINE

## 2023-06-10 PROCEDURE — 99231 SBSQ HOSP IP/OBS SF/LOW 25: CPT | Mod: GC | Performed by: HOSPITALIST

## 2023-06-10 PROCEDURE — 700102 HCHG RX REV CODE 250 W/ 637 OVERRIDE(OP): Performed by: INTERNAL MEDICINE

## 2023-06-10 RX ADMIN — ENOXAPARIN SODIUM 40 MG: 100 INJECTION SUBCUTANEOUS at 18:21

## 2023-06-10 RX ADMIN — SERTRALINE 100 MG: 100 TABLET, FILM COATED ORAL at 21:23

## 2023-06-10 ASSESSMENT — PATIENT HEALTH QUESTIONNAIRE - PHQ9
1. LITTLE INTEREST OR PLEASURE IN DOING THINGS: SEVERAL DAYS
SUM OF ALL RESPONSES TO PHQ9 QUESTIONS 1 AND 2: 2
7. TROUBLE CONCENTRATING ON THINGS, SUCH AS READING THE NEWSPAPER OR WATCHING TELEVISION: MORE THAN HALF THE DAYS
9. THOUGHTS THAT YOU WOULD BE BETTER OFF DEAD, OR OF HURTING YOURSELF: SEVERAL DAYS
8. MOVING OR SPEAKING SO SLOWLY THAT OTHER PEOPLE COULD HAVE NOTICED. OR THE OPPOSITE, BEING SO FIGETY OR RESTLESS THAT YOU HAVE BEEN MOVING AROUND A LOT MORE THAN USUAL: NOT AT ALL
SUM OF ALL RESPONSES TO PHQ QUESTIONS 1-9: 9
4. FEELING TIRED OR HAVING LITTLE ENERGY: SEVERAL DAYS
2. FEELING DOWN, DEPRESSED, IRRITABLE, OR HOPELESS: SEVERAL DAYS
5. POOR APPETITE OR OVEREATING: NOT AT ALL
3. TROUBLE FALLING OR STAYING ASLEEP OR SLEEPING TOO MUCH: SEVERAL DAYS
6. FEELING BAD ABOUT YOURSELF - OR THAT YOU ARE A FAILURE OR HAVE LET YOURSELF OR YOUR FAMILY DOWN: MORE THAN HALF THE DAYS

## 2023-06-10 ASSESSMENT — ENCOUNTER SYMPTOMS
CONSTIPATION: 0
WEIGHT LOSS: 0
DIAPHORESIS: 0
TREMORS: 0
SPUTUM PRODUCTION: 0
COUGH: 0
DEPRESSION: 0
HALLUCINATIONS: 0
VOMITING: 0
HEADACHES: 0
SPEECH CHANGE: 0
SHORTNESS OF BREATH: 0
SENSORY CHANGE: 0
WEAKNESS: 1
TINGLING: 0
NAUSEA: 0
SEIZURES: 0
ABDOMINAL PAIN: 0
NERVOUS/ANXIOUS: 1
FOCAL WEAKNESS: 0
DIARRHEA: 0
FEVER: 0
DIZZINESS: 0
BLURRED VISION: 0
CHILLS: 0
PALPITATIONS: 0

## 2023-06-10 ASSESSMENT — LIFESTYLE VARIABLES: SUBSTANCE_ABUSE: 0

## 2023-06-10 NOTE — CARE PLAN
The patient is Stable - Low risk of patient condition declining or worsening    Shift Goals  Clinical Goals: Safety  Patient Goals: Rest & Recovery  Family Goals: RAINA    Progress made toward(s) clinical / shift goals:    Problem: Psychosocial  Goal: Patient's ability to verbalize feelings about condition will improve  Outcome: Progressing  Flowsheets (Taken 6/10/2023 0301)  Condition Will Improve:   Discussed coping with medical condition and its effects   Encouraged patient participation in care   Screened for depression     Problem: Provide Safe Environment  Goal: Suicide environmental safety, protocols, policies, and practices will be implemented  Outcome: Progressing  Flowsheets (Taken 6/9/2023 1900)  Safety Interventions:   Patient Wearing Hospital Clothing   Patient Room Close to Nursing Station   Provided Safety Education   Personal Clothing / Belongings Removed (Comment: Storage Location)   Potentially Dangerous Items Removed from room   Plastic Utensils / Paper Ware Ordered   Discussed no self harm or elopement and safe behavior with patient   Patient Accompanied by Unit Staff when off Unit.  Note: Patient remained on SI precautions throughout shift. Patient's safety maintained.     Problem: Psychosocial  Goal: Patient's ability to identify and develop effective coping behaviors will improve  Outcome: Progressing  Note: Patient discussed and identified triggers and ways to deal with them when they appear.

## 2023-06-10 NOTE — PROGRESS NOTES
"[facility] Internal Medicine Daily Progress Note    Date of Service  6/10/2023    [facility] Team: R IM Green Team   Attending: Rogelio Webb M.d.  Senior Resident: Dr. Decker  Intern:  Dr. Nowak  Contact Number: [phone]    Chief Complaint  Bishop Bucio is a 52 y.o. male admitted 5/23/2023 with AMS secondary to Benadryl intoxication and overdose.     Hospital Course  Bishop Bucio is  52 year-old male with past medical history of depression who [place]23[place]. Admitted for altered mental status secondary to benadryl overdose (empty bottle found at his side). Patient had GCS 6 and intubated in ED for airway control, successfully extubated on 5/24/2023. Seizure event was suspected as patient had \"deviated gaze and nonverbal\" per emergency department staff so he was started on ativan and keppra. MRI and EEG were unremarkable. He was started on keppra 1000mg BID in the critical care unit. He then underwent Keppra taper by neurology and was successfully weaned off. His metabolic acidosis on admission was resolved by discharge. Hypokalemia also resolved. Psychiatry placed patient on legal hold secondary to suicidal ideation and medication overdose with intention. They continued to follow patient and he was started on sertraline 100mg (administer at night as patient complains of drowsiness, fogginess) which he will be discharged with. Hospitalization was complicated by US of RUE showing superficial thrombophlebitis, however did not need any intervention and showed improvement with supportive management with warm compresses, etc. He was medically clear for discharge on 5/27/2023 but unfortunately inpatient stay was prolonged due to lack of bed availability at the [facility].  Plan was to discharge patient to [facility] however due to positive COVID test on 6/7, patient will stay inpatient for 5 days per facility protocol.    Interval Problem Update  ANIYAH  Seen at bedside, with no new " acute complaints.  COVID+ pending placement, clear on 6/11/23    I have discussed this patient's plan of care and discharge plan at IDT rounds today with Case Management, Nursing, Nursing leadership, and other members of the IDT team.    Consultants/Specialty  psychiatry    Code Status  Full Code    Disposition  The patient is medically cleared for discharge to home or a post-acute facility.  Anticipate discharge to: a psychiatric hospital    I have placed the appropriate orders for post-discharge needs.    Review of Systems  Review of Systems   Constitutional:  Negative for chills, diaphoresis, fever, malaise/fatigue and weight loss.   HENT:  Negative for tinnitus.    Eyes:  Negative for blurred vision.   Respiratory:  Negative for cough, sputum production and shortness of breath.    Cardiovascular:  Negative for chest pain, palpitations and leg swelling.   Gastrointestinal:  Negative for abdominal pain, constipation, diarrhea, nausea and vomiting.   Genitourinary:  Negative for dysuria, frequency and urgency.   Musculoskeletal:         R arm superficial thrombophlebitis   Skin:  Negative for rash.   Neurological:  Positive for weakness. Negative for dizziness, tingling, tremors, sensory change, speech change, focal weakness, seizures and headaches.   Psychiatric/Behavioral:  Negative for depression, hallucinations, substance abuse and suicidal ideas. The patient is nervous/anxious.    All other systems reviewed and are negative.       Physical Exam  Temp:  [35.9 °C (96.6 °F)-36.9 °C (98.5 °F)] (P) 36.9 °C (98.5 °F)  Pulse:  [70-88] (P) 76  Resp:  [16-18] (P) 16  BP: (124-133)/(74-85) (P) 120/81  SpO2:  [92 %-98 %] (P) 94 %    Physical Exam  Vitals and nursing note reviewed.   Constitutional:       General: He is not in acute distress.     Appearance: Normal appearance. He is not ill-appearing.   HENT:      Head: Normocephalic and atraumatic.      Right Ear: Tympanic membrane normal.      Left Ear: Tympanic  membrane normal.      Mouth/Throat:      Mouth: Mucous membranes are moist.      Pharynx: Oropharynx is clear.   Eyes:      Extraocular Movements: Extraocular movements intact.      Conjunctiva/sclera: Conjunctivae normal.      Pupils: Pupils are equal, round, and reactive to light.   Cardiovascular:      Rate and Rhythm: Normal rate and regular rhythm.      Pulses: Normal pulses.   Pulmonary:      Effort: Pulmonary effort is normal.      Breath sounds: Normal breath sounds.   Abdominal:      General: Abdomen is flat. Bowel sounds are normal.      Palpations: Abdomen is soft.   Musculoskeletal:         General: No swelling, tenderness or deformity. Normal range of motion.      Cervical back: Normal range of motion and neck supple.      Right lower leg: No edema.      Left lower leg: No edema.      Comments: R arm superficial thrombophlebitis improved; no erythema, warmth.  Swelling improved.   Skin:     General: Skin is warm and dry.      Findings: No erythema.   Neurological:      Mental Status: He is oriented to person, place, and time.   Psychiatric:         Behavior: Behavior normal.         Fluids    Intake/Output Summary (Last 24 hours) at 6/10/2023 0732  Last data filed at 6/10/2023 0500  Gross per 24 hour   Intake 960 ml   Output --   Net 960 ml       Laboratory                        Imaging  US-EXTREMITY VENOUS UPPER UNILAT RIGHT   Final Result      MR-BRAIN-W/O   Final Result         No acute intracranial process.      Inflammatory changes involving the paranasal sinuses. Right mastoid effusion.      DX-CHEST-PORTABLE (1 VIEW)   Final Result      Endotracheal tube has been advanced and now terminates in good position above the bhavin      Retrocardiac opacity could be from atelectasis or consolidation      DX-ABDOMEN FOR TUBE PLACEMENT   Final Result      NG tube terminates over the stomach.      DX-CHEST-PORTABLE (1 VIEW)   Final Result      1.  Interval intubation; the endotracheal tube tip is just  above the level of the clavicular heads   2.  Lung volumes are low.      CT-HEAD W/O   Final Result      1.  No CT evidence of acute infarct, hemorrhage or mass.   2.  Moderate mucosal thickening of the maxillary, sphenoid and ethmoid sinuses.      DX-CHEST-PORTABLE (1 VIEW)   Final Result      Predominately linear bibasilar opacities, likely atelectasis. No focal consolidation. No effusions.              Assessment/Plan  Problem Representation:    * Drug intoxication with complication, with unspecified complication (HCC)  Assessment & Plan  RESOLVED  Patient was admitted with suspected dyphenhydramine intoxication with unknown amount of tablets and unknown time of normal last seen. Symptoms related includes cutaneous vasodilation (more obvious in the face), mydriasis, and reported halluciations with possible urinary retention. Certainly he developed AMS and respiratory failure. Differential diagnosis include other intoxications, seizures, meningitis, sepsis, etc.  For now will base management on supportive care, consider physostigmine but given suspected seizure and possible concomitant other drug intoxication, will hold for now.  -Initial treatment for seizures with benzos, Keppra  -Poison control case #[id number]  -CT head, MRI negative  -UDS reported  -Neurology, psychiatry consulted  -Improved and currently medically clear, the patient remains on a legal hold    Superficial venous thrombosis of arm, right  Assessment & Plan  RESOLVED  Non traumatic swelling of RUE. US demonstrated acute to subacute occlusive SVT. Swelling has now resolved.  -Warm compresses as needed           VTE prophylaxis: SCDs/TEDs and enoxaparin ppx    I have performed a physical exam and reviewed and updated ROS and Plan today (6/10/2023). In review of yesterday's note (6/9/2023), there are no changes except as documented above.

## 2023-06-10 NOTE — CONSULTS
"  [facility] PSYCHIATRIC FOLLOW-UP:(established)  *Reason for admission: [place] with AMS and twitching. Pt is awake, alert, and oriented x 1. Incontinent of urine. Pt denies hst of SZ . SA via OD benadryl  *Legal Hold Status: on legal hold                             S:  mood is still \"6-7\" and he is getting tired of being in the hospital though he still feels he needs help. Ambivalent SI. And now covid +. Expressed his frustrations appropriately. Grateful.    O: Medical ROS (as pertinent):                      *Psychiatric Examination:   Vitals:   Vitals:    06/09/23 1631 06/09/23 1911 06/10/23 0722 06/10/23 1518   BP: 133/85 129/81 120/81 (!) 140/82   Pulse: 82 88 76 90   Resp: 18 18 16 16   Temp: 36 °C (96.8 °F) 36.1 °C (96.9 °F) 36.9 °C (98.5 °F) 36.5 °C (97.7 °F)   TempSrc: Temporal Temporal Temporal Temporal   SpO2: 96% 98% 94% 95%   Weight:       Height:         General Appearance:  good eye contact, cooperative  Abnormal Movements: none   Gait and Posture: not observed  Speech: within normal limits  Thought Process: normal rate  Associations:    linear but over inclusive  Abnormal or Psychotic Thoughts: none  Judgement and Insight: fair  Orientation: grossly intact  Recent and Remote Memory: grossly intact  Attention Span and Concentration: intact  Language:fluent  Fund of Knowledge: not tested  Mood and Affect:  depressed but looks euthymic  SI/HI:  not SI/HI     Current Medications:  Scheduled Medications   Medication Dose Frequency    sertraline  100 mg DAILY    senna-docusate  2 Tablet BID    enoxaparin (LOVENOX) injection  40 mg DAILY AT 1800          *ASSESSMENT/RECOMENDATIONS:  1..Major Depression recurrent severe: but improved      -R/O bipolar 2  2  R/O borderline pers disorder traits  3  Anxiety disorder unspc:  better but  ruminates on non urgent things.      Medical:     -HTN  -s/p OD on benadryl with sz and intubation on admit  -superficial thrombus of R forearm    " -covid + :few symptoms    Legal Status: extended    Observation status:   -line of site with sitter     Visitors: y no  Personal belongings: yes phone, glasses, TV control     Medication and Other Recommendations: final orders as per Tx Tm   No changes today.      Will continue to follow with you.       Discharge recommendations: inpt psych     If released from [facility]: Discharge Instructions:  -Reviewed safety plan: 911, ER, PCM, MHC, Suicide crisis line  -Please assist with outpatient Psychiatric/substance use follow up appointments at discharge once medically cleared.

## 2023-06-11 PROCEDURE — 700102 HCHG RX REV CODE 250 W/ 637 OVERRIDE(OP): Performed by: INTERNAL MEDICINE

## 2023-06-11 PROCEDURE — 770001 HCHG ROOM/CARE - MED/SURG/GYN PRIV*

## 2023-06-11 PROCEDURE — 99231 SBSQ HOSP IP/OBS SF/LOW 25: CPT | Mod: GC | Performed by: HOSPITALIST

## 2023-06-11 PROCEDURE — 700111 HCHG RX REV CODE 636 W/ 250 OVERRIDE (IP): Performed by: EMERGENCY MEDICINE

## 2023-06-11 PROCEDURE — A9270 NON-COVERED ITEM OR SERVICE: HCPCS | Performed by: INTERNAL MEDICINE

## 2023-06-11 RX ADMIN — ENOXAPARIN SODIUM 40 MG: 100 INJECTION SUBCUTANEOUS at 17:22

## 2023-06-11 RX ADMIN — SERTRALINE 100 MG: 100 TABLET, FILM COATED ORAL at 21:08

## 2023-06-11 ASSESSMENT — ENCOUNTER SYMPTOMS
COUGH: 0
PALPITATIONS: 0
SPUTUM PRODUCTION: 0
CONSTIPATION: 0
NERVOUS/ANXIOUS: 1
SENSORY CHANGE: 0
CHILLS: 0
WEAKNESS: 1
SHORTNESS OF BREATH: 0
ABDOMINAL PAIN: 0
HEADACHES: 0
FEVER: 0
DIZZINESS: 0
TINGLING: 0
SEIZURES: 0
NAUSEA: 0
WEIGHT LOSS: 0
DIARRHEA: 0
HALLUCINATIONS: 0
BLURRED VISION: 0
FOCAL WEAKNESS: 0
DEPRESSION: 0
SPEECH CHANGE: 0
TREMORS: 0
DIAPHORESIS: 0
VOMITING: 0

## 2023-06-11 ASSESSMENT — PATIENT HEALTH QUESTIONNAIRE - PHQ9
5. POOR APPETITE OR OVEREATING: NOT AT ALL
9. THOUGHTS THAT YOU WOULD BE BETTER OFF DEAD, OR OF HURTING YOURSELF: SEVERAL DAYS
2. FEELING DOWN, DEPRESSED, IRRITABLE, OR HOPELESS: SEVERAL DAYS
1. LITTLE INTEREST OR PLEASURE IN DOING THINGS: SEVERAL DAYS
2. FEELING DOWN, DEPRESSED, IRRITABLE, OR HOPELESS: SEVERAL DAYS
4. FEELING TIRED OR HAVING LITTLE ENERGY: NOT AT ALL
6. FEELING BAD ABOUT YOURSELF - OR THAT YOU ARE A FAILURE OR HAVE LET YOURSELF OR YOUR FAMILY DOWN: MORE THAN HALF THE DAYS
8. MOVING OR SPEAKING SO SLOWLY THAT OTHER PEOPLE COULD HAVE NOTICED. OR THE OPPOSITE, BEING SO FIGETY OR RESTLESS THAT YOU HAVE BEEN MOVING AROUND A LOT MORE THAN USUAL: NOT AT ALL
7. TROUBLE CONCENTRATING ON THINGS, SUCH AS READING THE NEWSPAPER OR WATCHING TELEVISION: MORE THAN HALF THE DAYS
3. TROUBLE FALLING OR STAYING ASLEEP OR SLEEPING TOO MUCH: NOT AT ALL
1. LITTLE INTEREST OR PLEASURE IN DOING THINGS: SEVERAL DAYS
5. POOR APPETITE OR OVEREATING: NOT AT ALL
8. MOVING OR SPEAKING SO SLOWLY THAT OTHER PEOPLE COULD HAVE NOTICED. OR THE OPPOSITE, BEING SO FIGETY OR RESTLESS THAT YOU HAVE BEEN MOVING AROUND A LOT MORE THAN USUAL: NOT AT ALL
7. TROUBLE CONCENTRATING ON THINGS, SUCH AS READING THE NEWSPAPER OR WATCHING TELEVISION: MORE THAN HALF THE DAYS
SUM OF ALL RESPONSES TO PHQ QUESTIONS 1-9: 7
4. FEELING TIRED OR HAVING LITTLE ENERGY: NOT AT ALL
SUM OF ALL RESPONSES TO PHQ9 QUESTIONS 1 AND 2: 2
9. THOUGHTS THAT YOU WOULD BE BETTER OFF DEAD, OR OF HURTING YOURSELF: SEVERAL DAYS
SUM OF ALL RESPONSES TO PHQ9 QUESTIONS 1 AND 2: 2
6. FEELING BAD ABOUT YOURSELF - OR THAT YOU ARE A FAILURE OR HAVE LET YOURSELF OR YOUR FAMILY DOWN: MORE THAN HALF THE DAYS
3. TROUBLE FALLING OR STAYING ASLEEP OR SLEEPING TOO MUCH: NOT AT ALL
SUM OF ALL RESPONSES TO PHQ QUESTIONS 1-9: 7

## 2023-06-11 ASSESSMENT — PAIN DESCRIPTION - PAIN TYPE
TYPE: ACUTE PAIN
TYPE: ACUTE PAIN

## 2023-06-11 ASSESSMENT — LIFESTYLE VARIABLES: SUBSTANCE_ABUSE: 0

## 2023-06-11 ASSESSMENT — FIBROSIS 4 INDEX: FIB4 SCORE: 1.13

## 2023-06-11 NOTE — CARE PLAN
The patient is Stable - Low risk of patient condition declining or worsening    Shift Goals  Clinical Goals: Safety, Rest  Patient Goals: Rest, discharge soon  Family Goals: RAINA    Progress made toward(s) clinical / shift goals:    Problem: Psychosocial  Goal: Patient's level of anxiety will decrease  Outcome: Progressing  Flowsheets (Taken 6/11/2023 0230)  Decrease Anxiety Level: Implemented stimuli reduction, calming techniques  Note: Patient demonstrated positive attitude about hospital stay and future care.     Problem: Provide Safe Environment  Goal: Suicide environmental safety, protocols, policies, and practices will be implemented  Outcome: Progressing  Flowsheets (Taken 6/10/2023 2200)  Safety Interventions:   Patient Wearing Hospital Clothing   Patient Room Close to Nursing Station   Provided Safety Education   Personal Clothing / Belongings Removed (Comment: Storage Location)   Potentially Dangerous Items Removed from room   Plastic Utensils / Paper Ware Ordered   Discussed no self harm or elopement and safe behavior with patient   Patient Accompanied by Unit Staff when off Unit.  Note: Patient remained on SI precautions throughout shift.     Problem: Psychosocial  Goal: Patient's ability to identify and develop effective coping behaviors will improve  Outcome: Progressing  Note: Discussed importance of Identifying triggers and ways to cope when they arise.  Goal: Patient's ability to identify and utilize available support systems will improve  Outcome: Progressing  Note: Discussed people in his life who might provide good advice and support.

## 2023-06-11 NOTE — PROGRESS NOTES
"[facility] Internal Medicine Daily Progress Note    Date of Service  6/11/2023    R Team: R IM Green Team   Attending: Rogelio Webb M.d.  Senior Resident: Dr. Dekcer  Intern:  Dr. Nowak  Contact Number: [phone]    Chief Complaint  Bishop Bucio is a 52 y.o. male admitted 5/23/2023 with AMS secondary to Benadryl intoxication and overdose.     Hospital Course  Bishop Bucio is  52 year-old male with past medical history of depression who presented on 5/23/2023 [place]. Admitted for altered mental status secondary to benadryl overdose (empty bottle found at his side). Patient had GCS 6 and intubated in ED for airway control, successfully extubated on 5/24/2023. Seizure event was suspected as patient had \"deviated gaze and nonverbal\" per emergency department staff so he was started on ativan and keppra. MRI and EEG were unremarkable. He was started on keppra 1000mg BID in the critical care unit. He then underwent Keppra taper by neurology and was successfully weaned off. His metabolic acidosis on admission was resolved by discharge. Hypokalemia also resolved. Psychiatry placed patient on legal hold secondary to suicidal ideation and medication overdose with intention. They continued to follow patient and he was started on sertraline 100mg (administer at night as patient complains of drowsiness, fogginess) which he will be discharged with. Hospitalization was complicated by US of RUE showing superficial thrombophlebitis, however did not need any intervention and showed improvement with supportive management with warm compresses, etc. He was medically clear for discharge on 5/27/2023 but unfortunately inpatient stay was prolonged due to lack of bed availability at the [facility].  Plan was to discharge patient to [facility] however due to positive COVID test on 6/7, patient will stay inpatient for 5 days per facility protocol.    Interval Problem Update  Day 5 since positive COVID test, " cleared for DC to psych facility tomorrow am.     I have discussed this patient's plan of care and discharge plan at IDT rounds today with Case Management, Nursing, Nursing leadership, and other members of the IDT team.    Consultants/Specialty  psychiatry    Code Status  Full Code    Disposition  Medically Cleared  I have placed the appropriate orders for post-discharge needs.    Review of Systems  Review of Systems   Constitutional:  Negative for chills, diaphoresis, fever, malaise/fatigue and weight loss.   HENT:  Negative for tinnitus.    Eyes:  Negative for blurred vision.   Respiratory:  Negative for cough, sputum production and shortness of breath.    Cardiovascular:  Negative for chest pain, palpitations and leg swelling.   Gastrointestinal:  Negative for abdominal pain, constipation, diarrhea, nausea and vomiting.   Genitourinary:  Negative for dysuria, frequency and urgency.   Musculoskeletal:         R arm superficial thrombophlebitis   Skin:  Negative for rash.   Neurological:  Positive for weakness. Negative for dizziness, tingling, tremors, sensory change, speech change, focal weakness, seizures and headaches.   Psychiatric/Behavioral:  Negative for depression, hallucinations, substance abuse and suicidal ideas. The patient is nervous/anxious.    All other systems reviewed and are negative.       Physical Exam  Temp:  [35.9 °C (96.7 °F)-36.5 °C (97.7 °F)] 36.3 °C (97.3 °F)  Pulse:  [84-90] 84  Resp:  [16] 16  BP: (126-140)/(77-86) 131/86  SpO2:  [94 %-98 %] 98 %    Physical Exam  Vitals and nursing note reviewed.   Constitutional:       General: He is not in acute distress.     Appearance: Normal appearance. He is not ill-appearing.   HENT:      Head: Normocephalic and atraumatic.      Right Ear: Tympanic membrane normal.      Left Ear: Tympanic membrane normal.      Mouth/Throat:      Mouth: Mucous membranes are moist.      Pharynx: Oropharynx is clear.   Eyes:      Extraocular Movements: Extraocular  movements intact.      Conjunctiva/sclera: Conjunctivae normal.      Pupils: Pupils are equal, round, and reactive to light.   Cardiovascular:      Rate and Rhythm: Normal rate and regular rhythm.      Pulses: Normal pulses.   Pulmonary:      Effort: Pulmonary effort is normal.      Breath sounds: Normal breath sounds.   Abdominal:      General: Abdomen is flat. Bowel sounds are normal.      Palpations: Abdomen is soft.   Musculoskeletal:         General: No swelling, tenderness or deformity. Normal range of motion.      Cervical back: Normal range of motion and neck supple.      Right lower leg: No edema.      Left lower leg: No edema.      Comments: R arm superficial thrombophlebitis improved; no erythema, warmth.  Swelling improved.   Skin:     General: Skin is warm and dry.      Findings: No erythema.   Neurological:      Mental Status: He is oriented to person, place, and time.   Psychiatric:         Behavior: Behavior normal.         Fluids    Intake/Output Summary (Last 24 hours) at 6/11/2023 1221  Last data filed at 6/10/2023 2200  Gross per 24 hour   Intake 300 ml   Output --   Net 300 ml         Laboratory                        Imaging  US-EXTREMITY VENOUS UPPER UNILAT RIGHT   Final Result      MR-BRAIN-W/O   Final Result         No acute intracranial process.      Inflammatory changes involving the paranasal sinuses. Right mastoid effusion.      DX-CHEST-PORTABLE (1 VIEW)   Final Result      Endotracheal tube has been advanced and now terminates in good position above the bhavin      Retrocardiac opacity could be from atelectasis or consolidation      DX-ABDOMEN FOR TUBE PLACEMENT   Final Result      NG tube terminates over the stomach.      DX-CHEST-PORTABLE (1 VIEW)   Final Result      1.  Interval intubation; the endotracheal tube tip is just above the level of the clavicular heads   2.  Lung volumes are low.      CT-HEAD W/O   Final Result      1.  No CT evidence of acute infarct, hemorrhage or mass.    2.  Moderate mucosal thickening of the maxillary, sphenoid and ethmoid sinuses.      DX-CHEST-PORTABLE (1 VIEW)   Final Result      Predominately linear bibasilar opacities, likely atelectasis. No focal consolidation. No effusions.              Assessment/Plan  Problem Representation:    * Drug intoxication with complication, with unspecified complication (HCC)  Assessment & Plan  RESOLVED  Patient was admitted with suspected dyphenhydramine intoxication with unknown amount of tablets and unknown time of normal last seen. Symptoms related includes cutaneous vasodilation (more obvious in the face), mydriasis, and reported halluciations with possible urinary retention. Certainly he developed AMS and respiratory failure. Differential diagnosis include other intoxications, seizures, meningitis, sepsis, etc.  For now will base management on supportive care, consider physostigmine but given suspected seizure and possible concomitant other drug intoxication, will hold for now.  -Initial treatment for seizures with benzos, Keppra  -Poison control case #[id number]  -CT head, MRI negative  -UDS reported  -Neurology, psychiatry consulted  -Improved and currently medically clear, the patient remains on a legal hold    Superficial venous thrombosis of arm, right  Assessment & Plan  RESOLVED  Non traumatic swelling of RUE. US demonstrated acute to subacute occlusive SVT. Swelling has now resolved.  -Warm compresses as needed        VTE prophylaxis: enoxaparin ppx    I have performed a physical exam and reviewed and updated ROS and Plan today (6/11/2023). In review of yesterday's note (6/10/2023), there are no changes except as documented above.

## 2023-06-11 NOTE — PROGRESS NOTES
Report received from night shift nurse, there are no new events, patient is awake alert and oriented in bed this am, declines pain or discomfort, states he can not wait to be discharged. Pleasant and says he is waiting patiently.

## 2023-06-12 PROCEDURE — A9270 NON-COVERED ITEM OR SERVICE: HCPCS | Performed by: INTERNAL MEDICINE

## 2023-06-12 PROCEDURE — 99231 SBSQ HOSP IP/OBS SF/LOW 25: CPT | Mod: GC | Performed by: HOSPITALIST

## 2023-06-12 PROCEDURE — 770001 HCHG ROOM/CARE - MED/SURG/GYN PRIV*

## 2023-06-12 PROCEDURE — 700102 HCHG RX REV CODE 250 W/ 637 OVERRIDE(OP): Performed by: INTERNAL MEDICINE

## 2023-06-12 PROCEDURE — 700111 HCHG RX REV CODE 636 W/ 250 OVERRIDE (IP): Performed by: EMERGENCY MEDICINE

## 2023-06-12 RX ADMIN — ENOXAPARIN SODIUM 40 MG: 100 INJECTION SUBCUTANEOUS at 16:54

## 2023-06-12 RX ADMIN — SERTRALINE 100 MG: 100 TABLET, FILM COATED ORAL at 21:30

## 2023-06-12 ASSESSMENT — PATIENT HEALTH QUESTIONNAIRE - PHQ9
4. FEELING TIRED OR HAVING LITTLE ENERGY: NOT AT ALL
4. FEELING TIRED OR HAVING LITTLE ENERGY: NOT AT ALL
2. FEELING DOWN, DEPRESSED, IRRITABLE, OR HOPELESS: SEVERAL DAYS
6. FEELING BAD ABOUT YOURSELF - OR THAT YOU ARE A FAILURE OR HAVE LET YOURSELF OR YOUR FAMILY DOWN: MORE THAN HALF THE DAYS
2. FEELING DOWN, DEPRESSED, IRRITABLE, OR HOPELESS: SEVERAL DAYS
3. TROUBLE FALLING OR STAYING ASLEEP OR SLEEPING TOO MUCH: NOT AT ALL
3. TROUBLE FALLING OR STAYING ASLEEP OR SLEEPING TOO MUCH: NOT AT ALL
8. MOVING OR SPEAKING SO SLOWLY THAT OTHER PEOPLE COULD HAVE NOTICED. OR THE OPPOSITE, BEING SO FIGETY OR RESTLESS THAT YOU HAVE BEEN MOVING AROUND A LOT MORE THAN USUAL: NOT AT ALL
6. FEELING BAD ABOUT YOURSELF - OR THAT YOU ARE A FAILURE OR HAVE LET YOURSELF OR YOUR FAMILY DOWN: MORE THAN HALF THE DAYS
SUM OF ALL RESPONSES TO PHQ QUESTIONS 1-9: 7
SUM OF ALL RESPONSES TO PHQ QUESTIONS 1-9: 7
SUM OF ALL RESPONSES TO PHQ9 QUESTIONS 1 AND 2: 2
7. TROUBLE CONCENTRATING ON THINGS, SUCH AS READING THE NEWSPAPER OR WATCHING TELEVISION: MORE THAN HALF THE DAYS
5. POOR APPETITE OR OVEREATING: NOT AT ALL
1. LITTLE INTEREST OR PLEASURE IN DOING THINGS: SEVERAL DAYS
1. LITTLE INTEREST OR PLEASURE IN DOING THINGS: SEVERAL DAYS
5. POOR APPETITE OR OVEREATING: NOT AT ALL
8. MOVING OR SPEAKING SO SLOWLY THAT OTHER PEOPLE COULD HAVE NOTICED. OR THE OPPOSITE, BEING SO FIGETY OR RESTLESS THAT YOU HAVE BEEN MOVING AROUND A LOT MORE THAN USUAL: NOT AT ALL
9. THOUGHTS THAT YOU WOULD BE BETTER OFF DEAD, OR OF HURTING YOURSELF: SEVERAL DAYS
7. TROUBLE CONCENTRATING ON THINGS, SUCH AS READING THE NEWSPAPER OR WATCHING TELEVISION: MORE THAN HALF THE DAYS
9. THOUGHTS THAT YOU WOULD BE BETTER OFF DEAD, OR OF HURTING YOURSELF: SEVERAL DAYS
SUM OF ALL RESPONSES TO PHQ9 QUESTIONS 1 AND 2: 2

## 2023-06-12 ASSESSMENT — PAIN DESCRIPTION - PAIN TYPE: TYPE: ACUTE PAIN

## 2023-06-12 NOTE — CARE PLAN
The patient is Stable - Low risk of patient condition declining or worsening    Shift Goals  Clinical Goals: Safety, rest  Patient Goals: Rest, Discharge  Family Goals: RAINA    Progress made toward(s) clinical / shift goals:    Problem: Psychosocial  Goal: Patient's ability to verbalize feelings about condition will improve  Outcome: Progressing  Flowsheets (Taken 6/12/2023 0259)  Condition Will Improve:   Discussed coping with medical condition and its effects   Encouraged patient participation in care   Encouraged acknowledgement of body changes and emotions  Note: Patient shared story about SI attempt and stated he was excited to be getting the care and help he has needed.      Problem: Provide Safe Environment  Goal: Suicide environmental safety, protocols, policies, and practices will be implemented  Outcome: Progressing  Flowsheets (Taken 6/12/2023 0259)  Safety Interventions:   Patient Room Close to Nursing Station   Patient Wearing Hospital Clothing   Personal Clothing / Belongings Removed (Comment: Storage Location)   Potentially Dangerous Items Removed from room   Plastic Utensils / Paper Ware Ordered   Provided Safety Education   Discussed no self harm or elopement and safe behavior with patient   Patient Accompanied by Unit Staff when off Unit.   Notify Receiving Department of Close Observation Status   Medically necessary equipment present, hourly room safety check, and post-meal tray check.  Note: Patient remains on SI precautions and will discharge to inpatient psych facility.

## 2023-06-12 NOTE — CARE PLAN
The patient is Stable - Low risk of patient condition declining or worsening    Shift Goals  Clinical Goals: Safety, rest  Patient Goals: Rest, discharge  Family Goals: RAINA    Progress made toward(s) clinical / shift goals:    Problem: Provide Safe Environment  Goal: Suicide environmental safety, protocols, policies, and practices will be implemented  Outcome: Progressing   Patient continues with 1:1 sitter for SI safety and will discharge to Psych possibly 6/12/23. Patient states he is ready to be discharged and plan for his future. He declines feelings of wanting to end his life but endorses continued feelings of depression. Education provided and encouraged patient o voice feeling. Patient is agreeable and verbalized understanding.

## 2023-06-12 NOTE — DISCHARGE PLANNING
Alert Team Note:    Submitted discharge summary to [facility] via [facility] as pt has been here for 5 days after initial positive Covid test.

## 2023-06-12 NOTE — CARE PLAN
Shift Goals  Clinical Goals: Safety  Patient Goals: Discharge  Family Goals: not present, RAINA    Progress made toward(s) clinical / shift goals:      Problem: Provide Safe Environment  Goal: Suicide environmental safety, protocols, policies, and practices will be implemented  6/12/2023 1353 by Candida Rodriguez R.N.  Outcome: Progressing  Flowsheets (Taken 6/12/2023 1353)  Safety Interventions:   Patient Room Close to Nursing Station   Patient Wearing Hospital Clothing   Potentially Dangerous Items Removed from room   Personal Clothing / Belongings Removed (Comment: Storage Location)   Plastic Utensils / Paper Ware Ordered   Provided Safety Education   Medically necessary equipment present, hourly room safety check, and post-meal tray check.  Note: Note: Safety protocols in place, patient educated, 1:1 sitter   6/12/2023 1352 by Candida Rodriguez R.N.  Outcome: Progressing       Problem: Psychosocial  Goal: Patient's level of anxiety will decrease  6/12/2023 1406 by Candida Rodriguez R.N.  Flowsheets (Taken 6/12/2023 1406)  Decrease Anxiety Level: Implemented stimuli reduction, calming techniques  Note: Note: calm environment provided, patient encouraged to voice questions and concerns.   6/12/2023 1353 by Candida Rodriguez R.N.  Outcome: Progressing

## 2023-06-12 NOTE — CONSULTS
[facility] PSYCHIATRIC FOLLOW-UP:(established)  *Reason for admission:  [place] with A[place] and twitching. Pt is awake, alert, and oriented x 1. Incontinent of urine. Pt denies hst of SZ . SA via OD benadryl  *Legal Hold Status: on legal hold                                   S:  more dampened in spirits today. Concerned about how he will get therapy, his living situation on dc. When asked if he had a care he could like in he says no and that he had actually thought about renting a car so he could CO himself but it was too expensive and he decided to OD instead. However he is also hopeful that he will be able to get his old job back quickly as he left on good terms. We talked about the time in between discharge  from a hospital and becoming employed and getting first check. No foggyiess, or any other side effects. Feels he is not sleeping well because he naps because there is little to do here. Thoughtful.    O: Medical ROS (as pertinent):                      *Psychiatric Examination:   Vitals:   Vitals:    06/11/23 1548 06/11/23 1920 06/12/23 0350 06/12/23 0800   BP: 128/81 128/88 112/69 127/70   Pulse: 89 84 69 68   Resp: 16 16 16 16   Temp: 36.2 °C (97.1 °F) 36.6 °C (97.9 °F) 36.1 °C (97 °F) 36.4 °C (97.6 °F)   TempSrc: Temporal Temporal Temporal Temporal   SpO2: 95% 96% 98% 96%   Weight:       Height:         General Appearance:  good eye contact, cooperative  Abnormal Movements: none   Gait and Posture: not observed  Speech: within normal limits  Thought Process: normal rate  Associations:    linear but over inclusive  Abnormal or Psychotic Thoughts: none  Judgement and Insight: fair  Orientation: grossly intact  Recent and Remote Memory: grossly intact  Attention Span and Concentration: intact  Language:fluent  Fund of Knowledge: not tested  Mood and Affect:  dysphoric  SI/HI:  ambivalent SI        Current Medications:  Scheduled Medications   Medication Dose Frequency    sertraline   100 mg DAILY    senna-docusate  2 Tablet BID    enoxaparin (LOVENOX) injection  40 mg DAILY AT 1800          *ASSESSMENT/RECOMENDATIONS:  1. Major Depression recurrent severe: but improved      -R/O bipolar 2  2  R/O borderline pers disorder traits  3  Anxiety disorder unspc:  better but  ruminates on non urgent things.      Medical:     -HTN  -s/p OD on benadryl with sz and intubation on admit  -superficial thrombus of R forearm    -covid + :: no symptoms     Legal Status: extended    Observation status:   -line of site with sitter     Visitors:  no at his request  Personal belongings: yes phone, glasses, TV control     Medication and Other Recommendations: final orders as per Tx Tm   No changes today.      Will continue to follow with you.       Discharge recommendations: inpt psych     If released from [facility]: Discharge Instructions:  -Reviewed safety plan: 911, ER, PCM, MHC, Suicide crisis line  -Please assist with outpatient Psychiatric/substance use follow up appointments at discharge once medically cleared.

## 2023-06-12 NOTE — DISCHARGE PLANNING
Case Management Discharge Planning    Admission Date: 5/23/2023  GMLOS: 3.6  ALOS: 20    6-Clicks ADL Score: 20  6-Clicks Mobility Score: 19      Anticipated Discharge Dispo: Discharge Disposition: D/T to psych hosp or distinct part unit (65)    DME Needed: No    Action(s) Taken: OTHER    Patient discussed during IDT rounds. Patient is medically clear. LSW reached out to Rojelio Huerta with Alert Team who is coordinating with [facility] for bed availability.    Escalations Completed: None    Medically Clear: Yes    Next Steps: LSW to follow up with patient and medical team regarding discharge needs and barriers.     Barriers to Discharge: Pending Placement

## 2023-06-12 NOTE — DISCHARGE PLANNING
Alert Team Note:    Per [facility], [facility] has no bed availability at this time.

## 2023-06-13 PROCEDURE — 700111 HCHG RX REV CODE 636 W/ 250 OVERRIDE (IP): Performed by: EMERGENCY MEDICINE

## 2023-06-13 PROCEDURE — A9270 NON-COVERED ITEM OR SERVICE: HCPCS | Performed by: INTERNAL MEDICINE

## 2023-06-13 PROCEDURE — 700102 HCHG RX REV CODE 250 W/ 637 OVERRIDE(OP): Performed by: INTERNAL MEDICINE

## 2023-06-13 PROCEDURE — 770001 HCHG ROOM/CARE - MED/SURG/GYN PRIV*

## 2023-06-13 PROCEDURE — 99231 SBSQ HOSP IP/OBS SF/LOW 25: CPT | Mod: GC | Performed by: HOSPITALIST

## 2023-06-13 RX ADMIN — ENOXAPARIN SODIUM 40 MG: 100 INJECTION SUBCUTANEOUS at 18:03

## 2023-06-13 RX ADMIN — SERTRALINE 100 MG: 100 TABLET, FILM COATED ORAL at 20:11

## 2023-06-13 ASSESSMENT — ENCOUNTER SYMPTOMS
FOCAL WEAKNESS: 0
WEAKNESS: 1
SPUTUM PRODUCTION: 0
COUGH: 0
DEPRESSION: 0
SEIZURES: 0
NERVOUS/ANXIOUS: 0
HALLUCINATIONS: 0
WEIGHT LOSS: 0
HEADACHES: 0
TREMORS: 0
DIZZINESS: 0
DIAPHORESIS: 0
CONSTIPATION: 0
CHILLS: 0
SENSORY CHANGE: 0
SHORTNESS OF BREATH: 0
DIARRHEA: 0
BLURRED VISION: 0
VOMITING: 0
FEVER: 0
SPEECH CHANGE: 0
NAUSEA: 0
ABDOMINAL PAIN: 0
NERVOUS/ANXIOUS: 1
PALPITATIONS: 0
TINGLING: 0

## 2023-06-13 ASSESSMENT — LIFESTYLE VARIABLES: SUBSTANCE_ABUSE: 0

## 2023-06-13 NOTE — CONSULTS
"  [facility] PSYCHIATRIC FOLLOW-UP:(established)    DOS: 06/13/23     Reason for admission: [place] river with AMS and twitching. Pt is awake, alert, and oriented x 1. Incontinent of urine. Pt denies hst of SZ . SA via OD benadryl  Legal Hold Status: on legal hold      Chief Complaint:   Chief Complaint   Patient presents with    ALOC     Pt found by river with AMS and twitching. Pt is awake, alert, and oriented x 1. Incontinent of urine. Pt denies hst of SZ                S:  Seen today as f/u psych consult.   Observed in bed, decreased mood r/t prolonged hospitalization, appropriate. Denies SI/HI, A/VH, no Sx of psychosis/joanne reported or observed. Denies GI distress, HA, dizziness. Continues to vocalize hopefulness about IP treatment.      O: Medical ROS (as pertinent): No new changes reported to this writer.    No results for input(s): WBC, RBC, HEMOGLOBIN, HEMATOCRIT, MCV, MCH, RDW, PLATELETCT, MPV, NEUTSPOLYS, LYMPHOCYTES, MONOCYTES, EOSINOPHILS, BASOPHILS, RBCMORPHOLO in the last 72 hours.  No results for input(s): SODIUM, POTASSIUM, CHLORIDE, CO2, GLUCOSE, BUN, CPKTOTAL in the last 72 hours.  No results for input(s): ALBUMIN, TBILIRUBIN, ALKPHOSPHAT, TOTPROTEIN, ALTSGPT, ASTSGOT, CREATININE in the last 72 hours.      PSYCHIATRIC EXAM (MSE):   /89   Pulse 74   Temp 36.8 °C (98.3 °F) (Temporal)   Resp 17   Ht 1.778 m (5' 10\")   Wt 89.8 kg (197 lb 15.6 oz)   SpO2 96%       Constitutional: as noted above  General Appearance/Behavior: 52 y.o. appears stated age, good eye contact cooperative, No behavioral disturbances  Abnormal Movements: none, no PMA/PMR or tremor observed.  Gait and Posture: not observed  Musculoskeletal: as noted above  Mood: \"middle\"  Affect: Mood/Congruent and Appropriate   Speech: normal rate, normal rhythm, normal tone, normal volume, and normal fluency  Language:  spontaneous, comprehends spoken commands, and fluent   Thought Process: Linear, Logical, and " Goal Directed,  Future Oriented  Thought Content: Denies SI/HI. Denies A/VH, no e/o delusions, PI, or internal preoccupation.   Insight/Judgement:  fair  Alert/Orientation: alert, oriented to person, place and time  Attn/Concentration: normal  Fund of Knowledge: not tested  Memory recent/remote: not tested  MMSE: deferred this visit          Meds Current:  Scheduled Medications   Medication Dose Frequency    sertraline  100 mg DAILY    senna-docusate  2 Tablet BID    enoxaparin (LOVENOX) injection  40 mg DAILY AT 1800     Allergies:   Allergies   Allergen Reactions    Pcn [Penicillins] Rash     Per patient            *ASSESSMENT:   1. Altered mental status, unspecified altered mental status type    2. Drug intoxication with complication, with unspecified complication (HCC)         Psychiatric:   Major depressive disorder, recurrent  R/o adjustment disorder     Medical:   ARF with hypoxia  S/p o/d  HTN        RECOMMENDATIONS:  Legal Status: on legal hold     Please transfer patient to inpatient psychiatric hospital when medically cleared and bed is available.     Observation status:   Line of site with sitter     Visitors: No   Personal belongings: Yes, limited to cell phone, glasses, TV remote at nursing discretion     Discussed/voalted: [employer]     Medication Recommendations: Final orders as per Treatment Team  No new recommendations; continue Zoloft 100mg PO daily  Risks/benefits/side effects discussed, patient verbalized understanding     Reviewed safety plan: 911, ER, PCM, MHC, suicide crisis line, nursing staff while inpatient.     Will Continue to Follow. Thank you for the consult.

## 2023-06-13 NOTE — PROGRESS NOTES
"[facility] Internal Medicine Daily Progress Note    Date of Service  6/13/2023    R Team: R IM Green Team   Attending: Rogelio Webb M.d.  Senior Resident: Dr. Decker  Intern:  Dr. Nowak  Contact Number: [phone]    Chief Complaint  Bishop Bucio is a 52 y.o. male admitted 5/23/2023 with AMS secondary to Benadryl intoxication and overdose.     Hospital Course  Bishop Bucio is  52 year-old male with past medical history of depression who [place]. Admitted for altered mental status secondary to benadryl overdose (empty bottle found at his side). Patient had GCS 6 and intubated in ED for airway control, successfully extubated on 5/24/2023. Seizure event was suspected as patient had \"deviated gaze and nonverbal\" per emergency department staff so he was started on ativan and keppra. MRI and EEG were unremarkable. He was started on keppra 1000mg BID in the critical care unit. He then underwent Keppra taper by neurology and was successfully weaned off. His metabolic acidosis on admission was resolved by discharge. Hypokalemia also resolved. Psychiatry placed patient on legal hold secondary to suicidal ideation and medication overdose with intention. They continued to follow patient and he was started on sertraline 100mg (administer at night as patient complains of drowsiness, fogginess) which he will be discharged with. Hospitalization was complicated by US of RUE showing superficial thrombophlebitis, however did not need any intervention and showed improvement with supportive management with warm compresses, etc. He was medically clear for discharge on 5/27/2023 but unfortunately inpatient stay was prolonged due to lack of bed availability at the [facility].  Plan was to discharge patient to [facility] however due to positive COVID test on 6/7, patient will stay inpatient for 5 days per facility protocol.    Interval Problem Update  NAEO  No acute complaints  Medically " cleared for discharge.    I have discussed this patient's plan of care and discharge plan at IDT rounds today with Case Management, Nursing, Nursing leadership, and other members of the IDT team.    Consultants/Specialty  psychiatry    Code Status  Full Code    Disposition  The patient is medically cleared for discharge to home or a post-acute facility.  Anticipate discharge to: a psychiatric hospital    I have placed the appropriate orders for post-discharge needs.    Review of Systems  Review of Systems   Constitutional:  Negative for chills, diaphoresis, fever, malaise/fatigue and weight loss.   HENT:  Negative for tinnitus.    Eyes:  Negative for blurred vision.   Respiratory:  Negative for cough, sputum production and shortness of breath.    Cardiovascular:  Negative for chest pain, palpitations and leg swelling.   Gastrointestinal:  Negative for abdominal pain, constipation, diarrhea, nausea and vomiting.   Genitourinary:  Negative for dysuria, frequency and urgency.   Musculoskeletal:         R arm superficial thrombophlebitis   Skin:  Negative for rash.   Neurological:  Positive for weakness. Negative for dizziness, tingling, tremors, sensory change, speech change, focal weakness, seizures and headaches.   Psychiatric/Behavioral:  Negative for depression, hallucinations, substance abuse and suicidal ideas. The patient is nervous/anxious.    All other systems reviewed and are negative.       Physical Exam  Temp:  [35.9 °C (96.6 °F)-36.7 °C (98 °F)] 36.7 °C (98 °F)  Pulse:  [68-85] 72  Resp:  [16-18] 18  BP: (126-132)/(70-78) 129/76  SpO2:  [95 %-98 %] 98 %    Physical Exam  Vitals and nursing note reviewed.   Constitutional:       General: He is not in acute distress.     Appearance: Normal appearance. He is not ill-appearing.   HENT:      Head: Normocephalic and atraumatic.      Right Ear: Tympanic membrane normal.      Left Ear: Tympanic membrane normal.      Mouth/Throat:      Mouth: Mucous membranes are  moist.      Pharynx: Oropharynx is clear.   Eyes:      Extraocular Movements: Extraocular movements intact.      Conjunctiva/sclera: Conjunctivae normal.      Pupils: Pupils are equal, round, and reactive to light.   Cardiovascular:      Rate and Rhythm: Normal rate and regular rhythm.      Pulses: Normal pulses.   Pulmonary:      Effort: Pulmonary effort is normal.      Breath sounds: Normal breath sounds.   Abdominal:      General: Abdomen is flat. Bowel sounds are normal.      Palpations: Abdomen is soft.   Musculoskeletal:         General: No swelling, tenderness or deformity. Normal range of motion.      Cervical back: Normal range of motion and neck supple.      Right lower leg: No edema.      Left lower leg: No edema.      Comments: R arm superficial thrombophlebitis improved; no erythema, warmth.  Swelling improved.   Skin:     General: Skin is warm and dry.      Findings: No erythema.   Neurological:      Mental Status: He is oriented to person, place, and time.   Psychiatric:         Behavior: Behavior normal.         Fluids    Intake/Output Summary (Last 24 hours) at 6/13/2023 0734  Last data filed at 6/12/2023 2009  Gross per 24 hour   Intake --   Output 0 ml   Net 0 ml       Laboratory                        Imaging  US-EXTREMITY VENOUS UPPER UNILAT RIGHT   Final Result      MR-BRAIN-W/O   Final Result         No acute intracranial process.      Inflammatory changes involving the paranasal sinuses. Right mastoid effusion.      DX-CHEST-PORTABLE (1 VIEW)   Final Result      Endotracheal tube has been advanced and now terminates in good position above the bhavin      Retrocardiac opacity could be from atelectasis or consolidation      DX-ABDOMEN FOR TUBE PLACEMENT   Final Result      NG tube terminates over the stomach.      DX-CHEST-PORTABLE (1 VIEW)   Final Result      1.  Interval intubation; the endotracheal tube tip is just above the level of the clavicular heads   2.  Lung volumes are low.       CT-HEAD W/O   Final Result      1.  No CT evidence of acute infarct, hemorrhage or mass.   2.  Moderate mucosal thickening of the maxillary, sphenoid and ethmoid sinuses.      DX-CHEST-PORTABLE (1 VIEW)   Final Result      Predominately linear bibasilar opacities, likely atelectasis. No focal consolidation. No effusions.              Assessment/Plan  Problem Representation:    * Drug intoxication with complication, with unspecified complication (HCC)  Assessment & Plan  RESOLVED  Patient was admitted with suspected dyphenhydramine intoxication with unknown amount of tablets and unknown time of normal last seen. Symptoms related includes cutaneous vasodilation (more obvious in the face), mydriasis, and reported halluciations with possible urinary retention. Certainly he developed AMS and respiratory failure. Differential diagnosis include other intoxications, seizures, meningitis, sepsis, etc.  For now will base management on supportive care, consider physostigmine but given suspected seizure and possible concomitant other drug intoxication, will hold for now.  -Initial treatment for seizures with benzos, Keppra  -Poison control case #[id number]  -CT head, MRI negative  -UDS reported  -Neurology, psychiatry consulted  -Improved and currently medically clear, the patient remains on a legal hold  -Plan for inpatient psych transfer    Superficial venous thrombosis of arm, right  Assessment & Plan  RESOLVED  Non traumatic swelling of RUE. US demonstrated acute to subacute occlusive SVT. Swelling has now resolved.  -Warm compresses as needed        VTE prophylaxis: enoxaparin ppx    I have performed a physical exam and reviewed and updated ROS and Plan today (6/13/2023). In review of yesterday's note (6/12/2023), there are no changes except as documented above.

## 2023-06-13 NOTE — DISCHARGE PLANNING
Legal Hold     Referral: Legal Hold Court     Intervention: Pt presented for legal hold meeting with  via video conferencing to discuss legal options of contesting hold and meeting with the court doctors tomorrow afternoon, or not contesting legal hold and continuing the hold for one week.  advised that pt's legal hold will be be continued for one week (6/22).       Plan: Pt's legal hold has been continued for one week. Pt awaiting transfer to an in patient psych facility.

## 2023-06-13 NOTE — PROGRESS NOTES
"[facility] Internal Medicine Daily Progress Note    Date of Service  6/13/2023    R Team: R IM Green Team   Attending: Rogelio Webb M.d.  Senior Resident: Dr. Jeronimo ESPARZA  Intern:  Dr. She ESPARZA  Contact Number: [phone]    Chief Complaint  Bishop Bucio is a 52 y.o. male admitted 5/23/2023 with AMS secondary to Benadryl intoxication and overdose.     Hospital Course  Bishop Bucio is  52 year-old male with past medical history of depression who [place]. Ad[place] for altered mental status secondary to benadryl overdose (empty bottle found at his side). Patient had GCS 6 and intubated in ED for airway control, successfully extubated on 5/24/2023. Seizure event was suspected as patient had \"deviated gaze and nonverbal\" per emergency department staff so he was started on ativan and keppra. MRI and EEG were unremarkable. He was started on keppra 1000mg BID in the critical care unit. He then underwent Keppra taper by neurology and was successfully weaned off. His metabolic acidosis on admission was resolved by discharge. Hypokalemia also resolved. Psychiatry placed patient on legal hold secondary to suicidal ideation and medication overdose with intention. They continued to follow patient and he was started on sertraline 100mg (administer at night as patient complains of drowsiness, fogginess) which he will be discharged with. Hospitalization was complicated by US of RUE showing superficial thrombophlebitis, however did not need any intervention and showed improvement with supportive management with warm compresses, etc. He was medically clear for discharge on 5/27/2023 but unfortunately inpatient stay was prolonged [facility]  Plan was to discharge patient to [facility] however due to positive COVID test on 6/7, patient will stay inpatient for 5 days per facility protocol.    Interval Problem Update  Day 6 since COVID+, cleared for " d/c to psych facility. Pending bed availability.    I have discussed this patient's plan of care and discharge plan at IDT rounds today with Case Management, Nursing, Nursing leadership, and other members of the IDT team.    Consultants/Specialty  psychiatry    Code Status  Full Code    Disposition  The patient is medically cleared for discharge to home or a post-acute facility.  Anticipate discharge to: a psychiatric hospital    I have placed the appropriate orders for post-discharge needs.    Review of Systems  Review of Systems   Constitutional:  Negative for chills, diaphoresis, fever, malaise/fatigue and weight loss.   HENT:  Negative for tinnitus.    Eyes:  Negative for blurred vision.   Respiratory:  Negative for cough, sputum production and shortness of breath.    Cardiovascular:  Negative for chest pain, palpitations and leg swelling.   Gastrointestinal:  Negative for abdominal pain, constipation, diarrhea, nausea and vomiting.   Genitourinary:  Negative for dysuria, frequency and urgency.   Musculoskeletal:         R arm superficial thrombophlebitis   Skin:  Negative for rash.   Neurological:  Positive for weakness. Negative for dizziness, tingling, tremors, sensory change, speech change, focal weakness, seizures and headaches.   Psychiatric/Behavioral:  Negative for depression, hallucinations, substance abuse and suicidal ideas. The patient is nervous/anxious.    All other systems reviewed and are negative.       Physical Exam  Temp:  [35.9 °C (96.6 °F)-36.7 °C (98 °F)] 36.7 °C (98 °F)  Pulse:  [68-85] 72  Resp:  [16-18] 18  BP: (126-132)/(70-78) 129/76  SpO2:  [95 %-98 %] 98 %    Physical Exam  Vitals and nursing note reviewed.   Constitutional:       General: He is not in acute distress.     Appearance: Normal appearance. He is not ill-appearing.   HENT:      Head: Normocephalic and atraumatic.      Right Ear: Tympanic membrane normal.      Left Ear: Tympanic membrane normal.      Mouth/Throat:       Mouth: Mucous membranes are moist.      Pharynx: Oropharynx is clear.   Eyes:      Extraocular Movements: Extraocular movements intact.      Conjunctiva/sclera: Conjunctivae normal.      Pupils: Pupils are equal, round, and reactive to light.   Cardiovascular:      Rate and Rhythm: Normal rate and regular rhythm.      Pulses: Normal pulses.   Pulmonary:      Effort: Pulmonary effort is normal.      Breath sounds: Normal breath sounds.   Abdominal:      General: Abdomen is flat. Bowel sounds are normal.      Palpations: Abdomen is soft.   Musculoskeletal:         General: No swelling, tenderness or deformity. Normal range of motion.      Cervical back: Normal range of motion and neck supple.      Right lower leg: No edema.      Left lower leg: No edema.      Comments: R arm superficial thrombophlebitis improved; no erythema, warmth.  Swelling improved.   Skin:     General: Skin is warm and dry.      Findings: No erythema.   Neurological:      Mental Status: He is oriented to person, place, and time.   Psychiatric:         Behavior: Behavior normal.         Fluids    Intake/Output Summary (Last 24 hours) at 6/13/2023 0735  Last data filed at 6/12/2023 2009  Gross per 24 hour   Intake --   Output 0 ml   Net 0 ml       Laboratory                        Imaging  US-EXTREMITY VENOUS UPPER UNILAT RIGHT   Final Result      MR-BRAIN-W/O   Final Result         No acute intracranial process.      Inflammatory changes involving the paranasal sinuses. Right mastoid effusion.      DX-CHEST-PORTABLE (1 VIEW)   Final Result      Endotracheal tube has been advanced and now terminates in good position above the bhavin      Retrocardiac opacity could be from atelectasis or consolidation      DX-ABDOMEN FOR TUBE PLACEMENT   Final Result      NG tube terminates over the stomach.      DX-CHEST-PORTABLE (1 VIEW)   Final Result      1.  Interval intubation; the endotracheal tube tip is just above the level of the clavicular heads   2.   Lung volumes are low.      CT-HEAD W/O   Final Result      1.  No CT evidence of acute infarct, hemorrhage or mass.   2.  Moderate mucosal thickening of the maxillary, sphenoid and ethmoid sinuses.      DX-CHEST-PORTABLE (1 VIEW)   Final Result      Predominately linear bibasilar opacities, likely atelectasis. No focal consolidation. No effusions.              Assessment/Plan  Problem Representation:    * Drug intoxication with complication, with unspecified complication (HCC)  Assessment & Plan  RESOLVED  Patient was admitted with suspected dyphenhydramine intoxication with unknown amount of tablets and unknown time of normal last seen. Symptoms related includes cutaneous vasodilation (more obvious in the face), mydriasis, and reported halluciations with possible urinary retention. Certainly he developed AMS and respiratory failure. Differential diagnosis include other intoxications, seizures, meningitis, sepsis, etc.  For now will base management on supportive care, consider physostigmine but given suspected seizure and possible concomitant other drug intoxication, will hold for now.  -Initial treatment for seizures with benzos, Keppra  -Poison control case #[id number]  -CT head, MRI negative  -UDS reported  -Neurology, psychiatry consulted  -Improved and currently medically clear, the patient remains on a legal hold  -Plan for inpatient psych transfer    Superficial venous thrombosis of arm, right  Assessment & Plan  RESOLVED  Non traumatic swelling of RUE. US demonstrated acute to subacute occlusive SVT. Swelling has now resolved.  -Warm compresses as needed           VTE prophylaxis: enoxaparin ppx    I have performed a physical exam and reviewed and updated ROS and Plan today (6/13/2023). In review of yesterday's note (6/12/2023), there are no changes except as documented above.

## 2023-06-13 NOTE — PROGRESS NOTES
"[facility] Internal Medicine Daily Progress Note    Date of Service  6/13/2023    [facility] Team: R IM Green Team   Attending: Rogelio Webb M.d.  Senior Resident: Dr. Jeronimo ESPARZA  Intern:  Dr. She ESPARZA  Contact Number: [phone]    Chief Complaint  Bishop Bucio is a 52 y.o. male admitted 5/23/2023 with AMS secondary to Benadryl intoxication and overdose.     Hospital Course  Bishop Bucio is  52 year-old male with past medical history of depression who [place]. Admitted for altered mental status secondary to benadryl overdose (empty bottle found at his side). Patient had GCS 6 and intubated in ED for airway control, successfully extubated on 5/24/2023. Seizure event was suspected as patient had \"deviated gaze and nonverbal\" per emergency department staff so he was started on ativan and keppra. MRI and EEG were unremarkable. He was started on keppra 1000mg BID in the critical care unit. He then underwent Keppra taper by neurology and was successfully weaned off. His metabolic acidosis on admission was resolved by discharge. Hypokalemia also resolved. Psychiatry placed patient on legal hold secondary to suicidal ideation and medication overdose with intention. They continued to follow patient and he was started on sertraline 100mg (administer at night as patient complains of drowsiness, fogginess) which he will be discharged with. Hospitalization was complicated by US of RUE showing superficial thrombophlebitis, however did not need any intervention and showed improvement with supportive management with warm compresses, etc. He was medically clear for discharge on 5/27/2023 but unfortunately inpatient stay was prolonged due to lack of bed availability at the [facility].  Plan was to discharge patient to [facility] however due to positive COVID test on 6/7, patient will stay inpatient for 5 days per facility protocol.    Interval Problem Update  No acute events overnight. Seen " at bedside this morning, cheerful without new acute complaints. Pending available bed for discharge to psych hospital, medically clear. Legal hold extended.    I have discussed this patient's plan of care and discharge plan at IDT rounds today with Case Management, Nursing, Nursing leadership, and other members of the IDT team.    Consultants/Specialty  psychiatry    Code Status  Full Code    Disposition  The patient is medically cleared for discharge to home or a post-acute facility.  Anticipate discharge to: a psychiatric hospital    I have placed the appropriate orders for post-discharge needs.    Review of Systems  Review of Systems   Constitutional:  Negative for chills, diaphoresis, fever, malaise/fatigue and weight loss.   HENT:  Negative for tinnitus.    Eyes:  Negative for blurred vision.   Respiratory:  Negative for cough, sputum production and shortness of breath.    Cardiovascular:  Negative for chest pain, palpitations and leg swelling.   Gastrointestinal:  Negative for abdominal pain, constipation, diarrhea, nausea and vomiting.   Genitourinary:  Negative for dysuria, frequency and urgency.   Musculoskeletal:         R arm superficial thrombophlebitis   Skin:  Negative for rash.   Neurological:  Positive for weakness. Negative for dizziness, tingling, tremors, sensory change, speech change, focal weakness, seizures and headaches.   Psychiatric/Behavioral:  Negative for depression, hallucinations, substance abuse and suicidal ideas. The patient is not nervous/anxious.    All other systems reviewed and are negative.       Physical Exam  Temp:  [35.9 °C (96.6 °F)-36.7 °C (98 °F)] 36.7 °C (98 °F)  Pulse:  [72-85] 72  Resp:  [18] 18  BP: (126-132)/(72-78) 129/76  SpO2:  [95 %-98 %] 98 %    Physical Exam  Vitals and nursing note reviewed.   Constitutional:       General: He is not in acute distress.     Appearance: Normal appearance. He is not ill-appearing.   HENT:      Head: Normocephalic and atraumatic.       Right Ear: Tympanic membrane normal.      Left Ear: Tympanic membrane normal.      Mouth/Throat:      Mouth: Mucous membranes are moist.      Pharynx: Oropharynx is clear.   Eyes:      Extraocular Movements: Extraocular movements intact.      Conjunctiva/sclera: Conjunctivae normal.      Pupils: Pupils are equal, round, and reactive to light.   Cardiovascular:      Rate and Rhythm: Normal rate and regular rhythm.      Pulses: Normal pulses.   Pulmonary:      Effort: Pulmonary effort is normal.      Breath sounds: Normal breath sounds.   Abdominal:      General: Abdomen is flat. Bowel sounds are normal.      Palpations: Abdomen is soft.   Musculoskeletal:         General: No swelling, tenderness or deformity. Normal range of motion.      Cervical back: Normal range of motion and neck supple.      Right lower leg: No edema.      Left lower leg: No edema.      Comments: R arm superficial thrombophlebitis improved; no erythema, warmth.  Swelling improved.   Skin:     General: Skin is warm and dry.      Findings: No erythema.   Neurological:      Mental Status: He is oriented to person, place, and time.   Psychiatric:         Behavior: Behavior normal.         Fluids    Intake/Output Summary (Last 24 hours) at 6/13/2023 1224  Last data filed at 6/12/2023 2009  Gross per 24 hour   Intake --   Output 0 ml   Net 0 ml       Laboratory                        Imaging  US-EXTREMITY VENOUS UPPER UNILAT RIGHT   Final Result      MR-BRAIN-W/O   Final Result         No acute intracranial process.      Inflammatory changes involving the paranasal sinuses. Right mastoid effusion.      DX-CHEST-PORTABLE (1 VIEW)   Final Result      Endotracheal tube has been advanced and now terminates in good position above the bhavin      Retrocardiac opacity could be from atelectasis or consolidation      DX-ABDOMEN FOR TUBE PLACEMENT   Final Result      NG tube terminates over the stomach.      DX-CHEST-PORTABLE (1 VIEW)   Final Result       1.  Interval intubation; the endotracheal tube tip is just above the level of the clavicular heads   2.  Lung volumes are low.      CT-HEAD W/O   Final Result      1.  No CT evidence of acute infarct, hemorrhage or mass.   2.  Moderate mucosal thickening of the maxillary, sphenoid and ethmoid sinuses.      DX-CHEST-PORTABLE (1 VIEW)   Final Result      Predominately linear bibasilar opacities, likely atelectasis. No focal consolidation. No effusions.              Assessment/Plan  Problem Representation:    * Drug intoxication with complication, with unspecified complication (HCC)  Assessment & Plan  RESOLVED  Patient was admitted with suspected dyphenhydramine intoxication with unknown amount of tablets and unknown time of normal last seen. Symptoms related includes cutaneous vasodilation (more obvious in the face), mydriasis, and reported halluciations with possible urinary retention. Certainly he developed AMS and respiratory failure. Differential diagnosis include other intoxications, seizures, meningitis, sepsis, etc.  For now will base management on supportive care, consider physostigmine but given suspected seizure and possible concomitant other drug intoxication, will hold for now.  -Initial treatment for seizures with benzos, Keppra  -Poison control case #[id number]  -CT head, MRI negative  -UDS reported  -Neurology, psychiatry consulted  -Improved and currently medically clear, the patient remains on a legal hold  -Plan for inpatient psych transfer    Superficial venous thrombosis of arm, right  Assessment & Plan  RESOLVED  Non traumatic swelling of RUE. US demonstrated acute to subacute occlusive SVT. Swelling has now resolved.  -Warm compresses as needed           VTE prophylaxis: enoxaparin ppx    I have performed a physical exam and reviewed and updated ROS and Plan today (6/13/2023). In review of yesterday's note (6/12/2023), there are no changes except as documented above.

## 2023-06-14 PROCEDURE — 700111 HCHG RX REV CODE 636 W/ 250 OVERRIDE (IP): Performed by: EMERGENCY MEDICINE

## 2023-06-14 PROCEDURE — 770001 HCHG ROOM/CARE - MED/SURG/GYN PRIV*

## 2023-06-14 PROCEDURE — 99231 SBSQ HOSP IP/OBS SF/LOW 25: CPT | Mod: GC | Performed by: HOSPITALIST

## 2023-06-14 PROCEDURE — A9270 NON-COVERED ITEM OR SERVICE: HCPCS | Performed by: INTERNAL MEDICINE

## 2023-06-14 PROCEDURE — 700102 HCHG RX REV CODE 250 W/ 637 OVERRIDE(OP): Performed by: INTERNAL MEDICINE

## 2023-06-14 RX ADMIN — ENOXAPARIN SODIUM 40 MG: 100 INJECTION SUBCUTANEOUS at 17:53

## 2023-06-14 RX ADMIN — SERTRALINE 100 MG: 100 TABLET, FILM COATED ORAL at 20:58

## 2023-06-14 ASSESSMENT — ENCOUNTER SYMPTOMS
COUGH: 0
NERVOUS/ANXIOUS: 0
SEIZURES: 0
CONSTIPATION: 0
ABDOMINAL PAIN: 0
SPEECH CHANGE: 0
PALPITATIONS: 0
WEAKNESS: 1
TREMORS: 0
DIAPHORESIS: 0
FEVER: 0
HALLUCINATIONS: 0
DIZZINESS: 0
DIARRHEA: 0
TINGLING: 0
VOMITING: 0
FOCAL WEAKNESS: 0
SENSORY CHANGE: 0
SPUTUM PRODUCTION: 0
BLURRED VISION: 0
WEIGHT LOSS: 0
DEPRESSION: 0
HEADACHES: 0
SHORTNESS OF BREATH: 0
CHILLS: 0
NAUSEA: 0

## 2023-06-14 ASSESSMENT — PAIN DESCRIPTION - PAIN TYPE: TYPE: ACUTE PAIN

## 2023-06-14 ASSESSMENT — LIFESTYLE VARIABLES: SUBSTANCE_ABUSE: 0

## 2023-06-14 NOTE — CARE PLAN
The patient is Stable - Low risk of patient condition declining or worsening    Shift Goals  Clinical Goals: Safety  Patient Goals: Discharge  Family Goals: not present, RAINA    Progress made toward(s) clinical / shift goals:    Problem: Provide Safe Environment  Goal: Suicide environmental safety, protocols, policies, and practices will be implemented  Outcome: Met: SI precautions continued. Pt maintaining safety in room.       Patient is not progressing towards the following goals:      Problem: Psychosocial  Goal: Patient's level of anxiety will decrease  Outcome: Progressing: pt states his anxiety is low and pt states that he feels he's doing well, but feels bored. Psych team aware. Pt waiting for placement.

## 2023-06-14 NOTE — CARE PLAN
The patient is Stable - Low risk of patient condition declining or worsening    Problem: Psychosocial  Goal: Patient's level of anxiety will decrease  Outcome: Progressing  Note: Patient appears calm and relaxed and reports no anxiety.   Goal: Patient's ability to verbalize feelings about condition will improve  Outcome: Progressing  Note: Patient reports feelings of boredom but understanding of barriers to discharge.      Shift Goals  Clinical Goals: safety  Patient Goals: rest, discharge  Family Goals: not present, RAINA    Progress made toward(s) clinical / shift goals:  Patient remains safe in room with sitter.  Patient appears to be able to rest with no signs or symptoms of acute distress.

## 2023-06-14 NOTE — PROGRESS NOTES
"[facility] Internal Medicine Daily Progress Note    Date of Service  6/14/2023    [facility] Team: R IM Green Team   Attending: Rogelio Webb M.d.  Senior Resident: Dr. Jeronimo ESPARZA  Intern:  Dr. She ESPARZA  Contact Number: [phone]    Chief Complaint  Bishop Bucio is a 52 y.o. male admitted 5/23/2023 with AMS secondary to Benadryl intoxication and overdose.     Hospital Course  Bishop Bucio is  52 year-old male with past medical history of depression who [place]23[place]. Admitted for altered mental status secondary to benadryl overdose (empty bottle found at his side). Patient had GCS 6 and intubated in ED for airway control, successfully extubated on 5/24/2023. Seizure event was suspected as patient had \"deviated gaze and nonverbal\" per emergency department staff so he was started on ativan and keppra. MRI and EEG were unremarkable. He was started on keppra 1000mg BID in the critical care unit. He then underwent Keppra taper by neurology and was successfully weaned off. His metabolic acidosis on admission was resolved by discharge. Hypokalemia also resolved. Psychiatry placed patient on legal hold secondary to suicidal ideation and medication overdose with intention. They continued to follow patient and he was started on sertraline 100mg (administer at night as patient complains of drowsiness, fogginess) which he will be discharged with. Hospitalization was complicated by US of RUE showing superficial thrombophlebitis, however did not need any intervention and showed improvement with supportive management with warm compresses, etc. He was medically clear for discharge on 5/27/2023 but unfortunately inpatient stay was prolonged due to lack of bed availability at the [facility].  Plan was to discharge patient to [facility] however due to positive COVID test on 6/7, patient required inpatient stay for 5 more days per facility protocol and medically clear on 6/11.    Interval Problem " Update  No acute events overnight. Seen at bedside, no new complaints. States he is bored. Continuing on legal hold per psychiatry. Medically clear however still pending bed at [facility], appreciate CM help.    I have discussed this patient's plan of care and discharge plan at IDT rounds today with Case Management, Nursing, Nursing leadership, and other members of the IDT team.    Consultants/Specialty  psychiatry    Code Status  Full Code    Disposition  The patient is medically cleared for discharge to home or a post-acute facility.  Anticipate discharge to: a psychiatric hospital    I have placed the appropriate orders for post-discharge needs.    Review of Systems  Review of Systems   Constitutional:  Negative for chills, diaphoresis, fever, malaise/fatigue and weight loss.   HENT:  Negative for tinnitus.    Eyes:  Negative for blurred vision.   Respiratory:  Negative for cough, sputum production and shortness of breath.    Cardiovascular:  Negative for chest pain, palpitations and leg swelling.   Gastrointestinal:  Negative for abdominal pain, constipation, diarrhea, nausea and vomiting.   Genitourinary:  Negative for dysuria, frequency and urgency.   Musculoskeletal:         R arm superficial thrombophlebitis   Skin:  Negative for rash.   Neurological:  Positive for weakness. Negative for dizziness, tingling, tremors, sensory change, speech change, focal weakness, seizures and headaches.   Psychiatric/Behavioral:  Negative for depression, hallucinations, substance abuse and suicidal ideas. The patient is not nervous/anxious.    All other systems reviewed and are negative.       Physical Exam  Temp:  [36.4 °C (97.5 °F)-36.8 °C (98.3 °F)] 36.6 °C (97.9 °F)  Pulse:  [72-90] 76  Resp:  [17-18] 17  BP: (127-137)/(69-89) 127/84  SpO2:  [95 %-96 %] 95 %    Physical Exam  Vitals and nursing note reviewed.   Constitutional:       General: He is not in acute distress.     Appearance: Normal appearance. He is not  ill-appearing.   HENT:      Head: Normocephalic and atraumatic.      Right Ear: Tympanic membrane normal.      Left Ear: Tympanic membrane normal.      Mouth/Throat:      Mouth: Mucous membranes are moist.      Pharynx: Oropharynx is clear.   Eyes:      Extraocular Movements: Extraocular movements intact.      Conjunctiva/sclera: Conjunctivae normal.      Pupils: Pupils are equal, round, and reactive to light.   Cardiovascular:      Rate and Rhythm: Normal rate and regular rhythm.      Pulses: Normal pulses.   Pulmonary:      Effort: Pulmonary effort is normal.      Breath sounds: Normal breath sounds.   Abdominal:      General: Abdomen is flat. Bowel sounds are normal.      Palpations: Abdomen is soft.   Musculoskeletal:         General: No swelling, tenderness or deformity. Normal range of motion.      Cervical back: Normal range of motion and neck supple.      Right lower leg: No edema.      Left lower leg: No edema.      Comments: R arm superficial thrombophlebitis improved; no erythema, warmth.  Swelling improved.   Skin:     General: Skin is warm and dry.      Findings: No erythema.   Neurological:      Mental Status: He is oriented to person, place, and time.   Psychiatric:         Behavior: Behavior normal.         Fluids    Intake/Output Summary (Last 24 hours) at 6/14/2023 1212  Last data filed at 6/14/2023 0800  Gross per 24 hour   Intake 420 ml   Output --   Net 420 ml       Laboratory                        Imaging  US-EXTREMITY VENOUS UPPER UNILAT RIGHT   Final Result      MR-BRAIN-W/O   Final Result         No acute intracranial process.      Inflammatory changes involving the paranasal sinuses. Right mastoid effusion.      DX-CHEST-PORTABLE (1 VIEW)   Final Result      Endotracheal tube has been advanced and now terminates in good position above the bhavin      Retrocardiac opacity could be from atelectasis or consolidation      DX-ABDOMEN FOR TUBE PLACEMENT   Final Result      NG tube terminates  over the stomach.      DX-CHEST-PORTABLE (1 VIEW)   Final Result      1.  Interval intubation; the endotracheal tube tip is just above the level of the clavicular heads   2.  Lung volumes are low.      CT-HEAD W/O   Final Result      1.  No CT evidence of acute infarct, hemorrhage or mass.   2.  Moderate mucosal thickening of the maxillary, sphenoid and ethmoid sinuses.      DX-CHEST-PORTABLE (1 VIEW)   Final Result      Predominately linear bibasilar opacities, likely atelectasis. No focal consolidation. No effusions.              Assessment/Plan  Problem Representation:    * Drug intoxication with complication, with unspecified complication (HCC)  Assessment & Plan  RESOLVED  Patient was admitted with suspected dyphenhydramine intoxication with unknown amount of tablets and unknown time of normal last seen. Symptoms related includes cutaneous vasodilation (more obvious in the face), mydriasis, and reported halluciations with possible urinary retention. Certainly he developed AMS and respiratory failure. Differential diagnosis include other intoxications, seizures, meningitis, sepsis, etc.  For now will base management on supportive care, consider physostigmine but given suspected seizure and possible concomitant other drug intoxication, will hold for now.  -Initial treatment for seizures with benzos, Keppra  -Poison control case #[id number]  -CT head, MRI negative  -UDS reported  -Neurology, psychiatry consulted  -Improved and currently medically clear, the patient remains on a legal hold  -Plan for inpatient psych transfer    Superficial venous thrombosis of arm, right  Assessment & Plan  RESOLVED  Non traumatic swelling of RUE. US demonstrated acute to subacute occlusive SVT. Swelling has now resolved.  -Warm compresses as needed           VTE prophylaxis: enoxaparin ppx    I have performed a physical exam and reviewed and updated ROS and Plan today (6/14/2023). In review of yesterday's note (6/13/2023), there are  no changes except as documented above.

## 2023-06-14 NOTE — CONSULTS
"  [facility] PSYCHIATRIC FOLLOW-UP:(established)    DOS: 06/14/23     Reason for [place] and twitching. Pt is awake, alert, and oriented x 1. Incontinent of urine. Pt denies hst of SZ . SA via OD benadryl  Legal Hold Status: on legal hold      Chief Complaint:   Chief Complaint   Patient presents with    ALOC     [place] with AMS and twitching. Pt is awake, alert, and oriented x 1. Incontinent of urine. Pt denies hst of SZ                S:  Seen today as f/u psych consult.   Observed laying in bed. Reports decreased sleep, and energy. Reports increased thinking and anxiety during HS. Denies SI/HI, A/VH, no Sx of psychosis/joanne reported or observed. Appears to be tolerating medications, denies GI distress, dizziness, HA. Continues to express a desire to continue mental health treatment, gaining insight, able to identify triggers.      O: Medical ROS (as pertinent): No new changes reported to this writer.    No results for input(s): WBC, RBC, HEMOGLOBIN, HEMATOCRIT, MCV, MCH, RDW, PLATELETCT, MPV, NEUTSPOLYS, LYMPHOCYTES, MONOCYTES, EOSINOPHILS, BASOPHILS, RBCMORPHOLO in the last 72 hours.  No results for input(s): SODIUM, POTASSIUM, CHLORIDE, CO2, GLUCOSE, BUN, CPKTOTAL in the last 72 hours.  No results for input(s): ALBUMIN, TBILIRUBIN, ALKPHOSPHAT, TOTPROTEIN, ALTSGPT, ASTSGOT, CREATININE in the last 72 hours.      PSYCHIATRIC EXAM (MSE):   /84   Pulse 76   Temp 36.6 °C (97.9 °F) (Temporal)   Resp 17   Ht 1.778 m (5' 10\")   Wt 89.8 kg (197 lb 15.6 oz)   SpO2 95%       Constitutional: as noted above  General Appearance/Behavior: 52 y.o. appears stated age, good eye contact cooperative, No behavioral disturbances  Abnormal Movements: none, no PMA/PMR or tremor observed.  Gait and Posture: not observed  Musculoskeletal: as noted above  Mood: \"okay\"  Affect: Mood/Congruent and Appropriate   Speech: normal rate, normal rhythm, normal tone, normal " volume, and normal fluency  Language:  spontaneous, comprehends spoken commands, and fluent   Thought Process: Linear, Logical, and Goal Directed,  Future Oriented  Thought Content: Denies SI/HI. Denies A/VH, no e/o delusions, PI, or internal preoccupation.   Insight/Judgement:  fair  Alert/Orientation: alert, oriented to person, place and time  Attn/Concentration: normal  Fund of Knowledge: Average  Memory recent/remote: No gross evidence of memory deficits  MMSE: deferred this visit          Meds Current:  Scheduled Medications   Medication Dose Frequency    sertraline  100 mg DAILY    senna-docusate  2 Tablet BID    enoxaparin (LOVENOX) injection  40 mg DAILY AT 1800     Allergies:   Allergies   Allergen Reactions    Pcn [Penicillins] Rash     Per patient           ASSESSMENT:   1. Altered mental status, unspecified altered mental status type    2. Drug intoxication with complication, with unspecified complication (HCC)          Psychiatric:   Major depressive disorder, recurrent  R/o adjustment disorder     Medical:   ARF with hypoxia  S/p o/d  HTN        RECOMMENDATIONS:  Legal Status: on legal hold     Please transfer patient to inpatient psychiatric hospital when medically cleared and bed is available.     Observation status:   Line of site with sitter     Visitors: No   Personal belongings: Yes, limited to cell phone, glasses, TV remote at nursing discretion     Discussed/voalted: EFE Decker MD     Medication Recommendations: Final orders as per Treatment Team  No new recommendations; continue Zoloft 100mg PO daily  Risks/benefits/side effects discussed, patient verbalized understanding     Reviewed safety plan: 911, ER, PCM, MHC, suicide crisis line, nursing staff while inpatient.     Will Continue to Follow. Thank you for the consult.

## 2023-06-14 NOTE — DISCHARGE PLANNING
Received Stipulation and Order from the court continuing pt's legal hold until 6/22, scanned copy into pt's chart.

## 2023-06-14 NOTE — DISCHARGE PLANNING
Case Management Discharge Planning    Admission Date: 5/23/2023  GMLOS: 3.6  ALOS: 22    6-Clicks ADL Score: 20  6-Clicks Mobility Score: 19      Anticipated Discharge Dispo: Discharge Disposition: D/T to psych hosp or distinct part unit (65)    DME Needed: No    Action(s) Taken: OTHER    Patient discussed during IDT rounds. Patient remains medically clear. Pending inpatient psych placement.     Escalations Completed: None    Medically Clear: Yes    Next Steps: LSW to follow up with patient and medical team regarding discharge needs and barriers.     Barriers to Discharge: Pending Placement

## 2023-06-14 NOTE — CARE PLAN
The patient is Stable - Low risk of patient condition declining or worsening    Shift Goals  Clinical Goals: safety  Patient Goals: rest  Family Goals: RAINA    Progress made toward(s) clinical / shift goals:    Problem: Psychosocial  Goal: Patient's level of anxiety will decrease  Outcome: Progressing: pt does not complain of worsening anxiety and is reading for entertainment this shift.

## 2023-06-14 NOTE — DISCHARGE PLANNING
"Alert Team Note:    Per an [employer] message from Shaina with [facility], \"We will not be admitting anyone today. We will keep this pt on our list and let you know if we can take them. Will touch base tomorrow!\"  "

## 2023-06-14 NOTE — DISCHARGE PLANNING
Alert Team Note:    Contacted by [facility], spoke to Maris. Confirmed that pt is still here and in need of treatment. Referral to be reviewed.

## 2023-06-15 PROCEDURE — A9270 NON-COVERED ITEM OR SERVICE: HCPCS | Performed by: INTERNAL MEDICINE

## 2023-06-15 PROCEDURE — 700102 HCHG RX REV CODE 250 W/ 637 OVERRIDE(OP): Performed by: INTERNAL MEDICINE

## 2023-06-15 PROCEDURE — 770001 HCHG ROOM/CARE - MED/SURG/GYN PRIV*

## 2023-06-15 PROCEDURE — 99231 SBSQ HOSP IP/OBS SF/LOW 25: CPT | Mod: GC | Performed by: HOSPITALIST

## 2023-06-15 PROCEDURE — 700102 HCHG RX REV CODE 250 W/ 637 OVERRIDE(OP): Performed by: EMERGENCY MEDICINE

## 2023-06-15 PROCEDURE — 700111 HCHG RX REV CODE 636 W/ 250 OVERRIDE (IP): Performed by: EMERGENCY MEDICINE

## 2023-06-15 PROCEDURE — A9270 NON-COVERED ITEM OR SERVICE: HCPCS | Performed by: EMERGENCY MEDICINE

## 2023-06-15 RX ADMIN — SERTRALINE 100 MG: 100 TABLET, FILM COATED ORAL at 20:17

## 2023-06-15 RX ADMIN — ACETAMINOPHEN 650 MG: 325 TABLET, FILM COATED ORAL at 23:41

## 2023-06-15 RX ADMIN — ENOXAPARIN SODIUM 40 MG: 100 INJECTION SUBCUTANEOUS at 20:17

## 2023-06-15 RX ADMIN — ACETAMINOPHEN 650 MG: 325 TABLET, FILM COATED ORAL at 04:39

## 2023-06-15 ASSESSMENT — ENCOUNTER SYMPTOMS
SPUTUM PRODUCTION: 0
COUGH: 0
SHORTNESS OF BREATH: 0
SENSORY CHANGE: 0
TREMORS: 0
HEADACHES: 0
HALLUCINATIONS: 0
DEPRESSION: 0
CONSTIPATION: 0
WEIGHT LOSS: 0
DIZZINESS: 0
NERVOUS/ANXIOUS: 0
FOCAL WEAKNESS: 0
TINGLING: 0
DIAPHORESIS: 0
WEAKNESS: 0
VOMITING: 0
NAUSEA: 0
ABDOMINAL PAIN: 0
FEVER: 0
CHILLS: 0
DIARRHEA: 0
BLURRED VISION: 0
SEIZURES: 0
PALPITATIONS: 0
SPEECH CHANGE: 0

## 2023-06-15 ASSESSMENT — PAIN DESCRIPTION - PAIN TYPE: TYPE: ACUTE PAIN

## 2023-06-15 ASSESSMENT — LIFESTYLE VARIABLES: SUBSTANCE_ABUSE: 0

## 2023-06-15 NOTE — CONSULTS
"  [facility] PSYCHIATRIC FOLLOW-UP:(established)    DOS: 06/15/23     Reason for [place] and twitching. Pt is awake, alert, and oriented x 1. Incontinent of urine. Pt denies hst of SZ . SA via OD benadryl  Legal Hold Status: on legal hold      Chief Complaint:   Chief Complaint   Patient presents with    ALOC     [place] with AMS and twitching. Pt is awake, alert, and oriented x 1. Incontinent of urine. Pt denies hst of SZ                S:  Seen today as f/u psych consult.   Observed laying in bed. Denies SI/HI, A/VH, no Sx of psychosis/joanne reported or observed. Reports adequate sleep, energy, appetite. Denies GI distress, HA, dizziness; appears to be tolerating medications. Continues to express boredom is causing disruption to sleep cycle, encouraged to use alternative hobbies to keep occupied.       O: Medical ROS (as pertinent): No new changes reported to this writer.    No results for input(s): WBC, RBC, HEMOGLOBIN, HEMATOCRIT, MCV, MCH, RDW, PLATELETCT, MPV, NEUTSPOLYS, LYMPHOCYTES, MONOCYTES, EOSINOPHILS, BASOPHILS, RBCMORPHOLO in the last 72 hours.  No results for input(s): SODIUM, POTASSIUM, CHLORIDE, CO2, GLUCOSE, BUN, CPKTOTAL in the last 72 hours.  No results for input(s): ALBUMIN, TBILIRUBIN, ALKPHOSPHAT, TOTPROTEIN, ALTSGPT, ASTSGOT, CREATININE in the last 72 hours.      PSYCHIATRIC EXAM (MSE):   /73   Pulse 82   Temp 36.4 °C (97.6 °F) (Temporal)   Resp 18   Ht 1.778 m (5' 10\")   Wt 89.8 kg (197 lb 15.6 oz)   SpO2 97%       Constitutional: as noted above  General Appearance/Behavior: 52 y.o. appears stated age, obese intermittent eye contact cooperative, No behavioral disturbances  Abnormal Movements: none, no PMA/PMR or tremor observed.  Gait and Posture: not observed  Musculoskeletal: as noted above  Mood: \"alright\"  Affect: Mood/Congruent and Appropriate   Speech: normal rate, normal rhythm, normal tone, normal volume, and normal " fluency  Language:  spontaneous, comprehends spoken commands, and fluent   Thought Process: Linear, Logical, and Goal Directed,  Future Oriented  Thought Content: Denies SI/HI. Denies A/VH, no e/o delusions, PI, or internal preoccupation.   Insight/Judgement:  fair  Alert/Orientation: alert, oriented to person, place and time  Attn/Concentration: normal  Fund of Knowledge: Average  Memory recent/remote: not tested  MMSE: deferred this visit          Meds Current:  Scheduled Medications   Medication Dose Frequency    sertraline  100 mg DAILY    senna-docusate  2 Tablet BID    enoxaparin (LOVENOX) injection  40 mg DAILY AT 1800     Allergies:   Allergies   Allergen Reactions    Pcn [Penicillins] Rash     Per patient           ASSESSMENT:   1. Altered mental status, unspecified altered mental status type    2. Drug intoxication with complication, with unspecified complication (HCC)         Psychiatric:   Major depressive disorder, recurrent  R/o adjustment disorder     Medical:   ARF with hypoxia  S/p o/d  HTN       RECOMMENDATIONS:  Legal Status: on legal hold    Please transfer patient to inpatient psychiatric hospital when medically cleared and bed is available.    Observation status:   Line of site with sitter    Visitors: No   Personal belongings: Yes, limited to items discussed per nursing discretion    Discussed/voalted: [employer]    Medication Recommendations: Final orders as per Treatment Team  No new medication recommendations; continue Zoloft 100mg PO daily  Risks/benefits/side effects discussed, patient verbalized understanding    Reviewed safety plan: 911, ER, PCM, MHC, suicide crisis line, nursing staff while inpatient.    Will Continue to Follow. Thank you for the consult.

## 2023-06-15 NOTE — DISCHARGE PLANNING
Case Management Discharge Planning    Admission Date: 5/23/2023  GMLOS: 3.6  ALOS: 23    6-Clicks ADL Score: 20  6-Clicks Mobility Score: 19      Anticipated Discharge Dispo: Discharge Disposition: D/T to psych hosp or distinct part unit (65)    DME Needed: No    Action(s) Taken: OTHER    LSW reached out to Rojelio Huerta with the Alert Team. There are no updates as of right now on inpatient psych placement for patient. Patient is medically clear.    Escalations Completed: None    Medically Clear: Yes    Next Steps: LSW to follow up with patient and medical team regarding discharge needs and barriers.     Barriers to Discharge: Pending Placement

## 2023-06-15 NOTE — PROGRESS NOTES
"[facility] Internal Medicine Daily Progress Note    Date of Service  6/15/2023    R Team: R IM Green Team   Attending: Rogelio Webb M.d.  Senior Resident: Dr. Jeronimo ESPARZA  Intern:  Dr. She ESPARZA  Contact Number: [phone]    Chief Complaint  Bishop Bucio is a 52 y.o. male admitted 5/23/2023 with AMS secondary to Benadryl intoxication and overdose, on legal hold.    Hospital Course  Bishop Bucio is  52 year-old male with past medical history of depression who presented [place] 5[place]23/[place]. Admitted for altered mental status secondary to benadryl overdose (empty bottle found at his side). Patient had GCS 6 and intubated in ED for airway control, successfully extubated on 5/24/2023. Seizure event was suspected as patient had \"deviated gaze and nonverbal\" per emergency department staff so he was started on ativan and keppra. MRI and EEG were unremarkable. He was started on keppra 1000mg BID in the critical care unit. He then underwent Keppra taper by neurology and was successfully weaned off. His metabolic acidosis on admission was resolved by discharge. Hypokalemia also resolved. Psychiatry placed patient on legal hold secondary to suicidal ideation and medication overdose with intention. They continued to follow patient and he was started on sertraline 100mg (administer at night as patient complains of drowsiness, fogginess) which he will be discharged with. Hospitalization was complicated by US of RUE showing superficial thrombophlebitis, however did not need any intervention and showed improvement with supportive management with warm compresses, etc. He was medically clear for discharge on 5/27/2023 but unfortunately inpatient stay was prolonged due to lack of bed availability at the [facility].  Plan was to discharge patient to [facility] however due to positive COVID test on 6/7, patient required inpatient stay for 5 more days per facility protocol and medically clear on " 6/11.    Interval Problem Update  No acute events overnight. Seen at bedside this morning, states he is bored but otherwise pleasant and cheerful. Continuing with legal hold and 1:1 sitter. Pending bed availability at [facility].    I have discussed this patient's plan of care and discharge plan at IDT rounds today with Case Management, Nursing, Nursing leadership, and other members of the IDT team.    Consultants/Specialty  psychiatry    Code Status  Full Code    Disposition  The patient is medically cleared for discharge to home or a post-acute facility.  Anticipate discharge to: a psychiatric hospital    I have placed the appropriate orders for post-discharge needs.    Review of Systems  Review of Systems   Constitutional:  Negative for chills, diaphoresis, fever, malaise/fatigue and weight loss.   HENT:  Negative for tinnitus.    Eyes:  Negative for blurred vision.   Respiratory:  Negative for cough, sputum production and shortness of breath.    Cardiovascular:  Negative for chest pain, palpitations and leg swelling.   Gastrointestinal:  Negative for abdominal pain, constipation, diarrhea, nausea and vomiting.   Genitourinary:  Negative for dysuria, frequency and urgency.   Musculoskeletal:         R arm superficial thrombophlebitis (IMPROVED)   Skin:  Negative for rash.   Neurological:  Negative for dizziness, tingling, tremors, sensory change, speech change, focal weakness, seizures, weakness and headaches.   Psychiatric/Behavioral:  Negative for depression, hallucinations, substance abuse and suicidal ideas. The patient is not nervous/anxious.         Boredom   All other systems reviewed and are negative.       Physical Exam  Temp:  [36.4 °C (97.5 °F)-36.4 °C (97.6 °F)] 36.4 °C (97.6 °F)  Pulse:  [74-82] 82  Resp:  [17-18] 18  BP: (123-135)/(73-90) 133/73  SpO2:  [96 %-97 %] 97 %    Physical Exam  Vitals and nursing note reviewed.   Constitutional:       General: He is not in acute distress.      Appearance: Normal appearance. He is not ill-appearing.   HENT:      Head: Normocephalic and atraumatic.      Right Ear: Tympanic membrane normal.      Left Ear: Tympanic membrane normal.      Mouth/Throat:      Mouth: Mucous membranes are moist.      Pharynx: Oropharynx is clear.   Eyes:      Extraocular Movements: Extraocular movements intact.      Conjunctiva/sclera: Conjunctivae normal.      Pupils: Pupils are equal, round, and reactive to light.   Cardiovascular:      Rate and Rhythm: Normal rate and regular rhythm.      Pulses: Normal pulses.   Pulmonary:      Effort: Pulmonary effort is normal.      Breath sounds: Normal breath sounds.   Abdominal:      General: Abdomen is flat. Bowel sounds are normal.      Palpations: Abdomen is soft.   Musculoskeletal:         General: No swelling, tenderness or deformity. Normal range of motion.      Cervical back: Normal range of motion and neck supple.      Right lower leg: No edema.      Left lower leg: No edema.      Comments: R arm superficial thrombophlebitis improved; no erythema, warmth.  Swelling improved.   Skin:     General: Skin is warm and dry.      Findings: No erythema.   Neurological:      Mental Status: He is oriented to person, place, and time.   Psychiatric:         Behavior: Behavior normal.         Fluids    Intake/Output Summary (Last 24 hours) at 6/15/2023 1213  Last data filed at 6/15/2023 0728  Gross per 24 hour   Intake 1160 ml   Output --   Net 1160 ml       Laboratory                        Imaging  US-EXTREMITY VENOUS UPPER UNILAT RIGHT   Final Result      MR-BRAIN-W/O   Final Result         No acute intracranial process.      Inflammatory changes involving the paranasal sinuses. Right mastoid effusion.      DX-CHEST-PORTABLE (1 VIEW)   Final Result      Endotracheal tube has been advanced and now terminates in good position above the bhavin      Retrocardiac opacity could be from atelectasis or consolidation      DX-ABDOMEN FOR TUBE PLACEMENT    Final Result      NG tube terminates over the stomach.      DX-CHEST-PORTABLE (1 VIEW)   Final Result      1.  Interval intubation; the endotracheal tube tip is just above the level of the clavicular heads   2.  Lung volumes are low.      CT-HEAD W/O   Final Result      1.  No CT evidence of acute infarct, hemorrhage or mass.   2.  Moderate mucosal thickening of the maxillary, sphenoid and ethmoid sinuses.      DX-CHEST-PORTABLE (1 VIEW)   Final Result      Predominately linear bibasilar opacities, likely atelectasis. No focal consolidation. No effusions.              Assessment/Plan  Problem Representation:    * Drug intoxication with complication, with unspecified complication (HCC)  Assessment & Plan  RESOLVED  Patient was admitted with suspected dyphenhydramine intoxication with unknown amount of tablets and unknown time of normal last seen. Symptoms related includes cutaneous vasodilation (more obvious in the face), mydriasis, and reported halluciations with possible urinary retention. Certainly he developed AMS and respiratory failure. Differential diagnosis include other intoxications, seizures, meningitis, sepsis, etc.  For now will base management on supportive care, consider physostigmine but given suspected seizure and possible concomitant other drug intoxication, will hold for now.  -Initial treatment for seizures with benzos, Keppra  -Poison control case #[id number]  -CT head, MRI negative  -UDS reported  -Neurology, psychiatry consulted  -Improved and currently medically clear, the patient remains on a legal hold  -Plan for inpatient psych transfer    Superficial venous thrombosis of arm, right  Assessment & Plan  RESOLVED  Non traumatic swelling of RUE. US demonstrated acute to subacute occlusive SVT. Swelling has now resolved.  -Warm compresses as needed           VTE prophylaxis: enoxaparin ppx    I have performed a physical exam and reviewed and updated ROS and Plan today (6/15/2023). In review of  yesterday's note (6/14/2023), there are no changes except as documented above.

## 2023-06-15 NOTE — CARE PLAN
The patient is Stable - Low risk of patient condition declining or worsening    Shift Goals  Clinical Goals: suicide prevention, DC to psych  Patient Goals: rest, express feelings  Family Goals: RAINA    Progress made toward(s) clinical / shift goals:    Problem: Psychosocial  Goal: Patient's level of anxiety will decrease  Outcome: Progressing  Note: Patient has not appeared anxious throughout the shift. Patient appears pleasant and is looking forward to his discharge for further inpatient care.      Problem: Psychosocial  Goal: Patient's ability to identify and develop effective coping behaviors will improve  Outcome: Progressing  Note: Patient is able to express his thoughts on his situation, and has improved from his previous depressed, flat affect mood that he had upon his admission to the floor.

## 2023-06-15 NOTE — PROGRESS NOTES
Bedside report received and patient care assumed. Pt is resting in bed, A&O4, with no complaints of pain, and is on room air. All appropriate fall precautions are in place, belongings at bedside table.  Pt was updated on POC, no questions or concerns. Pt educated on use of call light for assistance. Patient has 1:1 sitter at bedside, all dangerous material and objects removed from the room, patient acknowledges understanding.

## 2023-06-16 PROCEDURE — 700111 HCHG RX REV CODE 636 W/ 250 OVERRIDE (IP): Performed by: EMERGENCY MEDICINE

## 2023-06-16 PROCEDURE — A9270 NON-COVERED ITEM OR SERVICE: HCPCS | Performed by: INTERNAL MEDICINE

## 2023-06-16 PROCEDURE — 770001 HCHG ROOM/CARE - MED/SURG/GYN PRIV*

## 2023-06-16 PROCEDURE — 99231 SBSQ HOSP IP/OBS SF/LOW 25: CPT | Mod: GC | Performed by: HOSPITALIST

## 2023-06-16 PROCEDURE — 700102 HCHG RX REV CODE 250 W/ 637 OVERRIDE(OP): Performed by: INTERNAL MEDICINE

## 2023-06-16 RX ADMIN — ENOXAPARIN SODIUM 40 MG: 100 INJECTION SUBCUTANEOUS at 16:43

## 2023-06-16 RX ADMIN — SERTRALINE 100 MG: 100 TABLET, FILM COATED ORAL at 20:52

## 2023-06-16 ASSESSMENT — ENCOUNTER SYMPTOMS
TINGLING: 0
TREMORS: 0
SPUTUM PRODUCTION: 0
CHILLS: 0
HEADACHES: 0
DEPRESSION: 0
CONSTIPATION: 0
FOCAL WEAKNESS: 0
SPEECH CHANGE: 0
NAUSEA: 0
DIARRHEA: 0
HALLUCINATIONS: 0
PALPITATIONS: 0
FEVER: 0
COUGH: 0
NERVOUS/ANXIOUS: 0
SEIZURES: 0
WEAKNESS: 0
BLURRED VISION: 0
DIZZINESS: 0
VOMITING: 0
ABDOMINAL PAIN: 0
DIAPHORESIS: 0
SENSORY CHANGE: 0
SHORTNESS OF BREATH: 0
WEIGHT LOSS: 0

## 2023-06-16 ASSESSMENT — PATIENT HEALTH QUESTIONNAIRE - PHQ9
8. MOVING OR SPEAKING SO SLOWLY THAT OTHER PEOPLE COULD HAVE NOTICED. OR THE OPPOSITE, BEING SO FIGETY OR RESTLESS THAT YOU HAVE BEEN MOVING AROUND A LOT MORE THAN USUAL: NOT AT ALL
7. TROUBLE CONCENTRATING ON THINGS, SUCH AS READING THE NEWSPAPER OR WATCHING TELEVISION: MORE THAN HALF THE DAYS
5. POOR APPETITE OR OVEREATING: NOT AT ALL
SUM OF ALL RESPONSES TO PHQ QUESTIONS 1-9: 7
6. FEELING BAD ABOUT YOURSELF - OR THAT YOU ARE A FAILURE OR HAVE LET YOURSELF OR YOUR FAMILY DOWN: MORE THAN HALF THE DAYS
3. TROUBLE FALLING OR STAYING ASLEEP OR SLEEPING TOO MUCH: NOT AT ALL
4. FEELING TIRED OR HAVING LITTLE ENERGY: NOT AT ALL
1. LITTLE INTEREST OR PLEASURE IN DOING THINGS: SEVERAL DAYS
SUM OF ALL RESPONSES TO PHQ9 QUESTIONS 1 AND 2: 2
2. FEELING DOWN, DEPRESSED, IRRITABLE, OR HOPELESS: SEVERAL DAYS
9. THOUGHTS THAT YOU WOULD BE BETTER OFF DEAD, OR OF HURTING YOURSELF: SEVERAL DAYS

## 2023-06-16 ASSESSMENT — PAIN DESCRIPTION - PAIN TYPE
TYPE: ACUTE PAIN
TYPE: ACUTE PAIN

## 2023-06-16 ASSESSMENT — FIBROSIS 4 INDEX: FIB4 SCORE: 1.13

## 2023-06-16 ASSESSMENT — LIFESTYLE VARIABLES: SUBSTANCE_ABUSE: 0

## 2023-06-16 NOTE — DISCHARGE PLANNING
Case Management Discharge Planning    Admission Date: 5/23/2023  GMLOS: 3.6  ALOS: 24    6-Clicks ADL Score: 20  6-Clicks Mobility Score: 19      Anticipated Discharge Dispo: Discharge Disposition: D/T to psych hosp or distinct part unit (65)    DME Needed: No    Action(s) Taken: OTHER    LSW reached out to Alert Team for updates on placement. Patient remains medically clear.     Spoke to [facility] with Alert Team. All inpatient psych transfers are pending at [facility] as they're facing staffing shortages. No admissions today.      Escalations Completed: None    Medically Clear: Yes    Next Steps: LSW to follow up with patient and medical team regarding discharge needs and barriers.     Barriers to Discharge: Pending Placement

## 2023-06-16 NOTE — PROGRESS NOTES
"[facility] Internal Medicine Daily Progress Note    Date of Service  6/16/2023    R Team: R IM Green Team   Attending: Rogelio Webb M.d.  Senior Resident: Dr. Jeronimo ESPARZA  Intern:  Dr. She ESPARZA  Contact Number: [phone]    Chief Complaint  Bishop Bucio is a 52 y.o. male admitted 5/23/2023 with AMS secondary to Benadryl intoxication and overdose, on legal hold.    Hospital Course  Bishop Bucio is  52 year-old male with past medical history of depression who [place]23[place]. Admitted for altered mental status secondary to benadryl overdose (empty bottle found at his side). Patient had GCS 6 and intubated in ED for airway control, successfully extubated on 5/24/2023. Seizure event was suspected as patient had \"deviated gaze and nonverbal\" per emergency department staff so he was started on ativan and keppra. MRI and EEG were unremarkable. He was started on keppra 1000mg BID in the critical care unit. He then underwent Keppra taper by neurology and was successfully weaned off. His metabolic acidosis on admission was resolved by discharge. Hypokalemia also resolved. Psychiatry placed patient on legal hold secondary to suicidal ideation and medication overdose with intention. They continued to follow patient and he was started on sertraline 100mg (administer at night as patient complains of drowsiness, fogginess) which he will be discharged with. Hospitalization was complicated by US of RUE showing superficial thrombophlebitis, however did not need any intervention and showed improvement with supportive management with warm compresses, etc. He was medically clear for discharge on 5/27/2023 but unfortunately inpatient stay was prolonged due to lack of bed availability at the [facility].  Plan was to discharge patient to [facility] however due to positive COVID test on 6/7, patient required inpatient stay for 5 more days per facility protocol and medically clear on " 6/11.    Interval Problem Update  No acute events overnight. No new complaints. There is a shortage in staffing at [facility] today, unable to be discharged. On legal hold + 1:1 sitter. Medically clear for d/c.    I have discussed this patient's plan of care and discharge plan at IDT rounds today with Case Management, Nursing, Nursing leadership, and other members of the IDT team.    Consultants/Specialty  psychiatry    Code Status  Full Code    Disposition  The patient is medically cleared for discharge to home or a post-acute facility.  Anticipate discharge to: a psychiatric hospital    I have placed the appropriate orders for post-discharge needs.    Review of Systems  Review of Systems   Constitutional:  Negative for chills, diaphoresis, fever, malaise/fatigue and weight loss.   HENT:  Negative for tinnitus.    Eyes:  Negative for blurred vision.   Respiratory:  Negative for cough, sputum production and shortness of breath.    Cardiovascular:  Negative for chest pain, palpitations and leg swelling.   Gastrointestinal:  Negative for abdominal pain, constipation, diarrhea, nausea and vomiting.   Genitourinary:  Negative for dysuria, frequency and urgency.   Musculoskeletal:         R arm superficial thrombophlebitis (IMPROVED)   Skin:  Negative for rash.   Neurological:  Negative for dizziness, tingling, tremors, sensory change, speech change, focal weakness, seizures, weakness and headaches.   Psychiatric/Behavioral:  Negative for depression, hallucinations, substance abuse and suicidal ideas. The patient is not nervous/anxious.         Boredom   All other systems reviewed and are negative.       Physical Exam  Temp:  [36.1 °C (97 °F)-36.5 °C (97.7 °F)] 36.5 °C (97.7 °F)  Pulse:  [76-81] 76  Resp:  [18] 18  BP: (120-126)/(77-83) 126/77  SpO2:  [96 %-98 %] 98 %    Physical Exam  Vitals and nursing note reviewed.   Constitutional:       General: He is not in acute distress.     Appearance: Normal appearance. He is not  ill-appearing.   HENT:      Head: Normocephalic and atraumatic.      Right Ear: Tympanic membrane normal.      Left Ear: Tympanic membrane normal.      Mouth/Throat:      Mouth: Mucous membranes are moist.      Pharynx: Oropharynx is clear.   Eyes:      Extraocular Movements: Extraocular movements intact.      Conjunctiva/sclera: Conjunctivae normal.      Pupils: Pupils are equal, round, and reactive to light.   Cardiovascular:      Rate and Rhythm: Normal rate and regular rhythm.      Pulses: Normal pulses.   Pulmonary:      Effort: Pulmonary effort is normal.      Breath sounds: Normal breath sounds.   Abdominal:      General: Abdomen is flat. Bowel sounds are normal.      Palpations: Abdomen is soft.   Musculoskeletal:         General: No swelling, tenderness or deformity. Normal range of motion.      Cervical back: Normal range of motion and neck supple.      Right lower leg: No edema.      Left lower leg: No edema.      Comments: R arm superficial thrombophlebitis improved; no erythema, warmth.  Swelling improved.   Skin:     General: Skin is warm and dry.      Findings: No erythema.   Neurological:      Mental Status: He is oriented to person, place, and time.   Psychiatric:         Behavior: Behavior normal.         Fluids    Intake/Output Summary (Last 24 hours) at 6/16/2023 1153  Last data filed at 6/16/2023 0733  Gross per 24 hour   Intake 960 ml   Output --   Net 960 ml       Laboratory                        Imaging  US-EXTREMITY VENOUS UPPER UNILAT RIGHT   Final Result      MR-BRAIN-W/O   Final Result         No acute intracranial process.      Inflammatory changes involving the paranasal sinuses. Right mastoid effusion.      DX-CHEST-PORTABLE (1 VIEW)   Final Result      Endotracheal tube has been advanced and now terminates in good position above the bhavin      Retrocardiac opacity could be from atelectasis or consolidation      DX-ABDOMEN FOR TUBE PLACEMENT   Final Result      NG tube terminates  over the stomach.      DX-CHEST-PORTABLE (1 VIEW)   Final Result      1.  Interval intubation; the endotracheal tube tip is just above the level of the clavicular heads   2.  Lung volumes are low.      CT-HEAD W/O   Final Result      1.  No CT evidence of acute infarct, hemorrhage or mass.   2.  Moderate mucosal thickening of the maxillary, sphenoid and ethmoid sinuses.      DX-CHEST-PORTABLE (1 VIEW)   Final Result      Predominately linear bibasilar opacities, likely atelectasis. No focal consolidation. No effusions.              Assessment/Plan  Problem Representation:    * Drug intoxication with complication, with unspecified complication (HCC)  Assessment & Plan  RESOLVED  Patient was admitted with suspected dyphenhydramine intoxication with unknown amount of tablets and unknown time of normal last seen. Symptoms related includes cutaneous vasodilation (more obvious in the face), mydriasis, and reported halluciations with possible urinary retention. Certainly he developed AMS and respiratory failure. Differential diagnosis include other intoxications, seizures, meningitis, sepsis, etc.  For now will base management on supportive care, consider physostigmine but given suspected seizure and possible concomitant other drug intoxication, will hold for now.  -Initial treatment for seizures with benzos, Keppra  -Poison control case #[id number]  -CT head, MRI negative  -UDS reported  -Neurology, psychiatry consulted  -Improved and currently medically clear, the patient remains on a legal hold  -Plan for inpatient psych transfer    Superficial venous thrombosis of arm, right  Assessment & Plan  RESOLVED  Non traumatic swelling of RUE. US demonstrated acute to subacute occlusive SVT. Swelling has now resolved.  -Warm compresses as needed           VTE prophylaxis: enoxaparin ppx    I have performed a physical exam and reviewed and updated ROS and Plan today (6/16/2023). In review of yesterday's note (6/15/2023), there are  no changes except as documented above.

## 2023-06-16 NOTE — CARE PLAN
The patient is Stable - Low risk of patient condition declining or worsening    Shift Goals  Clinical Goals: Safety  Patient Goals: Rest, Discharge  Family Goals: RAINA    Progress made toward(s) clinical / shift goals:    Problem: Psychosocial  Goal: Patient's ability to identify and develop effective coping behaviors will improve  Outcome: Progressing: pt is reading a new book today and interacting with staff positively.       Problem: Psychosocial  Goal: Patient's ability to re-evaluate and adapt role responsibilities will improve  Outcome: Progressing: pt advocated for shower today and performed ADLs.

## 2023-06-16 NOTE — CONSULTS
[facility] PSYCHIATRIC FOLLOW-UP:(established)  *Reason for admission:   [place] with AMS and twitching. Pt is awake, alert, and oriented x 1. Incontinent of urine. Pt denies hst of SZ . SA via OD benadryl  *Legal Hold Status: on legal hold                  S:  not much change: still concerned about discharge and even if he can get a job easily, wants to make sure he is dong as best he can.  Has been ruminating more but he was given some books and that has helped distract him.    O: Medical ROS (as pertinent):                      *Psychiatric Examination:   Vitals:   Vitals:    06/14/23 1925 06/15/23 0728 06/15/23 1557 06/16/23 0733   BP: 123/79 133/73 120/83 126/77   Pulse: 74 82 81 76   Resp: 17 18 18 18   Temp: 36.4 °C (97.6 °F) 36.4 °C (97.6 °F) 36.1 °C (97 °F) 36.5 °C (97.7 °F)   TempSrc: Temporal Temporal Temporal Temporal   SpO2: 96% 97% 96% 98%   Weight:       Height:         General Appearance:  good eye contact, cooperative  Abnormal Movements: none   Gait and Posture: not observed  Speech: within normal limits  Thought Process: normal rate  Associations:    linear   Abnormal or Psychotic Thoughts: none  Judgement and Insight: fair  Orientation: grossly intact  Recent and Remote Memory: grossly intact  Attention Span and Concentration: intact  Language:fluent  Fund of Knowledge: not tested  Mood and Affect:  dysphoric though polite and smiles at times.  SI/HI:  ambivalent SI     Current Medications:  Scheduled Medications   Medication Dose Frequency    sertraline  100 mg DAILY    senna-docusate  2 Tablet BID    enoxaparin (LOVENOX) injection  40 mg DAILY AT 1800          *ASSESSMENT/RECOMENDATIONS:  1. Major Depression recurrent severe: but improved      -R/O bipolar 2  2  R/O borderline pers disorder traits  3  Anxiety disorder unspc:  better but  ruminates on non urgent things.      Medical:     -HTN  -s/p OD on benadryl with sz and intubation on admit  -superficial thrombus  of R forearm    -covid + on 6/7 no symptoms     Legal Status: extended    Observation status:   -line of site with sitter     Visitors:  no at his request  Personal belongings: yes phone, glasses, TV control and please get contacts out of his wallet.     Medication and Other Recommendations: final orders as per Tx Tm   No changes today.      Will continue to follow with you.       Discharge recommendations: inpt psych     If released from [facility]: Discharge Instructions:  -Reviewed safety plan: 911, ER, PCM, MHC, Suicide crisis line  -Please assist with outpatient Psychiatric/substance use follow up appointments at discharge once medically cleared.

## 2023-06-16 NOTE — CARE PLAN
The patient is Stable - Low risk of patient condition declining or worsening    Shift Goals  Clinical Goals: Monitor patient's safety by end of shift  Patient Goals: Rest  Family Goals: not present, RAINA    Progress made toward(s) clinical / shift goals:   Problem: Psychosocial  Goal: Patient's level of anxiety will decrease  6/16/2023 1442 by Candida Rodriguez R.N.  Outcome: Progressing  Note: patient psychosocial needs addressed, safety sitter at bedside.       Goal: Patient's ability to verbalize feelings about condition will improve  6/16/2023 1442 by Candida Rodriguez R.N.  Outcome: Progressing  Note: patient encouraged to voice questions and concerns, uses call light appropriately.

## 2023-06-16 NOTE — PROGRESS NOTES
Bedside report received and patient care assumed. Pt is resting in bed, A&O4, with no complaints of pain, and is on room air. All appropriate fall precautions are in place, belongings at bedside table. Pt was updated on POC, no questions or concerns. Pt educated on use of call light for assistance. 1:1 sitter at bedside, patient on legal hold, dangerous objects removed from the room. Patient has phone and books at bedside approved by MD.

## 2023-06-16 NOTE — CARE PLAN
The patient is Stable - Low risk of patient condition declining or worsening    Shift Goals  Clinical Goals: Safety, DC to inpatient psych  Patient Goals: rest, express feelings of depression  Family Goals: RAINA    Progress made toward(s) clinical / shift goals:    Problem: Psychosocial  Goal: Patient's level of anxiety will decrease  Outcome: Progressing     Problem: Psychosocial  Goal: Patient's ability to identify and develop effective coping behaviors will improve  Outcome: Progressing  Note: Patient has books and his phone in his room for his entertainment and states that he is comfortably awaiting a room to open up for inpatient psych. Patient's demeanor and behavior has improved since his admission to the floor.

## 2023-06-16 NOTE — DISCHARGE PLANNING
Alert Team/[facility]   Note:      - [facility]: Talked to RN Supervisor (Shashi). No admissions today due to critically low staffing. Referral is on pending list.

## 2023-06-17 PROCEDURE — 700111 HCHG RX REV CODE 636 W/ 250 OVERRIDE (IP): Performed by: EMERGENCY MEDICINE

## 2023-06-17 PROCEDURE — 770001 HCHG ROOM/CARE - MED/SURG/GYN PRIV*

## 2023-06-17 PROCEDURE — 700102 HCHG RX REV CODE 250 W/ 637 OVERRIDE(OP): Performed by: INTERNAL MEDICINE

## 2023-06-17 PROCEDURE — 99231 SBSQ HOSP IP/OBS SF/LOW 25: CPT | Mod: GC | Performed by: HOSPITALIST

## 2023-06-17 PROCEDURE — A9270 NON-COVERED ITEM OR SERVICE: HCPCS | Performed by: INTERNAL MEDICINE

## 2023-06-17 RX ADMIN — SERTRALINE 100 MG: 100 TABLET, FILM COATED ORAL at 22:03

## 2023-06-17 RX ADMIN — ENOXAPARIN SODIUM 40 MG: 100 INJECTION SUBCUTANEOUS at 16:45

## 2023-06-17 ASSESSMENT — ENCOUNTER SYMPTOMS
CONSTIPATION: 0
PALPITATIONS: 0
DIZZINESS: 0
SPEECH CHANGE: 0
WEAKNESS: 0
DIARRHEA: 0
SENSORY CHANGE: 0
TINGLING: 0
NERVOUS/ANXIOUS: 0
DEPRESSION: 0
COUGH: 0
TREMORS: 0
HALLUCINATIONS: 0
SPUTUM PRODUCTION: 0
SHORTNESS OF BREATH: 0
FEVER: 0
DIAPHORESIS: 0
NAUSEA: 0
BLURRED VISION: 0
ABDOMINAL PAIN: 0
WEIGHT LOSS: 0
HEADACHES: 0
FOCAL WEAKNESS: 0
CHILLS: 0
SEIZURES: 0
VOMITING: 0

## 2023-06-17 ASSESSMENT — PATIENT HEALTH QUESTIONNAIRE - PHQ9
4. FEELING TIRED OR HAVING LITTLE ENERGY: NOT AT ALL
8. MOVING OR SPEAKING SO SLOWLY THAT OTHER PEOPLE COULD HAVE NOTICED. OR THE OPPOSITE, BEING SO FIGETY OR RESTLESS THAT YOU HAVE BEEN MOVING AROUND A LOT MORE THAN USUAL: NOT AT ALL
6. FEELING BAD ABOUT YOURSELF - OR THAT YOU ARE A FAILURE OR HAVE LET YOURSELF OR YOUR FAMILY DOWN: MORE THAN HALF THE DAYS
5. POOR APPETITE OR OVEREATING: NOT AT ALL
SUM OF ALL RESPONSES TO PHQ9 QUESTIONS 1 AND 2: 2
2. FEELING DOWN, DEPRESSED, IRRITABLE, OR HOPELESS: SEVERAL DAYS
1. LITTLE INTEREST OR PLEASURE IN DOING THINGS: SEVERAL DAYS
7. TROUBLE CONCENTRATING ON THINGS, SUCH AS READING THE NEWSPAPER OR WATCHING TELEVISION: MORE THAN HALF THE DAYS
9. THOUGHTS THAT YOU WOULD BE BETTER OFF DEAD, OR OF HURTING YOURSELF: SEVERAL DAYS
3. TROUBLE FALLING OR STAYING ASLEEP OR SLEEPING TOO MUCH: NOT AT ALL
SUM OF ALL RESPONSES TO PHQ QUESTIONS 1-9: 7

## 2023-06-17 ASSESSMENT — PAIN DESCRIPTION - PAIN TYPE: TYPE: ACUTE PAIN

## 2023-06-17 ASSESSMENT — LIFESTYLE VARIABLES: SUBSTANCE_ABUSE: 0

## 2023-06-17 NOTE — PROGRESS NOTES
Bedside report received and patient care assumed. Pt is resting in bed, A&O4, with no complaints of pain, and is on room air. All fall precautions are in place, belongings at bedside table.  Pt was updated on POC, no questions or concerns. Pt educated on use of call light for assistance. Patient is currently on a legal hold with a 1:1 sitter at bedside. Patient understands and accepts his legal hold status considering the nature of his admission. Patient expresses excitement to be discharged to inpatient psych and appears to be in good spirits about the multiple barriers that have prevented him from being discharged.

## 2023-06-17 NOTE — CARE PLAN
"The patient is Watcher - Medium risk of patient condition declining or worsening    Shift Goals  Clinical Goals: suicide prevention, dc to inpatient psych  Patient Goals: rest, mild pain management  Family Goals: not present, RAINA    Progress made toward(s) clinical / shift goals:    Problem: Psychosocial  Goal: Patient's level of anxiety will decrease  Outcome: Progressing  Note: Patient has been pleasant and calm through every interaction that was made with staff members. Patient does not display any signs of agitation or anxiety.      Problem: Psychosocial  Goal: Patient's ability to identify and develop effective coping behaviors will improve  Outcome: Progressing  Note: Patient has been cleared to have books and his electronic device in the room to help him feel more at home and has been able to verbalize to nursing staff his excitement to be discharged from the facility so he can \"move on with his life.\" Patient verbalizes hopes and wants for the future including finding a job once he is discharged.        "

## 2023-06-17 NOTE — PROGRESS NOTES
"[facility] Internal Medicine Daily Progress Note    Date of Service  6/17/2023    R Team: R IM Green Team   Attending: Rogelio Webb M.d.  Senior Resident: Dr. Jeronimo ESPARZA  Intern:  Dr. She ESPARZA  Contact Number: [phone]    Chief Complaint  Bishop Bucio is a 52 y.o. male admitted 5/23/2023 with AMS secondary to Benadryl intoxication and overdose, on legal hold.    Hospital Course  Bishop Bucio is  52 year-old male with past medical history of depression who [place]23[place]. Admitted for altered mental status secondary to benadryl overdose (empty bottle found at his side). Patient had GCS 6 and intubated in ED for airway control, successfully extubated on 5/24/2023. Seizure event was suspected as patient had \"deviated gaze and nonverbal\" per emergency department staff so he was started on ativan and keppra. MRI and EEG were unremarkable. He was started on keppra 1000mg BID in the critical care unit. He then underwent Keppra taper by neurology and was successfully weaned off. His metabolic acidosis on admission was resolved by discharge. Hypokalemia also resolved. Psychiatry placed patient on legal hold secondary to suicidal ideation and medication overdose with intention. They continued to follow patient and he was started on sertraline 100mg (administer at night as patient complains of drowsiness, fogginess) which he will be discharged with. Hospitalization was complicated by US of RUE showing superficial thrombophlebitis, however did not need any intervention and showed improvement with supportive management with warm compresses, etc. He was medically clear for discharge on 5/27/2023 but unfortunately inpatient stay was prolonged due to lack of bed availability at the [facility].  Plan was to discharge patient to [facility] however due to positive COVID test on 6/7, patient required inpatient stay for 5 more days per facility protocol and medically clear on " 6/11.    Interval Problem Update  No acute events overnight. Pleasant this morning. Medically clear for discharge to [facility], still pending bed and likely nothing will happen over the weekend. Continue legal hold, 1:1 sitter.    I have discussed this patient's plan of care and discharge plan at IDT rounds today with Case Management, Nursing, Nursing leadership, and other members of the IDT team.    Consultants/Specialty  psychiatry    Code Status  Full Code    Disposition  The patient is medically cleared for discharge to home or a post-acute facility.  Anticipate discharge to: a psychiatric hospital    I have placed the appropriate orders for post-discharge needs.    Review of Systems  Review of Systems   Constitutional:  Negative for chills, diaphoresis, fever, malaise/fatigue and weight loss.   HENT:  Negative for tinnitus.    Eyes:  Negative for blurred vision.   Respiratory:  Negative for cough, sputum production and shortness of breath.    Cardiovascular:  Negative for chest pain, palpitations and leg swelling.   Gastrointestinal:  Negative for abdominal pain, constipation, diarrhea, nausea and vomiting.   Genitourinary:  Negative for dysuria, frequency and urgency.   Musculoskeletal:         R arm superficial thrombophlebitis (IMPROVED)   Skin:  Negative for rash.   Neurological:  Negative for dizziness, tingling, tremors, sensory change, speech change, focal weakness, seizures, weakness and headaches.   Psychiatric/Behavioral:  Negative for depression, hallucinations, substance abuse and suicidal ideas. The patient is not nervous/anxious.         Boredom   All other systems reviewed and are negative.       Physical Exam  Temp:  [36.4 °C (97.6 °F)-36.7 °C (98 °F)] 36.7 °C (98 °F)  Pulse:  [70-84] 70  Resp:  [18-20] 18  BP: (124-133)/(78-86) 124/86  SpO2:  [96 %-97 %] 97 %    Physical Exam  Vitals and nursing note reviewed.   Constitutional:       General: He is not in acute distress.     Appearance: Normal  appearance. He is not ill-appearing.   HENT:      Head: Normocephalic and atraumatic.      Right Ear: Tympanic membrane normal.      Left Ear: Tympanic membrane normal.      Mouth/Throat:      Mouth: Mucous membranes are moist.      Pharynx: Oropharynx is clear.   Eyes:      Extraocular Movements: Extraocular movements intact.      Conjunctiva/sclera: Conjunctivae normal.      Pupils: Pupils are equal, round, and reactive to light.   Cardiovascular:      Rate and Rhythm: Normal rate and regular rhythm.      Pulses: Normal pulses.   Pulmonary:      Effort: Pulmonary effort is normal.      Breath sounds: Normal breath sounds.   Abdominal:      General: Abdomen is flat. Bowel sounds are normal.      Palpations: Abdomen is soft.   Musculoskeletal:         General: No swelling, tenderness or deformity. Normal range of motion.      Cervical back: Normal range of motion and neck supple.      Right lower leg: No edema.      Left lower leg: No edema.      Comments: R arm superficial thrombophlebitis improved; no erythema, warmth.  Swelling improved.   Skin:     General: Skin is warm and dry.      Findings: No erythema.   Neurological:      Mental Status: He is oriented to person, place, and time.   Psychiatric:         Behavior: Behavior normal.         Fluids    Intake/Output Summary (Last 24 hours) at 6/17/2023 1257  Last data filed at 6/17/2023 1200  Gross per 24 hour   Intake 720 ml   Output --   Net 720 ml       Laboratory                        Imaging  US-EXTREMITY VENOUS UPPER UNILAT RIGHT   Final Result      MR-BRAIN-W/O   Final Result         No acute intracranial process.      Inflammatory changes involving the paranasal sinuses. Right mastoid effusion.      DX-CHEST-PORTABLE (1 VIEW)   Final Result      Endotracheal tube has been advanced and now terminates in good position above the bhavin      Retrocardiac opacity could be from atelectasis or consolidation      DX-ABDOMEN FOR TUBE PLACEMENT   Final Result       NG tube terminates over the stomach.      DX-CHEST-PORTABLE (1 VIEW)   Final Result      1.  Interval intubation; the endotracheal tube tip is just above the level of the clavicular heads   2.  Lung volumes are low.      CT-HEAD W/O   Final Result      1.  No CT evidence of acute infarct, hemorrhage or mass.   2.  Moderate mucosal thickening of the maxillary, sphenoid and ethmoid sinuses.      DX-CHEST-PORTABLE (1 VIEW)   Final Result      Predominately linear bibasilar opacities, likely atelectasis. No focal consolidation. No effusions.              Assessment/Plan  Problem Representation:    * Drug intoxication with complication, with unspecified complication (HCC)  Assessment & Plan  RESOLVED  Patient was admitted with suspected dyphenhydramine intoxication with unknown amount of tablets and unknown time of normal last seen. Symptoms related includes cutaneous vasodilation (more obvious in the face), mydriasis, and reported halluciations with possible urinary retention. Certainly he developed AMS and respiratory failure. Differential diagnosis include other intoxications, seizures, meningitis, sepsis, etc.  For now will base management on supportive care, consider physostigmine but given suspected seizure and possible concomitant other drug intoxication, will hold for now.  -Initial treatment for seizures with benzos, Keppra  -Poison control case #[id number]  -CT head, MRI negative  -UDS reported  -Neurology, psychiatry consulted  -Improved and currently medically clear, the patient remains on a legal hold  -Plan for inpatient psych transfer    Superficial venous thrombosis of arm, right  Assessment & Plan  RESOLVED  Non traumatic swelling of RUE. US demonstrated acute to subacute occlusive SVT. Swelling has now resolved.  -Warm compresses as needed           VTE prophylaxis: enoxaparin ppx    I have performed a physical exam and reviewed and updated ROS and Plan today (6/17/2023). In review of yesterday's note  (6/16/2023), there are no changes except as documented above.
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
## 2023-06-17 NOTE — CARE PLAN
The patient is Stable - Low risk of patient condition declining or worsening    Shift Goals  Clinical Goals: Safety  Patient Goals: Rest  Family Goals: not present, RAINA    Progress made toward(s) clinical / shift goals:        Problem: Psychosocial  Goal: Patient's ability to identify and develop effective coping behaviors will improve  Outcome: Progressing  Note: Patient educated to voice feelings and concerns, patient voiced understanding, uses call light approprietly     Problem: Skin Integrity  Goal: Skin integrity is maintained or improved  Outcome: Progressing  Note: Patient educated on skin care, walks frequently in the room, skin integrity maintained

## 2023-06-18 PROCEDURE — 99232 SBSQ HOSP IP/OBS MODERATE 35: CPT | Performed by: HOSPITALIST

## 2023-06-18 PROCEDURE — A9270 NON-COVERED ITEM OR SERVICE: HCPCS | Performed by: INTERNAL MEDICINE

## 2023-06-18 PROCEDURE — 700102 HCHG RX REV CODE 250 W/ 637 OVERRIDE(OP): Performed by: INTERNAL MEDICINE

## 2023-06-18 PROCEDURE — 700111 HCHG RX REV CODE 636 W/ 250 OVERRIDE (IP): Performed by: EMERGENCY MEDICINE

## 2023-06-18 PROCEDURE — 770001 HCHG ROOM/CARE - MED/SURG/GYN PRIV*

## 2023-06-18 RX ORDER — AMOXICILLIN 250 MG
2 CAPSULE ORAL 2 TIMES DAILY PRN
Status: DISCONTINUED | OUTPATIENT
Start: 2023-06-18 | End: 2023-06-21 | Stop reason: HOSPADM

## 2023-06-18 RX ORDER — POLYETHYLENE GLYCOL 3350 17 G/17G
1 POWDER, FOR SOLUTION ORAL
Status: DISCONTINUED | OUTPATIENT
Start: 2023-06-18 | End: 2023-06-21 | Stop reason: HOSPADM

## 2023-06-18 RX ORDER — BISACODYL 10 MG
10 SUPPOSITORY, RECTAL RECTAL
Status: DISCONTINUED | OUTPATIENT
Start: 2023-06-18 | End: 2023-06-21 | Stop reason: HOSPADM

## 2023-06-18 RX ADMIN — ENOXAPARIN SODIUM 40 MG: 100 INJECTION SUBCUTANEOUS at 17:14

## 2023-06-18 RX ADMIN — SERTRALINE 100 MG: 100 TABLET, FILM COATED ORAL at 20:30

## 2023-06-18 ASSESSMENT — ENCOUNTER SYMPTOMS
NAUSEA: 0
SHORTNESS OF BREATH: 0
FOCAL WEAKNESS: 0
HALLUCINATIONS: 0
VOMITING: 0
NERVOUS/ANXIOUS: 0
DEPRESSION: 0
SEIZURES: 0
TINGLING: 0
SENSORY CHANGE: 0
COUGH: 0
DIAPHORESIS: 0
FEVER: 0
BLURRED VISION: 0
MYALGIAS: 0
CONSTIPATION: 0
WEAKNESS: 0
DIZZINESS: 0
NECK PAIN: 0
BACK PAIN: 0
HEADACHES: 0
CHILLS: 0
SPEECH CHANGE: 0
PALPITATIONS: 0
ABDOMINAL PAIN: 0
SPUTUM PRODUCTION: 0
TREMORS: 0
WEIGHT LOSS: 0
FALLS: 0
DIARRHEA: 0

## 2023-06-18 ASSESSMENT — PATIENT HEALTH QUESTIONNAIRE - PHQ9
2. FEELING DOWN, DEPRESSED, IRRITABLE, OR HOPELESS: SEVERAL DAYS
7. TROUBLE CONCENTRATING ON THINGS, SUCH AS READING THE NEWSPAPER OR WATCHING TELEVISION: MORE THAN HALF THE DAYS
SUM OF ALL RESPONSES TO PHQ9 QUESTIONS 1 AND 2: 2
5. POOR APPETITE OR OVEREATING: NOT AT ALL
6. FEELING BAD ABOUT YOURSELF - OR THAT YOU ARE A FAILURE OR HAVE LET YOURSELF OR YOUR FAMILY DOWN: MORE THAN HALF THE DAYS
8. MOVING OR SPEAKING SO SLOWLY THAT OTHER PEOPLE COULD HAVE NOTICED. OR THE OPPOSITE, BEING SO FIGETY OR RESTLESS THAT YOU HAVE BEEN MOVING AROUND A LOT MORE THAN USUAL: NOT AT ALL
4. FEELING TIRED OR HAVING LITTLE ENERGY: NOT AT ALL
9. THOUGHTS THAT YOU WOULD BE BETTER OFF DEAD, OR OF HURTING YOURSELF: SEVERAL DAYS
3. TROUBLE FALLING OR STAYING ASLEEP OR SLEEPING TOO MUCH: NOT AT ALL
1. LITTLE INTEREST OR PLEASURE IN DOING THINGS: SEVERAL DAYS
SUM OF ALL RESPONSES TO PHQ QUESTIONS 1-9: 7

## 2023-06-18 ASSESSMENT — FIBROSIS 4 INDEX: FIB4 SCORE: 1.13

## 2023-06-18 ASSESSMENT — LIFESTYLE VARIABLES: SUBSTANCE_ABUSE: 0

## 2023-06-18 NOTE — CARE PLAN
The patient is Watcher - Medium risk of patient condition declining or worsening    Shift Goals  Clinical Goals: Keep Pt safe  Patient Goals: Feel better, rest  Family Goals: delgado    Progress made toward(s) clinical / shift goals:      Problem: Skin Integrity  Goal: Skin integrity is maintained or improved  Outcome: Progressing  Note: Pt remains adlib in his room able to ambulate reposition self with ease. No medical devices. Skin remains intact.     Legal hold 1:1, Sitter at bedside.        Patient is not progressing towards the following goals:

## 2023-06-18 NOTE — CONSULTS
[facility] PSYCHIATRIC FOLLOW-UP:(established)  *Reason for admission:     [place] with [place] and twitching. Pt is awake, alert, and oriented x 1. Incontinent of urine. Pt denies hst of SZ . SA via OD benadryl  *Legal Hold Status: on legal hold                                  S:  no changes, see note from yesterday. Brief psychotherapy today.    O: Medical ROS (as pertinent):                      *Psychiatric Examination:   Vitals:   Vitals:    06/17/23 0714 06/17/23 1603 06/17/23 2005 06/18/23 0805   BP: 124/86 138/82 118/81 128/81   Pulse: 70 83 67 77   Resp: 18 18 16 18   Temp: 36.7 °C (98 °F) 36.5 °C (97.7 °F) 36.3 °C (97.3 °F) 36.3 °C (97.3 °F)   TempSrc: Temporal Temporal Temporal Temporal   SpO2: 97% (!) 83% 94% 95%   Weight:    95.3 kg (210 lb 1.6 oz)   Height:         General Appearance: good eye contact, cooperative  Abnormal Movements: none   Gait and Posture: in bed  Speech: within normal limits  Thought Process: normal rate  Associations:    linear   Abnormal or Psychotic Thoughts: none  Judgement and Insight: fair  Orientation: grossly intact  Recent and Remote Memory: grossly intact  Attention Span and Concentration: intact  Language:fluent  Fund of Knowledge: not tested  Mood and Affect:  somber while talking of past though polite and smiles at times.  SI/HI:  not SI/HI     Current Medications:  Scheduled Medications   Medication Dose Frequency    sertraline  100 mg DAILY    senna-docusate  2 Tablet BID    enoxaparin (LOVENOX) injection  40 mg DAILY AT 1800          *ASSESSMENT/RECOMENDATIONS:  1. Major Depression recurrent severe: but improved      -R/O bipolar 2  2  R/O borderline pers disorder traits  3  Anxiety disorder unspc:  better :ruminates on non urgent things.      Medical:     -HTN  -s/p OD on benadryl with sz and intubation on admit  -superficial thrombus of R forearm    -covid + on 6/7 no symptoms     Legal Status: extended    Observation status:   -line of  site with sitter     Visitors:  no at his request  Personal belongings: yes phone, glasses, TV control and please get contacts out of his wallet.     Medication and Other Recommendations: final orders as per Tx Tm   No changes today.      Will continue to follow with you.       Discharge recommendations: inpt psych     If released from [facility]: Discharge Instructions:  -Reviewed safety plan: 911, ER, PCM, MHC, Suicide crisis line  -Please assist with outpatient Psychiatric/substance use follow up appointments at discharge once medically cleared.

## 2023-06-18 NOTE — CARE PLAN
The patient is Stable - Low risk of patient condition declining or worsening    Shift Goals  Clinical Goals: Monitor patient's safety  Patient Goals: feel better  Family Goals: not present, RAINA    Progress made toward(s) clinical / shift goals:    Problem: Psychosocial  Goal: Patient's ability to identify and develop effective coping behaviors will improve  Description: Target End Date:  1 to 3 days    1.  Present opportunities for the patient to express thoughts, and feelings in a nonjudgmental environment  2.  Help the patient with problem-solving in a constructive manner.  3.  Educate the patient on cognitive-behavioral self-management responses to suicidal thoughts.  4.  Introduce the use of self-expression methods to manage suicidal feelings  5.  Provide emotional support  6.  Encourage identification of positive aspects of self  Outcome: Progressing  Goal: Patient's ability to identify and utilize available support systems will improve  Description: Target End Date:  1 to 3 days    1.  Help patient identify available resources and support systems  2.  Collaborate with interdisciplinary team  3.  Collaborate with patient, family/caregiver and other support systems  Outcome: Progressing       Patient is not progressing towards the following goals:

## 2023-06-18 NOTE — PROGRESS NOTES
[facility] Internal Medicine Daily Progress Note    Date of Service  6/18/2023    R Team: R IM Green Team   Attending: Rogelio Webb M.d.  Senior Resident: Dr. Jeronimo ESPARZA  Intern:  Dr. She ESPARZA  Contact Number: [phone]    Chief Complaint  Bishop Bucio is a 52 y.o. male admitted 5/23/2023 with AMS secondary to Benadryl intoxication and overdose, on legal hold.    Hospital Course  No notes on file    Interval Problem Update  No acute events overnight.  No concerns as per night team or nursing, patient otherwise doing well without any acute complaints today.  Patient states he plans to get up and walk around the room more, states that walking in the hallways depresses him, is hopeful for the future denies any SI or HI today.  14 point review of systems negative separate as below.  Medically clear for discharge to [facility], still pending bed and likely nothing will happen over the weekend. Continue legal hold, 1:1 sitter.    I have discussed this patient's plan of care and discharge plan at IDT rounds today with Case Management, Nursing, Nursing leadership, and other members of the IDT team.    Consultants/Specialty  psychiatry    Code Status  Full Code    Disposition  Medically Cleared  I have placed the appropriate orders for post-discharge needs.    Review of Systems  Review of Systems   Constitutional:  Negative for chills, diaphoresis, fever, malaise/fatigue and weight loss.   HENT:  Negative for tinnitus.    Eyes:  Negative for blurred vision.   Respiratory:  Negative for cough, sputum production and shortness of breath.    Cardiovascular:  Negative for chest pain, palpitations and leg swelling.   Gastrointestinal:  Negative for abdominal pain, constipation, diarrhea, nausea and vomiting.   Genitourinary:  Negative for dysuria, frequency and urgency.   Musculoskeletal:  Negative for back pain, falls, joint pain, myalgias and neck pain.   Skin:  Negative for rash.   Neurological:  Negative for dizziness,  tingling, tremors, sensory change, speech change, focal weakness, seizures, weakness and headaches.   Psychiatric/Behavioral:  Negative for depression, hallucinations, substance abuse and suicidal ideas. The patient is not nervous/anxious.    All other systems reviewed and are negative.       Physical Exam  Temp:  [36.3 °C (97.3 °F)-36.7 °C (98 °F)] 36.3 °C (97.3 °F)  Pulse:  [67-83] 67  Resp:  [16-18] 16  BP: (118-138)/(81-86) 118/81  SpO2:  [83 %-97 %] 94 %    Physical Exam  Vitals and nursing note reviewed.   Constitutional:       General: He is not in acute distress.     Appearance: Normal appearance. He is not ill-appearing.   HENT:      Head: Normocephalic and atraumatic.      Right Ear: Tympanic membrane normal.      Left Ear: Tympanic membrane normal.      Mouth/Throat:      Mouth: Mucous membranes are moist.      Pharynx: Oropharynx is clear.   Eyes:      Extraocular Movements: Extraocular movements intact.      Conjunctiva/sclera: Conjunctivae normal.      Pupils: Pupils are equal, round, and reactive to light.   Cardiovascular:      Rate and Rhythm: Normal rate and regular rhythm.      Pulses: Normal pulses.      Heart sounds: No murmur heard.     No friction rub. No gallop.   Pulmonary:      Effort: Pulmonary effort is normal.      Breath sounds: Normal breath sounds.   Abdominal:      General: Abdomen is flat. Bowel sounds are normal.      Palpations: Abdomen is soft.   Musculoskeletal:         General: No swelling, tenderness or deformity. Normal range of motion.      Cervical back: Normal range of motion and neck supple.      Right lower leg: No edema.      Left lower leg: No edema.      Comments: R arm superficial thrombophlebitis improved; no erythema, warmth.  Swelling improved.   Skin:     General: Skin is warm and dry.      Capillary Refill: Capillary refill takes less than 2 seconds.      Findings: No erythema.   Neurological:      General: No focal deficit present.      Mental Status: He is  oriented to person, place, and time.   Psychiatric:         Behavior: Behavior normal.         Fluids    Intake/Output Summary (Last 24 hours) at 6/18/2023 0702  Last data filed at 6/17/2023 1200  Gross per 24 hour   Intake 600 ml   Output --   Net 600 ml         Laboratory    No new laboratory work-up in the last 48 hours                    Imaging  US-EXTREMITY VENOUS UPPER UNILAT RIGHT   Final Result      MR-BRAIN-W/O   Final Result         No acute intracranial process.      Inflammatory changes involving the paranasal sinuses. Right mastoid effusion.      DX-CHEST-PORTABLE (1 VIEW)   Final Result      Endotracheal tube has been advanced and now terminates in good position above the bhavin      Retrocardiac opacity could be from atelectasis or consolidation      DX-ABDOMEN FOR TUBE PLACEMENT   Final Result      NG tube terminates over the stomach.      DX-CHEST-PORTABLE (1 VIEW)   Final Result      1.  Interval intubation; the endotracheal tube tip is just above the level of the clavicular heads   2.  Lung volumes are low.      CT-HEAD W/O   Final Result      1.  No CT evidence of acute infarct, hemorrhage or mass.   2.  Moderate mucosal thickening of the maxillary, sphenoid and ethmoid sinuses.      DX-CHEST-PORTABLE (1 VIEW)   Final Result      Predominately linear bibasilar opacities, likely atelectasis. No focal consolidation. No effusions.              Assessment/Plan  Problem Representation:    * Drug intoxication with complication, with unspecified complication (HCC)  Assessment & Plan  RESOLVED  Patient was admitted with suspected dyphenhydramine intoxication with unknown amount of tablets and unknown time of normal last seen. Symptoms related includes cutaneous vasodilation (more obvious in the face), mydriasis, and reported halluciations with possible urinary retention. Certainly he developed AMS and respiratory failure. Differential diagnosis include other intoxications, seizures, meningitis, sepsis,  etc.  For now will base management on supportive care, consider physostigmine but given suspected seizure and possible concomitant other drug intoxication, will hold for now.  -Initial treatment for seizures with benzos, Keppra  -Poison control case #[id number]  -CT head, MRI negative  -UDS reported  -Neurology, psychiatry consulted  -Continue to appreciate recommendations from psychiatry.   -Improved and currently medically clear, the patient remains on a legal hold  -Plan for inpatient psych transfer    Superficial venous thrombosis of arm, right  Assessment & Plan  RESOLVED  Non traumatic swelling of RUE. US demonstrated acute to subacute occlusive SVT. Swelling has now resolved.  -Warm compresses as needed           VTE prophylaxis: enoxaparin ppx    I have performed a physical exam and reviewed and updated ROS and Plan today (6/18/2023). In review of yesterday's note (6/17/2023), there are no changes except as documented above.

## 2023-06-19 PROCEDURE — 770001 HCHG ROOM/CARE - MED/SURG/GYN PRIV*

## 2023-06-19 PROCEDURE — 700111 HCHG RX REV CODE 636 W/ 250 OVERRIDE (IP): Performed by: EMERGENCY MEDICINE

## 2023-06-19 PROCEDURE — 99231 SBSQ HOSP IP/OBS SF/LOW 25: CPT | Mod: GC | Performed by: HOSPITALIST

## 2023-06-19 PROCEDURE — A9270 NON-COVERED ITEM OR SERVICE: HCPCS | Performed by: INTERNAL MEDICINE

## 2023-06-19 PROCEDURE — 700102 HCHG RX REV CODE 250 W/ 637 OVERRIDE(OP): Performed by: INTERNAL MEDICINE

## 2023-06-19 RX ADMIN — SERTRALINE 100 MG: 100 TABLET, FILM COATED ORAL at 20:34

## 2023-06-19 RX ADMIN — ENOXAPARIN SODIUM 40 MG: 100 INJECTION SUBCUTANEOUS at 16:43

## 2023-06-19 ASSESSMENT — FIBROSIS 4 INDEX: FIB4 SCORE: 1.13

## 2023-06-19 ASSESSMENT — ENCOUNTER SYMPTOMS
FEVER: 0
PALPITATIONS: 0
CONSTIPATION: 0
VOMITING: 0
SPEECH CHANGE: 0
SEIZURES: 0
SHORTNESS OF BREATH: 0
DIZZINESS: 0
NERVOUS/ANXIOUS: 0
FOCAL WEAKNESS: 0
HALLUCINATIONS: 0
DEPRESSION: 0
NECK PAIN: 0
BACK PAIN: 0
SENSORY CHANGE: 0
DIARRHEA: 0
CHILLS: 0
TINGLING: 0
BLURRED VISION: 0
FALLS: 0
MYALGIAS: 0
HEADACHES: 0
SPUTUM PRODUCTION: 0
COUGH: 0
NAUSEA: 0
ABDOMINAL PAIN: 0
WEIGHT LOSS: 0
DIAPHORESIS: 0
WEAKNESS: 0
TREMORS: 0

## 2023-06-19 ASSESSMENT — PAIN DESCRIPTION - PAIN TYPE: TYPE: ACUTE PAIN

## 2023-06-19 ASSESSMENT — LIFESTYLE VARIABLES: SUBSTANCE_ABUSE: 0

## 2023-06-19 NOTE — DISCHARGE PLANNING
Alert Team Note:    Contacted by [facility], spoke to Shaina. Due to the Holiday, the facility has no medical or pharmacy staff and will not be able to admit this pt today. Pt is on the pending list.

## 2023-06-19 NOTE — CONSULTS
[facility] PSYCHIATRIC FOLLOW-UP:(established)  *Reason for admission:     [place] with AMS and twitching. Pt is awake, alert, and oriented x 1. Incontinent of urine. Pt denies hst of SZ . SA via OD benadryl  *Legal Hold Status: on legal hold                                          S:  heavily asleep. Chart reviewed. Taking meds. No concerns documented    O: Medical ROS (as pertinent):                      *Psychiatric Examination:   Vitals:   Vitals:    06/17/23 2005 06/18/23 0805 06/18/23 2006 06/19/23 0820   BP: 118/81 128/81 123/76 139/58   Pulse: 67 77 94 73   Resp: 16 18 20 18   Temp: 36.3 °C (97.3 °F) 36.3 °C (97.3 °F) 36.6 °C (97.9 °F) 36.2 °C (97.2 °F)   TempSrc: Temporal Temporal Temporal Temporal   SpO2: 94% 95% 95% 96%   Weight:  95.3 kg (210 lb 1.6 oz)     Height:         General Appearance:  poor eye contact  Abnormal Movements: none   Gait and Posture: not observed  Speech: none  Thought Process: unable to assess  Associations:   unable to assess  Abnormal or Psychotic Thoughts: unable to assess  Judgement and Insight: unable to assess  Orientation: unable to determine  Recent and Remote Memory: unable to determine  Attention Span and Concentration: diminished  Language:unable to assess  Fund of Knowledge: unable to assess  Mood and Affect: unable to asssess  SI/HI:  unable to assess        Current Medications:  Scheduled Medications   Medication Dose Frequency    sertraline  100 mg DAILY    enoxaparin (LOVENOX) injection  40 mg DAILY AT 1800          *ASSESSMENT/RECOMENDATIONS:  1. Major Depression recurrent severe:        -R/O bipolar 2  2  R/O borderline pers disorder traits  3  Anxiety disorder unspc:  better :ruminates on non urgent things.      Medical:     -HTN  -s/p OD on benadryl with sz and intubation on admit  -superficial thrombus of R forearm    -covid + on 6/7 no symptoms     Legal Status: extended    Observation status:   -line of site with sitter      Visitors:  no at his request  Personal belongings: yes phone, glasses, TV control and please get contacts out of his wallet.     Medication and Other Recommendations: final orders as per Tx Tm   No changes today.      Will continue to follow with you.       Discharge recommendations: inpt psych     If released from [facility]: Discharge Instructions:  -Reviewed safety plan: 911, ER, PCM, MHC, Suicide crisis line  -Please assist with outpatient Psychiatric/substance use follow up appointments at discharge once medically cleared.

## 2023-06-19 NOTE — CARE PLAN
Problem: Psychosocial  Goal: Patient's level of anxiety will decrease  Outcome: Progressing     Problem: Psychosocial  Goal: Patient's ability to identify and utilize available support systems will improve  Outcome: Progressing   The patient is Stable - Low risk of patient condition declining or worsening    Shift Goals  Clinical Goals: monitor safety  Patient Goals: rest  Family Goals: RAINA    Progress made toward(s) clinical / shift goals:  Pt with positive affect, hopeful outlook for inpatient psych.     Patient is not progressing towards the following goals:

## 2023-06-19 NOTE — CARE PLAN
The patient is Stable - Low risk of patient condition declining or worsening    Shift Goals  Clinical Goals: monitor safety  Patient Goals: sleep, feel better  Family Goals: RAINA    Progress made toward(s) clinical / shift goals:    Problem: Psychosocial  Goal: Patient's level of anxiety will decrease  Outcome: Progressing  Goal: Patient's ability to verbalize feelings about condition will improve  Outcome: Progressing  Goal: Patient's ability to re-evaluate and adapt role responsibilities will improve  Outcome: Progressing     Problem: Psychosocial  Goal: Patient's ability to identify and develop effective coping behaviors will improve  Outcome: Progressing  Note: Patient stable, able to identify feelings and triggers.   Goal: Patient's ability to identify and utilize available support systems will improve  Outcome: Progressing     Problem: Skin Integrity  Goal: Skin integrity is maintained or improved  Outcome: Progressing  Note: Skin intact this shift. No new skin breakdown noted overnight.         Patient is not progressing towards the following goals:

## 2023-06-19 NOTE — PROGRESS NOTES
"[facility] Internal Medicine Daily Progress Note    Date of Service  6/19/2023    R Team: R IM Green Team   Attending: Rogelio Webb M.d.  Senior Resident: Dr. Nicole MD  Intern:  Dr. Carol MD  Contact Number: [phone]    Chief Complaint  Bishop Bucio is a 52 y.o. male admitted 5/23/2023 with AMS secondary to Benadryl intoxication and overdose, on legal hold.    Hospital Course  Bishop Bucio is  52 year-old male with past medical history of depression who presented on 5/23/2023 [place]. Admitted for altered mental status secondary to benadryl overdose (empty bottle found at his side). Patient had GCS 6 and intubated in ED for airway control, successfully extubated on 5/24/2023. Seizure event was suspected as patient had \"deviated gaze and nonverbal\" per emergency department staff so he was started on ativan and keppra. MRI and EEG were unremarkable. He was started on keppra 1000mg BID in the critical care unit. He then underwent Keppra taper by neurology and was successfully weaned off. His metabolic acidosis on admission was resolved by discharge. Hypokalemia also resolved. Psychiatry placed patient on legal hold secondary to suicidal ideation and medication overdose with intention. They continued to follow patient and he was started on sertraline 100mg (administer at night as patient complains of drowsiness, fogginess) which he will be discharged with. Hospitalization was complicated by US of RUE showing superficial thrombophlebitis, however did not need any intervention and showed improvement with supportive management with warm compresses, etc. He was medically clear for discharge on 5/27/2023 but unfortunately inpatient stay was prolonged due to [facility] of bed [facility].  Plan was to discharge patient to [facility] however due to positive COVID test on 6/7, patient required inpatient stay for 5 more days per facility protocol and medically clear on " 6/11.    Interval Problem Update  No acute events overnight.  Patient denies any acute complaints this morning.  Reports feeling well without any SI or HI.  Patient tested positive COVID-19 infection on 06/7 following which he has been having on and off loose bowel movements.  We will continue to monitor.  Patient is medically cleared for discharge to IP psych, still pending bed availability.    Interval update  -Psychiatry on board.  Continue legal hold.  -Medically cleared pending bed availability in IP psych facility.    I have discussed this patient's plan of care and discharge plan at IDT rounds today with Case Management, Nursing, Nursing leadership, and other members of the IDT team.    Consultants/Specialty  psychiatry    Code Status  Full Code    Disposition  The patient is medically cleared for discharge to home or a post-acute facility.      I have placed the appropriate orders for post-discharge needs.    Review of Systems  Review of Systems   Constitutional:  Negative for chills, diaphoresis, fever, malaise/fatigue and weight loss.   HENT:  Negative for tinnitus.    Eyes:  Negative for blurred vision.   Respiratory:  Negative for cough, sputum production and shortness of breath.    Cardiovascular:  Negative for chest pain, palpitations and leg swelling.   Gastrointestinal:  Negative for abdominal pain, constipation, diarrhea, nausea and vomiting.   Genitourinary:  Negative for dysuria, frequency and urgency.   Musculoskeletal:  Negative for back pain, falls, joint pain, myalgias and neck pain.   Skin:  Negative for rash.   Neurological:  Negative for dizziness, tingling, tremors, sensory change, speech change, focal weakness, seizures, weakness and headaches.   Psychiatric/Behavioral:  Negative for depression, hallucinations, substance abuse and suicidal ideas. The patient is not nervous/anxious.    All other systems reviewed and are negative.       Physical Exam  Temp:  [36.2 °C (97.2 °F)-36.6 °C (97.9  °F)] 36.2 °C (97.2 °F)  Pulse:  [73-94] 73  Resp:  [18-20] 18  BP: (123-139)/(58-76) 139/58  SpO2:  [95 %-96 %] 96 %    Physical Exam  Vitals and nursing note reviewed.   Constitutional:       General: He is not in acute distress.     Appearance: Normal appearance. He is not ill-appearing.   HENT:      Head: Normocephalic and atraumatic.      Right Ear: Tympanic membrane normal.      Left Ear: Tympanic membrane normal.      Mouth/Throat:      Mouth: Mucous membranes are moist.      Pharynx: Oropharynx is clear.   Eyes:      Extraocular Movements: Extraocular movements intact.      Conjunctiva/sclera: Conjunctivae normal.      Pupils: Pupils are equal, round, and reactive to light.   Cardiovascular:      Rate and Rhythm: Normal rate and regular rhythm.      Pulses: Normal pulses.      Heart sounds: No murmur heard.     No friction rub. No gallop.   Pulmonary:      Effort: Pulmonary effort is normal.      Breath sounds: Normal breath sounds.   Abdominal:      General: Abdomen is flat. Bowel sounds are normal.      Palpations: Abdomen is soft.   Musculoskeletal:         General: No swelling, tenderness or deformity. Normal range of motion.      Cervical back: Normal range of motion and neck supple.      Right lower leg: No edema.      Left lower leg: No edema.      Comments: R arm superficial thrombophlebitis improved; no erythema, warmth.  Swelling improved.   Skin:     General: Skin is warm and dry.      Capillary Refill: Capillary refill takes less than 2 seconds.      Findings: No erythema.   Neurological:      General: No focal deficit present.      Mental Status: He is oriented to person, place, and time.   Psychiatric:         Behavior: Behavior normal.         Fluids    Intake/Output Summary (Last 24 hours) at 6/19/2023 1549  Last data filed at 6/18/2023 1700  Gross per 24 hour   Intake 360 ml   Output --   Net 360 ml       Laboratory    No new laboratory work-up in the last 48 hours                     Imaging  US-EXTREMITY VENOUS UPPER UNILAT RIGHT   Final Result      MR-BRAIN-W/O   Final Result         No acute intracranial process.      Inflammatory changes involving the paranasal sinuses. Right mastoid effusion.      DX-CHEST-PORTABLE (1 VIEW)   Final Result      Endotracheal tube has been advanced and now terminates in good position above the bhavin      Retrocardiac opacity could be from atelectasis or consolidation      DX-ABDOMEN FOR TUBE PLACEMENT   Final Result      NG tube terminates over the stomach.      DX-CHEST-PORTABLE (1 VIEW)   Final Result      1.  Interval intubation; the endotracheal tube tip is just above the level of the clavicular heads   2.  Lung volumes are low.      CT-HEAD W/O   Final Result      1.  No CT evidence of acute infarct, hemorrhage or mass.   2.  Moderate mucosal thickening of the maxillary, sphenoid and ethmoid sinuses.      DX-CHEST-PORTABLE (1 VIEW)   Final Result      Predominately linear bibasilar opacities, likely atelectasis. No focal consolidation. No effusions.              Assessment/Plan  Problem Representation:    * Drug intoxication with complication, with unspecified complication (HCC)  Assessment & Plan  RESOLVED  Patient was admitted with suspected dyphenhydramine intoxication with unknown amount of tablets and unknown time of normal last seen. Symptoms related includes cutaneous vasodilation (more obvious in the face), mydriasis, and reported halluciations with possible urinary retention. Certainly he developed AMS and respiratory failure. Differential diagnosis include other intoxications, seizures, meningitis, sepsis, etc.  For now will base management on supportive care, consider physostigmine but given suspected seizure and possible concomitant other drug intoxication, will hold for now.  -Initial treatment for seizures with benzos Keppra  -Poison control case #[id number]  -CT head, MRI negative  -UDS reported  -Neurology, psychiatry  consulted  -Continue to appreciate recommendations from psychiatry.   -Improved and currently medically clear, the patient remains on a legal hold  -Plan for inpatient psych transfer    Superficial venous thrombosis of arm, right  Assessment & Plan  RESOLVED  Non traumatic swelling of RUE. US demonstrated acute to subacute occlusive SVT. Swelling has now resolved.  -Warm compresses as needed           VTE prophylaxis: enoxaparin ppx    I have performed a physical exam and reviewed and updated ROS and Plan today (6/19/2023). In review of yesterday's note (6/18/2023), there are no changes except as documented above.

## 2023-06-20 ENCOUNTER — PATIENT OUTREACH (OUTPATIENT)
Dept: SCHEDULING | Facility: IMAGING CENTER | Age: 53
End: 2023-06-20
Payer: COMMERCIAL

## 2023-06-20 PROCEDURE — 700102 HCHG RX REV CODE 250 W/ 637 OVERRIDE(OP): Performed by: INTERNAL MEDICINE

## 2023-06-20 PROCEDURE — 770001 HCHG ROOM/CARE - MED/SURG/GYN PRIV*

## 2023-06-20 PROCEDURE — 87635 SARS-COV-2 COVID-19 AMP PRB: CPT

## 2023-06-20 PROCEDURE — 700111 HCHG RX REV CODE 636 W/ 250 OVERRIDE (IP): Performed by: EMERGENCY MEDICINE

## 2023-06-20 PROCEDURE — A9270 NON-COVERED ITEM OR SERVICE: HCPCS | Performed by: INTERNAL MEDICINE

## 2023-06-20 PROCEDURE — 99233 SBSQ HOSP IP/OBS HIGH 50: CPT | Mod: GC | Performed by: HOSPITALIST

## 2023-06-20 RX ADMIN — SERTRALINE 100 MG: 100 TABLET, FILM COATED ORAL at 20:33

## 2023-06-20 RX ADMIN — ENOXAPARIN SODIUM 40 MG: 100 INJECTION SUBCUTANEOUS at 17:37

## 2023-06-20 ASSESSMENT — ENCOUNTER SYMPTOMS
SHORTNESS OF BREATH: 0
DIARRHEA: 0
ABDOMINAL PAIN: 0
BACK PAIN: 0
SPUTUM PRODUCTION: 0
WEAKNESS: 0
COUGH: 0
SPEECH CHANGE: 0
DEPRESSION: 0
FOCAL WEAKNESS: 0
SENSORY CHANGE: 0
CHILLS: 0
VOMITING: 0
DIAPHORESIS: 0
BLURRED VISION: 0
HALLUCINATIONS: 0
HEADACHES: 0
SEIZURES: 0
NECK PAIN: 0
FEVER: 0
NERVOUS/ANXIOUS: 0
PALPITATIONS: 0
NAUSEA: 0
TINGLING: 0
WEIGHT LOSS: 0
FALLS: 0
TREMORS: 0
DIZZINESS: 0
MYALGIAS: 0
CONSTIPATION: 0

## 2023-06-20 ASSESSMENT — LIFESTYLE VARIABLES: SUBSTANCE_ABUSE: 0

## 2023-06-20 NOTE — PROGRESS NOTES
"[facility] Internal Medicine Daily Progress Note    Date of Service  6/20/2023    R Team: R IM Green Team   Attending: Rogelio Webb M.d.  Senior Resident: Dr. Nicole MD  Intern:  Dr. Carol MD  Contact Number: [phone]    Chief Complaint  Bishop Bucio is a 52 y.o. male admitted 5/23/2023 with AMS secondary to Benadryl intoxication and overdose, on legal hold.    Hospital Course  Bishop Bucio is  52 year-old male with past medical history of depression who presented [place] 5[place]23/[place]. Admitted for altered mental status secondary to benadryl overdose (empty bottle found at his side). Patient had GCS 6 and intubated in ED for airway control, successfully extubated on 5/24/2023. Seizure event was suspected as patient had \"deviated gaze and nonverbal\" per emergency department staff so he was started on ativan and keppra. MRI and EEG were unremarkable. He was started on keppra 1000mg BID in the critical care unit. He then underwent Keppra taper by neurology and was successfully weaned off. His metabolic acidosis on admission was resolved by discharge. Hypokalemia also resolved. Psychiatry placed patient on legal hold secondary to suicidal ideation and medication overdose with intention. They continued to follow patient and he was started on sertraline 100mg (administer at night as patient complains of drowsiness, fogginess) which he will be discharged with. Hospitalization was complicated by US of RUE showing superficial thrombophlebitis, however did not need any intervention and showed improvement with supportive management with warm compresses, etc. He was medically clear for discharge on 5/27/2023 but unfortunately inpatient stay was prolonged due to lack of bed availability at the [facility].  Plan was to discharge patient to [facility] however due to positive COVID test on 6/7, patient required inpatient stay for 5 more days per facility protocol and medically clear on " 6/11.    Interval Problem Update  No acute events overnight.  Patient denies any acute complaints this morning.  Reports feeling well without any SI or HI. Patient is medically cleared for discharge to IP psych, still pending bed availability.    Interval update  -Psychiatry on board.  Continue legal hold.  -Medically cleared pending bed availability in IP psych facility.    I have discussed this patient's plan of care and discharge plan at IDT rounds today with Case Management, Nursing, Nursing leadership, and other members of the IDT team.    Consultants/Specialty  psychiatry    Code Status  Full Code    Disposition  Medically Cleared  I have placed the appropriate orders for post-discharge needs.    Review of Systems  Review of Systems   Constitutional:  Negative for chills, diaphoresis, fever, malaise/fatigue and weight loss.   HENT:  Negative for tinnitus.    Eyes:  Negative for blurred vision.   Respiratory:  Negative for cough, sputum production and shortness of breath.    Cardiovascular:  Negative for chest pain, palpitations and leg swelling.   Gastrointestinal:  Negative for abdominal pain, constipation, diarrhea, nausea and vomiting.   Genitourinary:  Negative for dysuria, frequency and urgency.   Musculoskeletal:  Negative for back pain, falls, joint pain, myalgias and neck pain.   Skin:  Negative for rash.   Neurological:  Negative for dizziness, tingling, tremors, sensory change, speech change, focal weakness, seizures, weakness and headaches.   Psychiatric/Behavioral:  Negative for depression, hallucinations, substance abuse and suicidal ideas. The patient is not nervous/anxious.    All other systems reviewed and are negative.       Physical Exam  Temp:  [36.1 °C (96.9 °F)-36.6 °C (97.9 °F)] 36.1 °C (96.9 °F)  Pulse:  [] 104  Resp:  [18] 18  BP: (121-145)/(72-96) 145/96  SpO2:  [94 %-96 %] 96 %    Physical Exam  Vitals and nursing note reviewed.   Constitutional:       General: He is not in  acute distress.     Appearance: Normal appearance. He is not ill-appearing.   HENT:      Head: Normocephalic and atraumatic.      Right Ear: Tympanic membrane normal.      Left Ear: Tympanic membrane normal.      Mouth/Throat:      Mouth: Mucous membranes are moist.      Pharynx: Oropharynx is clear.   Eyes:      Extraocular Movements: Extraocular movements intact.      Conjunctiva/sclera: Conjunctivae normal.      Pupils: Pupils are equal, round, and reactive to light.   Cardiovascular:      Rate and Rhythm: Normal rate and regular rhythm.      Pulses: Normal pulses.      Heart sounds: No murmur heard.     No friction rub. No gallop.   Pulmonary:      Effort: Pulmonary effort is normal.      Breath sounds: Normal breath sounds.   Abdominal:      General: Abdomen is flat. Bowel sounds are normal.      Palpations: Abdomen is soft.   Musculoskeletal:         General: No swelling, tenderness or deformity. Normal range of motion.      Cervical back: Normal range of motion and neck supple.      Right lower leg: No edema.      Left lower leg: No edema.      Comments: R arm superficial thrombophlebitis improved; no erythema, warmth.  Swelling improved.   Skin:     General: Skin is warm and dry.      Capillary Refill: Capillary refill takes less than 2 seconds.      Findings: No erythema.   Neurological:      General: No focal deficit present.      Mental Status: He is oriented to person, place, and time.   Psychiatric:         Behavior: Behavior normal.         Fluids    Intake/Output Summary (Last 24 hours) at 6/20/2023 1358  Last data filed at 6/20/2023 1300  Gross per 24 hour   Intake 600 ml   Output --   Net 600 ml         Laboratory    No new laboratory work-up in the last 48 hours                    Imaging  US-EXTREMITY VENOUS UPPER UNILAT RIGHT   Final Result      MR-BRAIN-W/O   Final Result         No acute intracranial process.      Inflammatory changes involving the paranasal sinuses. Right mastoid effusion.       DX-CHEST-PORTABLE (1 VIEW)   Final Result      Endotracheal tube has been advanced and now terminates in good position above the bhavin      Retrocardiac opacity could be from atelectasis or consolidation      DX-ABDOMEN FOR TUBE PLACEMENT   Final Result      NG tube terminates over the stomach.      DX-CHEST-PORTABLE (1 VIEW)   Final Result      1.  Interval intubation; the endotracheal tube tip is just above the level of the clavicular heads   2.  Lung volumes are low.      CT-HEAD W/O   Final Result      1.  No CT evidence of acute infarct, hemorrhage or mass.   2.  Moderate mucosal thickening of the maxillary, sphenoid and ethmoid sinuses.      DX-CHEST-PORTABLE (1 VIEW)   Final Result      Predominately linear bibasilar opacities, likely atelectasis. No focal consolidation. No effusions.              Assessment/Plan  Problem Representation:    * Drug intoxication with complication, with unspecified complication (HCC)  Assessment & Plan  RESOLVED  Patient was admitted with suspected dyphenhydramine intoxication with unknown amount of tablets and unknown time of normal last seen. Symptoms related includes cutaneous vasodilation (more obvious in the face), mydriasis, and reported halluciations with possible urinary retention. Certainly he developed AMS and respiratory failure. Differential diagnosis include other intoxications, seizures, meningitis, sepsis, etc.  For now will base management on supportive care, consider physostigmine but given suspected seizure and possible concomitant other drug intoxication, will hold for now.  -Initial treatment for seizures with benzos, Keppra  -Poison control case #[id number]  -CT head, MRI negative  -UDS reported  -Neurology, psychiatry consulted  -Continue to appreciate recommendations from psychiatry.   -Improved and currently medically clear, the patient remains on a legal hold  -Plan for inpatient psych transfer    Superficial venous thrombosis of arm, right  Assessment &  Plan  RESOLVED  Non traumatic swelling of RUE. US demonstrated acute to subacute occlusive SVT. Swelling has now resolved.  -Warm compresses as needed           VTE prophylaxis: enoxaparin ppx    I have performed a physical exam and reviewed and updated ROS and Plan today (6/20/2023). In review of yesterday's note (6/19/2023), there are no changes except as documented above.

## 2023-06-20 NOTE — DISCHARGE PLANNING
Legal Hold     Referral: Legal Hold Court     Intervention: Pt presented for legal hold meeting with  via video conferencing to discuss legal options of contesting hold and meeting with the court doctors tomorrow afternoon, or not contesting legal hold and continuing the hold for one week.  advised that pt's legal hold will be be continued for one week (6/29).       Plan: Pt's legal hold has been continued for one week. Pt awaiting transfer to an in patient psych facility.

## 2023-06-20 NOTE — DISCHARGE SUMMARY
"[facility] Internal Medicine Discharge Summary    Attending: Rogelio Webb M.d.  Senior Resident: Dr. Rivera  Intern:  Dr. Medina  Contact Number: [phone]    CHIEF COMPLAINT ON ADMISSION  Chief Complaint   Patient presents with    ALOC     Pt [place] with AMS and twitching. Pt is awake, alert, and oriented x 1. Incontinent of urine. Pt denies hst of SZ        Reason for Admission  EMS     Admission Date  5/23/2023    CODE STATUS  Full Code    HPI & HOSPITAL COURSE  Bishop Bucio is  52 year-old male with past medical history of depression who presented on 5/23/2023 [place]. Admitted for altered mental status secondary to benadryl overdose (empty bottle found at his side). Patient had GCS 6 and intubated in ED for airway control, successfully extubated on 5/24/2023. Seizure event was suspected as patient had \"deviated gaze and nonverbal\" per emergency department staff so he was started on ativan and keppra. MRI and EEG were unremarkable. He was started on keppra 1000mg BID in the critical care unit. He then underwent Keppra taper by neurology and was successfully weaned off. His metabolic acidosis on admission was resolved by discharge. Hypokalemia also resolved. Psychiatry placed patient on legal hold secondary to suicidal ideation and medication overdose with intention. They continued to follow patient and he was started on sertraline 100mg (administer at night as patient complains of drowsiness, fogginess) which he will be discharged with. Hospitalization was complicated by US of RUE showing superficial thrombophlebitis, however did not need any intervention and showed improvement with supportive management with warm compresses, etc. He was medically clear for discharge on 5/27/2023 but unfortunately inpatient stay was prolonged due to lack of bed availability at the [facility].  Plan was to discharge patient to [facility] however due to positive COVID test on 6/7, patient required " inpatient stay for additional 5 more days per facility protocol and has been medically stable since 6/11. Patient was accepted for transfer to [facility] on 6/21/23.       Therefore, he is discharged in good and stable condition to a psychiatric hospital.    The patient met 2-midnight criteria for an inpatient stay at the time of discharge.    Discharge Date  6/21/23    Physical Exam on Day of Discharge    Physical Exam  Vitals and nursing note reviewed.   Constitutional:       General: He is not in acute distress.     Appearance: Normal appearance. He is not ill-appearing.   HENT:      Head: Normocephalic and atraumatic.      Right Ear: Tympanic membrane normal.      Left Ear: Tympanic membrane normal.      Mouth/Throat:      Mouth: Mucous membranes are moist.      Pharynx: Oropharynx is clear.   Eyes:      Extraocular Movements: Extraocular movements intact.      Conjunctiva/sclera: Conjunctivae normal.      Pupils: Pupils are equal, round, and reactive to light.   Cardiovascular:      Rate and Rhythm: Normal rate and regular rhythm.      Pulses: Normal pulses.      Heart sounds: No murmur heard.     No friction rub. No gallop.   Pulmonary:      Effort: Pulmonary effort is normal.      Breath sounds: Normal breath sounds.   Abdominal:      General: Abdomen is flat. Bowel sounds are normal.      Palpations: Abdomen is soft.   Musculoskeletal:         General: No swelling, tenderness or deformity. Normal range of motion.      Cervical back: Normal range of motion and neck supple.      Right lower leg: No edema.      Left lower leg: No edema.      Comments: R arm superficial thrombophlebitis improved; no erythema, warmth.  Swelling improved.   Skin:     General: Skin is warm and dry.      Capillary Refill: Capillary refill takes less than 2 seconds.      Findings: No erythema.   Neurological:      General: No focal deficit present.      Mental Status: He is oriented to person, place, and time.   Psychiatric:          Behavior: Behavior normal.     FOLLOW UP ITEMS POST DISCHARGE  Continue titrating psychiatric meds as appropriate     DISCHARGE DIAGNOSES  Principal Problem:    Drug intoxication with complication, with unspecified complication (HCC) (POA: Unknown)  Active Problems:    Superficial venous thrombosis of arm, right (POA: Unknown)  Resolved Problems:    Acute respiratory failure with hypoxia (HCC) (POA: Yes)    Elevated CPK (POA: Yes)    Metabolic acidosis (POA: Yes)    Hypokalemia (POA: Yes)    AMS (altered mental status) (POA: Yes)    Suicide attempt by drug ingestion (HCC) (POA: Yes)    Complaint of nasal congestion (POA: Unknown)      FOLLOW UP  No future appointments.  No follow-up provider specified.    MEDICATIONS ON DISCHARGE     Medication List        START taking these medications        Instructions   sertraline 100 MG Tabs  Commonly known as: Zoloft   Take 1 Tablet by mouth every day.  Dose: 100 mg              Allergies  Allergies   Allergen Reactions    Pcn [Penicillins] Rash     Per patient        DIET  Orders Placed This Encounter   Procedures    Discontinue Diet Tray     Standing Status:   Standing     Number of Occurrences:   1    Diet Order Diet: Regular     Standing Status:   Standing     Number of Occurrences:   1     Order Specific Question:   Diet:     Answer:   Regular [1]       ACTIVITY  As tolerated.  Weight bearing as tolerated    LINES, DRAINS, AND WOUNDS  This is an automated list. Peripheral IVs will be removed prior to discharge.                     MENTAL STATUS ON TRANSFER      A&Ox4, baseline        CONSULTATIONS  Psychiatry    PROCEDURES  N/A    LABORATORY  Lab Results   Component Value Date    SODIUM 140 05/26/2023    POTASSIUM 3.9 05/26/2023    CHLORIDE 105 05/26/2023    CO2 22 05/26/2023    GLUCOSE 89 05/26/2023    BUN 11 05/26/2023    CREATININE 0.86 05/26/2023        Lab Results   Component Value Date    WBC 8.4 05/26/2023    HEMOGLOBIN 14.6 05/26/2023    HEMATOCRIT 42.7  05/26/2023    PLATELETCT 273 05/26/2023        Total time of the discharge process exceeds 28 minutes.

## 2023-06-20 NOTE — DISCHARGE PLANNING
"Alert Team Note:    Per an [employer] message yesterday from Shaina with [facility], \"Dr. Garza said we can take him tomorrow or Wednesday pending staffing here. I assume he has had a new COVID test recently?\" Writer had requested a Covid test be ordered yesterday from Dr. Medina and reached out to HUBER Mallory to see if it can be collected. Per [place], she will have it collected soon.   "

## 2023-06-20 NOTE — CARE PLAN
The patient is Stable - Low risk of patient condition declining or worsening    Shift Goals  Clinical Goals: monitor safety  Patient Goals: rest  Family Goals: RAINA    Progress made toward(s) clinical / shift goals:    Problem: Psychosocial  Goal: Patient's level of anxiety will decrease  Outcome: Progressing  Goal: Patient's ability to verbalize feelings about condition will improve  Outcome: Progressing     Problem: Psychosocial  Goal: Patient's ability to identify and develop effective coping behaviors will improve  Outcome: Progressing  Goal: Patient's ability to identify and utilize available support systems will improve  Outcome: Progressing     Problem: Skin Integrity  Goal: Skin integrity is maintained or improved  Outcome: Progressing  Note: Skin intact this shift. No new skin breakdown noted overnight.       Problem: Psychosocial  Goal: Patient's level of anxiety will decrease  Outcome: Progressing  Goal: Patient's ability to verbalize feelings about condition will improve  Outcome: Progressing     Problem: Psychosocial  Goal: Patient's ability to identify and develop effective coping behaviors will improve  Outcome: Progressing  Goal: Patient's ability to identify and utilize available support systems will improve  Outcome: Progressing     Problem: Skin Integrity  Goal: Skin integrity is maintained or improved  Outcome: Progressing  Note: Skin intact this shift. No new skin breakdown noted overnight.         Patient is not progressing towards the following goals:

## 2023-06-20 NOTE — CONSULTS
"  [facility] PSYCHIATRIC FOLLOW-UP:(established)    DOS: 06/20/23     Reason for admission: [place] with AMS and twitching. Pt is awake, alert, and oriented x 1. Incontinent of urine. Pt denies hst of SZ . SA via OD benadryl  Legal Hold Status: on legal hold      Chief Complaint:   Chief Complaint   Patient presents with    ALOC     Pt found [place] river with AMS and twitching. Pt is awake, alert, and oriented x 1. Incontinent of urine. Pt denies hst of SZ                S:  Seen today as f/u psych consult.   Observed laying in bed. Denies SI/HI, A/VH, no Sx of psychosis/joanne reported or observed. Expresses periods of depression and negative thinking r/t length of hospitalization and inability to transition to IP MH facility. Reports decreased energy, and sleep r/t racing thoughts, some daytime resting. Appears to be tolerating medications; denies GI distress, HA, dizziness.     O: Medical ROS (as pertinent): No new changes reported to this writer.    No results for input(s): WBC, RBC, HEMOGLOBIN, HEMATOCRIT, MCV, MCH, RDW, PLATELETCT, MPV, NEUTSPOLYS, LYMPHOCYTES, MONOCYTES, EOSINOPHILS, BASOPHILS, RBCMORPHOLO in the last 72 hours.  No results for input(s): SODIUM, POTASSIUM, CHLORIDE, CO2, GLUCOSE, BUN, CPKTOTAL in the last 72 hours.  No results for input(s): ALBUMIN, TBILIRUBIN, ALKPHOSPHAT, TOTPROTEIN, ALTSGPT, ASTSGOT, CREATININE in the last 72 hours.      PSYCHIATRIC EXAM (MSE):   BP (!) 145/96   Pulse (!) 104   Temp 36.1 °C (96.9 °F) (Temporal)   Resp 18   Ht 1.778 m (5' 10\")   Wt 89.8 kg (197 lb 15.6 oz)   SpO2 96%       Constitutional: as noted above  General Appearance/Behavior: 52 y.o. appears stated age, obese good eye contact cooperative, No behavioral disturbances  Abnormal Movements: none, no PMA/PMR or tremor observed.  Gait and Posture: not observed  Musculoskeletal: as noted above  Mood: \"fair\"  Affect: Mood/Congruent and Appropriate   Speech: normal rate, normal rhythm, " normal tone, normal volume, and normal fluency  Language:  spontaneous, comprehends spoken commands, and fluent   Thought Process: Logical and Goal Directed,  Future Oriented  Thought Content: Denies SI/HI. Denies A/VH, no e/o delusions, PI, or internal preoccupation.   Insight/Judgement:  fair  Alert/Orientation: alert, oriented to person, place and time  Attn/Concentration: normal  Fund of Knowledge: Average  Memory recent/remote: not tested  MMSE: deferred this visit          Meds Current:  Scheduled Medications   Medication Dose Frequency    sertraline  100 mg DAILY    enoxaparin (LOVENOX) injection  40 mg DAILY AT 1800     Allergies:   Allergies   Allergen Reactions    Pcn [Penicillins] Rash     Per patient           ASSESSMENT:   1. Altered mental status, unspecified altered mental status type    2. Drug intoxication with complication, with unspecified complication (HCC)       Psychiatric:   Major depressive disorder, recurrent  R/o adjustment disorder     Medical:   ARF with hypoxia  S/p o/d  HTN        RECOMMENDATIONS:  Legal Status: on legal hold     Please transfer patient to inpatient psychiatric hospital when medically cleared and bed is available.     Observation status:   Line of site with sitter     Visitors: No   Personal belongings: Yes, limited to items discussed per nursing discretion     Medication Recommendations: Final orders as per Treatment Team  No new medication recommendations; continue Zoloft 100mg PO daily  Risks/benefits/side effects discussed, patient verbalized understanding     Reviewed safety plan: 911, ER, PCM, MHC, suicide crisis line, nursing staff while inpatient.     Will Continue to Follow. Thank you for the consult.

## 2023-06-21 VITALS
RESPIRATION RATE: 18 BRPM | OXYGEN SATURATION: 93 % | HEIGHT: 70 IN | BODY MASS INDEX: 28.34 KG/M2 | DIASTOLIC BLOOD PRESSURE: 78 MMHG | WEIGHT: 197.97 LBS | TEMPERATURE: 96.8 F | SYSTOLIC BLOOD PRESSURE: 138 MMHG | HEART RATE: 98 BPM

## 2023-06-21 LAB
SARS-COV-2 RNA RESP QL NAA+PROBE: DETECTED
SPECIMEN SOURCE: ABNORMAL

## 2023-06-21 PROCEDURE — 99238 HOSP IP/OBS DSCHRG MGMT 30/<: CPT | Mod: GC | Performed by: HOSPITALIST

## 2023-06-21 NOTE — CONSULTS
"  [facility] PSYCHIATRIC FOLLOW-UP:(established)    DOS: 06/21/23     Reason for [place]: [place] A[place] and twitching. Pt is awake, alert, and oriented x 1. Incontinent of urine. Pt denies hst of SZ . SA via OD benadryl  Legal Hold Status: on legal hold      Chief Complaint:   Chief Complaint   Patient presents with    ALOC     Pt [place] river with AMS and twitching. Pt is awake, alert, and oriented x 1. Incontinent of urine. Pt denies hst of SZ                S:  Seen today as f/u psych consult.   Observed in bed. Cooperative, denies SI/HI, A/VH, no Sx of psychosis/joanne reported or observed. Reports improved mood, sleep, energy. Remains hopeful about transition to IP MH facility. Expressing plans moving forward r/t accepting support. Denies GI distress, HA, dizziness; appears to be tolerating medications.       O: Medical ROS (as pertinent): No new changes reported to this writer.    No results for input(s): WBC, RBC, HEMOGLOBIN, HEMATOCRIT, MCV, MCH, RDW, PLATELETCT, MPV, NEUTSPOLYS, LYMPHOCYTES, MONOCYTES, EOSINOPHILS, BASOPHILS, RBCMORPHOLO in the last 72 hours.  No results for input(s): SODIUM, POTASSIUM, CHLORIDE, CO2, GLUCOSE, BUN, CPKTOTAL in the last 72 hours.  No results for input(s): ALBUMIN, TBILIRUBIN, ALKPHOSPHAT, TOTPROTEIN, ALTSGPT, ASTSGOT, CREATININE in the last 72 hours.      PSYCHIATRIC EXAM (MSE):   /78   Pulse 98   Temp 36 °C (96.8 °F) (Temporal)   Resp 18   Ht 1.778 m (5' 10\")   Wt 89.8 kg (197 lb 15.6 oz)   SpO2 93%       Constitutional: as noted above  General Appearance/Behavior: 52 y.o. appears stated age, good eye contact cooperative, No behavioral disturbances  Abnormal Movements: none, no PMA/PMR or tremor observed.  Gait and Posture: not observed  Musculoskeletal: as noted above  Mood: \"Good\"  Affect: Mood/Congruent and Appropriate   Speech: normal rate, normal rhythm, normal tone, normal volume, and normal fluency  Language:  spontaneous, " comprehends spoken commands, and fluent   Thought Process: Logical and Goal Directed,  Future Oriented  Thought Content: Denies SI/HI. Denies A/VH, no e/o delusions, PI, or internal preoccupation.   Insight/Judgement:  fair  Alert/Orientation: alert, oriented to person, place and time  Attn/Concentration: normal  Fund of Knowledge: Average  Memory recent/remote: not tested  MMSE: deferred this visit          Meds Current:  Scheduled Medications   Medication Dose Frequency    sertraline  100 mg DAILY    enoxaparin (LOVENOX) injection  40 mg DAILY AT 1800     Allergies:   Allergies   Allergen Reactions    Pcn [Penicillins] Rash     Per patient           ASSESSMENT:   1. Altered mental status, unspecified altered mental status type    2. Drug intoxication with complication, with unspecified complication (HCC)       Psychiatric:   Major depressive disorder, recurrent  R/o adjustment disorder     Medical:   ARF with hypoxia  S/p o/d  HTN        RECOMMENDATIONS:  Legal Status: on legal hold     Please transfer patient to inpatient psychiatric hospital when medically cleared and bed is available.     Observation status:   Line of site with sitter     Visitors: No   Personal belongings: Yes, limited to items discussed per nursing discretion    Discussed/voalted: KATH Medina MD     Medication Recommendations: Final orders as per Treatment Team  No new recommendations; continue Zoloft 100mg PO daily  Risks/benefits/side effects discussed, patient verbalized understanding     Reviewed safety plan: 911, ER, PCM, MHC, suicide crisis line, nursing staff while inpatient.     Will Continue to Follow. Thank you for the consult.

## 2023-06-21 NOTE — CARE PLAN
The patient is Stable - Low risk of patient condition declining or worsening    Shift Goals  Clinical Goals: Monitor safety,  Patient Goals: rest  Family Goals: RAINA    Progress made toward(s) clinical / shift goals:    Problem: Skin Integrity  Goal: Skin integrity is maintained or improved  Outcome: Progressing  Note: Patient is showering daily and skin integrity is intact.      Problem: Psychosocial  Goal: Patient's ability to identify and develop effective coping behaviors will improve  Outcome: Progressing  Note: Patient is having a sitter to aid in effective coping behaviors.

## 2023-06-21 NOTE — DISCHARGE PLANNING
Alert Team Note:    Submitted updated progress notes to [facility] as requested by Maris.

## 2023-06-21 NOTE — PROGRESS NOTES
Lab called with critical result of covid pcr positive at 0715. Critical lab result read back to tech.   Dr. Medina notified of critical lab result at 0719.  Critical lab result read back by Dr. Medina.

## 2023-06-21 NOTE — DISCHARGE PLANNING
Alert Team Note:    Contacted by [facility], spoke to Maris. Pt has been accepted, accepting is Dr. Garza. Facility expects transport at 1100.  Informed  HERNANDEZ Sheriff.

## 2023-06-21 NOTE — PROGRESS NOTES
The patient is Stable - Low risk of patient condition declining or worsening     Shift Goals  Clinical Goals: monitor safety, discharge  Patient Goals: rest,speak with  about positive PCR  Family Goals: RAINA     Progress made toward(s) clinical / shift goals:    Problem: Psychosocial  Goal: Patient's level of anxiety will decrease  Outcome: Progressing  Goal: Patient's ability to verbalize feelings about condition will improve  Outcome: Progressing     Problem: Psychosocial  Goal: Patient's ability to identify and develop effective coping behaviors will improve  Outcome: Progressing  Goal: Patient's ability to identify and utilize available support systems will improve  Outcome: Progressing     Problem: Skin Integrity  Goal: Skin integrity is maintained or improved  Outcome: Progressing  Note: Skin intact this shift. No new skin breakdown noted overnight.        Problem: Psychosocial  Goal: Patient's level of anxiety will decrease  Outcome: Progressing  Goal: Patient's ability to verbalize feelings about condition will improve  Outcome: Progressing     Problem: Psychosocial  Goal: Patient's ability to identify and develop effective coping behaviors will improve  Outcome: Progressing  Goal: Patient's ability to identify and utilize available support systems will improve  Outcome: Progressing     Problem: Skin Integrity  Goal: Skin integrity is maintained or improved  Outcome: Progressing  Note: Skin intact this shift. No new skin breakdown noted overnight.

## 2023-08-30 ENCOUNTER — HOSPITAL ENCOUNTER (INPATIENT)
Facility: MEDICAL CENTER | Age: 53
LOS: 3 days | DRG: 917 | End: 2023-09-04
Attending: EMERGENCY MEDICINE | Admitting: STUDENT IN AN ORGANIZED HEALTH CARE EDUCATION/TRAINING PROGRAM
Payer: COMMERCIAL

## 2023-08-30 ENCOUNTER — APPOINTMENT (OUTPATIENT)
Dept: RADIOLOGY | Facility: MEDICAL CENTER | Age: 53
DRG: 917 | End: 2023-08-30
Payer: COMMERCIAL

## 2023-08-30 ENCOUNTER — APPOINTMENT (OUTPATIENT)
Dept: RADIOLOGY | Facility: MEDICAL CENTER | Age: 53
DRG: 917 | End: 2023-08-30
Attending: EMERGENCY MEDICINE
Payer: COMMERCIAL

## 2023-08-30 DIAGNOSIS — F32.9 MAJOR DEPRESSIVE DISORDER, REMISSION STATUS UNSPECIFIED, UNSPECIFIED WHETHER RECURRENT: ICD-10-CM

## 2023-08-30 DIAGNOSIS — R41.82 ALTERED MENTAL STATUS, UNSPECIFIED ALTERED MENTAL STATUS TYPE: ICD-10-CM

## 2023-08-30 PROBLEM — F32.A DEPRESSION: Status: ACTIVE | Noted: 2023-08-30

## 2023-08-30 PROBLEM — R33.9 URINARY RETENTION: Status: ACTIVE | Noted: 2023-08-30

## 2023-08-30 PROBLEM — T45.0X4A: Status: ACTIVE | Noted: 2023-08-30

## 2023-08-30 LAB
ALBUMIN SERPL BCP-MCNC: 4.3 G/DL (ref 3.2–4.9)
ALBUMIN/GLOB SERPL: 1.7 G/DL
ALP SERPL-CCNC: 76 U/L (ref 30–99)
ALT SERPL-CCNC: 32 U/L (ref 2–50)
AMPHET UR QL SCN: NEGATIVE
ANION GAP SERPL CALC-SCNC: 13 MMOL/L (ref 7–16)
APAP SERPL-MCNC: <5 UG/ML (ref 10–30)
APPEARANCE UR: CLEAR
APTT PPP: 24.3 SEC (ref 24.7–36)
AST SERPL-CCNC: 28 U/L (ref 12–45)
BACTERIA #/AREA URNS HPF: NEGATIVE /HPF
BARBITURATES UR QL SCN: NEGATIVE
BASOPHILS # BLD AUTO: 0.4 % (ref 0–1.8)
BASOPHILS # BLD: 0.03 K/UL (ref 0–0.12)
BENZODIAZ UR QL SCN: NEGATIVE
BILIRUB SERPL-MCNC: 0.5 MG/DL (ref 0.1–1.5)
BILIRUB UR QL STRIP.AUTO: NEGATIVE
BUN SERPL-MCNC: 9 MG/DL (ref 8–22)
BZE UR QL SCN: NEGATIVE
CALCIUM ALBUM COR SERPL-MCNC: 8.3 MG/DL (ref 8.5–10.5)
CALCIUM SERPL-MCNC: 8.5 MG/DL (ref 8.5–10.5)
CANNABINOIDS UR QL SCN: POSITIVE
CHLORIDE SERPL-SCNC: 104 MMOL/L (ref 96–112)
CO2 SERPL-SCNC: 22 MMOL/L (ref 20–33)
COLOR UR: YELLOW
CREAT SERPL-MCNC: 1 MG/DL (ref 0.5–1.4)
EKG IMPRESSION: NORMAL
EOSINOPHIL # BLD AUTO: 0.09 K/UL (ref 0–0.51)
EOSINOPHIL NFR BLD: 1.2 % (ref 0–6.9)
EPI CELLS #/AREA URNS HPF: NEGATIVE /HPF
ERYTHROCYTE [DISTWIDTH] IN BLOOD BY AUTOMATED COUNT: 45.2 FL (ref 35.9–50)
ETHANOL BLD-MCNC: <10.1 MG/DL
FENTANYL UR QL: NEGATIVE
GFR SERPLBLD CREATININE-BSD FMLA CKD-EPI: 89 ML/MIN/1.73 M 2
GLOBULIN SER CALC-MCNC: 2.5 G/DL (ref 1.9–3.5)
GLUCOSE SERPL-MCNC: 149 MG/DL (ref 65–99)
GLUCOSE UR STRIP.AUTO-MCNC: NEGATIVE MG/DL
HCT VFR BLD AUTO: 45.5 % (ref 42–52)
HGB BLD-MCNC: 14.9 G/DL (ref 14–18)
HYALINE CASTS #/AREA URNS LPF: ABNORMAL /LPF
IMM GRANULOCYTES # BLD AUTO: 0.05 K/UL (ref 0–0.11)
IMM GRANULOCYTES NFR BLD AUTO: 0.6 % (ref 0–0.9)
INR PPP: 1.03 (ref 0.87–1.13)
KETONES UR STRIP.AUTO-MCNC: NEGATIVE MG/DL
LACTATE SERPL-SCNC: 1.1 MMOL/L (ref 0.5–2)
LEUKOCYTE ESTERASE UR QL STRIP.AUTO: ABNORMAL
LYMPHOCYTES # BLD AUTO: 0.96 K/UL (ref 1–4.8)
LYMPHOCYTES NFR BLD: 12.4 % (ref 22–41)
MCH RBC QN AUTO: 29.3 PG (ref 27–33)
MCHC RBC AUTO-ENTMCNC: 32.7 G/DL (ref 32.3–36.5)
MCV RBC AUTO: 89.6 FL (ref 81.4–97.8)
METHADONE UR QL SCN: NEGATIVE
MICRO URNS: ABNORMAL
MONOCYTES # BLD AUTO: 0.32 K/UL (ref 0–0.85)
MONOCYTES NFR BLD AUTO: 4.1 % (ref 0–13.4)
NEUTROPHILS # BLD AUTO: 6.27 K/UL (ref 1.82–7.42)
NEUTROPHILS NFR BLD: 81.3 % (ref 44–72)
NITRITE UR QL STRIP.AUTO: NEGATIVE
NRBC # BLD AUTO: 0 K/UL
NRBC BLD-RTO: 0 /100 WBC (ref 0–0.2)
OPIATES UR QL SCN: NEGATIVE
OXYCODONE UR QL SCN: NEGATIVE
PCP UR QL SCN: NEGATIVE
PH UR STRIP.AUTO: 7 [PH] (ref 5–8)
PLATELET # BLD AUTO: 219 K/UL (ref 164–446)
PMV BLD AUTO: 9.6 FL (ref 9–12.9)
POTASSIUM SERPL-SCNC: 3.5 MMOL/L (ref 3.6–5.5)
PROPOXYPH UR QL SCN: NEGATIVE
PROT SERPL-MCNC: 6.8 G/DL (ref 6–8.2)
PROT UR QL STRIP: NEGATIVE MG/DL
PROTHROMBIN TIME: 13.6 SEC (ref 12–14.6)
RBC # BLD AUTO: 5.08 M/UL (ref 4.7–6.1)
RBC # URNS HPF: ABNORMAL /HPF
RBC UR QL AUTO: NEGATIVE
SALICYLATES SERPL-MCNC: <1 MG/DL (ref 15–25)
SODIUM SERPL-SCNC: 139 MMOL/L (ref 135–145)
SP GR UR STRIP.AUTO: 1.01
TSH SERPL DL<=0.005 MIU/L-ACNC: 4.54 UIU/ML (ref 0.38–5.33)
UROBILINOGEN UR STRIP.AUTO-MCNC: 0.2 MG/DL
WBC # BLD AUTO: 7.7 K/UL (ref 4.8–10.8)
WBC #/AREA URNS HPF: ABNORMAL /HPF

## 2023-08-30 PROCEDURE — 99223 1ST HOSP IP/OBS HIGH 75: CPT | Performed by: STUDENT IN AN ORGANIZED HEALTH CARE EDUCATION/TRAINING PROGRAM

## 2023-08-30 PROCEDURE — 83605 ASSAY OF LACTIC ACID: CPT

## 2023-08-30 PROCEDURE — 85730 THROMBOPLASTIN TIME PARTIAL: CPT

## 2023-08-30 PROCEDURE — 87086 URINE CULTURE/COLONY COUNT: CPT

## 2023-08-30 PROCEDURE — 700105 HCHG RX REV CODE 258: Mod: UD | Performed by: EMERGENCY MEDICINE

## 2023-08-30 PROCEDURE — 71045 X-RAY EXAM CHEST 1 VIEW: CPT

## 2023-08-30 PROCEDURE — 700111 HCHG RX REV CODE 636 W/ 250 OVERRIDE (IP): Mod: JZ,UD | Performed by: STUDENT IN AN ORGANIZED HEALTH CARE EDUCATION/TRAINING PROGRAM

## 2023-08-30 PROCEDURE — 80053 COMPREHEN METABOLIC PANEL: CPT

## 2023-08-30 PROCEDURE — 84443 ASSAY THYROID STIM HORMONE: CPT

## 2023-08-30 PROCEDURE — 85610 PROTHROMBIN TIME: CPT

## 2023-08-30 PROCEDURE — 96374 THER/PROPH/DIAG INJ IV PUSH: CPT

## 2023-08-30 PROCEDURE — 80307 DRUG TEST PRSMV CHEM ANLYZR: CPT

## 2023-08-30 PROCEDURE — 82077 ASSAY SPEC XCP UR&BREATH IA: CPT

## 2023-08-30 PROCEDURE — 70450 CT HEAD/BRAIN W/O DYE: CPT

## 2023-08-30 PROCEDURE — 96372 THER/PROPH/DIAG INJ SC/IM: CPT

## 2023-08-30 PROCEDURE — 93005 ELECTROCARDIOGRAM TRACING: CPT | Performed by: EMERGENCY MEDICINE

## 2023-08-30 PROCEDURE — G0378 HOSPITAL OBSERVATION PER HR: HCPCS

## 2023-08-30 PROCEDURE — 700111 HCHG RX REV CODE 636 W/ 250 OVERRIDE (IP): Mod: UD | Performed by: EMERGENCY MEDICINE

## 2023-08-30 PROCEDURE — 36415 COLL VENOUS BLD VENIPUNCTURE: CPT

## 2023-08-30 PROCEDURE — 99285 EMERGENCY DEPT VISIT HI MDM: CPT

## 2023-08-30 PROCEDURE — 85025 COMPLETE CBC W/AUTO DIFF WBC: CPT

## 2023-08-30 PROCEDURE — 81001 URINALYSIS AUTO W/SCOPE: CPT

## 2023-08-30 PROCEDURE — 80143 DRUG ASSAY ACETAMINOPHEN: CPT

## 2023-08-30 PROCEDURE — 99255 IP/OBS CONSLTJ NEW/EST HI 80: CPT | Mod: GC | Performed by: PSYCHIATRY & NEUROLOGY

## 2023-08-30 PROCEDURE — 700105 HCHG RX REV CODE 258: Mod: UD | Performed by: STUDENT IN AN ORGANIZED HEALTH CARE EDUCATION/TRAINING PROGRAM

## 2023-08-30 PROCEDURE — 80179 DRUG ASSAY SALICYLATE: CPT

## 2023-08-30 RX ORDER — AMOXICILLIN 250 MG
2 CAPSULE ORAL 2 TIMES DAILY
Status: DISCONTINUED | OUTPATIENT
Start: 2023-08-30 | End: 2023-09-04 | Stop reason: HOSPADM

## 2023-08-30 RX ORDER — LORAZEPAM 2 MG/ML
0.5 INJECTION INTRAMUSCULAR
Status: DISCONTINUED | OUTPATIENT
Start: 2023-08-30 | End: 2023-08-31

## 2023-08-30 RX ORDER — LORAZEPAM 2 MG/ML
0.5 INJECTION INTRAMUSCULAR ONCE
Status: COMPLETED | OUTPATIENT
Start: 2023-08-30 | End: 2023-08-30

## 2023-08-30 RX ORDER — POLYETHYLENE GLYCOL 3350 17 G/17G
1 POWDER, FOR SOLUTION ORAL
Status: DISCONTINUED | OUTPATIENT
Start: 2023-08-30 | End: 2023-09-04 | Stop reason: HOSPADM

## 2023-08-30 RX ORDER — SODIUM CHLORIDE, SODIUM LACTATE, POTASSIUM CHLORIDE, CALCIUM CHLORIDE 600; 310; 30; 20 MG/100ML; MG/100ML; MG/100ML; MG/100ML
1000 INJECTION, SOLUTION INTRAVENOUS ONCE
Status: COMPLETED | OUTPATIENT
Start: 2023-08-30 | End: 2023-08-30

## 2023-08-30 RX ORDER — BISACODYL 10 MG
10 SUPPOSITORY, RECTAL RECTAL
Status: DISCONTINUED | OUTPATIENT
Start: 2023-08-30 | End: 2023-09-04 | Stop reason: HOSPADM

## 2023-08-30 RX ORDER — SODIUM CHLORIDE, SODIUM LACTATE, POTASSIUM CHLORIDE, CALCIUM CHLORIDE 600; 310; 30; 20 MG/100ML; MG/100ML; MG/100ML; MG/100ML
INJECTION, SOLUTION INTRAVENOUS CONTINUOUS
Status: DISCONTINUED | OUTPATIENT
Start: 2023-08-30 | End: 2023-09-01

## 2023-08-30 RX ORDER — ENOXAPARIN SODIUM 100 MG/ML
40 INJECTION SUBCUTANEOUS DAILY
Status: DISCONTINUED | OUTPATIENT
Start: 2023-08-30 | End: 2023-09-04 | Stop reason: HOSPADM

## 2023-08-30 RX ADMIN — SODIUM CHLORIDE, POTASSIUM CHLORIDE, SODIUM LACTATE AND CALCIUM CHLORIDE: 600; 310; 30; 20 INJECTION, SOLUTION INTRAVENOUS at 13:41

## 2023-08-30 RX ADMIN — SODIUM CHLORIDE, POTASSIUM CHLORIDE, SODIUM LACTATE AND CALCIUM CHLORIDE 1000 ML: 600; 310; 30; 20 INJECTION, SOLUTION INTRAVENOUS at 09:15

## 2023-08-30 RX ADMIN — ENOXAPARIN SODIUM 40 MG: 100 INJECTION SUBCUTANEOUS at 17:09

## 2023-08-30 RX ADMIN — LORAZEPAM 0.5 MG: 2 INJECTION INTRAMUSCULAR; INTRAVENOUS at 09:09

## 2023-08-30 ASSESSMENT — COGNITIVE AND FUNCTIONAL STATUS - GENERAL
SUGGESTED CMS G CODE MODIFIER DAILY ACTIVITY: CH
DAILY ACTIVITIY SCORE: 24
MOBILITY SCORE: 24
SUGGESTED CMS G CODE MODIFIER MOBILITY: CH

## 2023-08-30 ASSESSMENT — FIBROSIS 4 INDEX
FIB4 SCORE: 1.22
FIB4 SCORE: 1.149977816999891843
FIB4 SCORE: 1.22
FIB4 SCORE: 1.22

## 2023-08-30 ASSESSMENT — LIFESTYLE VARIABLES
ALCOHOL_USE: NO
TOTAL SCORE: 0
TOTAL SCORE: 0
AVERAGE NUMBER OF DAYS PER WEEK YOU HAVE A DRINK CONTAINING ALCOHOL: 0
TOTAL SCORE: 0
DO YOU DRINK ALCOHOL: NO
CONSUMPTION TOTAL: NEGATIVE
EVER HAD A DRINK FIRST THING IN THE MORNING TO STEADY YOUR NERVES TO GET RID OF A HANGOVER: NO
ON A TYPICAL DAY WHEN YOU DRINK ALCOHOL HOW MANY DRINKS DO YOU HAVE: 0
HOW MANY TIMES IN THE PAST YEAR HAVE YOU HAD 5 OR MORE DRINKS IN A DAY: 0
HAVE PEOPLE ANNOYED YOU BY CRITICIZING YOUR DRINKING: NO
EVER FELT BAD OR GUILTY ABOUT YOUR DRINKING: NO
HAVE YOU EVER FELT YOU SHOULD CUT DOWN ON YOUR DRINKING: NO

## 2023-08-30 ASSESSMENT — PATIENT HEALTH QUESTIONNAIRE - PHQ9
SUM OF ALL RESPONSES TO PHQ QUESTIONS 1-9: 13
4. FEELING TIRED OR HAVING LITTLE ENERGY: MORE THAN HALF THE DAYS
6. FEELING BAD ABOUT YOURSELF - OR THAT YOU ARE A FAILURE OR HAVE LET YOURSELF OR YOUR FAMILY DOWN: MORE THAN HALF THE DAYS
SUM OF ALL RESPONSES TO PHQ9 QUESTIONS 1 AND 2: 4
1. LITTLE INTEREST OR PLEASURE IN DOING THINGS: MORE THAN HALF THE DAYS
3. TROUBLE FALLING OR STAYING ASLEEP OR SLEEPING TOO MUCH: MORE THAN HALF THE DAYS
8. MOVING OR SPEAKING SO SLOWLY THAT OTHER PEOPLE COULD HAVE NOTICED. OR THE OPPOSITE, BEING SO FIGETY OR RESTLESS THAT YOU HAVE BEEN MOVING AROUND A LOT MORE THAN USUAL: NOT AT ALL
7. TROUBLE CONCENTRATING ON THINGS, SUCH AS READING THE NEWSPAPER OR WATCHING TELEVISION: SEVERAL DAYS
5. POOR APPETITE OR OVEREATING: NOT AT ALL
9. THOUGHTS THAT YOU WOULD BE BETTER OFF DEAD, OR OF HURTING YOURSELF: MORE THAN HALF THE DAYS
2. FEELING DOWN, DEPRESSED, IRRITABLE, OR HOPELESS: MORE THAN HALF THE DAYS

## 2023-08-30 NOTE — ED NOTES
Pharmacy Medication Reconciliation      ~Medication reconciliation updated and complete per patient home pharmacy   ~No oral ABX within the last 30 days  ~Patient home pharmacy :  [facility]

## 2023-08-30 NOTE — ED TRIAGE NOTES
"Chief Complaint   Patient presents with    ALOC     New onset this morning.      BIBA for above complaint. EMS reports pt was at the [facility] all night watching tv reportedly normal. Security attempted to kick pt out of building when he became unresponsive and withdrawn from communication.     Currently A+O2, GCS 14.    BP (!) 153/97   Pulse (!) 121   Temp 36.1 °C (97 °F) (Temporal)   Resp 16   Ht 1.778 m (5' 10\")   Wt 89.4 kg (197 lb)   SpO2 94%   BMI 28.27 kg/m²     "

## 2023-08-30 NOTE — ED PROVIDER NOTES
"ED Provider Note    CHIEF COMPLAINT  Chief Complaint   Patient presents with    ALOC     New onset this morning.        EXTERNAL RECORDS REVIEWED  Inpatient Notes patient was admitted to this facility in May after [place].  He had overdosed on Benadryl in a suicide attempt.  He was intubated for GCS of 6.  Seizure event was suspected as patient had \"deviated gaze and nonverbal \"per the ED staff so he was started on Ativan and Keppra.  MRI and EEG were unremarkable.  He was evaluated by neurology and Keppra was tapered and weaned off.  Psychiatry placed him on a legal hold and he was started on sertraline.    HPI/ROS  LIMITATION TO HISTORY   Select: Altered mental status / Confusion  OUTSIDE HISTORIAN(S):  None    Bishop Bucio is a 53 y.o. male who presents with altered mental status.  [place] and when they attempted to remove him from the premises he became altered.  Apparently security staff has removed him from the premises before and the patient has had similar presentation at that time.  Here the patient cannot provide any information given his altered mental status.    PAST MEDICAL HISTORY   has a past medical history of Complaint of nasal congestion (5/28/2023).    SURGICAL HISTORY  patient denies any surgical history    FAMILY HISTORY  History reviewed. No pertinent family history.    SOCIAL HISTORY  Social History     Tobacco Use    Smoking status: Never    Smokeless tobacco: Never   Substance and Sexual Activity    Alcohol use: Never    Drug use: Never    Sexual activity: Not on file       CURRENT MEDICATIONS  Home Medications       Reviewed by Trevon Rosas R.N. (Registered Nurse) on 08/30/23 at 0735  Med List Status: Partial     Medication Last Dose Status   sertraline (ZOLOFT) 100 MG Tab  Active                    ALLERGIES  Allergies   Allergen Reactions    Pcn [Penicillins] Rash     Per patient        PHYSICAL " "EXAM  VITAL SIGNS: BP (!) 153/97   Pulse (!) 108   Temp 36.1 °C (97 °F) (Temporal)   Resp 16   Ht 1.778 m (5' 10\")   Wt 89.4 kg (197 lb)   SpO2 93%   BMI 28.27 kg/m²    Physical Exam  Vitals and nursing note reviewed.   Constitutional:       Appearance: He is ill-appearing.   HENT:      Head: Normocephalic and atraumatic.      Right Ear: External ear normal.      Left Ear: External ear normal.      Nose: Nose normal.      Mouth/Throat:      Mouth: Mucous membranes are dry.   Eyes:      Extraocular Movements: Extraocular movements intact.      Pupils: Pupils are equal, round, and reactive to light.      Comments: Bilateral mydriasis.   Cardiovascular:      Rate and Rhythm: Regular rhythm. Tachycardia present.      Pulses: Normal pulses.   Pulmonary:      Effort: Pulmonary effort is normal.      Breath sounds: Normal breath sounds.   Abdominal:      Palpations: Abdomen is soft.      Tenderness: There is no abdominal tenderness.   Musculoskeletal:         General: Normal range of motion.      Cervical back: Normal range of motion and neck supple. No rigidity.   Skin:     General: Skin is warm and dry.   Neurological:      General: No focal deficit present.      Comments: Follows some commands.  GCS 11.   Psychiatric:      Comments: Difficult to assess.  Appears to be hallucinating.       DIAGNOSTIC STUDIES / PROCEDURES  LABS  Results for orders placed or performed during the hospital encounter of 08/30/23   CBC with Differential   Result Value Ref Range    WBC 7.7 4.8 - 10.8 K/uL    RBC 5.08 4.70 - 6.10 M/uL    Hemoglobin 14.9 14.0 - 18.0 g/dL    Hematocrit 45.5 42.0 - 52.0 %    MCV 89.6 81.4 - 97.8 fL    MCH 29.3 27.0 - 33.0 pg    MCHC 32.7 32.3 - 36.5 g/dL    RDW 45.2 35.9 - 50.0 fL    Platelet Count 219 164 - 446 K/uL    MPV 9.6 9.0 - 12.9 fL    Neutrophils-Polys 81.30 (H) 44.00 - 72.00 %    Lymphocytes 12.40 (L) 22.00 - 41.00 %    Monocytes 4.10 0.00 - 13.40 %    Eosinophils 1.20 0.00 - 6.90 %    Basophils " 0.40 0.00 - 1.80 %    Immature Granulocytes 0.60 0.00 - 0.90 %    Nucleated RBC 0.00 0.00 - 0.20 /100 WBC    Neutrophils (Absolute) 6.27 1.82 - 7.42 K/uL    Lymphs (Absolute) 0.96 (L) 1.00 - 4.80 K/uL    Monos (Absolute) 0.32 0.00 - 0.85 K/uL    Eos (Absolute) 0.09 0.00 - 0.51 K/uL    Baso (Absolute) 0.03 0.00 - 0.12 K/uL    Immature Granulocytes (abs) 0.05 0.00 - 0.11 K/uL    NRBC (Absolute) 0.00 K/uL   Comp Metabolic Panel   Result Value Ref Range    Sodium 139 135 - 145 mmol/L    Potassium 3.5 (L) 3.6 - 5.5 mmol/L    Chloride 104 96 - 112 mmol/L    Co2 22 20 - 33 mmol/L    Anion Gap 13.0 7.0 - 16.0    Glucose 149 (H) 65 - 99 mg/dL    Bun 9 8 - 22 mg/dL    Creatinine 1.00 0.50 - 1.40 mg/dL    Calcium 8.5 8.5 - 10.5 mg/dL    Correct Calcium 8.3 (L) 8.5 - 10.5 mg/dL    AST(SGOT) 28 12 - 45 U/L    ALT(SGPT) 32 2 - 50 U/L    Alkaline Phosphatase 76 30 - 99 U/L    Total Bilirubin 0.5 0.1 - 1.5 mg/dL    Albumin 4.3 3.2 - 4.9 g/dL    Total Protein 6.8 6.0 - 8.2 g/dL    Globulin 2.5 1.9 - 3.5 g/dL    A-G Ratio 1.7 g/dL   Diagnostic Alcohol   Result Value Ref Range    Diagnostic Alcohol <10.1 <10.1 mg/dL   Urinalysis    Specimen: Urine   Result Value Ref Range    Color Yellow     Character Clear     Specific Gravity 1.006 <1.035    Ph 7.0 5.0 - 8.0    Glucose Negative Negative mg/dL    Ketones Negative Negative mg/dL    Protein Negative Negative mg/dL    Bilirubin Negative Negative    Urobilinogen, Urine 0.2 Negative    Nitrite Negative Negative    Leukocyte Esterase Trace (A) Negative    Occult Blood Negative Negative    Micro Urine Req Microscopic    Urine Drug Screen (Triage)   Result Value Ref Range    Amphetamines Urine Negative Negative    Barbiturates Negative Negative    Benzodiazepines Negative Negative    Cocaine Metabolite Negative Negative    Fentanyl, Urine Negative Negative    Methadone Negative Negative    Opiates Negative Negative    Oxycodone Negative Negative    Phencyclidine -Pcp Negative Negative     Propoxyphene Negative Negative    Cannabinoid Metab Positive (A) Negative   ESTIMATED GFR   Result Value Ref Range    GFR (CKD-EPI) 89 >60 mL/min/1.73 m 2   Salicylate   Result Value Ref Range    Salicylates, Quant. <1.0 (L) 15.0 - 25.0 mg/dL   ACETAMINOPHEN   Result Value Ref Range    Acetaminophen -Tylenol <5.0 (L) 10.0 - 30.0 ug/mL   TSH WITH REFLEX TO FT4   Result Value Ref Range    TSH 4.540 0.380 - 5.330 uIU/mL   PT/INR   Result Value Ref Range    PT 13.6 12.0 - 14.6 sec    INR 1.03 0.87 - 1.13   PTT   Result Value Ref Range    APTT 24.3 (L) 24.7 - 36.0 sec   LACTIC ACID   Result Value Ref Range    Lactic Acid 1.1 0.5 - 2.0 mmol/L   URINE MICROSCOPIC (W/UA)   Result Value Ref Range    WBC 0-2 (A) /hpf    RBC 0-2 (A) /hpf    Bacteria Negative None /hpf    Epithelial Cells Negative /hpf    Hyaline Cast 0-2 /lpf   EKG   Result Value Ref Range    Report       [facility] Emergency Dept[facility]    Test Date:  2023  Pt Name:    MARIVEL VILLAFANA                Department: ER  MRN:        [id number]                      Room:        02  Gender:     Male                         Technician: [id number]  :        1970                   Requested By:PATRICIA RAMÍREZ  Order #:    [id number]                    Reading MD: Patricia Ramírez MD    Measurements  Intervals                                Axis  Rate:       115                          P:          38  WA:         138                          QRS:        17  QRSD:       79                           T:          1  QT:         326  QTc:        451    Interpretive Statements  Sinus tachycardia  Probable left atrial enlargement  Minimal ST depression, anterolateral leads  Compared to ECG 2023 20:57:17  ST (T wave) deviation now present  Sinus rhythm no longer present  Electronically Signed On 2023 11:34:40 PDT by Patricia Ramírez MD       RADIOLOGY  I have independently interpreted the diagnostic imaging associated with this visit and am waiting the  final reading from the radiologist.   My preliminary interpretation is as follows: No intracranial hemorrhage  Radiologist interpretation:   CT-HEAD W/O   Final Result      Unremarkable noncontrast CT examination of the brain.         DX-CHEST-PORTABLE (1 VIEW)   Final Result      No acute cardiac or pulmonary abnormalities are identified.        COURSE & MEDICAL DECISION MAKING    ED Observation Status? No; Patient does not meet criteria for ED Observation.     INITIAL ASSESSMENT, COURSE AND PLAN  Care Narrative: This is a 53-year-old male with a history of suicide attempt by overdosing on Benadryl who [place] he was found to be altered and poorly responsive.  Arrives tachycardic and hypertensive but afebrile with otherwise normal vital signs.  He appears dehydrated with dry mucous membranes.  He appears ill but is not in acute distress.  He also seems to be hallucinating and has bilateral mydriasis with equal and reactive pupils bilaterally.  No obvious external trauma.  Neck is supple without any meningeal signs.  Afebrile, lower suspicion for CNS infection especially given what appears to be an anticholinergic toxidrome in the setting of recent OD on Benadryl.  Indeed, nursing staff found a Benadryl bottle in his possessions here today.  Patient is moving all 4 extremities equally.  No focal neurologic deficits.  Low suspicion for CVA.  He is intermittently following commands but is nonverbal with me and unable to provide any history.  He was started on IV fluids and given a dose of Ativan for supportive care.    Differential diagnosis includes, but is not limited to, neoplasm, stroke, hypoglycemia, electrolyte abnormality, suicide attempt, medication side effect, drug or alcohol intoxication, intracranial hemorrhage.    CBC here without leukocytosis although he does have a mildly elevated neutrophil count without bandemia.  Metabolic panel reveals mild hypokalemia to 3.5  with a glucose of 149.  Remainder of electrolytes are normal and he has normal renal and liver function.  Lactate is normal.  Salicylate and acetaminophen levels are negative, diagnostic alcohol is negative.    Chest x-ray is without acute abnormality and a CT head is unremarkable.    Patient will require hospitalization for additional work-up and management.  My suspicion is a repeat Benadryl overdose which will require supportive care and ultimately likely transport to an inpatient psychiatric facility.  Patient was discussed with the hospitalist and he was admitted in guarded condition.    CRITICAL CARE  The very real possibilty of a deterioration of this patient's condition required the highest level of my preparedness for sudden, emergent intervention.  I provided critical care services, which included medication orders, frequent reevaluations of the patient's condition and response to treatment, ordering and reviewing test results, and discussing the case with various consultants.  The critical care time associated with the care of the patient was 37 minutes. Review chart for interventions. This time is exclusive of any other billable procedures.     HYDRATION: Based on the patient's presentation of Inability to take oral fluids, Tachycardia, and Other overdose the patient was given IV fluids. IV Hydration was used because oral hydration was not adequate alone. Upon recheck following hydration, the patient was improved.    ADDITIONAL PROBLEM LIST  None  DISPOSITION AND DISCUSSIONS  I have discussed management of the patient with the following physicians and YOCASTA's: Dr. Kwong, hospitalist.    Discussion of management with other [facility] or appropriate source(s): None     Escalation of care considered, and ultimately not performed: N/A.    Barriers to care at this time, including but not limited to:  None .     Decision tools and prescription drugs considered including, but not limited to: N/A.    FINAL  DIAGNOSIS  1.  Altered mental status     Electronically signed by: Rehan Maurice M.D., 8/30/2023 8:03 AM

## 2023-08-30 NOTE — ED NOTES
Pharmacy Note: Poison control updated for potential diphenhydramine overdose, patient with bottle of #100 Benadryl 25mg tablets on person, only 15 remain in bottle. On presentation patient was unresponsive, mucous membranes dry, tachycardic patient unable to urinate requiring straight cath. Patient with history of prior diphenhydramine ingestion.     Poison control case #:[id number]    Labs:  Recent Labs     08/30/23  0733   SODIUM 139   POTASSIUM 3.5*   CHLORIDE 104   CO2 22   BUN 9   CREATININE 1.00   GLUCOSE 149*   CALCIUM 8.5   ASTSGOT 28   ALTSGPT 32   ALBUMIN 4.3   TBILIRUBIN 0.5   INR 1.03        Most recent vitals: Temp 36.1C, , RR 25, /92, O2 sat 95%.     EKG:  QRS: 79  Qtc: 451    Levels:   Latest Reference Range & Units 08/30/23 07:33 08/30/23 08:00   Acetaminophen -Tylenol 10.0 - 30.0 ug/mL  <5.0 (L)   Diagnostic Alcohol <10.1 mg/dL <10.1    Salicylates, Quant. 15.0 - 25.0 mg/dL  <1.0 (L)     Recommended Plan:  1. Repeat EKG as needed to ensure QRS does not widen   2. Supportive care, management of anticholinergic symptoms, recommended ongoing maintenance fluids   3. Lorazepam as needed for agitation and seizure precautions   4. Recommend minimum of 6 hours of observation and until patient is back to baseline  5. If patient becomes agitated first check if patient is able to urinate or straight cath patient if unable to urinate, urinary retention and all other anticholinergic effects can persist after ingestion.

## 2023-08-30 NOTE — ED NOTES
Pt to red 2 via gurney. Triage completed. Changed in to gown, connected to monitor. Chart up for ERP.

## 2023-08-30 NOTE — ED NOTES
Pt resting in bed. Respirations even and unlabored. GCS 14, NAD, VSS on RA. Denies needs at this time. Will continue to monitor.

## 2023-08-30 NOTE — CONSULTS
"PSYCHIATRIC INTAKE EVALUATION(new)  Reason for admission: ALOC  Reason for consult: \"suicide attempt via intentional overdose, recurrent\"  Requesting Provider:      Schuyler Kwong D.O.   Legal Hold Status on Admission:          initiated   Chart reviewed.         *HPI:       This is a 53-year-old male with a history of depression presenting on 2023 with altered mental status.  Last admitted on  after being found down post intentional Benadryl overdose requiring intubation.  Psychiatry consult placed for concern of repeat intentional suicide attempt by overdose.  Per documentation, patient presented after EMS was called on him by [facility] staff when he became unresponsive.  ER triage assessed patient with diphenhydramine overdose given signs of anticholinergic toxicity and patient being found with Benadryl bottle.    Per nursing, patient is endorsing seeing squirrels run across his arms.    On interview, patient is grossly disorganized.  Unable to obtain complete history due to mental status.  Patient is mostly mumbling and stating that his mother .  Does superficially endorse that he attempted suicide, but unclear on exact method.  Intermittently endorsing that suicide agent was Benadryl, then declining Benadryl and stating it was cannabis.  Remained silent during evaluation of psychosis.  Asking to see \"that map on the wall\" and referring to a painting with no resemblance to a map.    On speaking with the patient approximately 1 hour later, patient does confirm that he attempted suicide by overdose.  Stating Benadryl was agent.  Unknown quantity        Psychiatric ROS:  Unable to obtain    Medical ROS (as pertinent):     Unable to obtain.  Per nursing, patient has been urinating on self.  Currently on condom catheter    *Psychiatric Examination:  Vitals:   Vitals:    23 1252   BP: (!) 149/93   Pulse: (!) 102   Resp: 19   Temp:    SpO2: 95%       General:   - Grooming and hygiene: " Appropriate hygiene and grooming  - Apparent distress: Moderate distress, despondent  - Behavior: Despondent, calm  - Eye Contact: No eye contact  - no psychomotor agitation or retardation  - Participation: Very limited verbal participation  Orientation: Oriented to place and person only.  Unable to recall date including year  Mood: Patient does not respond to questioning regarding mood  Affect: Flat  Thought Process: Unable to fully assess.  Patient disorganized and disarticulation of verbiage present  Thought Content: Endorsing suicidal attempt.  See HPI for additional details.  Denies HI.  Does not respond to questioning regarding paranoia  perception: Denies auditory or visual hallucinations. No delusions noted   Attention span and concentration: Intact   Speech: Decreased volume, whispers at times.  Unable to understand articulation of speech  Language: Limited  Insight: Poor  Judgment: Poor  Recent and remote memory: Unable to assess    Past Medical History:   Past Medical History:   Diagnosis Date    Complaint of nasal congestion 5/28/2023        Past Psychiatric History:  Previous Diagnosis: Major depressive disorder recurrent  Current meds: Unknown  Previous med trials: Zoloft, Remeron, hydroxyzine  Hospitalizations: Psychiatric hospitalization at past suicide attempt  Suicide attempts/SIB: Previous suicide attempt 5/24/2023 the overdose  Outpatient services: Unknown  Access to guns: Unknown  Abuse/trauma hx: Unknown  Legal hx: Unknown    Family Hx: Per chart review, maternal history of depression    Social Hx:   Drugs: Patient reports history of daily use of cannabis.  Positive UDS.  Unknown quantity.  Denies other substances however poor historian at this time  Alcohol: Unknown  Nicotine:   Unknown    Current Medications:  Current Facility-Administered Medications   Medication Dose Route Frequency Provider Last Rate Last Admin    senna-docusate (Pericolace Or Senokot S) 8.6-50 MG per tablet 2 Tablet  2  Tablet Oral BID Schuyler Kwong D.O.        And    polyethylene glycol/lytes (Miralax) PACKET 1 Packet  1 Packet Oral QDAY PRN Schuyler Kwong D.O.        And    magnesium hydroxide (Milk Of Magnesia) suspension 30 mL  30 mL Oral QDAY PRN Schuyler Kwong D.O.        And    bisacodyl (Dulcolax) suppository 10 mg  10 mg Rectal QDAY PRN Schuyler Kwong D.O.        lactated ringers infusion   Intravenous Continuous Schuyler Kwong D.O.        enoxaparin (Lovenox) inj 40 mg  40 mg Subcutaneous DAILY AT 1800 Schuyler Kwong D.O.        LORazepam (Ativan) injection 0.5 mg  0.5 mg Intravenous Q3HRS PRN Schuyler Kwong D.O.         No current outpatient medications on file.        Allergies:  Pcn [penicillins]       Labs personally reviewed:   Recent Results (from the past 72 hour(s))   CBC with Differential    Collection Time: 08/30/23  7:33 AM   Result Value Ref Range    WBC 7.7 4.8 - 10.8 K/uL    RBC 5.08 4.70 - 6.10 M/uL    Hemoglobin 14.9 14.0 - 18.0 g/dL    Hematocrit 45.5 42.0 - 52.0 %    MCV 89.6 81.4 - 97.8 fL    MCH 29.3 27.0 - 33.0 pg    MCHC 32.7 32.3 - 36.5 g/dL    RDW 45.2 35.9 - 50.0 fL    Platelet Count 219 164 - 446 K/uL    MPV 9.6 9.0 - 12.9 fL    Neutrophils-Polys 81.30 (H) 44.00 - 72.00 %    Lymphocytes 12.40 (L) 22.00 - 41.00 %    Monocytes 4.10 0.00 - 13.40 %    Eosinophils 1.20 0.00 - 6.90 %    Basophils 0.40 0.00 - 1.80 %    Immature Granulocytes 0.60 0.00 - 0.90 %    Nucleated RBC 0.00 0.00 - 0.20 /100 WBC    Neutrophils (Absolute) 6.27 1.82 - 7.42 K/uL    Lymphs (Absolute) 0.96 (L) 1.00 - 4.80 K/uL    Monos (Absolute) 0.32 0.00 - 0.85 K/uL    Eos (Absolute) 0.09 0.00 - 0.51 K/uL    Baso (Absolute) 0.03 0.00 - 0.12 K/uL    Immature Granulocytes (abs) 0.05 0.00 - 0.11 K/uL    NRBC (Absolute) 0.00 K/uL   Comp Metabolic Panel    Collection Time: 08/30/23  7:33 AM   Result Value Ref Range    Sodium 139 135 - 145 mmol/L    Potassium 3.5 (L) 3.6 - 5.5 mmol/L    Chloride 104 96 -  112 mmol/L    Co2 22 20 - 33 mmol/L    Anion Gap 13.0 7.0 - 16.0    Glucose 149 (H) 65 - 99 mg/dL    Bun 9 8 - 22 mg/dL    Creatinine 1.00 0.50 - 1.40 mg/dL    Calcium 8.5 8.5 - 10.5 mg/dL    Correct Calcium 8.3 (L) 8.5 - 10.5 mg/dL    AST(SGOT) 28 12 - 45 U/L    ALT(SGPT) 32 2 - 50 U/L    Alkaline Phosphatase 76 30 - 99 U/L    Total Bilirubin 0.5 0.1 - 1.5 mg/dL    Albumin 4.3 3.2 - 4.9 g/dL    Total Protein 6.8 6.0 - 8.2 g/dL    Globulin 2.5 1.9 - 3.5 g/dL    A-G Ratio 1.7 g/dL   Diagnostic Alcohol    Collection Time: 23  7:33 AM   Result Value Ref Range    Diagnostic Alcohol <10.1 <10.1 mg/dL   ESTIMATED GFR    Collection Time: 23  7:33 AM   Result Value Ref Range    GFR (CKD-EPI) 89 >60 mL/min/1.73 m 2   TSH WITH REFLEX TO FT4    Collection Time: 23  7:33 AM   Result Value Ref Range    TSH 4.540 0.380 - 5.330 uIU/mL   PT/INR    Collection Time: 23  7:33 AM   Result Value Ref Range    PT 13.6 12.0 - 14.6 sec    INR 1.03 0.87 - 1.13   PTT    Collection Time: 23  7:33 AM   Result Value Ref Range    APTT 24.3 (L) 24.7 - 36.0 sec   Salicylate    Collection Time: 23  8:00 AM   Result Value Ref Range    Salicylates, Quant. <1.0 (L) 15.0 - 25.0 mg/dL   ACETAMINOPHEN    Collection Time: 23  8:00 AM   Result Value Ref Range    Acetaminophen -Tylenol <5.0 (L) 10.0 - 30.0 ug/mL   LACTIC ACID    Collection Time: 23  8:00 AM   Result Value Ref Range    Lactic Acid 1.1 0.5 - 2.0 mmol/L   EKG    Collection Time: 23  8:44 AM   Result Value Ref Range    Report       [facility] Emergency Dept.    Test Date:  2023  Pt Name:    MARIVEL VILLAFANA                Department: ER  MRN:        [id number]                      Room:        02  Gender:     Male                         Technician: [id number]  :        1970                   Requested By:PATRICIA RAMÍREZ  Order #:    [id number]                    Reading MD: Patricia Ramírez MD    Measurements  Intervals                                 Axis  Rate:       115                          P:          38  AK:         138                          QRS:        17  QRSD:       79                           T:          1  QT:         326  QTc:        451    Interpretive Statements  Sinus tachycardia  Probable left atrial enlargement  Minimal ST depression, anterolateral leads  Compared to ECG 05/23/2023 20:57:17  ST (T wave) deviation now present  Sinus rhythm no longer present  Electronically Signed On 08- 11:34:40 PDT by Rehan Maurice MD     Urinalysis    Collection Time: 08/30/23  8:46 AM    Specimen: Urine   Result Value Ref Range    Color Yellow     Character Clear     Specific Gravity 1.006 <1.035    Ph 7.0 5.0 - 8.0    Glucose Negative Negative mg/dL    Ketones Negative Negative mg/dL    Protein Negative Negative mg/dL    Bilirubin Negative Negative    Urobilinogen, Urine 0.2 Negative    Nitrite Negative Negative    Leukocyte Esterase Trace (A) Negative    Occult Blood Negative Negative    Micro Urine Req Microscopic    Urine Drug Screen (Triage)    Collection Time: 08/30/23  8:46 AM   Result Value Ref Range    Amphetamines Urine Negative Negative    Barbiturates Negative Negative    Benzodiazepines Negative Negative    Cocaine Metabolite Negative Negative    Fentanyl, Urine Negative Negative    Methadone Negative Negative    Opiates Negative Negative    Oxycodone Negative Negative    Phencyclidine -Pcp Negative Negative    Propoxyphene Negative Negative    Cannabinoid Metab Positive (A) Negative   URINE MICROSCOPIC (W/UA)    Collection Time: 08/30/23  8:46 AM   Result Value Ref Range    WBC 0-2 (A) /hpf    RBC 0-2 (A) /hpf    Bacteria Negative None /hpf    Epithelial Cells Negative /hpf    Hyaline Cast 0-2 /lpf           EKG:   Results for orders placed or performed during the hospital encounter of 08/30/23   EKG   Result Value Ref Range    Report       [facility] Emergency Dept.    Test Date:   "2023  Pt Name:    MARIVEL VILLAFANA                Department: ER  MRN:        [id number]                      Room:       RD 02  Gender:     Male                         Technician: [id number]  :        1970                   Requested By:PATRICIA RAMÍREZ  Order #:    [id number]                    Reading MD: Patricia Ramírez MD    Measurements  Intervals                                Axis  Rate:       115                          P:          38  SD:         138                          QRS:        17  QRSD:       79                           T:          1  QT:         326  QTc:        451    Interpretive Statements  Sinus tachycardia  Probable left atrial enlargement  Minimal ST depression, anterolateral leads  Compared to ECG 2023 20:57:17  ST (T wave) deviation now present  Sinus rhythm no longer present  Electronically Signed On 2023 11:34:40 PDT by Patricia Ramírez MD         Brain Imaging: head ct 23 w/o contrast  IMPRESSION:     Unremarkable noncontrast CT examination of the brain.    EE2023  \"INTERPRETATION:  Normal video EEG recording in the sedated, drowsy, sleep, awake states:  -Moderate background slowing with seen throughout the study due to sedation effects.  With the lowering and then eventual discontinuation of the sedation the background normalized.  -No persistent focal asymmetry, epileptiform activity, or seizures were seen.      \"         Assessment:  Psychiatric evaluation is limited as patient currently has altered level of consciousness and is a poor historian.  Unable to answer majority of psychiatric questions.  Per chart review, patient does have history of suicide attempts via overdose.  It appears he has endorsed suicide attempt during this hospitalization, but is unable to elaborate on details methods or intent.  Chart review indicates that empty bottle of Benadryl was found.  Patient's altered mental status is likely due to anticholinergic toxicity.  However, may be " complicated by comorbid cannabis use.  Patient is currently experiencing psychosis    Due to recent overdose, recommending against any as needed agitation or antipsychotic medications that carry anticholinergic burden as this may exacerbate delirious symptoms.    It is apparent patient attempted overdose as suicide attempt.  Consequently, patient does meet criteria for legal hold at this time and requires inpatient psychiatric hospitalization    Dx:  Delirium   History of MDD    Medical:  Anticholinergic toxicity      Plan:  Legal hold: extended 8/30  Psychotropic medications  Hold all psychotropic medications due to concerns of recent overdose and worsening of anticholinergic burden  Please transfer pt to inpatient psychiatric hospital when medically cleared and bed is available  Obtain additional history when patient mentation improves   Discussed the case with: Dr Mckeon  Psychiatry will follow up    Thank you for the consult.       Sitter: 1:1  Phone: NO ACCESS  Visitors: NO ACCESS  Personal belongings: NO ACCESS      This note was created using voice recognition software (Dragon). The accuracy of the dictation is limited by the abilities of the software. I have reviewed the note prior to signing. However, error related to voice recognition software and /or scribes may still exist and should be interpreted within the appropriate context.

## 2023-08-30 NOTE — CARE PLAN
The patient is Stable - Low risk of patient condition declining or worsening    Shift Goals  Clinical Goals: VSS, 1:1 sitter, safety, improved mentation  Patient Goals: RAINA  Family Goals: RAINA    Progress made toward(s) clinical / shift goals:    Problem: Knowledge Deficit - Standard  Goal: Patient and family/care givers will demonstrate understanding of plan of care, disease process/condition, diagnostic tests and medications  Outcome: Progressing     Problem: Provide Safe Environment  Goal: Suicide environmental safety, protocols, policies, and practices will be implemented  Outcome: Progressing     Problem: Skin Integrity  Goal: Skin integrity is maintained or improved  Outcome: Progressing       Patient is not progressing towards the following goals:

## 2023-08-30 NOTE — ED NOTES
Pt unable to urinate. Straight cath performed. Pt tolerated well. Urine collected and sent to lab. VSS on RA, GCS 14, NAD. Will continue to monitor.

## 2023-08-30 NOTE — ED NOTES
Pt resting in bed. Respirations even and unlabored. VSS on RA, NAD, GCS 14. Will continue to monitor.

## 2023-08-30 NOTE — PROGRESS NOTES
4 Eyes Skin Assessment Completed by HUBER Zambrano and OMER Ariza.    Head WDL  Ears WDL  Nose WDL  Mouth WDL  Neck WDL  Breast/Chest WDL  Shoulder Blades WDL  Spine Redness and Blanching  (R) Arm/Elbow/Hand WDL  (L) Arm/Elbow/Hand Rash  Abdomen WDL  Groin WDL  Scrotum/Coccyx/Buttocks Redness and Blanching  (R) Leg WDL  (L) Leg Rash  (R) Heel/Foot/Toe WDL  (L) Heel/Foot/Toe WDL          Devices In Places Tele Box and Condom Cath      Interventions In Place Q2 Turns    Possible Skin Injury No    Pictures Uploaded Into Epic Yes  Wound Consult Placed Yes  RN Wound Prevention Protocol Ordered No

## 2023-08-30 NOTE — ED NOTES
Pt resting in bed. Respirations even and unlabored. GCS 14, NAD, VSS on RA. Will continue to monitor.

## 2023-08-30 NOTE — PROGRESS NOTES
"Patient to T729. Patient alert, oriented to self and place only. Patient hallucinating- \"did you see that squirrel run across my arm?\" Patient c/o dizziness. /93, , SPO2 96%, Temp 98.2 f. No c/o of pain.  "

## 2023-08-31 PROBLEM — T45.0X2A INTENTIONAL DIPHENHYDRAMINE OVERDOSE (HCC): Status: ACTIVE | Noted: 2023-08-30

## 2023-08-31 LAB
ANION GAP SERPL CALC-SCNC: 9 MMOL/L (ref 7–16)
BASOPHILS # BLD AUTO: 0.2 % (ref 0–1.8)
BASOPHILS # BLD: 0.02 K/UL (ref 0–0.12)
BUN SERPL-MCNC: 8 MG/DL (ref 8–22)
CALCIUM SERPL-MCNC: 9.1 MG/DL (ref 8.5–10.5)
CHLORIDE SERPL-SCNC: 108 MMOL/L (ref 96–112)
CO2 SERPL-SCNC: 25 MMOL/L (ref 20–33)
CREAT SERPL-MCNC: 1.07 MG/DL (ref 0.5–1.4)
EOSINOPHIL # BLD AUTO: 0.09 K/UL (ref 0–0.51)
EOSINOPHIL NFR BLD: 1.1 % (ref 0–6.9)
ERYTHROCYTE [DISTWIDTH] IN BLOOD BY AUTOMATED COUNT: 47.2 FL (ref 35.9–50)
GFR SERPLBLD CREATININE-BSD FMLA CKD-EPI: 82 ML/MIN/1.73 M 2
GLUCOSE SERPL-MCNC: 106 MG/DL (ref 65–99)
HCT VFR BLD AUTO: 45.4 % (ref 42–52)
HGB BLD-MCNC: 14.5 G/DL (ref 14–18)
IMM GRANULOCYTES # BLD AUTO: 0.03 K/UL (ref 0–0.11)
IMM GRANULOCYTES NFR BLD AUTO: 0.4 % (ref 0–0.9)
LYMPHOCYTES # BLD AUTO: 1.68 K/UL (ref 1–4.8)
LYMPHOCYTES NFR BLD: 20.6 % (ref 22–41)
MCH RBC QN AUTO: 29.4 PG (ref 27–33)
MCHC RBC AUTO-ENTMCNC: 31.9 G/DL (ref 32.3–36.5)
MCV RBC AUTO: 91.9 FL (ref 81.4–97.8)
MONOCYTES # BLD AUTO: 0.48 K/UL (ref 0–0.85)
MONOCYTES NFR BLD AUTO: 5.9 % (ref 0–13.4)
NEUTROPHILS # BLD AUTO: 5.87 K/UL (ref 1.82–7.42)
NEUTROPHILS NFR BLD: 71.8 % (ref 44–72)
NRBC # BLD AUTO: 0 K/UL
NRBC BLD-RTO: 0 /100 WBC (ref 0–0.2)
PLATELET # BLD AUTO: 237 K/UL (ref 164–446)
PMV BLD AUTO: 9.6 FL (ref 9–12.9)
POTASSIUM SERPL-SCNC: 3.6 MMOL/L (ref 3.6–5.5)
RBC # BLD AUTO: 4.94 M/UL (ref 4.7–6.1)
SODIUM SERPL-SCNC: 142 MMOL/L (ref 135–145)
WBC # BLD AUTO: 8.2 K/UL (ref 4.8–10.8)

## 2023-08-31 PROCEDURE — 700111 HCHG RX REV CODE 636 W/ 250 OVERRIDE (IP): Mod: JZ,UD | Performed by: STUDENT IN AN ORGANIZED HEALTH CARE EDUCATION/TRAINING PROGRAM

## 2023-08-31 PROCEDURE — 80048 BASIC METABOLIC PNL TOTAL CA: CPT

## 2023-08-31 PROCEDURE — 96372 THER/PROPH/DIAG INJ SC/IM: CPT

## 2023-08-31 PROCEDURE — 85025 COMPLETE CBC W/AUTO DIFF WBC: CPT

## 2023-08-31 PROCEDURE — 99233 SBSQ HOSP IP/OBS HIGH 50: CPT | Mod: GC | Performed by: PSYCHIATRY & NEUROLOGY

## 2023-08-31 PROCEDURE — 700105 HCHG RX REV CODE 258: Mod: UD | Performed by: FAMILY MEDICINE

## 2023-08-31 PROCEDURE — G0378 HOSPITAL OBSERVATION PER HR: HCPCS

## 2023-08-31 PROCEDURE — 99232 SBSQ HOSP IP/OBS MODERATE 35: CPT | Performed by: FAMILY MEDICINE

## 2023-08-31 PROCEDURE — 36415 COLL VENOUS BLD VENIPUNCTURE: CPT

## 2023-08-31 RX ORDER — LORAZEPAM 2 MG/ML
1 INJECTION INTRAMUSCULAR
Status: DISCONTINUED | OUTPATIENT
Start: 2023-08-31 | End: 2023-09-04

## 2023-08-31 RX ADMIN — ENOXAPARIN SODIUM 40 MG: 100 INJECTION SUBCUTANEOUS at 18:48

## 2023-08-31 RX ADMIN — SODIUM CHLORIDE, POTASSIUM CHLORIDE, SODIUM LACTATE AND CALCIUM CHLORIDE: 600; 310; 30; 20 INJECTION, SOLUTION INTRAVENOUS at 18:47

## 2023-08-31 ASSESSMENT — ENCOUNTER SYMPTOMS
DIARRHEA: 0
DIAPHORESIS: 0
NECK PAIN: 0
FOCAL WEAKNESS: 0
CONSTITUTIONAL NEGATIVE: 1
DEPRESSION: 1
PALPITATIONS: 0
SHORTNESS OF BREATH: 0
WEAKNESS: 1
WHEEZING: 0
NAUSEA: 0
SPEECH CHANGE: 0
RESPIRATORY NEGATIVE: 1
COUGH: 0
EYES NEGATIVE: 1
FLANK PAIN: 0
BACK PAIN: 0
HEARTBURN: 0
CHILLS: 0
DIZZINESS: 0
BLURRED VISION: 0
HALLUCINATIONS: 0
ABDOMINAL PAIN: 0
NERVOUS/ANXIOUS: 0
VOMITING: 0
HEADACHES: 0
MYALGIAS: 0
MUSCULOSKELETAL NEGATIVE: 1
GASTROINTESTINAL NEGATIVE: 1
NEUROLOGICAL NEGATIVE: 1
CARDIOVASCULAR NEGATIVE: 1
TREMORS: 1
SORE THROAT: 0
SENSORY CHANGE: 0
FEVER: 0

## 2023-08-31 ASSESSMENT — PATIENT HEALTH QUESTIONNAIRE - PHQ9
SUM OF ALL RESPONSES TO PHQ QUESTIONS 1-9: 20
3. TROUBLE FALLING OR STAYING ASLEEP OR SLEEPING TOO MUCH: MORE THAN HALF THE DAYS
1. LITTLE INTEREST OR PLEASURE IN DOING THINGS: NEARLY EVERY DAY
4. FEELING TIRED OR HAVING LITTLE ENERGY: MORE THAN HALF THE DAYS
7. TROUBLE CONCENTRATING ON THINGS, SUCH AS READING THE NEWSPAPER OR WATCHING TELEVISION: MORE THAN HALF THE DAYS
6. FEELING BAD ABOUT YOURSELF - OR THAT YOU ARE A FAILURE OR HAVE LET YOURSELF OR YOUR FAMILY DOWN: NEARLY EVERY DAY
2. FEELING DOWN, DEPRESSED, IRRITABLE, OR HOPELESS: NEARLY EVERY DAY
8. MOVING OR SPEAKING SO SLOWLY THAT OTHER PEOPLE COULD HAVE NOTICED. OR THE OPPOSITE, BEING SO FIGETY OR RESTLESS THAT YOU HAVE BEEN MOVING AROUND A LOT MORE THAN USUAL: NOT AT ALL
9. THOUGHTS THAT YOU WOULD BE BETTER OFF DEAD, OR OF HURTING YOURSELF: NEARLY EVERY DAY
5. POOR APPETITE OR OVEREATING: MORE THAN HALF THE DAYS
SUM OF ALL RESPONSES TO PHQ9 QUESTIONS 1 AND 2: 6

## 2023-08-31 NOTE — CARE PLAN
Problem: Knowledge Deficit - Standard  Goal: Patient and family/care givers will demonstrate understanding of plan of care, disease process/condition, diagnostic tests and medications  Outcome: Progressing     Problem: Provide Safe Environment  Goal: Suicide environmental safety, protocols, policies, and practices will be implemented  Outcome: Progressing     Problem: Skin Integrity  Goal: Skin integrity is maintained or improved  Outcome: Progressing   The patient is Stable - Low risk of patient condition declining or worsening    Shift Goals  Clinical Goals: VSS, safety  Patient Goals: RAINA  Family Goals: RAINA

## 2023-08-31 NOTE — CARE PLAN
The patient is Stable - Low risk of patient condition declining or worsening    Shift Goals  Clinical Goals: fall free, suicide free  Patient Goals: RAINA  Family Goals: RAINA    Progress made toward(s) clinical / shift goals:  patient is fall free today, expresses his desire to commit suicide but made a pack with this nurse to not try anything today       Patient is not progressing towards the following goals:  Problem: Knowledge Deficit - Standard  Goal: Patient and family/care givers will demonstrate understanding of plan of care, disease process/condition, diagnostic tests and medications  Outcome: Progressing     Problem: Provide Safe Environment  Goal: Suicide environmental safety, protocols, policies, and practices will be implemented  Outcome: Progressing     Problem: Psychosocial  Goal: Patient's ability to identify and develop effective coping behaviors will improve  Outcome: Progressing  Goal: Patient's ability to identify and utilize available support systems will improve  Outcome: Progressing     Problem: Skin Integrity  Goal: Skin integrity is maintained or improved  Outcome: Progressing     Problem: Fall Risk  Goal: Patient will remain free from falls  Outcome: Progressing     Problem: Depression  Goal: Patient and family/caregiver will verbalize accurate information about at least two of the possible causes of depression, three-four of the signs and symptoms of depression  Outcome: Progressing

## 2023-08-31 NOTE — PROGRESS NOTES
Hospital Medicine Daily Progress Note    Date of Service  8/31/2023    Chief Complaint  Bishop Bucio is a 53 y.o. male admitted 8/30/2023 with overdose    Hospital Course  Admitted with suicidal attempt with Benadryl overdose.  He was placed on a legal hold.  Psychiatry was consulted case.    Interval Problem Update  Benadryl OD - mild tremors  Suicidal - persistent thoughts    I have discussed this patient's plan of care and discharge plan at IDT rounds today with Case Management, Nursing, Nursing leadership, and other members of the IDT team.    Consultants/Specialty  psychiatry    Code Status  Full Code    Disposition  The patient is not medically cleared for discharge to home or a post-acute facility.  Anticipate discharge to: a psychiatric hospital    I have placed the appropriate orders for post-discharge needs.    Review of Systems  Review of Systems   Constitutional:  Positive for malaise/fatigue. Negative for chills, diaphoresis and fever.   HENT:  Negative for congestion, hearing loss and sore throat.    Eyes:  Negative for blurred vision.   Respiratory:  Negative for cough, shortness of breath and wheezing.    Cardiovascular:  Negative for chest pain, palpitations and leg swelling.   Gastrointestinal:  Negative for abdominal pain, diarrhea, heartburn, nausea and vomiting.   Genitourinary:  Negative for dysuria, flank pain and hematuria.   Musculoskeletal:  Negative for back pain, joint pain, myalgias and neck pain.   Skin:  Negative for rash.   Neurological:  Positive for tremors and weakness. Negative for dizziness, sensory change, speech change, focal weakness and headaches.   Psychiatric/Behavioral:  Positive for depression and suicidal ideas. Negative for hallucinations. The patient is not nervous/anxious.         Physical Exam  Temp:  [36.6 °C (97.9 °F)-36.9 °C (98.4 °F)] 36.8 °C (98.2 °F)  Pulse:  [] 74  Resp:  [17-20] 19  BP: (115-163)/(62-93) 115/72  SpO2:  [92 %-96 %] 93  %    Physical Exam  Vitals and nursing note reviewed.   HENT:      Head: Normocephalic and atraumatic.      Nose: No congestion.      Mouth/Throat:      Mouth: Mucous membranes are moist.   Eyes:      Extraocular Movements: Extraocular movements intact.      Conjunctiva/sclera: Conjunctivae normal.   Cardiovascular:      Rate and Rhythm: Normal rate and regular rhythm.   Pulmonary:      Effort: Pulmonary effort is normal.      Breath sounds: Normal breath sounds.   Abdominal:      General: There is no distension.      Tenderness: There is no abdominal tenderness. There is no guarding or rebound.   Musculoskeletal:      Cervical back: No tenderness.      Right lower leg: No edema.      Left lower leg: No edema.   Skin:     General: Skin is warm and dry.   Neurological:      General: No focal deficit present.      Mental Status: He is alert and oriented to person, place, and time.      Cranial Nerves: No cranial nerve deficit.      Motor: Tremor present.   Psychiatric:         Mood and Affect: Mood is depressed. Affect is flat.         Behavior: Behavior is slowed.         Thought Content: Thought content is not paranoid or delusional. Thought content includes suicidal ideation. Thought content does not include homicidal ideation. Thought content does not include homicidal or suicidal plan.         Fluids    Intake/Output Summary (Last 24 hours) at 8/31/2023 1023  Last data filed at 8/31/2023 0325  Gross per 24 hour   Intake 336.08 ml   Output 774 ml   Net -437.92 ml       Laboratory  Recent Labs     08/30/23  0733 08/31/23  0345   WBC 7.7 8.2   RBC 5.08 4.94   HEMOGLOBIN 14.9 14.5   HEMATOCRIT 45.5 45.4   MCV 89.6 91.9   MCH 29.3 29.4   MCHC 32.7 31.9*   RDW 45.2 47.2   PLATELETCT 219 237   MPV 9.6 9.6     Recent Labs     08/30/23  0733 08/31/23  0345   SODIUM 139 142   POTASSIUM 3.5* 3.6   CHLORIDE 104 108   CO2 22 25   GLUCOSE 149* 106*   BUN 9 8   CREATININE 1.00 1.07   CALCIUM 8.5 9.1     Recent Labs      08/30/23  0733   APTT 24.3*   INR 1.03               Imaging  CT-HEAD W/O   Final Result      Unremarkable noncontrast CT examination of the brain.         DX-CHEST-PORTABLE (1 VIEW)   Final Result      No acute cardiac or pulmonary abnormalities are identified.           Assessment/Plan  * Intentional diphenhydramine overdose (HCC)- (present on admission)  Assessment & Plan  IVF hydration  Telemetry monitoring  IV Ativan as needed    Depression- (present on admission)  Assessment & Plan  Psychiatry following    Suicidal overdose (HCC)- (present on admission)  Assessment & Plan  Legal Hold    Urinary retention- (present on admission)  Assessment & Plan  Monitor         VTE prophylaxis:    enoxaparin ppx      I have performed a physical exam and reviewed and updated ROS and Plan today (8/31/2023). In review of yesterday's note (8/30/2023), there are no changes except as documented above.
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
## 2023-09-01 PROBLEM — T50.902A DRUG OVERDOSE, INTENTIONAL, INITIAL ENCOUNTER (HCC): Status: ACTIVE | Noted: 2023-09-01

## 2023-09-01 LAB
ALBUMIN SERPL BCP-MCNC: 3.9 G/DL (ref 3.2–4.9)
ALBUMIN/GLOB SERPL: 1.4 G/DL
ALP SERPL-CCNC: 61 U/L (ref 30–99)
ALT SERPL-CCNC: 27 U/L (ref 2–50)
ANION GAP SERPL CALC-SCNC: 9 MMOL/L (ref 7–16)
AST SERPL-CCNC: 23 U/L (ref 12–45)
BACTERIA UR CULT: NORMAL
BILIRUB SERPL-MCNC: 0.4 MG/DL (ref 0.1–1.5)
BUN SERPL-MCNC: 15 MG/DL (ref 8–22)
CALCIUM ALBUM COR SERPL-MCNC: 9.4 MG/DL (ref 8.5–10.5)
CALCIUM SERPL-MCNC: 9.3 MG/DL (ref 8.5–10.5)
CHLORIDE SERPL-SCNC: 104 MMOL/L (ref 96–112)
CO2 SERPL-SCNC: 22 MMOL/L (ref 20–33)
CREAT SERPL-MCNC: 0.9 MG/DL (ref 0.5–1.4)
ERYTHROCYTE [DISTWIDTH] IN BLOOD BY AUTOMATED COUNT: 46.7 FL (ref 35.9–50)
GFR SERPLBLD CREATININE-BSD FMLA CKD-EPI: 101 ML/MIN/1.73 M 2
GLOBULIN SER CALC-MCNC: 2.7 G/DL (ref 1.9–3.5)
GLUCOSE SERPL-MCNC: 97 MG/DL (ref 65–99)
HCT VFR BLD AUTO: 43.5 % (ref 42–52)
HGB BLD-MCNC: 14.1 G/DL (ref 14–18)
MCH RBC QN AUTO: 29.6 PG (ref 27–33)
MCHC RBC AUTO-ENTMCNC: 32.4 G/DL (ref 32.3–36.5)
MCV RBC AUTO: 91.2 FL (ref 81.4–97.8)
PLATELET # BLD AUTO: 224 K/UL (ref 164–446)
PMV BLD AUTO: 9.5 FL (ref 9–12.9)
POTASSIUM SERPL-SCNC: 3.7 MMOL/L (ref 3.6–5.5)
PROT SERPL-MCNC: 6.6 G/DL (ref 6–8.2)
RBC # BLD AUTO: 4.77 M/UL (ref 4.7–6.1)
SIGNIFICANT IND 70042: NORMAL
SITE SITE: NORMAL
SODIUM SERPL-SCNC: 135 MMOL/L (ref 135–145)
SOURCE SOURCE: NORMAL
WBC # BLD AUTO: 8.8 K/UL (ref 4.8–10.8)

## 2023-09-01 PROCEDURE — 770001 HCHG ROOM/CARE - MED/SURG/GYN PRIV*

## 2023-09-01 PROCEDURE — 80053 COMPREHEN METABOLIC PANEL: CPT

## 2023-09-01 PROCEDURE — 700111 HCHG RX REV CODE 636 W/ 250 OVERRIDE (IP): Mod: JZ | Performed by: STUDENT IN AN ORGANIZED HEALTH CARE EDUCATION/TRAINING PROGRAM

## 2023-09-01 PROCEDURE — A9270 NON-COVERED ITEM OR SERVICE: HCPCS | Performed by: FAMILY MEDICINE

## 2023-09-01 PROCEDURE — 700105 HCHG RX REV CODE 258: Mod: UD | Performed by: FAMILY MEDICINE

## 2023-09-01 PROCEDURE — A9270 NON-COVERED ITEM OR SERVICE: HCPCS | Performed by: STUDENT IN AN ORGANIZED HEALTH CARE EDUCATION/TRAINING PROGRAM

## 2023-09-01 PROCEDURE — 700102 HCHG RX REV CODE 250 W/ 637 OVERRIDE(OP): Performed by: STUDENT IN AN ORGANIZED HEALTH CARE EDUCATION/TRAINING PROGRAM

## 2023-09-01 PROCEDURE — 99232 SBSQ HOSP IP/OBS MODERATE 35: CPT | Performed by: FAMILY MEDICINE

## 2023-09-01 PROCEDURE — 36415 COLL VENOUS BLD VENIPUNCTURE: CPT

## 2023-09-01 PROCEDURE — 85027 COMPLETE CBC AUTOMATED: CPT

## 2023-09-01 PROCEDURE — 99233 SBSQ HOSP IP/OBS HIGH 50: CPT | Mod: GC | Performed by: PSYCHIATRY & NEUROLOGY

## 2023-09-01 PROCEDURE — 700102 HCHG RX REV CODE 250 W/ 637 OVERRIDE(OP): Performed by: FAMILY MEDICINE

## 2023-09-01 RX ORDER — CHOLECALCIFEROL (VITAMIN D3) 125 MCG
5 CAPSULE ORAL NIGHTLY
Status: DISCONTINUED | OUTPATIENT
Start: 2023-09-01 | End: 2023-09-04 | Stop reason: HOSPADM

## 2023-09-01 RX ORDER — QUETIAPINE FUMARATE 25 MG/1
50 TABLET, FILM COATED ORAL NIGHTLY
Status: DISCONTINUED | OUTPATIENT
Start: 2023-09-02 | End: 2023-09-04 | Stop reason: HOSPADM

## 2023-09-01 RX ADMIN — ENOXAPARIN SODIUM 40 MG: 100 INJECTION SUBCUTANEOUS at 17:03

## 2023-09-01 RX ADMIN — SODIUM CHLORIDE, POTASSIUM CHLORIDE, SODIUM LACTATE AND CALCIUM CHLORIDE: 600; 310; 30; 20 INJECTION, SOLUTION INTRAVENOUS at 04:35

## 2023-09-01 RX ADMIN — SENNOSIDES AND DOCUSATE SODIUM 2 TABLET: 50; 8.6 TABLET ORAL at 17:07

## 2023-09-01 RX ADMIN — POLYETHYLENE GLYCOL 3350 1 PACKET: 17 POWDER, FOR SOLUTION ORAL at 17:07

## 2023-09-01 RX ADMIN — Medication 5 MG: at 20:27

## 2023-09-01 ASSESSMENT — ENCOUNTER SYMPTOMS
WEAKNESS: 1
NERVOUS/ANXIOUS: 0
CONSTITUTIONAL NEGATIVE: 1
BLURRED VISION: 0
NECK PAIN: 0
NERVOUS/ANXIOUS: 1
INSOMNIA: 0
CONSTIPATION: 1
TREMORS: 0
DIAPHORESIS: 0
WHEEZING: 0
CHILLS: 0
SENSORY CHANGE: 0
SHORTNESS OF BREATH: 0
MUSCULOSKELETAL NEGATIVE: 1
MYALGIAS: 0
DEPRESSION: 1
FOCAL WEAKNESS: 0
NAUSEA: 0
BACK PAIN: 0
ABDOMINAL PAIN: 0
VOMITING: 0
COUGH: 0
HEADACHES: 0
FEVER: 0
SPEECH CHANGE: 0
SORE THROAT: 0
TINGLING: 0
FLANK PAIN: 0
HALLUCINATIONS: 0
HEARTBURN: 0
DIZZINESS: 0
EYES NEGATIVE: 1
DIARRHEA: 0
PALPITATIONS: 0
DIZZINESS: 1

## 2023-09-01 ASSESSMENT — PATIENT HEALTH QUESTIONNAIRE - PHQ9
6. FEELING BAD ABOUT YOURSELF - OR THAT YOU ARE A FAILURE OR HAVE LET YOURSELF OR YOUR FAMILY DOWN: NEARLY EVERY DAY
9. THOUGHTS THAT YOU WOULD BE BETTER OFF DEAD, OR OF HURTING YOURSELF: NEARLY EVERY DAY
3. TROUBLE FALLING OR STAYING ASLEEP OR SLEEPING TOO MUCH: MORE THAN HALF THE DAYS
7. TROUBLE CONCENTRATING ON THINGS, SUCH AS READING THE NEWSPAPER OR WATCHING TELEVISION: MORE THAN HALF THE DAYS
SUM OF ALL RESPONSES TO PHQ9 QUESTIONS 1 AND 2: 6
1. LITTLE INTEREST OR PLEASURE IN DOING THINGS: NEARLY EVERY DAY
4. FEELING TIRED OR HAVING LITTLE ENERGY: MORE THAN HALF THE DAYS
SUM OF ALL RESPONSES TO PHQ9 QUESTIONS 1 AND 2: 6
2. FEELING DOWN, DEPRESSED, IRRITABLE, OR HOPELESS: NEARLY EVERY DAY
5. POOR APPETITE OR OVEREATING: MORE THAN HALF THE DAYS
1. LITTLE INTEREST OR PLEASURE IN DOING THINGS: NEARLY EVERY DAY
2. FEELING DOWN, DEPRESSED, IRRITABLE, OR HOPELESS: NEARLY EVERY DAY
8. MOVING OR SPEAKING SO SLOWLY THAT OTHER PEOPLE COULD HAVE NOTICED. OR THE OPPOSITE, BEING SO FIGETY OR RESTLESS THAT YOU HAVE BEEN MOVING AROUND A LOT MORE THAN USUAL: NOT AT ALL
SUM OF ALL RESPONSES TO PHQ QUESTIONS 1-9: 20

## 2023-09-01 ASSESSMENT — PAIN DESCRIPTION - PAIN TYPE: TYPE: ACUTE PAIN

## 2023-09-01 ASSESSMENT — LIFESTYLE VARIABLES: SUBSTANCE_ABUSE: 0

## 2023-09-01 NOTE — PROGRESS NOTES
Monitor Summary:     Rhythm: SR   HR: 63-73  Ectopy: rare PAC  Measurements:   0.12/0.09/0.42

## 2023-09-01 NOTE — PROGRESS NOTES
Handoff report received from day shift nurse. Pt care assumed. Pt is currently resting in bed. POC discussed with Pt and Pt verbalizes no questions at this time. Pt is AAOx4, on RA, on Tele monitoring, and VSS. Sitter at bedside for safety and continue monitoring for Legal hold

## 2023-09-01 NOTE — CARE PLAN
Problem: Knowledge Deficit - Standard  Goal: Patient and family/care givers will demonstrate understanding of plan of care, disease process/condition, diagnostic tests and medications  Outcome: Progressing     Problem: Provide Safe Environment  Goal: Suicide environmental safety, protocols, policies, and practices will be implemented  Outcome: Progressing     Problem: Psychosocial  Goal: Patient's ability to identify and develop effective coping behaviors will improve  Outcome: Progressing  Goal: Patient's ability to identify and utilize available support systems will improve  Outcome: Progressing     Problem: Skin Integrity  Goal: Skin integrity is maintained or improved  Outcome: Progressing     Problem: Fall Risk  Goal: Patient will remain free from falls  Outcome: Progressing     Problem: Depression  Goal: Patient and family/caregiver will verbalize accurate information about at least two of the possible causes of depression, three-four of the signs and symptoms of depression  Outcome: Progressing   The patient is Stable - Low risk of patient condition declining or worsening    Shift Goals  Clinical Goals: VSS, safety  Patient Goals: RAINA  Family Goals: RAINA    Progress made toward(s) clinical / shift goals: Safety,     Patient is not progressing towards the following goals: patient still positive for SI. Admits and plan to harm self

## 2023-09-01 NOTE — CARE PLAN
The patient is Stable - Low risk of patient condition declining or worsening    Shift Goals  Clinical Goals: suicide free , out of bed  Patient Goals: kepp informed  Family Goals: RAINA    Progress made toward(s) clinical / shift goals:  patient made verbal contract to not attempt anything toward his life . [place] [facility] called and may have a bed open up in [place]. Patient informed.      Patient is not progressing towards the following goals: Patient up in room and had bath today  Problem: Knowledge Deficit - Standard  Goal: Patient and family/care givers will demonstrate understanding of plan of care, disease process/condition, diagnostic tests and medications  Outcome: Progressing     Problem: Provide Safe Environment  Goal: Suicide environmental safety, protocols, policies, and practices will be implemented  Outcome: Progressing     Problem: Psychosocial  Goal: Patient's ability to identify and develop effective coping behaviors will improve  Outcome: Progressing  Goal: Patient's ability to identify and utilize available support systems will improve  Outcome: Progressing     Problem: Skin Integrity  Goal: Skin integrity is maintained or improved  Outcome: Progressing     Problem: Fall Risk  Goal: Patient will remain free from falls  Outcome: Progressing     Problem: Depression  Goal: Patient and family/caregiver will verbalize accurate information about at least two of the possible causes of depression, three-four of the signs and symptoms of depression  Outcome: Progressing

## 2023-09-01 NOTE — CONSULTS
"PSYCHIATRIC FOLLOW-UP:(established)    *Reason for admission: ALOC       *Legal Hold Status: extended           Chart reviewed.         HPI:  No acute events overnight. Patient is seen in his room, laying in bed with his breakfast tray. He is calm and cooperative during his interview. He remembers speaking to psychiatry yesterday. He endorses \"spotty\" sleep that is better than prior to his admission. He describes having a bipolar diagnosis and being awake for 6 days after his discharge from [facility] with very little sleep. He feels \"mellow\" but \"still depressed.\" He states one of the biggest issues he was dealing with prior to his admission is getting angry and he doesn't like being angry and \"putting that\" on other people. He denies feeling angry right now. He wants to be honest and says his integrity matters to him. He doesn't want to \"go out a liar.\" He endorses active SI and thinks he would attempt suicide if he were to discharge. He says he \"keeps getting caught with the pills\" so he would probably try something \"more drastic\" like jumping off a bridge or parking garage. He feels \"dumb and tired of fighting.\" He is unable to identify reasons to live saying he has \"no one and doesn't trust people.\" He has \"no idea what can help.\"  He endorses mild HA that has resolved, some right chest pain that resolved, constipation, mild restlessness in his arms and legs overnight that resolved, and lightheadedness. He denies additional questions or concerns other than needing pants.          Medical ROS (as pertinent):     Review of Systems   Constitutional: Negative.    HENT: Negative.     Eyes: Negative.    Respiratory:  Negative for shortness of breath.    Cardiovascular:  Positive for chest pain.   Gastrointestinal:  Positive for constipation. Negative for abdominal pain, diarrhea, nausea and vomiting.   Genitourinary: Negative.         Groin pain   Musculoskeletal: Negative.    Neurological:  Positive for dizziness. " "Negative for tingling, tremors and headaches.   Endo/Heme/Allergies: Negative.    Psychiatric/Behavioral:  Positive for depression and suicidal ideas. Negative for hallucinations and substance abuse. The patient is nervous/anxious. The patient does not have insomnia.        Psychiatric Examination:  Vitals:   Vitals:    23 0747   BP: 120/59   Pulse: 63   Resp: 16   Temp: 36.7 °C (98.1 °F)   SpO2: 97%        General appearance: appears stated age, appropriate, casual, and neat grooming and hygiene. Wearing glasses. Tattoos on left arm.   Behavior: Pt is calm and cooperative with interview.  No apparent distress.  Eye contact is appropriate.  Abnormal movements: Psychomotor agitation or retardation not noted.  Tics or tremors not noted.  Gait/posture: No abnormalities appreciated  Speech: rate within normal limits and volume within normal limits  Thought Process: Logical and Goal-directed  Thought Content: endorses suicidal ideation, denies homicidal ideation. Within normal limits  Perception: denies auditory hallucinations, denies visual hallucinations. Not responding to internal stimuli. No delusions or paranoia noted on interview.   Judgment and Insight: Limited / Limited  Orientation: Alert and Fully Oriented  Recent and remote memory: Recent:  Limited and Remote:  Good  Attention span and concentration: intact  Language: English, fluent  Fund of Knowledge: appropriate for age and education  Mood: \"depressed\"  Affect: Constricted, consistent with mood  SI/HI: endorses active SI/ denies HI      *EK2023  Results for orders placed or performed during the hospital encounter of 23   EKG   Result Value Ref Range     Report           [facility] Emergency Dept.     Test Date:  2023  Pt Name:    MARIVEL VILLAFANA                Department: ER  MRN:        [id number]                      Room:       RD 02  Gender:     Male                         Technician: [id number]  :        " 1970                   Requested By:PATRICIA RAMÍREZ  Order #:    [id number]                    Reading MD: Patricia Ramírez MD     Measurements  Intervals                                Axis  Rate:       115                          P:          38  AZ:         138                          QRS:        17  QRSD:       79                           T:          1  QT:         326  QTc:        451     Interpretive Statements  Sinus tachycardia  Probable left atrial enlargement  Minimal ST depression, anterolateral leads  Compared to ECG 2023 20:57:17  ST (T wave) deviation now present  Sinus rhythm no longer present  Electronically Signed On 2023 11:34:40 PDT by Patricia Ramírez MD     *Imagin2023 CT-HEAD W/O    IMPRESSION:  Unremarkable noncontrast CT examination of the brain.      EE2023 continuous video EEG 24 hours  INTERPRETATION:  Normal video EEG recording in the sedated, drowsy, sleep, awake states:  -Moderate background slowing with seen throughout the study due to sedation effects.  With the lowering and then eventual discontinuation of the sedation the background normalized.  -No persistent focal asymmetry, epileptiform activity, or seizures were seen.     Labs personally reviewed   Recent Results (from the past 24 hour(s))   CBC WITHOUT DIFFERENTIAL    Collection Time: 23  4:01 AM   Result Value Ref Range    WBC 8.8 4.8 - 10.8 K/uL    RBC 4.77 4.70 - 6.10 M/uL    Hemoglobin 14.1 14.0 - 18.0 g/dL    Hematocrit 43.5 42.0 - 52.0 %    MCV 91.2 81.4 - 97.8 fL    MCH 29.6 27.0 - 33.0 pg    MCHC 32.4 32.3 - 36.5 g/dL    RDW 46.7 35.9 - 50.0 fL    Platelet Count 224 164 - 446 K/uL    MPV 9.5 9.0 - 12.9 fL   Comp Metabolic Panel    Collection Time: 23  4:01 AM   Result Value Ref Range    Sodium 135 135 - 145 mmol/L    Potassium 3.7 3.6 - 5.5 mmol/L    Chloride 104 96 - 112 mmol/L    Co2 22 20 - 33 mmol/L    Anion Gap 9.0 7.0 - 16.0    Glucose 97 65 - 99 mg/dL    Bun 15 8 - 22 mg/dL     Creatinine 0.90 0.50 - 1.40 mg/dL    Calcium 9.3 8.5 - 10.5 mg/dL    Correct Calcium 9.4 8.5 - 10.5 mg/dL    AST(SGOT) 23 12 - 45 U/L    ALT(SGPT) 27 2 - 50 U/L    Alkaline Phosphatase 61 30 - 99 U/L    Total Bilirubin 0.4 0.1 - 1.5 mg/dL    Albumin 3.9 3.2 - 4.9 g/dL    Total Protein 6.6 6.0 - 8.2 g/dL    Globulin 2.7 1.9 - 3.5 g/dL    A-G Ratio 1.4 g/dL   ESTIMATED GFR    Collection Time: 09/01/23  4:01 AM   Result Value Ref Range    GFR (CKD-EPI) 101 >60 mL/min/1.73 m 2     Current Facility-Administered Medications   Medication Dose Route Frequency Provider Last Rate Last Admin    LORazepam (Ativan) injection 1 mg  1 mg Intravenous Q2HRS PRN Kar Mcarthur M.D.        senna-docusate (Pericolace Or Senokot S) 8.6-50 MG per tablet 2 Tablet  2 Tablet Oral BID Schuyler Kwong D.O.        And    polyethylene glycol/lytes (Miralax) PACKET 1 Packet  1 Packet Oral QDAY PRN Schuyler Kwong D.O.        And    magnesium hydroxide (Milk Of Magnesia) suspension 30 mL  30 mL Oral QDAY PRN Schuyler Kwong D.O.        And    bisacodyl (Dulcolax) suppository 10 mg  10 mg Rectal QDAY PRN Schuyler Kwong D.O.        lactated ringers infusion   Intravenous Continuous Kar Mcarthur M.D. 100 mL/hr at 09/01/23 1158 Rate Verify at 09/01/23 1158    enoxaparin (Lovenox) inj 40 mg  40 mg Subcutaneous DAILY AT 1800 Schuyler Kwong D.O.   40 mg at 08/31/23 1848           Assessment:   Patient has continued endorsement of depression and active suicide with a plan.  He is unable to contract for safety and outlines plan with more drastic measures than prior suicide attempts.  Underlying motivations for suicidal ideation and attempts continue to be compounded by complex psychiatric history that includes diagnosis of bipolar type II and possible co-occurring personality disorder, intermittent explosive disorder, and secondary gain.  Regardless of intent or concern for potential secondary gain, patient's behavior is a  danger to self and most likely secondary to underlying mental condition, including bipolar II.  Consequently, patient continues to meet criteria for legal at this time and requires inpatient psychiatric hospitalization and medication management to treat underlying mood instability, lability and suicidal ideation in the context of intentional overdose.  He will benefit from seeing psychotherapy today and restarting prior medications for mood and bipolar II after 3 days from overdose (on August 30). Patient has benefited from Seroquel in the past and we recommend initiating this after 3 days from overdose to avoid worsening of anticholinergic toxicity. Currently, anticholinergic toxicity symptoms appear to have resolved and patient reported somatic symptoms appear to be minimal and have resolved.      Dx:  #History of bipolar type II, recent suicide attempt  Rule out borderline personality disorder  Rule out antisocial personality disorder  Rule out intermittent explosive disorder  Rule out malingering component to intentional overdose      Medical :  See medical note      Plan:  Legal hold: Extended 8/3/2023  Psychotropic medications  Hold all psychotropic medications  Initiate Seroquel 50 mg at bedtime for bipolar type II TOMORROW on Sept 2  May consider melatonin 5 mg at bedtime tonight to help sleep  Please transfer pt to inpatient psychiatric hospital when medically cleared and bed is available  Psychotherapy consulted  Labs reviewed  EKG reviewed  Old records reviewed   Discussed the case with: Dr. Mckeon / Ly[place]  Psychiatry will follow up      Thank you for the consult.     Sitter: 1:1  Phone: NO ACCESS  Visitors: NO ACCESS  Personal belongings: NO ACCESS    This note was created using voice recognition software (Dragon). The accuracy of the dictation is limited by the abilities of the software. I have reviewed the note prior to signing. However, error related to voice recognition software and /or scribes  may still exist and should be interpreted within the appropriate context.

## 2023-09-01 NOTE — CONSULTS
Brief Behavioral Health Care Consultation    Psychotherapy Consultation request received. Attempted to meet with patient however, another provider was at bedside. Will return at a later time.    Thank you,    Herlinda Ordoñez, Ph.D., [facility]

## 2023-09-01 NOTE — PROGRESS NOTES
Hospital Medicine Daily Progress Note    Date of Service  9/1/2023    Chief Complaint  Bishop Bucio is a 53 y.o. male admitted 8/30/2023 with overdose    Hospital Course  Admitted with suicidal attempt with Benadryl overdose.  He was placed on a legal hold.  Psychiatry was consulted case.    Interval Problem Update  Benadryl OD - tremors resolved  Suicidal - persistent thoughts, discussed case with Psychiatry    I have discussed this patient's plan of care and discharge plan at IDT rounds today with Case Management, Nursing, Nursing leadership, and other members of the IDT team.    Consultants/Specialty  psychiatry    Code Status  Full Code    Disposition  The patient is medically cleared for discharge to home or a post-acute facility.  Anticipate discharge to: a psychiatric hospital    I have placed the appropriate orders for post-discharge needs.    Review of Systems  Review of Systems   Constitutional:  Positive for malaise/fatigue. Negative for chills, diaphoresis and fever.   HENT:  Negative for congestion, hearing loss and sore throat.    Eyes:  Negative for blurred vision.   Respiratory:  Negative for cough, shortness of breath and wheezing.    Cardiovascular:  Negative for chest pain, palpitations and leg swelling.   Gastrointestinal:  Negative for abdominal pain, diarrhea, heartburn, nausea and vomiting.   Genitourinary:  Negative for dysuria, flank pain and hematuria.   Musculoskeletal:  Negative for back pain, joint pain, myalgias and neck pain.   Skin:  Negative for rash.   Neurological:  Positive for weakness. Negative for dizziness, tremors, sensory change, speech change, focal weakness and headaches.   Psychiatric/Behavioral:  Positive for depression and suicidal ideas. Negative for hallucinations. The patient is not nervous/anxious.         Physical Exam  Temp:  [36.3 °C (97.4 °F)-37.1 °C (98.7 °F)] 36.3 °C (97.4 °F)  Pulse:  [63-77] 66  Resp:  [15-16] 16  BP: (114-142)/(55-78) 118/55  SpO2:   [91 %-97 %] 94 %    Physical Exam  Vitals and nursing note reviewed.   HENT:      Head: Normocephalic and atraumatic.      Nose: No congestion.      Mouth/Throat:      Mouth: Mucous membranes are moist.   Eyes:      Extraocular Movements: Extraocular movements intact.      Conjunctiva/sclera: Conjunctivae normal.   Cardiovascular:      Rate and Rhythm: Normal rate and regular rhythm.   Pulmonary:      Effort: Pulmonary effort is normal.      Breath sounds: Normal breath sounds.   Abdominal:      General: There is no distension.      Tenderness: There is no abdominal tenderness. There is no guarding or rebound.   Musculoskeletal:      Cervical back: No tenderness.      Right lower leg: No edema.      Left lower leg: No edema.   Skin:     General: Skin is warm and dry.   Neurological:      General: No focal deficit present.      Mental Status: He is alert and oriented to person, place, and time.      Cranial Nerves: No cranial nerve deficit.   Psychiatric:         Mood and Affect: Mood is depressed. Affect is flat.         Behavior: Behavior is slowed.         Thought Content: Thought content is not paranoid or delusional. Thought content includes suicidal ideation. Thought content does not include homicidal ideation. Thought content does not include homicidal or suicidal plan.         Fluids    Intake/Output Summary (Last 24 hours) at 9/1/2023 1545  Last data filed at 9/1/2023 1534  Gross per 24 hour   Intake 3095.36 ml   Output 3350 ml   Net -254.64 ml         Laboratory  Recent Labs     08/30/23 0733 08/31/23 0345 09/01/23  0401   WBC 7.7 8.2 8.8   RBC 5.08 4.94 4.77   HEMOGLOBIN 14.9 14.5 14.1   HEMATOCRIT 45.5 45.4 43.5   MCV 89.6 91.9 91.2   MCH 29.3 29.4 29.6   MCHC 32.7 31.9* 32.4   RDW 45.2 47.2 46.7   PLATELETCT 219 237 224   MPV 9.6 9.6 9.5       Recent Labs     08/30/23 0733 08/31/23  0345 09/01/23  0401   SODIUM 139 142 135   POTASSIUM 3.5* 3.6 3.7   CHLORIDE 104 108 104   CO2 22 25 22   GLUCOSE 149*  106* 97   BUN 9 8 15   CREATININE 1.00 1.07 0.90   CALCIUM 8.5 9.1 9.3       Recent Labs     08/30/23  0733   APTT 24.3*   INR 1.03                 Imaging  CT-HEAD W/O   Final Result      Unremarkable noncontrast CT examination of the brain.         DX-CHEST-PORTABLE (1 VIEW)   Final Result      No acute cardiac or pulmonary abnormalities are identified.             Assessment/Plan  * Intentional diphenhydramine overdose (HCC)- (present on admission)  Assessment & Plan  Ativan as needed    Depression- (present on admission)  Assessment & Plan  Psychiatry recommendation - start Seroquel tomorrow  Check EKG fot QTc    Suicidal overdose (HCC)- (present on admission)  Assessment & Plan  Legal Hold    Urinary retention- (present on admission)  Assessment & Plan  Monitor         VTE prophylaxis:    enoxaparin ppx      I have performed a physical exam and reviewed and updated ROS and Plan today (9/1/2023). In review of yesterday's note (8/31/2023), there are no changes except as documented above.

## 2023-09-01 NOTE — DISCHARGE PLANNING
Alert Team/Behavioral Health   Note:      [facility] ALERT TEAM DISCHARGE PLANNING NOTE    Date:  9/1/23  Patient Name:  Bishop Bucio - 53 y.o. - Discharge Planning  MRN:  [id number]   YOB: 1970  ADMISSION DATE:  8/30/2023     Writer forwarded referral packet for inpatient psychiatric care to the following community providers:  [facility], [facility]    Items included in the referral packet:   _x____Face Sheet   _x____Pages 1 and 2 of completed legal hold   _x____Alert Team/Psych Assessment   _x____H&P   _x____UDS   _x____Blood Alcohol   _x____Vital signs   _n/a____Pregnancy Test (if applicable)   _x____Medications List   _____Covid Screen

## 2023-09-01 NOTE — DISCHARGE PLANNING
Alert Team/Behavioral Health   Note:      - [facility]: Talked to Intake Counselor (Danita). Referral has been received and pending review. No beds currently available/full for the night. Referral is on pending list.  @1600: Intake Counselor (Danita) called. Still do not have beds available, but referral was staffed with doctor. Doctor is concerned with patient not having recent bowel movements. Update on bowel movements will be needed if bed does become available.    - [facility] : Talked to Aury. No beds currently available.

## 2023-09-02 LAB — EKG IMPRESSION: NORMAL

## 2023-09-02 PROCEDURE — 93010 ELECTROCARDIOGRAM REPORT: CPT | Mod: GZ | Performed by: INTERNAL MEDICINE

## 2023-09-02 PROCEDURE — 770001 HCHG ROOM/CARE - MED/SURG/GYN PRIV*

## 2023-09-02 PROCEDURE — A9270 NON-COVERED ITEM OR SERVICE: HCPCS | Performed by: FAMILY MEDICINE

## 2023-09-02 PROCEDURE — 700102 HCHG RX REV CODE 250 W/ 637 OVERRIDE(OP): Performed by: FAMILY MEDICINE

## 2023-09-02 PROCEDURE — 93005 ELECTROCARDIOGRAM TRACING: CPT | Performed by: FAMILY MEDICINE

## 2023-09-02 PROCEDURE — 99232 SBSQ HOSP IP/OBS MODERATE 35: CPT | Performed by: FAMILY MEDICINE

## 2023-09-02 PROCEDURE — 700111 HCHG RX REV CODE 636 W/ 250 OVERRIDE (IP): Mod: JZ | Performed by: STUDENT IN AN ORGANIZED HEALTH CARE EDUCATION/TRAINING PROGRAM

## 2023-09-02 RX ADMIN — QUETIAPINE FUMARATE 50 MG: 25 TABLET ORAL at 20:03

## 2023-09-02 RX ADMIN — Medication 5 MG: at 20:03

## 2023-09-02 RX ADMIN — ENOXAPARIN SODIUM 40 MG: 100 INJECTION SUBCUTANEOUS at 17:21

## 2023-09-02 ASSESSMENT — ENCOUNTER SYMPTOMS
VOMITING: 0
BACK PAIN: 0
FEVER: 0
SENSORY CHANGE: 0
BLURRED VISION: 0
HALLUCINATIONS: 0
FLANK PAIN: 0
CHILLS: 0
INSOMNIA: 1
HEADACHES: 0
DEPRESSION: 1
SHORTNESS OF BREATH: 0
SPEECH CHANGE: 0
SORE THROAT: 0
DIARRHEA: 0
MYALGIAS: 0
ABDOMINAL PAIN: 0
FOCAL WEAKNESS: 0
NECK PAIN: 0
HEARTBURN: 0
TREMORS: 0
NAUSEA: 0
WHEEZING: 0
COUGH: 0
PALPITATIONS: 0
DIAPHORESIS: 0
NERVOUS/ANXIOUS: 0
DIZZINESS: 0
WEAKNESS: 1

## 2023-09-02 ASSESSMENT — PATIENT HEALTH QUESTIONNAIRE - PHQ9
2. FEELING DOWN, DEPRESSED, IRRITABLE, OR HOPELESS: NEARLY EVERY DAY
SUM OF ALL RESPONSES TO PHQ9 QUESTIONS 1 AND 2: 6
1. LITTLE INTEREST OR PLEASURE IN DOING THINGS: NEARLY EVERY DAY

## 2023-09-02 ASSESSMENT — PAIN DESCRIPTION - PAIN TYPE
TYPE: ACUTE PAIN

## 2023-09-02 ASSESSMENT — FIBROSIS 4 INDEX: FIB4 SCORE: 1.07

## 2023-09-02 NOTE — PROGRESS NOTES
Hospital Medicine Daily Progress Note    Date of Service  9/2/2023    Chief Complaint  Bishop Bucio is a 53 y.o. male admitted 8/30/2023 with overdose    Hospital Course  Admitted with suicidal attempt with Benadryl overdose.  He was placed on a legal hold.  Psychiatry was consulted case.    Interval Problem Update  Benadryl OD - tremors resolved  Suicidal - persistent thoughts    I have discussed this patient's plan of care and discharge plan at IDT rounds today with Case Management, Nursing, Nursing leadership, and other members of the IDT team.    Consultants/Specialty  psychiatry    Code Status  Full Code    Disposition  The patient is medically cleared for discharge to home or a post-acute facility.  Anticipate discharge to: a psychiatric hospital    I have placed the appropriate orders for post-discharge needs.    Review of Systems  Review of Systems   Constitutional:  Positive for malaise/fatigue. Negative for chills, diaphoresis and fever.   HENT:  Negative for congestion, hearing loss and sore throat.    Eyes:  Negative for blurred vision.   Respiratory:  Negative for cough, shortness of breath and wheezing.    Cardiovascular:  Negative for chest pain, palpitations and leg swelling.   Gastrointestinal:  Negative for abdominal pain, diarrhea, heartburn, nausea and vomiting.   Genitourinary:  Negative for dysuria, flank pain and hematuria.   Musculoskeletal:  Negative for back pain, joint pain, myalgias and neck pain.   Skin:  Negative for rash.   Neurological:  Positive for weakness. Negative for dizziness, tremors, sensory change, speech change, focal weakness and headaches.   Psychiatric/Behavioral:  Positive for depression and suicidal ideas. Negative for hallucinations. The patient has insomnia. The patient is not nervous/anxious.         Physical Exam  Temp:  [36.2 °C (97.2 °F)-37 °C (98.6 °F)] 37 °C (98.6 °F)  Pulse:  [60-74] 71  Resp:  [16-17] 17  BP: (128-159)/(68-85) 128/75  SpO2:  [91 %-95  %] 91 %    Physical Exam  Vitals and nursing note reviewed.   HENT:      Head: Normocephalic and atraumatic.      Nose: No congestion.      Mouth/Throat:      Mouth: Mucous membranes are moist.   Eyes:      Extraocular Movements: Extraocular movements intact.      Conjunctiva/sclera: Conjunctivae normal.   Cardiovascular:      Rate and Rhythm: Normal rate and regular rhythm.   Pulmonary:      Effort: Pulmonary effort is normal.      Breath sounds: Normal breath sounds.   Abdominal:      General: There is no distension.      Tenderness: There is no abdominal tenderness. There is no guarding or rebound.   Musculoskeletal:      Cervical back: No tenderness.      Right lower leg: No edema.      Left lower leg: No edema.   Skin:     General: Skin is warm and dry.   Neurological:      General: No focal deficit present.      Mental Status: He is alert and oriented to person, place, and time.      Cranial Nerves: No cranial nerve deficit.   Psychiatric:         Mood and Affect: Mood is depressed. Affect is flat.         Behavior: Behavior is slowed.         Thought Content: Thought content is not paranoid or delusional. Thought content includes suicidal ideation. Thought content does not include homicidal ideation. Thought content does not include homicidal or suicidal plan.         Fluids    Intake/Output Summary (Last 24 hours) at 9/2/2023 1316  Last data filed at 9/2/2023 1112  Gross per 24 hour   Intake 545.56 ml   Output 1 ml   Net 544.56 ml         Laboratory  Recent Labs     08/31/23  0345 09/01/23  0401   WBC 8.2 8.8   RBC 4.94 4.77   HEMOGLOBIN 14.5 14.1   HEMATOCRIT 45.4 43.5   MCV 91.9 91.2   MCH 29.4 29.6   MCHC 31.9* 32.4   RDW 47.2 46.7   PLATELETCT 237 224   MPV 9.6 9.5       Recent Labs     08/31/23  0345 09/01/23  0401   SODIUM 142 135   POTASSIUM 3.6 3.7   CHLORIDE 108 104   CO2 25 22   GLUCOSE 106* 97   BUN 8 15   CREATININE 1.07 0.90   CALCIUM 9.1 9.3                       Imaging  CT-HEAD W/O   Final  Result      Unremarkable noncontrast CT examination of the brain.         DX-CHEST-PORTABLE (1 VIEW)   Final Result      No acute cardiac or pulmonary abnormalities are identified.             Assessment/Plan  * Intentional diphenhydramine overdose (HCC)- (present on admission)  Assessment & Plan  Ativan as needed    Depression- (present on admission)  Assessment & Plan  Psychiatry recommendation - start Seroquel today      Suicidal overdose (HCC)- (present on admission)  Assessment & Plan  Legal Hold    Urinary retention- (present on admission)  Assessment & Plan  Monitor         VTE prophylaxis:    enoxaparin ppx      I have performed a physical exam and reviewed and updated ROS and Plan today (9/2/2023). In review of yesterday's note (9/1/2023), there are no changes except as documented above.

## 2023-09-02 NOTE — CARE PLAN
The patient is Stable - Low risk of patient condition declining or worsening    Shift Goals  Clinical Goals: safety, 1:1 sitter, VSS  Patient Goals: informed, rest  Family Goals: delgado    Progress made toward(s) clinical / shift goals:      Problem: Knowledge Deficit - Standard  Goal: Patient and family/care givers will demonstrate understanding of plan of care, disease process/condition, diagnostic tests and medications  Outcome: Progressing     Problem: Provide Safe Environment  Goal: Suicide environmental safety, protocols, policies, and practices will be implemented  Outcome: Progressing     Problem: Psychosocial  Goal: Patient's ability to identify and develop effective coping behaviors will improve  Outcome: Progressing  Goal: Patient's ability to identify and utilize available support systems will improve  Outcome: Progressing     Problem: Skin Integrity  Goal: Skin integrity is maintained or improved  Outcome: Progressing     Problem: Fall Risk  Goal: Patient will remain free from falls  Outcome: Progressing     Problem: Depression  Goal: Patient and family/caregiver will verbalize accurate information about at least two of the possible causes of depression, three-four of the signs and symptoms of depression  Outcome: Progressing       Patient is not progressing towards the following goals:

## 2023-09-02 NOTE — DISCHARGE PLANNING
Alert Team/Behavioral Health   Note:        - [facility]: Talked to Intake Counselor (Bebe). Referral is on pending list. No adult beds currently available.    - [facility] : No beds currently available.

## 2023-09-02 NOTE — PROGRESS NOTES
Bedside report received, assumed care of patient. Resting in bed, denied pain. 1:1 sitter at bedside for safety, report given to . Room checked and cleared of safety hazards. A&O x4. Tele monitoring in place. Educated on fall risk, all fall precautions in place. Bed locked and in lowest position, denied other needs at this time. Will continue with plan of care.

## 2023-09-02 NOTE — CARE PLAN
The patient is Stable - Low risk of patient condition declining or worsening    Shift Goals  Clinical Goals: safety, 1:1 sitter, rest  Patient Goals: updates on discharge  Family Goals: RAINA    Progress made toward(s) clinical / shift goals:    Problem: Knowledge Deficit - Standard  Goal: Patient and family/care givers will demonstrate understanding of plan of care, disease process/condition, diagnostic tests and medications  Description: Target End Date:  1-3 days or as soon as patient condition allows    Document in Patient Education    1.  Patient and family/caregiver oriented to unit, equipment, visitation policy and means for communicating concern  2.  Complete/review Learning Assessment  3.  Assess knowledge level of disease process/condition, treatment plan, diagnostic tests and medications  4.  Explain disease process/condition, treatment plan, diagnostic tests and medications  Outcome: Progressing     Problem: Provide Safe Environment  Goal: Suicide environmental safety, protocols, policies, and practices will be implemented  Description: Target End Date:  resolve day 1    1.  Remove objects or personal belongings that may cause harm or injury to self or others  2.  Dietary tray modifications (paperware)  3.  Provide a safe environment  4.  Render close patient supervision by sustaining observation or awareness of the patient at all times  Outcome: Progressing     Problem: Psychosocial  Goal: Patient's ability to identify and develop effective coping behaviors will improve  Description: Target End Date:  1 to 3 days    1.  Present opportunities for the patient to express thoughts, and feelings in a nonjudgmental environment  2.  Help the patient with problem-solving in a constructive manner.  3.  Educate the patient on cognitive-behavioral self-management responses to suicidal thoughts.  4.  Introduce the use of self-expression methods to manage suicidal feelings  5.  Provide emotional support  6.  Encourage  identification of positive aspects of self  Outcome: Progressing  Goal: Patient's ability to identify and utilize available support systems will improve  Description: Target End Date:  1 to 3 days    1.  Help patient identify available resources and support systems  2.  Collaborate with interdisciplinary team  3.  Collaborate with patient, family/caregiver and other support systems  Outcome: Progressing     Problem: Skin Integrity  Goal: Skin integrity is maintained or improved  Description: Target End Date:  Prior to discharge or change in level of care    Document interventions on Skin Risk/Abdoulaye flowsheet groups and corresponding LDA    1.  Assess and monitor skin integrity, appearance and/or temperature  2.  Assess risk factors for impaired skin integrity and/or pressures ulcers  3.  Implement precautions to protect skin integrity in collaboration with interdisciplinary team  4.  Implement pressure ulcer prevention protocol if at risk for skin breakdown  5.  Confirm wound care consult if at risk for skin breakdown  6.  Ensure patient use of pressure relieving devices  (Low air loss bed, waffle overlay, heel protectors, ROHO cushion, etc)  Outcome: Progressing     Problem: Fall Risk  Goal: Patient will remain free from falls  Description: Target End Date:  Prior to discharge or change in level of care    Document interventions on the [facility] Fall Risk Assessment    1.  Assess for fall risk factors  2.  Implement fall precautions  Outcome: Progressing     Problem: Depression  Goal: Patient and family/caregiver will verbalize accurate information about at least two of the possible causes of depression, three-four of the signs and symptoms of depression  Description: Target End Date:  1 to 3 days    1.  Assess the patient's and family/caregiver's knowledge regarding depression and its causes.  2.  Explain to the patient and family/caregiver regarding the major symptoms of depression.  3.  Inform the patient  and family/caregiver that depression can be treated through medications and psychotherapy.  4.  Allow the patient to express feelings and perceptions  5.  Express hope to the patient with realistic comments about the patient's strengths and resources.  5.  Give positive feedback after a tasks are achieved.  6.  Encourage identification of positive aspects of self.  7.  Educate the patient about crisis intervention services such as suicide hotlines and other resources.  Outcome: Progressing       Patient is not progressing towards the following goals:

## 2023-09-03 PROCEDURE — 700102 HCHG RX REV CODE 250 W/ 637 OVERRIDE(OP): Performed by: FAMILY MEDICINE

## 2023-09-03 PROCEDURE — 99232 SBSQ HOSP IP/OBS MODERATE 35: CPT | Performed by: FAMILY MEDICINE

## 2023-09-03 PROCEDURE — 770001 HCHG ROOM/CARE - MED/SURG/GYN PRIV*

## 2023-09-03 PROCEDURE — 90837 PSYTX W PT 60 MINUTES: CPT | Performed by: SOCIAL WORKER

## 2023-09-03 PROCEDURE — 700111 HCHG RX REV CODE 636 W/ 250 OVERRIDE (IP): Mod: JZ | Performed by: STUDENT IN AN ORGANIZED HEALTH CARE EDUCATION/TRAINING PROGRAM

## 2023-09-03 PROCEDURE — A9270 NON-COVERED ITEM OR SERVICE: HCPCS | Performed by: FAMILY MEDICINE

## 2023-09-03 RX ADMIN — ENOXAPARIN SODIUM 40 MG: 100 INJECTION SUBCUTANEOUS at 16:23

## 2023-09-03 RX ADMIN — Medication 5 MG: at 21:15

## 2023-09-03 RX ADMIN — QUETIAPINE FUMARATE 50 MG: 25 TABLET ORAL at 21:15

## 2023-09-03 ASSESSMENT — ENCOUNTER SYMPTOMS
FEVER: 0
MYALGIAS: 0
ABDOMINAL PAIN: 0
NAUSEA: 0
WHEEZING: 0
DEPRESSION: 1
NERVOUS/ANXIOUS: 0
HALLUCINATIONS: 0
SPEECH CHANGE: 0
CHILLS: 0
DIZZINESS: 0
INSOMNIA: 1
SHORTNESS OF BREATH: 0
SENSORY CHANGE: 0
FLANK PAIN: 0
PALPITATIONS: 0
FOCAL WEAKNESS: 0
HEARTBURN: 0
SORE THROAT: 0
DIAPHORESIS: 0
WEAKNESS: 1
VOMITING: 0
BLURRED VISION: 0
HEADACHES: 0
COUGH: 0
DIARRHEA: 0
TREMORS: 0
NECK PAIN: 0
BACK PAIN: 0

## 2023-09-03 ASSESSMENT — PATIENT HEALTH QUESTIONNAIRE - PHQ9
2. FEELING DOWN, DEPRESSED, IRRITABLE, OR HOPELESS: NEARLY EVERY DAY
6. FEELING BAD ABOUT YOURSELF - OR THAT YOU ARE A FAILURE OR HAVE LET YOURSELF OR YOUR FAMILY DOWN: NEARLY EVERY DAY
1. LITTLE INTEREST OR PLEASURE IN DOING THINGS: NEARLY EVERY DAY
3. TROUBLE FALLING OR STAYING ASLEEP OR SLEEPING TOO MUCH: MORE THAN HALF THE DAYS
4. FEELING TIRED OR HAVING LITTLE ENERGY: MORE THAN HALF THE DAYS
5. POOR APPETITE OR OVEREATING: MORE THAN HALF THE DAYS
7. TROUBLE CONCENTRATING ON THINGS, SUCH AS READING THE NEWSPAPER OR WATCHING TELEVISION: MORE THAN HALF THE DAYS
SUM OF ALL RESPONSES TO PHQ9 QUESTIONS 1 AND 2: 6
9. THOUGHTS THAT YOU WOULD BE BETTER OFF DEAD, OR OF HURTING YOURSELF: NEARLY EVERY DAY
8. MOVING OR SPEAKING SO SLOWLY THAT OTHER PEOPLE COULD HAVE NOTICED. OR THE OPPOSITE, BEING SO FIGETY OR RESTLESS THAT YOU HAVE BEEN MOVING AROUND A LOT MORE THAN USUAL: NOT AT ALL
SUM OF ALL RESPONSES TO PHQ QUESTIONS 1-9: 20

## 2023-09-03 ASSESSMENT — PAIN DESCRIPTION - PAIN TYPE: TYPE: ACUTE PAIN

## 2023-09-03 ASSESSMENT — FIBROSIS 4 INDEX: FIB4 SCORE: 1.07

## 2023-09-03 NOTE — CONSULTS
"[facility]    INPATIENT ASSESSMENT    Name: Bishop Bucio  MRN: [id number]  : 1970  Age: 53 y.o.  Date of assessment: 9/3/2023  PCP: Pcp Pt States None  Persons in attendance: Patient  Length of intervention: 60 minutes    HPI:  Per Medical Record, \"Bishop Bucio is a 53 y.o. male who presents with altered mental status.  [place] and when they attempted to remove him from the premises he became altered.  \" Patient was referred for psychotherapy while in the hospital awaiting for transfer to an inpatient psychiatric facility.    CHIEF COMPLAINT/PRESENTING ISSUE (as stated by Patient): Bishop Bucio was seen lying on hospital bed, alert and oriented, speech clear and coherent. Patient was pleasant and easy to engage in the interview process. Patient denies homicidal ideations, however endorses suicidal ideations stating, \"as soon as I am discharged I will do it again\". Patient states he has no cherie in his life and no reason to live. Patient reports I have been trying to kill myself since I was a teenager via overdose. Patient states \"It hasn't worked so I am going to have to change to something more lethal\".     History  Patient was admitted to [facility] for a suicide attempt    Patient states he promised his brother years ago that he would stop trying to kill himself, and he successfully stopped overdosing for many years. Patient states he worked successfully as a  without formal education stating he only has a GED, however patient found no cherie in his work although he states he liked the job. Patient repeatedly states, \"there is no reason to live, people in this world are bad, everyone is corrupt, no one can be trusted and I have nothing to look forward to\" . Patient continues, \"I know I am smart, and I love geology, but I don't feel cherie in that even though I love it\".  Patient states he has no goals and " "cannot give up the notion that dying is a better option than living.  Patient was resistant to psychotherapy stating, \"There is nothing you can say to change my mind, I am not going to stop trying to kill myself until I am successful\". Patient continues , \"I tried to follow the treatment plan when I was at [facility] but it took too long for my follow up appointment, I walked around and walked around and stayed awake for 6 days, I just couldn't do it any longer, I had to die\".    Chief Complaint   Patient presents with    ALOC     New onset this morning.         CURRENT LIVING SITUATION/SOCIAL SUPPORT: Patient reports homelessness, although he reports having a successful career as a . Patient reports he saves his money and at times had $25,000 saved although he was living on the streets.    BEHAVIORAL HEALTH TREATMENT HISTORY  Does patient/parent report a history of prior behavioral health treatment for patient?   Patient reports a recent admission at [facility] for suicide attempt.     SAFETY ASSESSMENT - SELF  Does patient acknowledge current or past symptoms of dangerousness to self? yes  Does parent/significant other report patient has current or past symptoms of dangerousness to self? N\A  Does presenting problem suggest symptoms of dangerousness to self? Yes:     Past Current    Suicidal Thoughts: [x]  [x]    Suicidal Plans: [x]  [x]    Suicidal Intent: [x]  [x]    Suicide Attempts: [x]  [x]    Self-Injury []  []      For any boxes checked above, provide detail: Patient reports ongoing attempts to overdose on medications with the goal of committing suicide.    History of suicide by family member: no  History of suicide by friend/significant other: no  Recent change in frequency/specificity/intensity of suicidal thoughts or self-harm behavior? no  Current access to firearms, medications, or other identified means of suicide/self-harm? No not while in the hospital  If yes, willing to restrict access " to means of suicide/self-harm? yes - while in the hospital  Protective factors present:   none reported    SAFETY ASSESSMENT - OTHERS  Does patient acknowledge current or past symptoms of aggressive behavior or risk to others? no  Does parent/significant other report patient has current or past symptoms of aggressive behavior or risk to others?  N\A  Does presenting problem suggest symptoms of dangerousness to others? No    Crisis Safety Plan completed and copy given to patient? no    ABUSE/NEGLECT SCREENING  Does patient report feeling “unsafe” in his/her home, or afraid of anyone?  no  Does patient report any history of physical, sexual, or emotional abuse?  no  Does parent or significant other report any of the above? N\A  Is there evidence of neglect by self?  yes  Is there evidence of neglect by a caregiver? no  Does the patient/parent report any history of CPS/APS/police involvement related to suspected abuse/neglect or domestic violence? no  Based on the information provided during the current assessment, is a mandated report of suspected abuse/neglect being made?  No    SUBSTANCE USE SCREENING  Cannabinoid Metab-positive  Diagnostic alcohol-negative    MENTAL STATUS              Participation: Active verbal participation  Grooming: Neat  Orientation: Alert  Behavior: Calm  Eye contact: Good  Mood: Depressed  Affect: Flat  Thought process: Goal-directed  Thought content: Within normal limits  Speech: Volume within normal limits  Perception: Within normal limits  Memory:  Recent:  Adequate  Insight: Poor  Judgment:  Poor  Other:    Collateral information:   Source:   Significant other present in person:    Significant other by telephone   [facility] Nursing Staff   [facility] Medical Record-reviewed   Other: psychiatry     Unable to complete full assessment due to:   Acute intoxication   Patient declined to participate/engage   Patient verbally unresponsive   Significant cognitive deficits    Significant perceptual distortions or behavioral disorganization   Other:             CLINICAL IMPRESSIONS:  Primary:  History of Bipolar type II  Secondary:  Borderline personality disorder                                       IDENTIFIED NEEDS/PLAN:  [Trigger DISPOSITION list for any items marked]    x  Imminent safety risk - self  Imminent safety risk - others     Acute substance withdrawal   Psychosis/Impaired reality testing   x  Mood/anxiety   Substance use/Addictive behavior    x Maladaptive behavior   Parent/child conflict     Family/Couples conflict   Biomedical     Housing   Financial      Legal  Occupational/Educational     Domestic violence   Other:     Recommendations and Observation Level:  Sitter: one to one  Phone: no  Visitors: no  Personal belongings: no    Please transfer to an inpatient psychiatric facility when a bed becomes available    Legal Hold: extended 8/3/23    Thank you,      Herlinda Ordoñez, Ph.D., [facility]  9/3/2023

## 2023-09-03 NOTE — CARE PLAN
The patient is Stable - Low risk of patient condition declining or worsening    Shift Goals  Clinical Goals: safety, 1:1 sitter, rest  Patient Goals: keep informed  Family Goals: delgado    Progress made toward(s) clinical / shift goals:      Problem: Knowledge Deficit - Standard  Goal: Patient and family/care givers will demonstrate understanding of plan of care, disease process/condition, diagnostic tests and medications  Outcome: Progressing     Problem: Provide Safe Environment  Goal: Suicide environmental safety, protocols, policies, and practices will be implemented  Outcome: Progressing     Problem: Psychosocial  Goal: Patient's ability to identify and develop effective coping behaviors will improve  Outcome: Progressing  Goal: Patient's ability to identify and utilize available support systems will improve  Outcome: Progressing     Problem: Skin Integrity  Goal: Skin integrity is maintained or improved  Outcome: Progressing     Problem: Fall Risk  Goal: Patient will remain free from falls  Outcome: Progressing     Problem: Depression  Goal: Patient and family/caregiver will verbalize accurate information about at least two of the possible causes of depression, three-four of the signs and symptoms of depression  Outcome: Progressing       Patient is not progressing towards the following goals:

## 2023-09-03 NOTE — DISCHARGE PLANNING
Alert Team/Behavioral Health   Note:      - [facility]): Talked to Intake RN ([place]). Referral is on pending list. No adult beds currently available.    - [facility]: Talked to [facility]. No beds currently available. Crisis Counselor will call back if they have an update.  @1420: Crisis Counselor (Delmar) called to confirm patient is still needing placement. Re-sent referral.  @1510: RN (Elisha) called to decline patient due to no accepting doctor. Writer requested a more specific reason, so we can work up concern and send updated referral. [facility] to call back.

## 2023-09-03 NOTE — PROGRESS NOTES
Hospital Medicine Daily Progress Note    Date of Service  9/3/2023    Chief Complaint  Bishop Bucio is a 53 y.o. male admitted 8/30/2023 with overdose    Hospital Course  Admitted with suicidal attempt with Benadryl overdose.  He was placed on a legal hold.  Psychiatry was consulted on the case. Legal hold was extended and they recommended transfer to an inpatient psychiatry. He had some mild tremors initially which resolved. Psychiatry also recommended Seroquel and this was started.     Interval Problem Update  Benadryl OD - tremors resolved  Suicidal - persistent thoughts    I have discussed this patient's plan of care and discharge plan at IDT rounds today with Case Management, Nursing, Nursing leadership, and other members of the IDT team.    Consultants/Specialty  psychiatry    Code Status  Full Code    Disposition  The patient is medically cleared for discharge to home or a post-acute facility.  Anticipate discharge to: a psychiatric hospital    I have placed the appropriate orders for post-discharge needs.    Review of Systems  Review of Systems   Constitutional:  Positive for malaise/fatigue. Negative for chills, diaphoresis and fever.   HENT:  Negative for congestion, hearing loss and sore throat.    Eyes:  Negative for blurred vision.   Respiratory:  Negative for cough, shortness of breath and wheezing.    Cardiovascular:  Negative for chest pain, palpitations and leg swelling.   Gastrointestinal:  Negative for abdominal pain, diarrhea, heartburn, nausea and vomiting.   Genitourinary:  Negative for dysuria, flank pain and hematuria.   Musculoskeletal:  Negative for back pain, joint pain, myalgias and neck pain.   Skin:  Negative for rash.   Neurological:  Positive for weakness. Negative for dizziness, tremors, sensory change, speech change, focal weakness and headaches.   Psychiatric/Behavioral:  Positive for depression and suicidal ideas. Negative for hallucinations. The patient has insomnia. The  patient is not nervous/anxious.         Physical Exam  Temp:  [36.7 °C (98.1 °F)-37 °C (98.6 °F)] 36.9 °C (98.4 °F)  Pulse:  [72-96] 87  Resp:  [12-16] 16  BP: (106-135)/(66-86) 135/72  SpO2:  [92 %-95 %] 95 %    Physical Exam  Vitals and nursing note reviewed.   HENT:      Head: Normocephalic and atraumatic.      Nose: No congestion.      Mouth/Throat:      Mouth: Mucous membranes are moist.   Eyes:      Extraocular Movements: Extraocular movements intact.      Conjunctiva/sclera: Conjunctivae normal.   Cardiovascular:      Rate and Rhythm: Normal rate and regular rhythm.   Pulmonary:      Effort: Pulmonary effort is normal.      Breath sounds: Normal breath sounds.   Abdominal:      General: There is no distension.      Tenderness: There is no abdominal tenderness. There is no guarding or rebound.   Musculoskeletal:      Cervical back: No tenderness.      Right lower leg: No edema.      Left lower leg: No edema.   Skin:     General: Skin is warm and dry.   Neurological:      General: No focal deficit present.      Mental Status: He is alert and oriented to person, place, and time.      Cranial Nerves: No cranial nerve deficit.   Psychiatric:         Mood and Affect: Mood is depressed. Affect is flat.         Behavior: Behavior is slowed.         Thought Content: Thought content is not paranoid or delusional. Thought content includes suicidal ideation. Thought content does not include homicidal ideation. Thought content does not include homicidal or suicidal plan.         Fluids    Intake/Output Summary (Last 24 hours) at 9/3/2023 1537  Last data filed at 9/3/2023 1517  Gross per 24 hour   Intake 580 ml   Output 0 ml   Net 580 ml       Laboratory  Recent Labs     09/01/23  0401   WBC 8.8   RBC 4.77   HEMOGLOBIN 14.1   HEMATOCRIT 43.5   MCV 91.2   MCH 29.6   MCHC 32.4   RDW 46.7   PLATELETCT 224   MPV 9.5     Recent Labs     09/01/23  0401   SODIUM 135   POTASSIUM 3.7   CHLORIDE 104   CO2 22   GLUCOSE 97   BUN 15    CREATININE 0.90   CALCIUM 9.3                     Imaging  CT-HEAD W/O   Final Result      Unremarkable noncontrast CT examination of the brain.         DX-CHEST-PORTABLE (1 VIEW)   Final Result      No acute cardiac or pulmonary abnormalities are identified.             Assessment/Plan  * Intentional diphenhydramine overdose (HCC)- (present on admission)  Assessment & Plan  Ativan as needed    Depression- (present on admission)  Assessment & Plan  Seroquel      Suicidal overdose (HCC)- (present on admission)  Assessment & Plan  Legal Hold    Urinary retention- (present on admission)  Assessment & Plan  Monitor         VTE prophylaxis:    enoxaparin ppx      I have performed a physical exam and reviewed and updated ROS and Plan today (9/3/2023). In review of yesterday's note (9/2/2023), there are no changes except as documented above.

## 2023-09-04 VITALS
HEART RATE: 82 BPM | BODY MASS INDEX: 26.94 KG/M2 | OXYGEN SATURATION: 93 % | DIASTOLIC BLOOD PRESSURE: 73 MMHG | SYSTOLIC BLOOD PRESSURE: 125 MMHG | HEIGHT: 71 IN | RESPIRATION RATE: 16 BRPM | TEMPERATURE: 98.2 F | WEIGHT: 192.46 LBS

## 2023-09-04 PROCEDURE — 99232 SBSQ HOSP IP/OBS MODERATE 35: CPT | Mod: GC | Performed by: PSYCHIATRY & NEUROLOGY

## 2023-09-04 PROCEDURE — 99239 HOSP IP/OBS DSCHRG MGMT >30: CPT | Performed by: FAMILY MEDICINE

## 2023-09-04 RX ORDER — QUETIAPINE FUMARATE 50 MG/1
50 TABLET, FILM COATED ORAL NIGHTLY
Qty: 60 TABLET | Refills: 3 | Status: SHIPPED
Start: 2023-09-04 | End: 2023-11-06

## 2023-09-04 RX ORDER — CHOLECALCIFEROL (VITAMIN D3) 125 MCG
5 CAPSULE ORAL NIGHTLY
Qty: 30 TABLET | Status: SHIPPED
Start: 2023-09-04 | End: 2023-11-06

## 2023-09-04 ASSESSMENT — ENCOUNTER SYMPTOMS
HEARTBURN: 1
TINGLING: 1
CONSTIPATION: 1
SHORTNESS OF BREATH: 0

## 2023-09-04 NOTE — CARE PLAN
The patient is Stable - Low risk of patient condition declining or worsening    Shift Goals  Clinical Goals: Safety, Emotional Support, Rest  Patient Goals: Keep Informed  Family Goals: delgado    Progress made toward(s) clinical / shift goals:      Patient still openly voicing wanting to end his life. This RN provided emotional support without evidence of patient coping to an ability of wanting to live. A 1:1 sitter is still at bedside for patient safety. All guidelines for patient safety and policies being followed per protocol for optimal care of SI patient.    Problem: Provide Safe Environment  Goal: Suicide environmental safety, protocols, policies, and practices will be implemented  Outcome: Progressing    Patient is not progressing towards the following goals:     Problem: Depression  Goal: Patient and family/caregiver will verbalize accurate information about at least two of the possible causes of depression, three-four of the signs and symptoms of depression  Outcome: Not Met

## 2023-09-04 NOTE — CONSULTS
"PSYCHIATRIC FOLLOW-UP:(established)  *Reason for admission: AMS       *Legal Hold Status: extended           Chart reviewed.         HPI: Patient reports he slept well last night with the Seroquel.  Patient notes in the past with Seroquel has had arm tingling.  In the right arm patient had IV removed yesterday, with some tingling since.  Patient is unsure if this is related to Seroquel or having the IV removed, will continue to monitor.  Patient reports suicidal ideation, with loose plan if discharged.  Patient reported he is not entirely sure what he would do but is considering jumping in the river or into traffic.  Patient was unable to assess if his suicidal ideation has improved since being in the hospital. Patient reported he was having constipation but had a bowel movement today.  Patient reports decreased appetite.  Patient denied AVH, HI.    Medical ROS (as pertinent):     Review of Systems   Respiratory:  Negative for shortness of breath.    Gastrointestinal:  Positive for constipation and heartburn.   Neurological:  Positive for tingling.           Psychiatric Examination:  Vitals:   Vitals:    09/04/23 0727   BP: 102/70   Pulse: 75   Resp: 16   Temp: 36.9 °C (98.4 °F)   SpO2: 91%      Appearance: appears stated age, good grooming and hygiene, calm, cooperative, good eye contact  Abnormal movements: none  Gait/posture: Gait not assessed  Speech: normal volume, tone and rhythm  Though process: linear and organized  Associations: no loose associations  Thought content: not observed responding to internal stimuli, denies paranoia, delusional content not present during interview  Judgement and Insight: Poor/limited  Orientation:oriented to person, place, time and situation  Recent and Remote Memory: intact  Attention Span and Concentration: intact  Language: English, fluent  Fund of Knowledge: not tested   Mood and Affect: \"Alright \", euthymic, incongruent to content  SI/HI: Reports suicidal ideation, plan " stated above.  Denies homicidal ideation           *EK23,   *Imagin23 -unremarkable   EEG:  none     Labs personally reviewed     Assessment: Today patient reported Seroquel helped him sleep, denied medication side effects.  The patient continues to endorse suicidal ideation with unformed plan,  patient's affect is incongruent and largely euthymic.  Patient participated in psychotherapy over the weekend.  We will continue to monitor patient for stability, legal continue to be extended.      Dx:  #History of Bipolar type II  #Borderline personality disorder  R/o malingering     Medical :  - See medical note      Plan:  - Legal hold: extended  - Psychotropic medications   - Continue Seroquel 50mg PO qhs for mood   - Please transfer pt to inpatient psychiatric hospital when medically cleared and bed is available  - Labs reviewed  - EKG reviewed  - Old records reviewed  - Discussed the case with: Dr. Mckeon and voalte message send to Dr. Mcarthur  - Psychiatry will follow up     Thank you for the consult.     Sitter: 1:1  Phone: NO ACCESS  Visitors: NO ACCESS  Personal belongings: NO ACCESS    This note was created using voice recognition software (Dragon). The accuracy of the dictation is limited by the abilities of the software. I have reviewed the note prior to signing. However, error related to voice recognition software and /or scribes may still exist and should be interpreted within the appropriate context.

## 2023-09-04 NOTE — DISCHARGE PLANNING
Legal Hold Transfer     Referral: Legal hold transfer to Mental Health Facility     Intervention: Informed by Jennifer at [facility] that pt has been accepted.     Pt's accepting physcian is Dr. Gonzalez     Transport arranged through [facility]    Spoke to Danielle with [facility].      The pt will be picked up at 1800      Notified Bedside RN Sunil, SUNSHINE Harper, and Dr. Mcarthur of the departure time as well as accepting facility.      Transfer packet and COBRA to be created with original legal hold and placed on chart by writer.      Plan: Pt will be transferring to [facility] via [facility] at 1800.

## 2023-09-04 NOTE — PROGRESS NOTES
Patient requested to have PIV removed due to pain at catheter site and not have another inserted if not needed. This RN inquired with CHASIDY Andrew and since no IV drugs are indicated, we did not insert a new one.

## 2023-09-04 NOTE — DISCHARGE PLANNING
Alert Team Note:    Contacted by [facility], spoke to Jada. Pt referral has been received and is being reviewed. Faxed legal hold extension as requested.   Facility requires pt to have had a BM before acceptance is possible. Informed RN Sunil.

## 2023-09-04 NOTE — PROGRESS NOTES
Reached out to [facility] to provide updates on pt. No beds available yet.

## 2023-09-04 NOTE — DISCHARGE SUMMARY
Discharge Summary    CHIEF COMPLAINT ON ADMISSION  Chief Complaint   Patient presents with    ALOC     New onset this morning.        Reason for Admission  EMS    Admission Date  8/30/2023     CODE STATUS  Full Code    HPI & HOSPITAL COURSE  This is a 53 y.o. male here with Suicidal attempt with Benadryl overdose.  He was placed on a legal hold.  Psychiatry was consulted on the case. Legal hold was extended and they recommended transfer to an inpatient psychiatry.  He was closely monitored and he had some mild tremors initially which resolved. Psychiatry also recommended Seroquel and this was started.  Patient will be transferred to an inpatient psychiatric facility.     Therefore, he is discharged in good and stable condition to a psychiatric hospital.    The patient met 2-midnight criteria for an inpatient stay at the time of discharge.      FOLLOW UP ITEMS POST DISCHARGE  None    DISCHARGE DIAGNOSES  Principal Problem:    Intentional diphenhydramine overdose (HCC) (POA: Yes)  Active Problems:    Suicidal overdose (HCC) (POA: Yes)    Depression (POA: Yes)    Urinary retention (POA: Yes)    Drug overdose, intentional, initial encounter (HCC) (POA: Yes)  Resolved Problems:    * No resolved hospital problems. *      FOLLOW UP  No future appointments.  [facility]  [address] [address]  [phone]  Go today        MEDICATIONS ON DISCHARGE     Medication List        START taking these medications        Instructions   melatonin 5 mg Tabs   Take 1 Tablet by mouth every evening.  Dose: 5 mg     QUEtiapine 50 MG tablet  Commonly known as: SEROquel   Take 1 Tablet by mouth every evening.  Dose: 50 mg              Allergies  Allergies   Allergen Reactions    Pcn [Penicillins] Rash     Per patient     Bloodless        DIET  Orders Placed This Encounter   Procedures    Diet Order Diet: Regular; Tray Modifications (optional): No Sharps (Paperware)     Paperware only on meal tray.      Standing Status:   Standing     Number of Occurrences:   1     Order Specific Question:   Diet:     Answer:   Regular [1]     Order Specific Question:   Tray Modifications (optional)     Answer:   No Sharps (Paperware)       ACTIVITY  As tolerated.  Weight bearing as tolerated    LINES, DRAINS, AND WOUNDS  This is an automated list. Peripheral IVs will be removed prior to discharge.                     MENTAL STATUS ON TRANSFER  Alert and oriented x 4           CONSULTATIONS  Psychiatry    PROCEDURES  None    LABORATORY  Lab Results   Component Value Date    SODIUM 135 09/01/2023    POTASSIUM 3.7 09/01/2023    CHLORIDE 104 09/01/2023    CO2 22 09/01/2023    GLUCOSE 97 09/01/2023    BUN 15 09/01/2023    CREATININE 0.90 09/01/2023        Lab Results   Component Value Date    WBC 8.8 09/01/2023    HEMOGLOBIN 14.1 09/01/2023    HEMATOCRIT 43.5 09/01/2023    PLATELETCT 224 09/01/2023        Total time of the discharge process exceeds 40 minutes.

## 2023-09-04 NOTE — PROGRESS NOTES
Received bedside report from RN, pt care assumed, VSS, pt assessment complete. Pt AAOx4, with no c/o of pain at this time. No signs of acute distress noted at this time. Plan of care discussed with pt and verbalizes no questions. Pt denies any additional needs at this time. Bed locked/in lowest position, bed alarm on, pt educated on fall risk and verbalized understanding, call light within reach, hourly rounding initiated.

## 2023-09-13 NOTE — DOCUMENTATION QUERY
[facility]                                                                       Query Response Note      PATIENT:               MARIVEL VILLAFANA  ACCT #:                  [id number]  MRN:                     [id number]  :                      1970  ADMIT DATE:       2023 7:27 AM  DISCH DATE:        2023 6:51 PM  RESPONDING  PROVIDER #:        [id number]           QUERY TEXT:    Based on the clinical indicators outlined above, please clarify if the following has been treated/evaluated during this hospitalization:    NOTE:  If the appropriate response is not listed below, please respond with a new note.    Thank you.      The patient's Clinical Indicators include:   Discharged summary: ALOC,  intentional diphenhydramine overdose.   ED Note: Altered mental status, Benadryl overdose.    Risk factor: Benadryl overdose    Treatment: Monitor on tele, neuro checks    Thank you,  Belem Hood  Clinical   Connect via [facility]  Options provided:   -- Acute toxic encephalopathy   -- Other encephalopathy, Please specify   -- Other explanation, Please specify   -- Unable to determine      Query created by: Belem Hood on 2023 9:46 AM    RESPONSE TEXT:    Other encephalopathy- Drug overdose          Electronically signed by:  HANNAH MARCELO MD 2023 7:08 AM

## 2023-11-06 ENCOUNTER — HOSPITAL ENCOUNTER (EMERGENCY)
Facility: MEDICAL CENTER | Age: 53
End: 2023-11-07
Attending: EMERGENCY MEDICINE
Payer: COMMERCIAL

## 2023-11-06 DIAGNOSIS — R45.851 SUICIDAL IDEATION: ICD-10-CM

## 2023-11-06 LAB
AMPHET UR QL SCN: NEGATIVE
BARBITURATES UR QL SCN: NEGATIVE
BENZODIAZ UR QL SCN: NEGATIVE
BZE UR QL SCN: NEGATIVE
CANNABINOIDS UR QL SCN: NEGATIVE
FENTANYL UR QL: NEGATIVE
METHADONE UR QL SCN: NEGATIVE
OPIATES UR QL SCN: NEGATIVE
OXYCODONE UR QL SCN: NEGATIVE
PCP UR QL SCN: NEGATIVE
POC BREATHALIZER: 0 PERCENT (ref 0–0.01)
PROPOXYPH UR QL SCN: NEGATIVE

## 2023-11-06 PROCEDURE — 90791 PSYCH DIAGNOSTIC EVALUATION: CPT | Performed by: PSYCHOLOGIST

## 2023-11-06 PROCEDURE — 80307 DRUG TEST PRSMV CHEM ANLYZR: CPT

## 2023-11-06 PROCEDURE — 302970 POC BREATHALIZER

## 2023-11-06 PROCEDURE — 99285 EMERGENCY DEPT VISIT HI MDM: CPT

## 2023-11-06 PROCEDURE — 302970 POC BREATHALIZER: Performed by: EMERGENCY MEDICINE

## 2023-11-06 RX ORDER — HYDROXYZINE 50 MG/1
50 TABLET, FILM COATED ORAL 3 TIMES DAILY PRN
Status: ON HOLD | COMMUNITY
End: 2024-01-02

## 2023-11-06 RX ORDER — SERTRALINE HYDROCHLORIDE 100 MG/1
100 TABLET, FILM COATED ORAL DAILY
Status: ON HOLD | COMMUNITY
End: 2024-01-02

## 2023-11-06 RX ORDER — QUETIAPINE FUMARATE 50 MG/1
50 TABLET, FILM COATED ORAL 2 TIMES DAILY PRN
Status: ON HOLD | COMMUNITY
End: 2024-01-02

## 2023-11-06 RX ORDER — ERGOCALCIFEROL 1.25 MG/1
50000 CAPSULE ORAL
COMMUNITY

## 2023-11-06 RX ORDER — QUETIAPINE 400 MG/1
400 TABLET, FILM COATED, EXTENDED RELEASE ORAL NIGHTLY
Status: ON HOLD | COMMUNITY
End: 2024-01-02

## 2023-11-06 ASSESSMENT — FIBROSIS 4 INDEX: FIB4 SCORE: 1.07

## 2023-11-06 NOTE — ED PROVIDER NOTES
ER Provider Note    Scribed for Lilia Marc M.d. by Rusty Trinh. 11/6/2023  3:14 PM    Primary Care Provider: Pcp Pt States None    CHIEF COMPLAINT  Chief Complaint   Patient presents with    Suicidal Ideation     BIB [facility] from [facility]. Patient endorsing suicidal ideation with plan to overdose on psych medication. Also reports was not sleeping well. Non-complaint with psych medication. Previous OD with bendaryl on 08/30/2023.     LIMITATION TO HISTORY   Select: : None    HPI/ROS  OUTSIDE HISTORIAN(S):  EMS regarding on scene information and interventions en route as detailed below.    EXTERNAL RECORDS REVIEWED  Inpatient Notes indicate the patient was recently discharged after an inpatient stay secondary to benadryl overdose at the end of August.    Bishop Bucio is a 53 y.o. male with a history of Bipolar disorder who presents to the ED via EMS from [facility] for evaluation of suicidal ideation onset a few weeks ago but exacerbated today. The patient states he also has shakiness, and difficulty sleeping, but denies any active attempts to hurt himself at this time. He describes having a plan to overdose on his psychiatric medications, although he has not been compliant with these medications recently. He previously attempted to overdose on Benadryl on 8/30/23. He is scheduled to see his psychiatrist on the 9th of this month for a dose adjustment. He notes he has a lot of anger issues, but he typically feels better after listening to music.    PAST MEDICAL HISTORY  Past Medical History:   Diagnosis Date    Complaint of nasal congestion 5/28/2023     SURGICAL HISTORY  No past surgical history noted.    FAMILY HISTORY  No family history noted.    SOCIAL HISTORY   reports that he has never smoked. He has never used smokeless tobacco. He reports that he does not drink alcohol and does not use drugs.    CURRENT MEDICATIONS  Previous Medications    HYDROXYZINE HCL (ATARAX) 50 MG TAB    Take 50 mg by mouth 3  "times a day as needed for Anxiety.    QUETIAPINE (SEROQUEL XR) 400 MG XR TABLET    Take 400 mg by mouth every evening.    QUETIAPINE (SEROQUEL) 50 MG TABLET    Take 50 mg by mouth 2 times a day as needed. Indications: Agitation    SERTRALINE (ZOLOFT) 100 MG TAB    Take 100 mg by mouth every day.    VITAMIN D2, ERGOCALCIFEROL, (DRISDOL) 1.25 MG (71370 UT) CAP CAPSULE    Take 50,000 Units by mouth every Friday.     ALLERGIES  Pcn [penicillins] and Bloodless    PHYSICAL EXAM  BP (!) 154/68   Pulse 68   Temp 36.7 °C (98 °F) (Temporal)   Resp 18   Ht 1.778 m (5' 10\")   Wt 93.9 kg (207 lb)   SpO2 98%   BMI 29.70 kg/m²   Constitutional: Alert in no apparent distress. Well appearing. Anxious. Rocking back and forth in gurney.  HENT: Normocephalic, Atraumatic, Bilateral external ears normal. Nose normal.   Eyes:  Conjunctiva normal, non-icteric.   Lungs: Non-labored respirations  Skin: Warm, Dry, No erythema, No rash.   Neurologic: Alert, Grossly non-focal.   Psychiatric: Affect normal, Judgment normal, Mood normal, Appears appropriate and not intoxicated.     DIAGNOSTIC STUDIES & PROCEDURES    Labs:   Results for orders placed or performed during the hospital encounter of 11/06/23   Urine Drug Screen   Result Value Ref Range    Amphetamines Urine Negative Negative    Barbiturates Negative Negative    Benzodiazepines Negative Negative    Cocaine Metabolite Negative Negative    Fentanyl, Urine Negative Negative    Methadone Negative Negative    Opiates Negative Negative    Oxycodone Negative Negative    Phencyclidine -Pcp Negative Negative    Propoxyphene Negative Negative    Cannabinoid Metab Negative Negative   POC BREATHALIZER   Result Value Ref Range    POC Breathalizer 0.000 0.00 - 0.01 Percent   All labs reviewed by me.    COURSE & MEDICAL DECISION MAKING    ED Observation Status? Yes; I am placing the patient in to an observation status due to a diagnostic uncertainty as well as therapeutic intensity. Patient " placed in observation status at 3:26 PM, 11/6/2023.     Observation plan is as follows: Behavioral skills evaluation      3:14 PM - Patient seen and evaluated at bedside.  Ordered Urine Drug Screen and POC Breathalizer to evaluate. He understands and agrees to the plan of care.    INITIAL ASSESSMENT AND PLAN  Care Narrative: This is a 49-year-old gentleman with a history of bipolar disorder and prior suicide attempts presenting with suicidal ideation.  He is currently over at [facility] and has not been taking his meds because he is afraid he is going to overdose on them.  He has a history of this.  I do think he is at high risk of hurting himself.  Life skills evaluated the patient and agrees with this assessment as well.  Legal hold be continued.  He says that other medications that have been prescribed to help him relax really have not been helpful he usually takes Seroquel at night to help him sleep otherwise he listens to music to help him calm down.  He has not been giving any sedatives in the ER today he has been successfully able to be de-escalated with verbal communication with a sitter at bedside.  However he will be continued on a legal hold MAR will be performed and medications restarted.    Patient will be continued on a legal hold until inpatient psych placement can be performed.                     DISPOSITION AND DISCUSSIONS  I have discussed management of the patient with the following physicians and YOCASTA's: none    Discussion of management with other [facility] or appropriate source(s): Behavioral Health          Barriers to care at this time, including but not limited to: Patient does not have established PCP.       FINAL IMPRESSION   1. Suicidal ideation        Rusty MAYO), am scribing for, and in the presence of, Lilia Marc M.D..    Electronically signed by: Rusty Nick), 11/6/2023    Lilia MAYO M.D. personally performed the services described in this documentation, as  scribed by Rusty Trinh in my presence, and it is both accurate and complete.    The note accurately reflects work and decisions made by me.  Lilia Marc M.D.  11/6/2023  8:46 PM

## 2023-11-06 NOTE — ED TRIAGE NOTES
Bishop Bucio  53 y.o. male  Chief Complaint   Patient presents with    Suicidal Ideation     BIB [facility] from [facility]. Patient endorsing suicidal ideation with plan to overdose on psych medication. Also reports was not sleeping well. Non-complaint with psych medication. Previous OD with bendaryl on 08/30/2023.     Appears calm, cooperative to staff during assessment.    Pt is GCS 15, speaking in full sentences, follows commands and responds appropriately to questions. Resp are even and unlabored.       Vitals:    11/06/23 1450   BP: (!) 154/68   Pulse: 68   Resp: 18   Temp: 36.7 °C (98 °F)   SpO2: 98%

## 2023-11-06 NOTE — ED NOTES
Placed on L2K at 1505. Patient changed to disposable scrubs. Belongings collected for safe keeping. 1:1 sitter with direct view of patient. Report given to TRINO Malone (1:1).

## 2023-11-07 VITALS
RESPIRATION RATE: 16 BRPM | HEIGHT: 70 IN | BODY MASS INDEX: 29.63 KG/M2 | DIASTOLIC BLOOD PRESSURE: 78 MMHG | OXYGEN SATURATION: 98 % | SYSTOLIC BLOOD PRESSURE: 123 MMHG | WEIGHT: 207 LBS | HEART RATE: 80 BPM | TEMPERATURE: 97.8 F

## 2023-11-07 RX ORDER — FENOFIBRATE 160 MG/1
160 TABLET ORAL DAILY
COMMUNITY

## 2023-11-07 NOTE — ED NOTES
Report to [facility] paramedics. Patient transferred to [facility] in stable condition with all belongings. Dr. Marc notified of transfer.

## 2023-11-07 NOTE — DISCHARGE SUMMARY
"  ED Observation Discharge Summary    Patient:Bishop Bucio  Patient : 1970  Patient MRN: [id number]  Patient PCP: Pcp Pt States None    Admit Date: 2023  Discharge Date and Time: 23 2:14 PM  Discharge Diagnosis: Suicidal ideation  Discharge Attending: Lilia Marc M.D.  Discharge Service: ED Observation    ED Course  Bishop is a 53 y.o. male who was evaluated at [facility] for suicidal ideation.  He was at [facility] and was refusing to take his medications because he felt that he was going to overdose on them.  He has a history of bipolar and has had previous overdose attempts.  He was seen by life skills when he first was evaluated at the hospital and deemed to be appropriate for legal hold and psychiatric placement.  He has been in the hospital over the last day with no issues.  He transferred to [facility].    Discharge Exam:  /78   Pulse 80   Temp 36.6 °C (97.8 °F) (Temporal)   Resp 16   Ht 1.778 m (5' 10\")   Wt 93.9 kg (207 lb)   SpO2 98%   BMI 29.70 kg/m² .    Constitutional: Awake and alert. Nontoxic  HENT:  Grossly normal  Eyes: Grossly normal  Neck: Normal range of motion  Cardiovascular: Normal heart rate   Thorax & Lungs: No respiratory distress  Abdomen: Nontender  Skin:  No pathologic rash.   Extremities: Well perfused  Psychiatric: Affect normal    Labs  Results for orders placed or performed during the hospital encounter of 23   Urine Drug Screen   Result Value Ref Range    Amphetamines Urine Negative Negative    Barbiturates Negative Negative    Benzodiazepines Negative Negative    Cocaine Metabolite Negative Negative    Fentanyl, Urine Negative Negative    Methadone Negative Negative    Opiates Negative Negative    Oxycodone Negative Negative    Phencyclidine -Pcp Negative Negative    Propoxyphene Negative Negative    Cannabinoid Metab Negative Negative   POC BREATHALIZER   Result Value Ref Range    POC Breathalizer 0.000 " 0.00 - 0.01 Percent       Radiology  No orders to display       Medications:   New Prescriptions    No medications on file       My final assessment includes continued SI  Upon Reevaluation, the patient's condition has: not improved; and will be escalated to hospitalization.    Patient discharged from ED Observation status at 2:15 PM (Time) 11/7/2023 (Date).     Total time spent on this ED Observation discharge encounter is < 30 Minutes    Electronically signed by: Lilia Marc M.D., 11/7/2023 2:14 PM

## 2023-11-07 NOTE — ED NOTES
BS report from [facility]  Pt is on a legal hold pending psych/transfer w/ 1:1  All L2K/safety precautions in place  Sitter remains outside of room in direct, unobstructed 1:1 view for safety    Pt currently sleeping in NAD, breathing even and unlabored w/ even chest rise and fall. Pt is intermittently restless   Pt is A&O x4 and ambulatory  Pt is not on oxygen and no isolation

## 2023-11-07 NOTE — ED NOTES
Bedside report received from HU[facility]. Assumed care of patient and he is resting, denies any pain or needs. Bed locked and in lowest position. No call light at this time due to L2K. No oxygen requirements at this time. Appropriate fall precautions in place. Patient A&Ox4, GCS 15. Head and hands visible. Patient in paper scrubs. All equipment removed from the room, and safety precautions in place. Safety checklist in use and report passed on to the sitter. See MAR for medications and infusions. 1:1 sitter in direct line of sight. No distress noted.

## 2023-11-07 NOTE — ED NOTES
No signs/symptoms of distress noted, respirations even, equal and non-labored. Remains under 1:1 observation of designee monitoring in hallway.  Breakfast provided. Pt pacing in room.

## 2023-11-07 NOTE — CONSULTS
"[facility]   TRIAGE ASSESSMENT    Name: Bishop Bucio  MRN: [id number]  : 1970  Age: 53 y.o.  Date of assessment: 2023  PCP: Pcp Pt States None  Persons in attendance: Patient  Patient Location: [facility]    I explained to patient the limits of my decision-making regarding the pt's care, my use of structured questioning, my role as an  of risk rather than a conductor of a psychological assessment, the limits of confidentiality relevant to the circumstances and the computerized records.     CHIEF COMPLAINT/PRESENTING ISSUE (as stated by pt): Chart notes two prior suicide attempts by OD in  and August.  \"It started, uh, like last week... feeling weird around people,,... the shakes....\" Pt resides in a [facility] home. He was asked to move to a new room, with new roommates, secondary to which he experienced insomnia. \"But then I woke up even more depressed.\" He has a colonoscopy coming up, and is hoping he will have cancer and die; he does not want to burden others, even with his suicide. He has been distracting himself from thoughts of suicide with music and funny videos.     He has researched lethal doses of his meds: 1400 mg of Seroquel. \"I was with my counselor [Elizabeth].... she's like, 'Maybe you should go to the hospital.'\" He has not been eating. \"So I took her advice and came here.\"     Pt is talkative in a advuro-pa-kfai conversational manner. He is easy to understand except when he drops the end of sentences. Jiggling feet. Flexibility of limited tone and facial expression, and his good eye contact are incongruent with acuity of depression he speaks about.   Chief Complaint   Patient presents with    Suicidal Ideation     BIB [facility] from [facility]. Patient endorsing suicidal ideation with plan to overdose on psych medication. Also reports was not sleeping well. Non-complaint with psych medication. Previous OD with bendaryl on 2023.      CURRENT " "LIVING SITUATION/SOCIAL SUPPORT/FINANCIAL RESOURCES:   Pt lives with: roommates at [facility] housing x 2-3 months.  Residence location: [address] [place] [address].  Residence type: The old [facility]    Patient Income: now unemployed.  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 \"But I was getting very angry.\" In May he went to [employer] (\"Somewhere different. It was warm.\"), but flew back after two weeks. Then he overdosed May 23, two days after he returned. His mental health continued to fall. He was not on any meds until he went to [facility] in June 2023.     Food resources: care of [facility].    Education: GED.    Sexual orientation and gender identity: straight male.  Partner; length of relationship; quality of relationship: None. Last: 3 yr ago, briefly. Never .  Children, their ages and location: none known.    Best friend; length of time known; location; last contact: \"Don't have one.\"     Closest family, extent of support according to pt: \"My brother Isaias,\" here in town. They exchange occasional texts. Isaias does not know pt is here.    Recent social ruptures or losses: Not since death of mother and two friends 12 yr ago.     \"I don't really talk to people.\"     BEHAVIORAL HEALTH/SUBSTANCE USE TREATMENT HISTORY  Does patient/parent report a history of prior behavioral health/substance use treatment for patient?   Yes:    Dates Level of Care Facilty/Provider Diagnosis/Problem Medications   ongoing Residential  [facility] Bipolar I, Depression, Anxiety,  Seroquel, Zoloft, Hydroxyzine   1 week inpt [facility] Bipolar I, Depression, Anxiety,     3 months, 2023 inpt [facility] Bipolar I, Depression, Anxiety,                                                      No hx of substance use treatment, despite self-acknowledged addiction to Xanax, Valium, cocaine, may dust, alcohol (age 15 to 29-something).   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   Family hx of alcoholism.     SAFETY ASSESSMENT - SELF  Does patient acknowledge current or past symptoms of dangerousness to self or is previous history noted? Yes.   Does parent/significant other report patient has current or past symptoms of dangerousness to self? N\A  Does presenting problem suggest symptoms of dangerousness to self? Yes:     Past Current    Suicidal Thoughts: []  [x]    Suicidal Plans: []  [x]    Suicidal Intent: []  [x]    Suicide Attempts: [x]  []    Self-Injury []  []      Age 15: drank liquor and .  20s: took some pills but they didn't do anything.\" Made him sick.  For any boxes checked above, provide detail: Patient acknowledged current thoughts of killing self: OD with meds. \"I'm done. It's hard to explain what 'done' is.\"  Plan: Pills because, \"It's clean.\"  Patient rated current intent to kill self on a scale of zero to 10 as: 8.  Patient reported that the intent to kill self would need rate as 10 in order to take action.  Asked what might need to happen for an increase in intent to that number, patient said obliquely: \"It's just a daily, daily fight.\"   Patient, asked how they might kill self were they to do so, reported: vague plan to OD.  Patient acknowledged having taken the following steps to kill self:\"The pills are in my locker.\" But he has not found a place or time he can be undiscovered.   He has been intentionally avoiding his pills for fear of OD. Once he had a staff member get his dose to sleep. He has avoided having staff dispense his meds routinely because he is angry at the staff.     History of suicide by family member: no.  History of suicide by friend/significant other: yes - [employer] in 90s. Ramirez, 12 yr ago-- \"He overdosed on cocaine... my best friend in high school.\"   Recent change in frequency/specificity/intensity of suicidal thoughts or self-harm behavior? yes - Since Wednesday, more often and more intensely, and more specifically about " "overdosing. Has thought of asking his brother for money so that he can buy Benadryl.  Current access to firearms, medications, or other identified means of suicide/self-harm? yes - Meds. He has no gun.  If yes, willing to restrict access to means of suicide/self-harm? yes - would be willing to have someone dispense his meds.   Protective factors present:   \"Both times I got caught.\" And now: \"I'm trying to fight it.\"     SAFETY ASSESSMENT - OTHERS  Does patient acknowledge current or past symptoms of aggressive behavior or risk to others or is previous history noted? Yes \"When I was younger,\" prior to age 19, in his bad neighborhood. \"I actually enjoyed fighting.\" \"To other people [now] I'm safe as hell.\"   Does parent/significant other report patient has current or past symptoms of aggressive behavior or risk to others?  N\A  Does presenting problem suggest symptoms of dangerousness to others? No    LEGAL HISTORY  Does patient acknowledge history of arrest/assisted/group home or is previous history noted? Yes. As a minor. Only for drunkenness (public nuisance) as an adult.     Crisis Safety Plan completed and copy given to patient? No.    ABUSE/NEGLECT SCREENING  Does patient report feeling “unsafe” in his/her home, or afraid of anyone?  Yes: \"Everybody.\" More so lately.  Does patient report any history of physical, sexual, or emotional abuse?  No.  Does parent or significant other report any of the above? N\A  Is there evidence of neglect by self?  No.  Is there evidence of neglect by a caregiver? No.  Does the patient/parent report any history of CPS/APS/police involvement related to suspected abuse/neglect or domestic violence? No.  Based on the information provided during the current assessment, is a mandated report of suspected abuse/neglect being made?  No.    SUBSTANCE USE SCREENING  Yes:  Compa all substances used in the past 30 days: \"Nothing.\"      Last Use Amount   []   Alcohol     []   Marijuana     []   Heroin  " "   []   Prescription Opioids  (used without prescription, for    recreation, or in excess of prescribed amount)     []   Other Prescription  (used without prescription, for    recreation, or in excess of prescribed amount)     []   Cocaine      []   Methamphetamine     []   \"\" drugs (ectasy, MDMA)     []   Other substances        UDS results: all negative     Breathalyzer results: 0.00    What consequences does the patient associate with any of the above substance use and or addictive behaviors? Legal, relationship problems, health problems, other.    Risk factors for detox (check all that apply):  []  Seizures   []  Diaphoretic (sweating)   []  Tremors   []  Hallucinations   []  Increased blood pressure   []  Decreased blood pressure   []  Other   [x]  None      [] Patient education on risk factors for detoxification and instructed to return to ER as needed.    MENTAL STATUS   Participation: Active verbal participation, Verbally monopolizing, Attentive, and Engaged.  Grooming: Adequate, Casual, and Disheveled.  Orientation: Alert and Fully Oriented.  Behavior: Calm and cooperative.   Eye contact: Good.  Mood: Depressed and Anxious.  Affect: Narrowed, Flexible, Congruent with content, Incongruent with content, and Anxious.  Thought process: Logical, Goal-directed, and generally coherent.  Thought content: Within normal limits.  Speech: Rate within normal limits, Volume within normal limits, Soft, Hypertalkative, and drops his sentences. I repeatedly ask him to repeat himself.  Perception: Within normal limits and when in a bipolar episode, he hallucinates.  Memory:  No gross evidence of memory deficits and pt claims problems with memory during bipolar episodes. \"Like the time I woke up in the hospital.\"   Insight: Limited.  Judgment:  Limited.  Other:    Collateral information:   Source:  [] Significant other present in person:   [] Significant other by telephone  [] [facility]   [x] [facility] Staff  [x] [facility] Medical Record  [] Other:     [] Unable to complete full assessment due to:  [] Acute intoxication  [] Patient declined to participate/engage  [] Patient verbally unresponsive  [] Significant cognitive deficits  [] Significant perceptual distortions or behavioral disorganization  [] Other:      CLINICAL IMPRESSIONS:  Primary:  Suicidal ideation, active, persistent and intensifying of late.   Secondary:  Mood disorder, unspecified.  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     IDENTIFIED NEEDS/PLAN:  [Trigger DISPOSITION list for any items marked]    [x]  Imminent safety risk - self [] Imminent safety risk - others   []  Acute substance withdrawal []  Psychosis/Impaired reality testing   [x]  Mood/anxiety []  Substance use/Addictive behavior   [x]  Maladaptive behaviro []  Parent/child conflict   []  Family/Couples conflict []  Biomedical   [x]  Housing [x]  Financial   []   Legal [x] Occupational/Educational   []  Domestic violence []  Other:     Recommended Plan of Care:    Recommendations, Including Observation Level:  Patient is to transfer to a community inpatient psychiatric treatment facility after a bed has become available.  Care of patient is actively being addressed by the [facility] and [facility] Emergency Department.    Legal Hold:  Extend Legal Hold.    Observation: Via 1:1 sitter.  Continue level of observation of patient in accord with the [place] Suicide Severity Rating Scale (C-SSRS) assessment completed by [facility] ED RN every shift.   Patient is currently at continued risk; sitter is still needed.    Phone: Patient may not have access to telephone.   Visitors: Pt may not have visitors.  Personal belongings: Patient may not have access to personal belongings.     Has the Recommended Plan of Care/Level of Observation been reviewed with the patient's assigned nurse? Yes.    Does patient/parent or guardian express agreement with the above plan? Yes.    Referral  appointment(s) scheduled? No.    Alert team only:   I have discussed findings and recommendations with Dr. Marc who is in agreement with these recommendations.     Referral information sent to the following outpatient community providers : Not applicable.     Referral information sent to the following inpatient community providers: TBA.    If applicable : Referred  to  Alert Team for legal hold follow up at (time):     Buster Colón, Ph.D.  11/6/2023

## 2023-11-07 NOTE — ED NOTES
Assumed care of patient from HUBER Seo. Bedside report given. Pt has 1:1 sitter in direct view of patient. Pt in paper scrubs. On RA. Given meal tray

## 2023-11-07 NOTE — DISCHARGE PLANNING
Legal Hold Transfer     Referral: Legal hold transfer to Mental Health Facility     Intervention: Notified by  Rojelio that pt has been accepted to [facility].     Pt's accepting physcian is Dr. Bridges     Spoke to Joan at [facility]     Transport arranged through [facility]     The pt will be picked up at 1415     Notified Bedside RN Alena and Dr. Pascal of the departure time as well as accepting facility.      Transfer packet and COBRA created with original legal hold and placed on chart.      Plan: Pt will be transferring to [facility] today at 1415 via [facility].

## 2023-11-07 NOTE — DISCHARGE PLANNING
Alert Team Note:    Contacted by [facility], spoke to Marylu. Pt has been accepted, accepting is Dr. Bridges. Facility expects transport at 1400.  Informed LH HERNANDEZ Sheriff.

## 2023-11-07 NOTE — ED NOTES
Med rec PARTIALLY completed per patient  Allergies reviewed  No PO Antibiotics in the last 30 days   No Anticoagulants in the last 30 days    Preferred Pharmacy: [facility]    Patient states he takes something for Cholesterol, however he is unsure what it is. Unable to verify what the medication is at the moment, home pharmacy, [facility] is not currently open.

## 2023-11-07 NOTE — PROGRESS NOTES
"ED Observation Progress Note    Date of Service: 11/07/23    Interval History and Interventions  Briefly this is a 53-year-old male who presented on 11/6/2023 for suicidal ideation.  He has prior history of suicide attempts.  He is on a legal hold at this time, pending inpatient psychiatric placement.  Patient is resting comfortably, has no acute complaints this morning.    Physical Exam  BP (!) 141/77   Pulse 80   Temp 36.5 °C (97.7 °F) (Temporal)   Resp 18   Ht 1.778 m (5' 10\")   Wt 93.9 kg (207 lb)   SpO2 95%   BMI 29.70 kg/m² .    Constitutional: Awake and alert. Nontoxic  HENT:  Grossly normal  Eyes: Grossly normal  Neck: Normal range of motion  Cardiovascular: Normal heart rate   Thorax & Lungs: No respiratory distress  Skin:  No pathologic rash.   Extremities: Well perfused  Psychiatric: Affect normal    Labs  Results for orders placed or performed during the hospital encounter of 11/06/23   Urine Drug Screen   Result Value Ref Range    Amphetamines Urine Negative Negative    Barbiturates Negative Negative    Benzodiazepines Negative Negative    Cocaine Metabolite Negative Negative    Fentanyl, Urine Negative Negative    Methadone Negative Negative    Opiates Negative Negative    Oxycodone Negative Negative    Phencyclidine -Pcp Negative Negative    Propoxyphene Negative Negative    Cannabinoid Metab Negative Negative   POC BREATHALIZER   Result Value Ref Range    POC Breathalizer 0.000 0.00 - 0.01 Percent       Radiology  No orders to display       Problem List  1.  Suicidal ideation-pending inpatient psychiatric placement    Electronically signed by: Machelle Pascal M.D., 11/7/2023 4:23 AM          "

## 2023-11-07 NOTE — ED NOTES
Pt continues to sleep in NAD, breathing even and unlabored  Sitter remains outside of room in direct, unobstructed 1:1 view for safety  Frequent rounds being made  All safety/L2K precautions remain in place

## 2023-11-07 NOTE — ED NOTES
Pt is resting on gurney. No signs/symptoms of distress noted, respirations even, equal and non-labored. Remains under 1:1 observation of designee monitoring in hallway.'

## 2023-11-07 NOTE — ED NOTES
Patient ambulatory to bathroom and back to room with 1:1 sitter. Patient denies any needs at this time.

## 2023-11-07 NOTE — ED NOTES
Bedside report received from off going RN/tech: HUBER Jaramillo, assumed care of patient.  POC discussed with patient. Call light within reach, all needs addressed at this time.       Fall risk interventions in place: Not Applicable (all applicable per [place] Fall risk assessment)   Continuous monitoring: Not Applicable   IVF/IV medications: Not Applicable   Oxygen: Room Air  Bedside sitter: Pt on L2k SI with 1:1 sitter   (name) and Report given to sitter  Isolation: Not Applicable

## 2023-11-07 NOTE — ED NOTES
VS re-checked and VSS, pt resting on bed but restless/anxious. Pt denies wanting anything for anxiety at this time  Sitter remains outside of room in direct, unobstructed 1:1 view for safety  All L2K/safety precautions in place

## 2023-12-29 ENCOUNTER — HOSPITAL ENCOUNTER (INPATIENT)
Facility: MEDICAL CENTER | Age: 53
LOS: 5 days | DRG: 917 | End: 2024-01-03
Attending: EMERGENCY MEDICINE | Admitting: INTERNAL MEDICINE
Payer: COMMERCIAL

## 2023-12-29 DIAGNOSIS — F41.9 ANXIETY: ICD-10-CM

## 2023-12-29 DIAGNOSIS — F51.05 INSOMNIA DUE TO OTHER MENTAL DISORDER: ICD-10-CM

## 2023-12-29 DIAGNOSIS — F33.2 SEVERE EPISODE OF RECURRENT MAJOR DEPRESSIVE DISORDER, WITHOUT PSYCHOTIC FEATURES (HCC): ICD-10-CM

## 2023-12-29 DIAGNOSIS — F99 INSOMNIA DUE TO OTHER MENTAL DISORDER: ICD-10-CM

## 2023-12-29 DIAGNOSIS — T50.902A INTENTIONAL DRUG OVERDOSE, INITIAL ENCOUNTER (HCC): ICD-10-CM

## 2023-12-29 DIAGNOSIS — T45.0X2A INTENTIONAL DIPHENHYDRAMINE OVERDOSE, INITIAL ENCOUNTER (HCC): ICD-10-CM

## 2023-12-29 PROBLEM — G92.9 TOXIC ENCEPHALOPATHY: Status: ACTIVE | Noted: 2023-12-29

## 2023-12-29 PROBLEM — R73.9 HYPERGLYCEMIA: Status: ACTIVE | Noted: 2023-12-29

## 2023-12-29 PROBLEM — I10 HYPERTENSION: Status: ACTIVE | Noted: 2023-12-29

## 2023-12-29 LAB
ALBUMIN SERPL BCP-MCNC: 4.5 G/DL (ref 3.2–4.9)
ALBUMIN/GLOB SERPL: 1.9 G/DL
ALP SERPL-CCNC: 70 U/L (ref 30–99)
ALT SERPL-CCNC: 27 U/L (ref 2–50)
ANION GAP SERPL CALC-SCNC: 16 MMOL/L (ref 7–16)
APAP SERPL-MCNC: <5 UG/ML (ref 10–30)
AST SERPL-CCNC: 21 U/L (ref 12–45)
BASOPHILS # BLD AUTO: 0.5 % (ref 0–1.8)
BASOPHILS # BLD: 0.03 K/UL (ref 0–0.12)
BILIRUB SERPL-MCNC: 0.5 MG/DL (ref 0.1–1.5)
BUN SERPL-MCNC: 10 MG/DL (ref 8–22)
CALCIUM ALBUM COR SERPL-MCNC: 8.3 MG/DL (ref 8.5–10.5)
CALCIUM SERPL-MCNC: 8.7 MG/DL (ref 8.5–10.5)
CHLORIDE SERPL-SCNC: 99 MMOL/L (ref 96–112)
CO2 SERPL-SCNC: 20 MMOL/L (ref 20–33)
CREAT SERPL-MCNC: 1.16 MG/DL (ref 0.5–1.4)
EKG IMPRESSION: NORMAL
EOSINOPHIL # BLD AUTO: 0.03 K/UL (ref 0–0.51)
EOSINOPHIL NFR BLD: 0.5 % (ref 0–6.9)
ERYTHROCYTE [DISTWIDTH] IN BLOOD BY AUTOMATED COUNT: 39.8 FL (ref 35.9–50)
ETHANOL BLD-MCNC: <10.1 MG/DL
GFR SERPLBLD CREATININE-BSD FMLA CKD-EPI: 75 ML/MIN/1.73 M 2
GLOBULIN SER CALC-MCNC: 2.4 G/DL (ref 1.9–3.5)
GLUCOSE SERPL-MCNC: 163 MG/DL (ref 65–99)
HCT VFR BLD AUTO: 43 % (ref 42–52)
HGB BLD-MCNC: 15.2 G/DL (ref 14–18)
IMM GRANULOCYTES # BLD AUTO: 0.03 K/UL (ref 0–0.11)
IMM GRANULOCYTES NFR BLD AUTO: 0.5 % (ref 0–0.9)
LYMPHOCYTES # BLD AUTO: 2.05 K/UL (ref 1–4.8)
LYMPHOCYTES NFR BLD: 33.4 % (ref 22–41)
MAGNESIUM SERPL-MCNC: 1.7 MG/DL (ref 1.5–2.5)
MCH RBC QN AUTO: 30 PG (ref 27–33)
MCHC RBC AUTO-ENTMCNC: 35.3 G/DL (ref 32.3–36.5)
MCV RBC AUTO: 85 FL (ref 81.4–97.8)
MONOCYTES # BLD AUTO: 0.48 K/UL (ref 0–0.85)
MONOCYTES NFR BLD AUTO: 7.8 % (ref 0–13.4)
NEUTROPHILS # BLD AUTO: 3.51 K/UL (ref 1.82–7.42)
NEUTROPHILS NFR BLD: 57.3 % (ref 44–72)
NRBC # BLD AUTO: 0 K/UL
NRBC BLD-RTO: 0 /100 WBC (ref 0–0.2)
PHOSPHATE SERPL-MCNC: 3.3 MG/DL (ref 2.5–4.5)
PLATELET # BLD AUTO: 220 K/UL (ref 164–446)
PMV BLD AUTO: 9.6 FL (ref 9–12.9)
POTASSIUM SERPL-SCNC: 3.1 MMOL/L (ref 3.6–5.5)
PROT SERPL-MCNC: 6.9 G/DL (ref 6–8.2)
RBC # BLD AUTO: 5.06 M/UL (ref 4.7–6.1)
SALICYLATES SERPL-MCNC: <1 MG/DL (ref 15–25)
SODIUM SERPL-SCNC: 135 MMOL/L (ref 135–145)
WBC # BLD AUTO: 6.1 K/UL (ref 4.8–10.8)

## 2023-12-29 PROCEDURE — 96365 THER/PROPH/DIAG IV INF INIT: CPT

## 2023-12-29 PROCEDURE — 96368 THER/DIAG CONCURRENT INF: CPT

## 2023-12-29 PROCEDURE — 99223 1ST HOSP IP/OBS HIGH 75: CPT | Performed by: INTERNAL MEDICINE

## 2023-12-29 PROCEDURE — 770000 HCHG ROOM/CARE - INTERMEDIATE ICU *

## 2023-12-29 PROCEDURE — 80143 DRUG ASSAY ACETAMINOPHEN: CPT

## 2023-12-29 PROCEDURE — 80179 DRUG ASSAY SALICYLATE: CPT

## 2023-12-29 PROCEDURE — 94760 N-INVAS EAR/PLS OXIMETRY 1: CPT

## 2023-12-29 PROCEDURE — 700111 HCHG RX REV CODE 636 W/ 250 OVERRIDE (IP): Performed by: INTERNAL MEDICINE

## 2023-12-29 PROCEDURE — 83735 ASSAY OF MAGNESIUM: CPT

## 2023-12-29 PROCEDURE — 84100 ASSAY OF PHOSPHORUS: CPT

## 2023-12-29 PROCEDURE — 82077 ASSAY SPEC XCP UR&BREATH IA: CPT

## 2023-12-29 PROCEDURE — 700111 HCHG RX REV CODE 636 W/ 250 OVERRIDE (IP): Mod: JZ | Performed by: EMERGENCY MEDICINE

## 2023-12-29 PROCEDURE — 36415 COLL VENOUS BLD VENIPUNCTURE: CPT

## 2023-12-29 PROCEDURE — 80053 COMPREHEN METABOLIC PANEL: CPT

## 2023-12-29 PROCEDURE — 99285 EMERGENCY DEPT VISIT HI MDM: CPT

## 2023-12-29 PROCEDURE — 700105 HCHG RX REV CODE 258: Mod: UD | Performed by: EMERGENCY MEDICINE

## 2023-12-29 PROCEDURE — 93005 ELECTROCARDIOGRAM TRACING: CPT | Performed by: EMERGENCY MEDICINE

## 2023-12-29 PROCEDURE — 85025 COMPLETE CBC W/AUTO DIFF WBC: CPT

## 2023-12-29 RX ORDER — ONDANSETRON 4 MG/1
4 TABLET, ORALLY DISINTEGRATING ORAL EVERY 4 HOURS PRN
Status: DISCONTINUED | OUTPATIENT
Start: 2023-12-29 | End: 2023-12-29

## 2023-12-29 RX ORDER — POLYETHYLENE GLYCOL 3350 17 G/17G
1 POWDER, FOR SOLUTION ORAL
Status: DISCONTINUED | OUTPATIENT
Start: 2023-12-29 | End: 2024-01-03 | Stop reason: HOSPADM

## 2023-12-29 RX ORDER — PROCHLORPERAZINE EDISYLATE 5 MG/ML
5-10 INJECTION INTRAMUSCULAR; INTRAVENOUS EVERY 4 HOURS PRN
Status: DISCONTINUED | OUTPATIENT
Start: 2023-12-29 | End: 2024-01-03 | Stop reason: HOSPADM

## 2023-12-29 RX ORDER — SODIUM CHLORIDE AND POTASSIUM CHLORIDE 300; 900 MG/100ML; MG/100ML
INJECTION, SOLUTION INTRAVENOUS CONTINUOUS
Status: DISCONTINUED | OUTPATIENT
Start: 2023-12-29 | End: 2023-12-31

## 2023-12-29 RX ORDER — BISACODYL 10 MG
10 SUPPOSITORY, RECTAL RECTAL
Status: DISCONTINUED | OUTPATIENT
Start: 2023-12-29 | End: 2024-01-03 | Stop reason: HOSPADM

## 2023-12-29 RX ORDER — AMOXICILLIN 250 MG
2 CAPSULE ORAL 2 TIMES DAILY
Status: DISCONTINUED | OUTPATIENT
Start: 2023-12-30 | End: 2024-01-03 | Stop reason: HOSPADM

## 2023-12-29 RX ORDER — MAGNESIUM SULFATE HEPTAHYDRATE 40 MG/ML
2 INJECTION, SOLUTION INTRAVENOUS ONCE
Status: COMPLETED | OUTPATIENT
Start: 2023-12-29 | End: 2023-12-30

## 2023-12-29 RX ORDER — PROMETHAZINE HYDROCHLORIDE 25 MG/1
12.5-25 TABLET ORAL EVERY 4 HOURS PRN
Status: DISCONTINUED | OUTPATIENT
Start: 2023-12-29 | End: 2024-01-03 | Stop reason: HOSPADM

## 2023-12-29 RX ORDER — SODIUM CHLORIDE 9 MG/ML
1000 INJECTION, SOLUTION INTRAVENOUS ONCE
Status: COMPLETED | OUTPATIENT
Start: 2023-12-29 | End: 2023-12-29

## 2023-12-29 RX ORDER — ENOXAPARIN SODIUM 100 MG/ML
40 INJECTION SUBCUTANEOUS DAILY
Status: DISCONTINUED | OUTPATIENT
Start: 2023-12-30 | End: 2024-01-03 | Stop reason: HOSPADM

## 2023-12-29 RX ORDER — ACETAMINOPHEN 325 MG/1
650 TABLET ORAL EVERY 6 HOURS PRN
Status: DISCONTINUED | OUTPATIENT
Start: 2023-12-29 | End: 2024-01-03 | Stop reason: HOSPADM

## 2023-12-29 RX ORDER — PROMETHAZINE HYDROCHLORIDE 25 MG/1
12.5-25 SUPPOSITORY RECTAL EVERY 4 HOURS PRN
Status: DISCONTINUED | OUTPATIENT
Start: 2023-12-29 | End: 2024-01-03 | Stop reason: HOSPADM

## 2023-12-29 RX ORDER — POTASSIUM CHLORIDE 7.45 MG/ML
10 INJECTION INTRAVENOUS
Status: COMPLETED | OUTPATIENT
Start: 2023-12-30 | End: 2023-12-30

## 2023-12-29 RX ORDER — LABETALOL HYDROCHLORIDE 5 MG/ML
10 INJECTION, SOLUTION INTRAVENOUS EVERY 4 HOURS PRN
Status: DISCONTINUED | OUTPATIENT
Start: 2023-12-29 | End: 2024-01-03 | Stop reason: HOSPADM

## 2023-12-29 RX ORDER — ONDANSETRON 2 MG/ML
4 INJECTION INTRAMUSCULAR; INTRAVENOUS EVERY 4 HOURS PRN
Status: DISCONTINUED | OUTPATIENT
Start: 2023-12-29 | End: 2023-12-29

## 2023-12-29 RX ADMIN — POTASSIUM CHLORIDE 10 MEQ: 7.46 INJECTION, SOLUTION INTRAVENOUS at 23:35

## 2023-12-29 RX ADMIN — MAGNESIUM SULFATE HEPTAHYDRATE 2 G: 2 INJECTION, SOLUTION INTRAVENOUS at 23:34

## 2023-12-29 RX ADMIN — SODIUM CHLORIDE 1000 ML: 9 INJECTION, SOLUTION INTRAVENOUS at 21:15

## 2023-12-29 ASSESSMENT — FIBROSIS 4 INDEX
FIB4 SCORE: 0.99
FIB4 SCORE: 1.12

## 2023-12-29 ASSESSMENT — PAIN DESCRIPTION - PAIN TYPE: TYPE: ACUTE PAIN

## 2023-12-30 LAB
ALBUMIN SERPL BCP-MCNC: 4 G/DL (ref 3.2–4.9)
ALBUMIN/GLOB SERPL: 1.8 G/DL
ALP SERPL-CCNC: 67 U/L (ref 30–99)
ALT SERPL-CCNC: 25 U/L (ref 2–50)
AMPHET UR QL SCN: NEGATIVE
ANION GAP SERPL CALC-SCNC: 12 MMOL/L (ref 7–16)
AST SERPL-CCNC: 21 U/L (ref 12–45)
BARBITURATES UR QL SCN: NEGATIVE
BASOPHILS # BLD AUTO: 0.3 % (ref 0–1.8)
BASOPHILS # BLD: 0.02 K/UL (ref 0–0.12)
BENZODIAZ UR QL SCN: NEGATIVE
BILIRUB SERPL-MCNC: 0.4 MG/DL (ref 0.1–1.5)
BUN SERPL-MCNC: 10 MG/DL (ref 8–22)
BZE UR QL SCN: NEGATIVE
CALCIUM ALBUM COR SERPL-MCNC: 8.3 MG/DL (ref 8.5–10.5)
CALCIUM SERPL-MCNC: 8.3 MG/DL (ref 8.5–10.5)
CANNABINOIDS UR QL SCN: NEGATIVE
CHLORIDE SERPL-SCNC: 105 MMOL/L (ref 96–112)
CO2 SERPL-SCNC: 21 MMOL/L (ref 20–33)
CREAT SERPL-MCNC: 0.99 MG/DL (ref 0.5–1.4)
EKG IMPRESSION: NORMAL
EOSINOPHIL # BLD AUTO: 0.02 K/UL (ref 0–0.51)
EOSINOPHIL NFR BLD: 0.3 % (ref 0–6.9)
ERYTHROCYTE [DISTWIDTH] IN BLOOD BY AUTOMATED COUNT: 40.5 FL (ref 35.9–50)
FENTANYL UR QL: NEGATIVE
GFR SERPLBLD CREATININE-BSD FMLA CKD-EPI: 90 ML/MIN/1.73 M 2
GLOBULIN SER CALC-MCNC: 2.2 G/DL (ref 1.9–3.5)
GLUCOSE BLD STRIP.AUTO-MCNC: 104 MG/DL (ref 65–99)
GLUCOSE BLD STRIP.AUTO-MCNC: 143 MG/DL (ref 65–99)
GLUCOSE BLD STRIP.AUTO-MCNC: 160 MG/DL (ref 65–99)
GLUCOSE BLD STRIP.AUTO-MCNC: 177 MG/DL (ref 65–99)
GLUCOSE SERPL-MCNC: 138 MG/DL (ref 65–99)
HCT VFR BLD AUTO: 40.6 % (ref 42–52)
HGB BLD-MCNC: 14.2 G/DL (ref 14–18)
IMM GRANULOCYTES # BLD AUTO: 0.04 K/UL (ref 0–0.11)
IMM GRANULOCYTES NFR BLD AUTO: 0.5 % (ref 0–0.9)
LYMPHOCYTES # BLD AUTO: 1.28 K/UL (ref 1–4.8)
LYMPHOCYTES NFR BLD: 16.6 % (ref 22–41)
MAGNESIUM SERPL-MCNC: 2.2 MG/DL (ref 1.5–2.5)
MCH RBC QN AUTO: 30.2 PG (ref 27–33)
MCHC RBC AUTO-ENTMCNC: 35 G/DL (ref 32.3–36.5)
MCV RBC AUTO: 86.4 FL (ref 81.4–97.8)
METHADONE UR QL SCN: NEGATIVE
MONOCYTES # BLD AUTO: 0.45 K/UL (ref 0–0.85)
MONOCYTES NFR BLD AUTO: 5.8 % (ref 0–13.4)
NEUTROPHILS # BLD AUTO: 5.89 K/UL (ref 1.82–7.42)
NEUTROPHILS NFR BLD: 76.5 % (ref 44–72)
NRBC # BLD AUTO: 0 K/UL
NRBC BLD-RTO: 0 /100 WBC (ref 0–0.2)
OPIATES UR QL SCN: NEGATIVE
OXYCODONE UR QL SCN: NEGATIVE
PCP UR QL SCN: NEGATIVE
PLATELET # BLD AUTO: 209 K/UL (ref 164–446)
PMV BLD AUTO: 9.3 FL (ref 9–12.9)
POTASSIUM SERPL-SCNC: 4.2 MMOL/L (ref 3.6–5.5)
PROPOXYPH UR QL SCN: NEGATIVE
PROT SERPL-MCNC: 6.2 G/DL (ref 6–8.2)
RBC # BLD AUTO: 4.7 M/UL (ref 4.7–6.1)
SODIUM SERPL-SCNC: 138 MMOL/L (ref 135–145)
WBC # BLD AUTO: 7.7 K/UL (ref 4.8–10.8)

## 2023-12-30 PROCEDURE — 85025 COMPLETE CBC W/AUTO DIFF WBC: CPT

## 2023-12-30 PROCEDURE — 99233 SBSQ HOSP IP/OBS HIGH 50: CPT | Performed by: HOSPITALIST

## 2023-12-30 PROCEDURE — 700101 HCHG RX REV CODE 250: Performed by: INTERNAL MEDICINE

## 2023-12-30 PROCEDURE — 80307 DRUG TEST PRSMV CHEM ANLYZR: CPT

## 2023-12-30 PROCEDURE — 82962 GLUCOSE BLOOD TEST: CPT | Mod: 91

## 2023-12-30 PROCEDURE — 93010 ELECTROCARDIOGRAM REPORT: CPT | Performed by: INTERNAL MEDICINE

## 2023-12-30 PROCEDURE — 770001 HCHG ROOM/CARE - MED/SURG/GYN PRIV*

## 2023-12-30 PROCEDURE — 700111 HCHG RX REV CODE 636 W/ 250 OVERRIDE (IP): Mod: JZ | Performed by: INTERNAL MEDICINE

## 2023-12-30 PROCEDURE — 80053 COMPREHEN METABOLIC PANEL: CPT

## 2023-12-30 PROCEDURE — 83735 ASSAY OF MAGNESIUM: CPT

## 2023-12-30 PROCEDURE — 700102 HCHG RX REV CODE 250 W/ 637 OVERRIDE(OP): Performed by: INTERNAL MEDICINE

## 2023-12-30 PROCEDURE — 700111 HCHG RX REV CODE 636 W/ 250 OVERRIDE (IP): Performed by: INTERNAL MEDICINE

## 2023-12-30 PROCEDURE — 93005 ELECTROCARDIOGRAM TRACING: CPT | Performed by: INTERNAL MEDICINE

## 2023-12-30 RX ADMIN — INSULIN HUMAN 1 UNITS: 100 INJECTION, SOLUTION PARENTERAL at 18:31

## 2023-12-30 RX ADMIN — POTASSIUM CHLORIDE AND SODIUM CHLORIDE: 900; 300 INJECTION, SOLUTION INTRAVENOUS at 00:39

## 2023-12-30 RX ADMIN — POTASSIUM CHLORIDE 10 MEQ: 7.46 INJECTION, SOLUTION INTRAVENOUS at 02:45

## 2023-12-30 RX ADMIN — POTASSIUM CHLORIDE AND SODIUM CHLORIDE: 900; 300 INJECTION, SOLUTION INTRAVENOUS at 20:18

## 2023-12-30 RX ADMIN — POTASSIUM CHLORIDE AND SODIUM CHLORIDE: 900; 300 INJECTION, SOLUTION INTRAVENOUS at 10:20

## 2023-12-30 RX ADMIN — ENOXAPARIN SODIUM 40 MG: 100 INJECTION SUBCUTANEOUS at 17:20

## 2023-12-30 RX ADMIN — FAMOTIDINE 20 MG: 10 INJECTION INTRAVENOUS at 17:24

## 2023-12-30 RX ADMIN — POTASSIUM CHLORIDE 10 MEQ: 7.46 INJECTION, SOLUTION INTRAVENOUS at 01:40

## 2023-12-30 RX ADMIN — FAMOTIDINE 20 MG: 10 INJECTION INTRAVENOUS at 05:55

## 2023-12-30 RX ADMIN — POTASSIUM CHLORIDE 10 MEQ: 7.46 INJECTION, SOLUTION INTRAVENOUS at 00:34

## 2023-12-30 RX ADMIN — INSULIN HUMAN 1 UNITS: 100 INJECTION, SOLUTION PARENTERAL at 00:33

## 2023-12-30 RX ADMIN — FAMOTIDINE 20 MG: 10 INJECTION INTRAVENOUS at 00:34

## 2023-12-30 ASSESSMENT — COGNITIVE AND FUNCTIONAL STATUS - GENERAL
CLIMB 3 TO 5 STEPS WITH RAILING: TOTAL
TOILETING: A LOT
HELP NEEDED FOR BATHING: A LOT
TURNING FROM BACK TO SIDE WHILE IN FLAT BAD: A LOT
SUGGESTED CMS G CODE MODIFIER DAILY ACTIVITY: CL
DRESSING REGULAR UPPER BODY CLOTHING: A LOT
STANDING UP FROM CHAIR USING ARMS: A LOT
DAILY ACTIVITIY SCORE: 12
MOVING TO AND FROM BED TO CHAIR: A LOT
SUGGESTED CMS G CODE MODIFIER MOBILITY: CL
MOBILITY SCORE: 11
PERSONAL GROOMING: A LOT
WALKING IN HOSPITAL ROOM: A LOT
EATING MEALS: A LOT
DRESSING REGULAR LOWER BODY CLOTHING: A LOT
MOVING FROM LYING ON BACK TO SITTING ON SIDE OF FLAT BED: A LOT

## 2023-12-30 ASSESSMENT — ENCOUNTER SYMPTOMS
BLURRED VISION: 0
NAUSEA: 0
SHORTNESS OF BREATH: 0
VOMITING: 0
PALPITATIONS: 0
DOUBLE VISION: 0
DIZZINESS: 0
HEADACHES: 0
FEVER: 0
ABDOMINAL PAIN: 0
CHILLS: 0
BACK PAIN: 0
LOSS OF CONSCIOUSNESS: 1

## 2023-12-30 ASSESSMENT — PAIN DESCRIPTION - PAIN TYPE
TYPE: ACUTE PAIN

## 2023-12-30 ASSESSMENT — FIBROSIS 4 INDEX: FIB4 SCORE: 1.09

## 2023-12-30 NOTE — ED NOTES
Potassium & Magnesium infusion started. Bedside report to HUBER Hernandez  Patient and all belongings from locker 13 transported to [id number] by floor staff with patient on monitor and IV infusions continued to floor.

## 2023-12-30 NOTE — PROGRESS NOTES
Received report from [facility]. Pt transported to room. Belongings placed in security locker.  Patient lethargic opens eyes to painful stimuli. Holding PO meds for now as pt is not alert enough to take oral medication. Sitter at bedside for safety.

## 2023-12-30 NOTE — H&P
"Hospital Medicine History & Physical Note    Date of Service  12/29/2023    Primary Care Physician  Pcp Pt States None        Code Status  Full Code    Chief Complaint  Chief Complaint   Patient presents with    Suicidal Ideation     OD attempt on Benadryl, Trazodone, Seroquel, and Risperidone        History of Presenting Illness  Bishop Bucio is a 53 y.o. male with prior attempts of suicidal overdose with Benadryl, depression who presented 12/29/2023 with suicidal overdose  with Benadryl (estimated more than 1 g of Benadryl swallowed), as well as possible overdose with trazodone, Seroquel and risperidone unknown amount  Upon evaluation patient is progressively somnolent, tachycardic.  EKG showed sinus tachycardia QTc is prolonged.  Potassium and magnesium are being replaced.  Patient is being admitted to IM for close monitoring    I discussed the plan of care with patient and , nonspecific EKG .    Review of Systems  Review of Systems   Unable to perform ROS: Mental status change       Past Medical History   has a past medical history of Complaint of nasal congestion (5/28/2023).    Surgical History   has no past surgical history on file.     Family History     Family history reviewed with patient. There is no family history that is pertinent to the chief complaint.     Social History   reports that he has never smoked. He has never used smokeless tobacco. He reports that he does not drink alcohol and does not use drugs.    Allergies  Allergies   Allergen Reactions    Lanolin Acid Glycerin Yanna [Lanolin] Swelling    Pcn [Penicillins] Rash     Per patient     Bloodless      Pt states \"I'm trying to kill myself, why would I want a blood transfusion?\"        Medications  Prior to Admission Medications   Prescriptions Last Dose Informant Patient Reported? Taking?   QUEtiapine (SEROQUEL) 50 MG tablet  Patient Yes No   Sig: Take 50 mg by mouth 2 times a day as needed. Indications: Agitation   fenofibrate " (TRIGLIDE) 160 MG tablet  Patient's Home Pharmacy Yes No   Sig: Take 160 mg by mouth every day.   hydrOXYzine HCl (ATARAX) 50 MG Tab  Patient Yes No   Sig: Take 50 mg by mouth 3 times a day as needed for Anxiety.   quetiapine (SEROQUEL XR) 400 MG XR tablet  Patient Yes No   Sig: Take 400 mg by mouth every evening.   sertraline (ZOLOFT) 100 MG Tab  Patient Yes No   Sig: Take 100 mg by mouth every day.   vitamin D2, Ergocalciferol, (DRISDOL) 1.25 MG (83180 UT) Cap capsule  Patient Yes No   Sig: Take 50,000 Units by mouth every Friday.      Facility-Administered Medications: None       Physical Exam  Temp:  [37.2 °C (98.9 °F)] 37.2 °C (98.9 °F)  Pulse:  [100-119] 119  Resp:  [12-13] 12  BP: (121-151)/(64-77) 151/72  SpO2:  [91 %-95 %] 94 %  Blood Pressure: (!) 151/72   Temperature: 37.2 °C (98.9 °F)   Pulse: (!) 119   Respiration: 12   Pulse Oximetry: 94 %       Physical Exam  Vitals and nursing note reviewed.   Constitutional:       General: He is not in acute distress.     Appearance: He is ill-appearing.   HENT:      Head: Normocephalic and atraumatic.      Nose: Nose normal.      Mouth/Throat:      Mouth: Mucous membranes are moist.   Eyes:      Extraocular Movements: Extraocular movements intact.      Pupils: Pupils are equal, round, and reactive to light.   Cardiovascular:      Rate and Rhythm: Normal rate and regular rhythm.   Pulmonary:      Effort: Pulmonary effort is normal.      Breath sounds: Normal breath sounds.   Abdominal:      General: Abdomen is flat. There is no distension.      Tenderness: There is no abdominal tenderness.   Musculoskeletal:         General: No swelling or deformity. Normal range of motion.      Cervical back: Normal range of motion and neck supple.   Skin:     General: Skin is warm and dry.   Neurological:      General: No focal deficit present.      Mental Status: He is alert and oriented to person, place, and time.      GCS: GCS eye subscore is 2. GCS verbal subscore is 1. GCS  "motor subscore is 2.   Psychiatric:         Mood and Affect: Mood normal.         Behavior: Behavior normal.       Laboratory:  Recent Labs     12/29/23 2035   WBC 6.1   RBC 5.06   HEMOGLOBIN 15.2   HEMATOCRIT 43.0   MCV 85.0   MCH 30.0   MCHC 35.3   RDW 39.8   PLATELETCT 220   MPV 9.6     Recent Labs     12/29/23 2035   SODIUM 135   POTASSIUM 3.1*   CHLORIDE 99   CO2 20   GLUCOSE 163*   BUN 10   CREATININE 1.16   CALCIUM 8.7     Recent Labs     12/29/23 2035   ALTSGPT 27   ASTSGOT 21   ALKPHOSPHAT 70   TBILIRUBIN 0.5   GLUCOSE 163*         No results for input(s): \"NTPROBNP\" in the last 72 hours.      No results for input(s): \"TROPONINT\" in the last 72 hours.    Imaging:  No orders to display       X-Ray:  I have personally reviewed the images and compared with prior images.    Assessment/Plan:  Justification for Admission Status  I anticipate this patient will require at least two midnights for appropriate medical management, necessitating inpatient admission because Benadryl overdose    Patient will need a Intermediate Care (Adult and Pediatrics) bed on MEDICAL service .  The need is secondary to Benadryl overdose.    * Drug overdose, intentional self-harm, initial encounter ([facility])- (present on admission)  Assessment & Plan  53-year-old male with prior history of Benadryl overdose and depression, presented with Benadryl (>1 gram), as well as possible Seroquel trazodone and risperidone overdose  With anticholinergic toxidrome, risk for arrhythmia due to QTc prolongation  Consider possibility of developing neuroleptic malignant syndrome, monitor for fever and muscle rigidity  Plan: Monitor closely for arrhythmia, pulse oximetry  IV fluid support  Avoid QTc prolonging medications, replace electrolytes  Chavez catheter  Psychiatric evaluation when able    Toxic encephalopathy  Assessment & Plan  Secondary to drug overdose  Supportive care  Fall, aspiration, seizure precaution  Monitor respiratory function with pulse " oximetry      Hypertension  Assessment & Plan  IV labetalol as needed    Hyperglycemia  Assessment & Plan  Sugar 163  Insulin sliding scale ordered    Hypokalemia- (present on admission)  Assessment & Plan  Potassium 3.1  Replacement ordered        VTE prophylaxis: enoxaparin ppx

## 2023-12-30 NOTE — ED NOTES
IVF started.  Patient sleeping comfortably, resp even and unlabored, NAD, and no needs at this time.  Patient  on 1:1 observation with sitter in LOS, room safety complete, checklist in place, and STOP sign posted outside door.

## 2023-12-30 NOTE — ED PROVIDER NOTES
ED Provider Note    CHIEF COMPLAINT  Chief Complaint   Patient presents with    Suicidal Ideation     OD attempt on Benadryl, Trazodone, Seroquel, and Risperidone        EXTERNAL RECORDS REVIEWED  Inpatient Notes discharge summary 6/21/2023 after admitted for toxic encephalopathy and hypoxic respiratory failure status post intubation due to drug overdose (Benadryl) due to SI.  Discharge summary 9/4/2023 after admitted for altered mental status, suicide attempt, Benadryl overdose.  Legal hold extended psychiatry recommended transfer to inpatient facility.  Psychiatry also recommended Seroquel and this was started.  ED discharge summary 11/7/2023 after evaluation for suicidal ideation.  He was at [facility] and refusing to take medications because he felt that he was going to overdose on them.  History of bipolar and previous overdose attempts.  Seen by life skills when first evaluated at the hospital and deemed appropriate for legal hold and psychiatric placement.  Has been in the hospital for more than 24 hours, transferred to [facility] for further psychiatric evaluation.    HPI/ROS  LIMITATION TO HISTORY   Select: Altered mental status / Confusion and Intoxication  OUTSIDE HISTORIAN(S):  EMS per report given by nursing staff    Bishop Bucio is a 53 y.o. male who presents to the emergency department by ambulance for reported intentional overdose, suicide attempt.  Patient apparently was more conversive with EMS he is here.  He has become quite somnolent.  Told EMS that he took Benadryl (there history suggests more than 1000 mg) as well as unknown quantity of trazodone, Seroquel and risperidone.    [place]      PAST MEDICAL HISTORY   has a past medical history of Complaint of nasal congestion (5/28/2023).    SURGICAL HISTORY  patient denies any surgical history    FAMILY HISTORY  No family history on file.    SOCIAL HISTORY  Social History     Tobacco Use    Smoking  "status: Never    Smokeless tobacco: Never   Substance and Sexual Activity    Alcohol use: Never    Drug use: Never    Sexual activity: Not on file       CURRENT MEDICATIONS  Home Medications       Reviewed by Trevon Garza R.N. (Registered Nurse) on 12/29/23 at 2031  Med List Status: <None>     Medication Last Dose Status   fenofibrate (TRIGLIDE) 160 MG tablet  Active   hydrOXYzine HCl (ATARAX) 50 MG Tab  Active   quetiapine (SEROQUEL XR) 400 MG XR tablet  Active   QUEtiapine (SEROQUEL) 50 MG tablet  Active   sertraline (ZOLOFT) 100 MG Tab  Active   vitamin D2, Ergocalciferol, (DRISDOL) 1.25 MG (91751 UT) Cap capsule  Active                    ALLERGIES  Allergies   Allergen Reactions    Lanolin Acid Glycerin Yanna [Lanolin] Swelling    Pcn [Penicillins] Rash     Per patient     Bloodless      Pt states \"I'm trying to kill myself, why would I want a blood transfusion?\"        PHYSICAL EXAM  VITAL SIGNS: /69   Pulse (!) 106   Temp 37.2 °C (98.9 °F) (Temporal)   Resp 12   Ht 1.778 m (5' 10\")   Wt 93.9 kg (207 lb)   SpO2 92%   BMI 29.70 kg/m²    Pulse ox interpretation: I interpret this pulse ox as normal.  Constitutional: Somnolent.  Arousable to name and stimuli but returns to sleep  HENT: Normocephalic, atraumatic. Bilateral external ears normal, Nose normal.  Dry lips and mucous membranes.    Eyes: Pupils are equal and reactive, 4-2 bilaterally conjunctiva normal.   Neck: Normal range of motion, Supple  Lymphatic: No lymphadenopathy noted.   Cardiovascular: Mild tachycardia otherwise regular rate and rhythm, no murmurs. Distal pulses intact.    Thorax & Lungs: Normal breath sounds.  No wheezing/rales/ronchi. No increased work of breathing, clipped speech or retractions.   Abdomen: Soft, non-distended.  No grimace or withdrawal to palpation.  Skin: Warm, Dry, No erythema, No rash.   Musculoskeletal: No major deformities noted.   Neurologic: Somnolent.  Psychiatric: Unable to " assess.      DIAGNOSTIC STUDIES / PROCEDURES    LABS  Results for orders placed or performed during the hospital encounter of 12/29/23   Blood Alcohol   Result Value Ref Range    Diagnostic Alcohol <10.1 <10.1 mg/dL   CBC WITH DIFFERENTIAL   Result Value Ref Range    WBC 6.1 4.8 - 10.8 K/uL    RBC 5.06 4.70 - 6.10 M/uL    Hemoglobin 15.2 14.0 - 18.0 g/dL    Hematocrit 43.0 42.0 - 52.0 %    MCV 85.0 81.4 - 97.8 fL    MCH 30.0 27.0 - 33.0 pg    MCHC 35.3 32.3 - 36.5 g/dL    RDW 39.8 35.9 - 50.0 fL    Platelet Count 220 164 - 446 K/uL    MPV 9.6 9.0 - 12.9 fL    Neutrophils-Polys 57.30 44.00 - 72.00 %    Lymphocytes 33.40 22.00 - 41.00 %    Monocytes 7.80 0.00 - 13.40 %    Eosinophils 0.50 0.00 - 6.90 %    Basophils 0.50 0.00 - 1.80 %    Immature Granulocytes 0.50 0.00 - 0.90 %    Nucleated RBC 0.00 0.00 - 0.20 /100 WBC    Neutrophils (Absolute) 3.51 1.82 - 7.42 K/uL    Lymphs (Absolute) 2.05 1.00 - 4.80 K/uL    Monos (Absolute) 0.48 0.00 - 0.85 K/uL    Eos (Absolute) 0.03 0.00 - 0.51 K/uL    Baso (Absolute) 0.03 0.00 - 0.12 K/uL    Immature Granulocytes (abs) 0.03 0.00 - 0.11 K/uL    NRBC (Absolute) 0.00 K/uL   COMP METABOLIC PANEL   Result Value Ref Range    Sodium 135 135 - 145 mmol/L    Potassium 3.1 (L) 3.6 - 5.5 mmol/L    Chloride 99 96 - 112 mmol/L    Co2 20 20 - 33 mmol/L    Anion Gap 16.0 7.0 - 16.0    Glucose 163 (H) 65 - 99 mg/dL    Bun 10 8 - 22 mg/dL    Creatinine 1.16 0.50 - 1.40 mg/dL    Calcium 8.7 8.5 - 10.5 mg/dL    Correct Calcium 8.3 (L) 8.5 - 10.5 mg/dL    AST(SGOT) 21 12 - 45 U/L    ALT(SGPT) 27 2 - 50 U/L    Alkaline Phosphatase 70 30 - 99 U/L    Total Bilirubin 0.5 0.1 - 1.5 mg/dL    Albumin 4.5 3.2 - 4.9 g/dL    Total Protein 6.9 6.0 - 8.2 g/dL    Globulin 2.4 1.9 - 3.5 g/dL    A-G Ratio 1.9 g/dL   Acetaminophen Level   Result Value Ref Range    Acetaminophen -Tylenol <5.0 (L) 10.0 - 30.0 ug/mL   SALICYLATE LEVEL   Result Value Ref Range    Salicylates, Quant. <1.0 (L) 15.0 - 25.0 mg/dL    ESTIMATED GFR   Result Value Ref Range    GFR (CKD-EPI) 75 >60 mL/min/1.73 m 2   MAGNESIUM   Result Value Ref Range    Magnesium 1.7 1.5 - 2.5 mg/dL   PHOSPHORUS   Result Value Ref Range    Phosphorus 3.3 2.5 - 4.5 mg/dL   EKG   Result Value Ref Range    Report       [facility]    Test Date:  2023  Pt Name:    MARIVEL VILLAFANA                Department: ER  MRN:        [id number]                      Room:       [facility]  Gender:     Male                         Technician: [id number]  :        1970                   Requested By:ER TRIAGE PROTOCOL  Order #:    [id number]                    Reading MD: UNRULY TURNER DO    Measurements  Intervals                                Axis  Rate:       108                          P:          50  ND:         131                          QRS:        15  QRSD:       80                           T:          22  QT:         402  QTc:        539    Interpretive Statements  Sinus tachycardia  Probable left atrial enlargement  Minimal ST depression, anterolateral leads  Prolonged QT interval  Compared to ECG 2023 07:17:54  ST (T wave) deviation now present  Prolonged QT interval now present  Sinus rhythm no longer present  Electronically Signed On 2023 20:54:29 P ST by UNRULY TURNER DO         COURSE & MEDICAL DECISION MAKING    ED Observation Status? Yes; I am placing the patient in to an observation status due to a diagnostic uncertainty as well as therapeutic intensity. Patient placed in observation status at 8:46 PM, 2023.     Observation plan is as follows: Labs, symptom control, IV fluids before final disposition can be made.    Upon Reevaluation, the patient's condition has: Requires prolonged observation following overdose, will be hospitalized    Patient discharged from ED Observation status at 2232 (Time) 23 (Date).     INITIAL ASSESSMENT, COURSE AND PLAN  Care Narrative:   Seen evaluated at bedside.   Quite somnolent, with recurrent stimulation arouses enough to say his name and then later the word Benadryl.  Will not elaborate on dosages, intention or other history.  See nursing notes regarding suspected Benadryl dose of 43 x 25 mg (100 tablet bottle with 57 remaining from what appears to be a recently button opened bottle and box).  Only additional medications over those remaining 50 and 200 mg Seroquel, 50 mg trazodone.  Unknown ingestion quantity or dosages.  Add labs per protocol, IV fluid for tachycardia.  Continue on cardiac, pulse oximetry blood pressure monitor, will ICU admission with lab results.  Will discuss with poison control.    8:57 PM Poison control case #[id number], [facility].  At least 6 hours observation but must be asymptomatic.  Suspect overnight monitoring.  With this dose of Benadryl (anticipated over 1000 mg) may see agitated delirium, seizure, treat symptomatically with benzodiazepine.  Benadryl can cause QRS widening, add bicarb for any QRS greater than 120.  Trazodone can cause other arrhythmias.  Seroquel can cause QTc prolongation, maximize electrolytes to include monitoring and replacement of sodium potassium magnesium in the setting.    0930 -patient reevaluated bedside.  Somnolent, snoring but arousable to name with gentle stimuli.  Opens eyes, says name but returns to sleep.  Hemodynamically stable without tachycardia, hypotension or hypoxia.  Remains on room air.    10:27 PM  -reevaluated bedside.  Exam unchanged.  He is quite somnolent, returns to sleep almost immediately after arousable to name and stimuli.  Hemodynamic stable without hypoxia.  Labs reviewed, CBC is unremarkable.  CMP with mild hypokalemia, potassium 3.1, hyperglycemia with glucose 163, no evidence for DKA or other electrolyte abnormality.  Potassium is normal at 1.7, however it does appear as though nursing staff contacted poison control as well as myself, their recommendation was for magnesium supplement for  QTc widening.  2 g added.  Alcohol, acetaminophen, salicylate negative.  IV fluid nearly complete.  He will be hospitalized for prolonged observation and intervention if delirium or agitation were to present.    ADDITIONAL PROBLEM LIST  Psychiatric history of suicidal ideation, overdose, Benadryl overdose    DISPOSITION AND DISCUSSIONS  I have discussed management of the patient with the following physicians and YOCASTA's:    10:40 PM Dr. Gill is aware the patient and agreeable to consultation.    11:21 PM Dr. Gill has seen and evaluated patient at bedside.  Okay for IMCU.  Dr. Bailey aware of the patient and agreeable to consultation for admission.    Discussion of management with other Hospitals in [place] or appropriate source(s):  see note above from poison control      CRITICAL CARE  The very real possibilty of a deterioration of this patient's condition required the highest level of my preparedness for sudden, emergent intervention.  I provided critical care services, which included medication orders, frequent reevaluations of the patient's condition and response to treatment, ordering and reviewing test results, and discussing the case with various consultants.  The critical care time associated with the care of the patient was 45 minutes. Review chart for interventions. This time is exclusive of any other billable procedures.       FINAL DIAGNOSIS  1. Intentional drug overdose, initial encounter (HCC)    2. Intentional diphenhydramine overdose, initial encounter (HCC)           Electronically signed by: Yahaira Iyer D.O., 12/29/2023 8:56 PM

## 2023-12-30 NOTE — PROGRESS NOTES
Poison control called for update on patient condition. Recommending repeat EKG once potassium replacement complete. MD notified.

## 2023-12-30 NOTE — PROGRESS NOTES
Called by Dr Iyer for Benadryl OD.     Patient with hx of Benadryl OD in 5/2023 and 8/2023 and SI ideation admitted 11/2023 he suffers from bipolar and depression. He was found down by [place] told EMS he took 1/2 bottle of benadryl he was also found with other pills of trazadone, resiperidone, seroqual.    His labs are normal, his EKG with QTc of 539 currently with a resting tachycardia of 110's.  He awakes briskly each time when I enter the room and makes good eye contact then closes his eyes. He has bowel sounds, he has sweat in axilla.     He can be admitted to IMCU for observation and SI precautions    RTOC notified.     Angel Luis Gill MD  Critical Care Medicine

## 2023-12-30 NOTE — ED NOTES
Contacted Poison Control, recommendations are as follows for Case Number [id number]    Charcoal if taken in less than an hour and alert enough to tolerate  IVF and Pressors if Hypotensive  Atropine if Bradycardic  Optimize Electrolytes  Give 2 grams magnesium for QTC  Benzos if patient has seizures    Close monitoring until returns to baseline.    ERP updated.

## 2023-12-30 NOTE — PROGRESS NOTES
Hospital Medicine Daily Progress Note    Date of Service  12/30/2023    Chief Complaint  Bishop Bucio is a 53 y.o. male admitted 12/29/2023 with intentional OD    Hospital Course  54yo Hx of depression and previous suicide attempts who presented after an ingestion of approximately 1g of benadryl and possibly trazodone, seroquel and risperidone    Interval Problem Update  Somnolent, rouses to touch.  Alert and denies all.  Nods off rapidly    Sinus 90s on monitor    On RA  AFebrile    I have discussed this patient's plan of care and discharge plan at IDT rounds today with Case Management, Nursing, Nursing leadership, and other members of the IDT team.    Consultants/Specialty  psychiatry    Code Status  Full Code    Disposition  The patient is not medically cleared for discharge to home or a post-acute facility.      I have placed the appropriate orders for post-discharge needs.    Review of Systems  Review of Systems   Unable to perform ROS: Mental status change   Constitutional:  Negative for chills and fever.   Eyes:  Negative for blurred vision and double vision.   Respiratory:  Negative for shortness of breath.    Cardiovascular:  Negative for chest pain and palpitations.   Gastrointestinal:  Negative for abdominal pain, nausea and vomiting.   Genitourinary:  Negative for dysuria, frequency and urgency.   Musculoskeletal:  Negative for back pain.   Neurological:  Positive for loss of consciousness. Negative for dizziness and headaches.        Physical Exam  Temp:  [35.8 °C (96.5 °F)-37.2 °C (98.9 °F)] 35.8 °C (96.5 °F)  Pulse:  [] 100  Resp:  [9-13] 12  BP: (121-160)/(64-87) 152/86  SpO2:  [91 %-96 %] 95 %    Physical Exam  Constitutional:       General: He is not in acute distress.     Appearance: He is well-developed. He is not diaphoretic.   HENT:      Head: Normocephalic and atraumatic.   Eyes:      Conjunctiva/sclera: Conjunctivae normal.   Neck:      Vascular: No JVD.   Cardiovascular:       Rate and Rhythm: Normal rate.      Heart sounds: No murmur heard.     No gallop.   Pulmonary:      Effort: Pulmonary effort is normal. No respiratory distress.      Breath sounds: No stridor. No wheezing or rales.   Abdominal:      Palpations: Abdomen is soft.      Tenderness: There is no abdominal tenderness. There is no guarding or rebound.   Skin:     General: Skin is warm and dry.      Findings: No rash.   Neurological:      Mental Status: He is oriented to person, place, and time.      Comments: Sleeping   Rouses to touch is then alert and oriented.  Nods off rapidly if left to his own devices  Face symmetric  Speech clear and content logical  Pupils mid position and symmettric  No nystagmus  Follows x 4   Psychiatric:         Thought Content: Thought content normal.         Fluids    Intake/Output Summary (Last 24 hours) at 12/30/2023 0706  Last data filed at 12/30/2023 0600  Gross per 24 hour   Intake 2194.55 ml   Output 1800 ml   Net 394.55 ml       Laboratory  Recent Labs     12/29/23 2035 12/30/23  0405   WBC 6.1 7.7   RBC 5.06 4.70   HEMOGLOBIN 15.2 14.2   HEMATOCRIT 43.0 40.6*   MCV 85.0 86.4   MCH 30.0 30.2   MCHC 35.3 35.0   RDW 39.8 40.5   PLATELETCT 220 209   MPV 9.6 9.3     Recent Labs     12/29/23 2035 12/30/23  0405   SODIUM 135 138   POTASSIUM 3.1* 4.2   CHLORIDE 99 105   CO2 20 21   GLUCOSE 163* 138*   BUN 10 10   CREATININE 1.16 0.99   CALCIUM 8.7 8.3*                   Imaging  No orders to display        Assessment/Plan  * Drug overdose, intentional self-harm, initial encounter (HCC)- (present on admission)  Assessment & Plan  53-year-old male with prior history of Benadryl overdose and depression, presented with Benadryl (>1 gram), as well as possible Seroquel trazodone and risperidone overdose  With anticholinergic toxidrome, risk for arrhythmia due to QTc prolongation  Neuro exam neg for nystagmus, increased tone/clonus/rigidity  QTc around 400 on monitor  Cont Tele  Follow BMP and  replace lytes  Chavez through today and voiding trial tomorrow if doing well  Monitor Neuro exam and airway  Psych consulted  Pt on legal hold    Toxic encephalopathy  Assessment & Plan  Secondary to drug overdose  Supportive care  Fall, aspiration, seizure precaution  Monitor respiratory function with pulse oximetry  Exam improved today with improved orientation though still sedated to some degree.  Given anticholinergic OD will need prolonged observation due to risk of delayed abssorption/bezor      Hypertension  Assessment & Plan  IV labetalol as needed    Hyperglycemia  Assessment & Plan  Fasting  this am  No Hx of DM  Insulin sliding scale ordered    Hypokalemia- (present on admission)  Assessment & Plan  Potassium 3.1  Follow and replace Mg         VTE prophylaxis:    enoxaparin ppx      I have performed a physical exam and reviewed and updated ROS and Plan today (12/30/2023). In review of yesterday's note (12/29/2023), there are no changes except as documented above.
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
## 2023-12-30 NOTE — PROGRESS NOTES
Carolyn NGO from [facility] called, Updated provided.  Phone Number [phone] case number [id number]

## 2023-12-30 NOTE — ED TRIAGE NOTES
"Chief Complaint   Patient presents with    Suicidal Ideation     OD attempt on Benadryl, Trazodone, Seroquel, and Risperidone      [place] [facility] [place]. Patient has a history of depression and bipolar with SI in the past. Today patient took an estimated 1025 mg Benadryl and an unknown amount of Trazodone, Risperidone, and Seroquel. Patient arrives somnolent but rousble to physical stimuli.     EKG ordered per protocol.    Patient placed on legal hold at this time.    /69   Pulse (!) 106   Temp 37.2 °C (98.9 °F) (Temporal)   Resp 12   Ht 1.778 m (5' 10\")   Wt 93.9 kg (207 lb)   SpO2 92%    "

## 2023-12-30 NOTE — ED NOTES
Patient belongings searched by security and placed in locker 13.  2 belongings bags and 1 black backpack.    Patient has home medications in pharmacy, bag number [id number].

## 2023-12-30 NOTE — ASSESSMENT & PLAN NOTE
53-year-old male with prior history of Benadryl overdose and depression, presented with Benadryl (>1 gram), as well as possible Seroquel trazodone and risperidone overdose  With anticholinergic toxidrome, risk for arrhythmia due to QTc prolongation  Neuro exam neg for nystagmus, increased tone/clonus/rigidity  Psych following. Legal hold extended  Inpatient Psych planned.    1/2/2024  Medically cleared.  Continued legal hold.  Awaiting inpatient psych facility acceptance.

## 2023-12-30 NOTE — ASSESSMENT & PLAN NOTE
Secondary to drug overdose  Supportive care  Fall, aspiration, seizure precaution  Monitor respiratory function with pulse oximetry  Exam improved today with improved orientation though still sedated to some degree.  Given anticholinergic OD will need prolonged observation due to risk of delayed abssorption/bezor  Mentation close to baseline

## 2023-12-30 NOTE — ED NOTES
Patient belongings found to have the following medication REMAININ Quetiapine 200 mg Tabs (Large white [place] pill with 250 imprint)  2 Quetiapine 50 mg Tabs (Small white [place] pill with 337 imprint)  10 Trazodone 50 mg Tabs (Small white [place] pill with Pliva 433 imprint)  57 Benadryl 25 mg Tabs (Pink & White Capsules)    ERP at bedside

## 2023-12-30 NOTE — ASSESSMENT & PLAN NOTE
Vitals:    01/03/24 0731   BP: 113/75   Pulse: 85   Resp: 17   Temp: 36.9 °C (98.4 °F)   SpO2: 93%         Stable.

## 2023-12-30 NOTE — PROGRESS NOTES
4 Eyes Skin Assessment Completed by HUBER Hernandez and HUBER Pulido.    Head WDL  Ears WDL  Nose WDL  Mouth WDL  Neck WDL  Breast/Chest WDL  Shoulder Blades WDL  Spine WDL  (R) Arm/Elbow/Hand WDL  (L) Arm/Elbow/Hand WDL  Abdomen WDL  Groin WDL  Scrotum/Coccyx/Buttocks Redness and Blanching  (R) Leg WDL  (L) Leg WDL  (R) Heel/Foot/Toe WDL  (L) Heel/Foot/Toe WDL          Devices In Places ECG, Blood Pressure Cuff, Pulse Ox, and Chavez      Interventions In Place Q2 Turns and Low Air Loss Mattress    Possible Skin Injury No    Pictures Uploaded Into Epic N/A  Wound Consult Placed N/A  RN Wound Prevention Protocol Ordered No

## 2023-12-30 NOTE — CARE PLAN
The patient is Watcher - Medium risk of patient condition declining or worsening    Shift Goals  Clinical Goals: Safety, stable hemodynamics  Patient Goals: RAINA  Family Goals: RAINA    Progress made toward(s) clinical / shift goals:    Problem: Knowledge Deficit - Standard  Goal: Patient and family/care givers will demonstrate understanding of plan of care, disease process/condition, diagnostic tests and medications  Outcome: Progressing     Problem: Skin Integrity  Goal: Skin integrity is maintained or improved  Outcome: Progressing     Problem: Fall Risk  Goal: Patient will remain free from falls  Outcome: Progressing     Problem: Provide Safe Environment  Goal: Suicide environmental safety, protocols, policies, and practices will be implemented  Outcome: Progressing     Problem: Psychosocial  Goal: Patient's ability to identify and develop effective coping behaviors will improve  Outcome: Progressing

## 2023-12-31 LAB
ALBUMIN SERPL BCP-MCNC: 3.8 G/DL (ref 3.2–4.9)
ALBUMIN/GLOB SERPL: 1.6 G/DL
ALP SERPL-CCNC: 65 U/L (ref 30–99)
ALT SERPL-CCNC: 23 U/L (ref 2–50)
ANION GAP SERPL CALC-SCNC: 11 MMOL/L (ref 7–16)
AST SERPL-CCNC: 21 U/L (ref 12–45)
BILIRUB SERPL-MCNC: 0.4 MG/DL (ref 0.1–1.5)
BUN SERPL-MCNC: 12 MG/DL (ref 8–22)
CALCIUM ALBUM COR SERPL-MCNC: 8.6 MG/DL (ref 8.5–10.5)
CALCIUM SERPL-MCNC: 8.4 MG/DL (ref 8.5–10.5)
CHLORIDE SERPL-SCNC: 107 MMOL/L (ref 96–112)
CO2 SERPL-SCNC: 21 MMOL/L (ref 20–33)
CREAT SERPL-MCNC: 1.24 MG/DL (ref 0.5–1.4)
GFR SERPLBLD CREATININE-BSD FMLA CKD-EPI: 69 ML/MIN/1.73 M 2
GLOBULIN SER CALC-MCNC: 2.4 G/DL (ref 1.9–3.5)
GLUCOSE BLD STRIP.AUTO-MCNC: 121 MG/DL (ref 65–99)
GLUCOSE BLD STRIP.AUTO-MCNC: 128 MG/DL (ref 65–99)
GLUCOSE BLD STRIP.AUTO-MCNC: 131 MG/DL (ref 65–99)
GLUCOSE BLD STRIP.AUTO-MCNC: 219 MG/DL (ref 65–99)
GLUCOSE SERPL-MCNC: 143 MG/DL (ref 65–99)
MAGNESIUM SERPL-MCNC: 2.1 MG/DL (ref 1.5–2.5)
POTASSIUM SERPL-SCNC: 5 MMOL/L (ref 3.6–5.5)
PROT SERPL-MCNC: 6.2 G/DL (ref 6–8.2)
SODIUM SERPL-SCNC: 139 MMOL/L (ref 135–145)

## 2023-12-31 PROCEDURE — 700105 HCHG RX REV CODE 258

## 2023-12-31 PROCEDURE — A9270 NON-COVERED ITEM OR SERVICE: HCPCS | Performed by: INTERNAL MEDICINE

## 2023-12-31 PROCEDURE — 83735 ASSAY OF MAGNESIUM: CPT

## 2023-12-31 PROCEDURE — 99233 SBSQ HOSP IP/OBS HIGH 50: CPT | Performed by: INTERNAL MEDICINE

## 2023-12-31 PROCEDURE — 80053 COMPREHEN METABOLIC PANEL: CPT

## 2023-12-31 PROCEDURE — 700111 HCHG RX REV CODE 636 W/ 250 OVERRIDE (IP): Mod: JZ | Performed by: INTERNAL MEDICINE

## 2023-12-31 PROCEDURE — 82962 GLUCOSE BLOOD TEST: CPT | Mod: 91

## 2023-12-31 PROCEDURE — 770001 HCHG ROOM/CARE - MED/SURG/GYN PRIV*

## 2023-12-31 PROCEDURE — 700102 HCHG RX REV CODE 250 W/ 637 OVERRIDE(OP): Performed by: INTERNAL MEDICINE

## 2023-12-31 RX ORDER — SODIUM CHLORIDE 9 MG/ML
INJECTION, SOLUTION INTRAVENOUS CONTINUOUS
Status: DISCONTINUED | OUTPATIENT
Start: 2023-12-31 | End: 2024-01-03

## 2023-12-31 RX ADMIN — INSULIN HUMAN 2 UNITS: 100 INJECTION, SOLUTION PARENTERAL at 05:45

## 2023-12-31 RX ADMIN — ACETAMINOPHEN 650 MG: 325 TABLET, FILM COATED ORAL at 22:02

## 2023-12-31 RX ADMIN — FAMOTIDINE 20 MG: 10 INJECTION INTRAVENOUS at 17:23

## 2023-12-31 RX ADMIN — SODIUM CHLORIDE: 9 INJECTION, SOLUTION INTRAVENOUS at 05:20

## 2023-12-31 RX ADMIN — ENOXAPARIN SODIUM 40 MG: 100 INJECTION SUBCUTANEOUS at 17:22

## 2023-12-31 RX ADMIN — FAMOTIDINE 20 MG: 10 INJECTION INTRAVENOUS at 05:40

## 2023-12-31 ASSESSMENT — PAIN DESCRIPTION - PAIN TYPE
TYPE: ACUTE PAIN

## 2023-12-31 ASSESSMENT — ENCOUNTER SYMPTOMS
LOSS OF CONSCIOUSNESS: 1
PALPITATIONS: 0
NAUSEA: 0
DIZZINESS: 0
BACK PAIN: 0
ABDOMINAL PAIN: 0
VOMITING: 0
CHILLS: 0
HEADACHES: 0
FEVER: 0
DOUBLE VISION: 0
BLURRED VISION: 0
SHORTNESS OF BREATH: 0

## 2023-12-31 NOTE — CARE PLAN
"The patient is Watcher - Medium risk of patient condition declining or worsening    Shift Goals  Clinical Goals: Safety, close observation, MIVF change, 2 RN skin check, rest  Patient Goals: Anxiety medication, \"doesn't like change\"  Family Goals: Glenis    Progress made toward(s) clinical / shift goals:  Sitter in place at all times. Items removedl from room prior to arrival. Personal belongings outside of room. MIVF changed prior to transfer. Administered NS upon arrival to floor. 2 RN skin check performed, stated he's been through \"this many times before\". Patient noted sleeping at end of shift.     Patient is not progressing towards the following goals: Updated on call hospitalist of pain/anxiety reports. No new orders received. Patient continues to \"not want to live\".       "

## 2023-12-31 NOTE — PROGRESS NOTES
Hospital Medicine Daily Progress Note    Date of Service  12/31/2023    Chief Complaint  Bishop Bucio is a 53 y.o. male admitted 12/29/2023 with intentional OD    Hospital Course  52yo Hx of depression and previous suicide attempts who presented after an ingestion of approximately 1g of benadryl and possibly trazodone, seroquel and risperidone    Interval Problem Update  I reviewed the chart along with vitals, labs, imaging, test (both pending and resulted) and recommendations from specialists and interdisciplinary team.  52yo M with prior attempts of suicide by overdose. He presented  12/29/2023 with suicidal overdose  with Benadryl (estimated more than 1 g of Benadryl swallowed), as well as possible overdose with trazodone, Seroquel and risperidone unknown amount. At ED, afebrile, tachycardic, slightly hypertensive. Anticoholinergic toxidrome on evaluation. EKG QTc prolonged. Hypokalemic and hypomagnesemic on labs. Admitted to IMCU for close monitoring. Psychiatry consulted, legal hold extended. Now stable off telemetry.     Sitter at bedside. Currently not agitated. Eating.    I have discussed this patient's plan of care and discharge plan at IDT rounds today with Case Management, Nursing, Nursing leadership, and other members of the IDT team.    Consultants/Specialty  psychiatry    Code Status  Full Code    Disposition  The patient is medically cleared for discharge to home or a post-acute facility.  Anticipate discharge to: a psychiatric hospital    I have placed the appropriate orders for post-discharge needs.    Review of Systems  Review of Systems   Unable to perform ROS: Mental status change   Constitutional:  Negative for chills and fever.   Eyes:  Negative for blurred vision and double vision.   Respiratory:  Negative for shortness of breath.    Cardiovascular:  Negative for chest pain and palpitations.   Gastrointestinal:  Negative for abdominal pain, nausea and vomiting.   Genitourinary:   Negative for dysuria, frequency and urgency.   Musculoskeletal:  Negative for back pain.   Neurological:  Positive for loss of consciousness. Negative for dizziness and headaches.        Physical Exam  Temp:  [36.6 °C (97.9 °F)-37.3 °C (99.1 °F)] 37.3 °C (99.1 °F)  Pulse:  [74-94] 91  Resp:  [10-21] 18  BP: (112-158)/(70-85) 112/85  SpO2:  [93 %-99 %] 99 %    Physical Exam  Constitutional:       General: He is not in acute distress.     Appearance: He is well-developed. He is not diaphoretic.   HENT:      Head: Normocephalic and atraumatic.   Eyes:      Conjunctiva/sclera: Conjunctivae normal.   Neck:      Vascular: No JVD.   Cardiovascular:      Rate and Rhythm: Normal rate.      Heart sounds: No murmur heard.     No gallop.   Pulmonary:      Effort: Pulmonary effort is normal. No respiratory distress.      Breath sounds: No stridor. No wheezing or rales.   Abdominal:      Palpations: Abdomen is soft.      Tenderness: There is no abdominal tenderness. There is no guarding or rebound.   Skin:     General: Skin is warm and dry.      Findings: No rash.   Neurological:      Mental Status: He is oriented to person, place, and time.      Comments: Sleeping   Rouses to touch is then alert and oriented.  Nods off rapidly if left to his own devices  Face symmetric  Speech clear and content logical  Pupils mid position and symmettric  No nystagmus  Follows x 4   Psychiatric:         Thought Content: Thought content normal.         Fluids    Intake/Output Summary (Last 24 hours) at 12/31/2023 1546  Last data filed at 12/31/2023 1509  Gross per 24 hour   Intake 240 ml   Output 5095 ml   Net -4855 ml         Laboratory  Recent Labs     12/29/23 2035 12/30/23  0405   WBC 6.1 7.7   RBC 5.06 4.70   HEMOGLOBIN 15.2 14.2   HEMATOCRIT 43.0 40.6*   MCV 85.0 86.4   MCH 30.0 30.2   MCHC 35.3 35.0   RDW 39.8 40.5   PLATELETCT 220 209   MPV 9.6 9.3       Recent Labs     12/29/23 2035 12/30/23  0405 12/31/23  0043   SODIUM 135 138 139    POTASSIUM 3.1* 4.2 5.0   CHLORIDE 99 105 107   CO2 20 21 21   GLUCOSE 163* 138* 143*   BUN 10 10 12   CREATININE 1.16 0.99 1.24   CALCIUM 8.7 8.3* 8.4*                     Imaging  No orders to display        Assessment/Plan  * Drug overdose, intentional self-harm, initial encounter (HCC)- (present on admission)  Assessment & Plan  53-year-old male with prior history of Benadryl overdose and depression, presented with Benadryl (>1 gram), as well as possible Seroquel trazodone and risperidone overdose  With anticholinergic toxidrome, risk for arrhythmia due to QTc prolongation  Neuro exam neg for nystagmus, increased tone/clonus/rigidity  Psych following. Legal hold extended  Inpatient Psych planned.    Toxic encephalopathy  Assessment & Plan  Secondary to drug overdose  Supportive care  Fall, aspiration, seizure precaution  Monitor respiratory function with pulse oximetry  Exam improved today with improved orientation though still sedated to some degree.  Given anticholinergic OD will need prolonged observation due to risk of delayed abssorption/bezor  Mentation close to baseline      Hypertension  Assessment & Plan  IV labetalol as needed.    Vitals:    12/31/23 1503   BP: 112/85   Pulse: 91   Resp: 18   Temp: 37.3 °C (99.1 °F)   SpO2: 99%     Stable.    Hyperglycemia  Assessment & Plan  Fasting  this am  No Hx of DM  Insulin sliding scale ordered    Hypokalemia- (present on admission)  Assessment & Plan  Potassium 3.1  Follow and replace Mg         VTE prophylaxis: Lovenox SQ    I have performed a physical exam and reviewed and updated ROS and Plan today (12/31/2023). In review of yesterday's note (12/30/2023), there are no changes except as documented above.

## 2023-12-31 NOTE — PROGRESS NOTES
"Report received from IMCU RN; assumed care once patient escorted to floor with IMCU RN, safety sitter, transport. Assessment/2 RN skin check completed. A&O x 3, stating has \"no idea\" about the time. VSS. 95% on RA. Mild SOB reported with congestion. Patient reported \"can't tell\" about numbness/tingling with later reports of numbness/tingling to limbs and throbbing noted to left shoulder. Patient denied nausea, vomiting. Dizziness reported with sitting up. Patient reported pain from left shoulder, lower chest to upper abdomen. Educated about coming down off medications ingested. Abdomen round. + void; yellow urine noted in rea.  + flatus. LBM 12/29 x 3 prior to coming to hospital. Patient reported tolerating diet that he's aware of, eating 2 meals that he recalls. Reported patient refusing to get OOB. Patient tolerating MIVF, changed per hospitalist prior to transport. Discussed plan of care/legal hold with patient. Patient aware with continued thoughts. Stated I don't need blood if \"I don't want to live\" when questioned about bloodless. Patient aware of process. Moderate fall risk. Bed's frame engaged. Bed in locked/lowest position.  Call light within sitters reach. Needs met as much as possible since arrival to floor   "

## 2023-12-31 NOTE — PROGRESS NOTES
2 RN skin check complete.     Devices in place: Bilateral AC PIV, rea catheter.  Skin assessed under devices: above as much as possible.     Red hue noted to skin. Patient reports having eczema. A few scattered scabs to BUE. Rash noted to anterior/posterior trunk. Dry, cracked heels. Scattered tattoos. No other skin issues noted/reported.      Confirmed pressure ulcers found on: NA  New potential pressure ulcers noted on: NA.   Wound consult placed: NA.    The following interventions in place: Blankets/pillows/bed remote. 1:1 sitter present at all times within sight.

## 2023-12-31 NOTE — CARE PLAN
The patient is Stable - Low risk of patient condition declining or worsening    Shift Goals  Clinical Goals: Safety and Hemodynamic Stability  Patient Goals: Rest  Family Goals: Glenis    Progress made toward(s) clinical / shift goals:       Problem: Provide Safe Environment  Goal: Suicide environmental safety, protocols, policies, and practices will be implemented  Description: Target End Date:  resolve day 1    1.  Remove objects or personal belongings that may cause harm or injury to self or others  2.  Dietary tray modifications (paperware)  3.  Provide a safe environment  4.  Render close patient supervision by sustaining observation or awareness of the patient at all times  Outcome: Progressing     Problem: Skin Integrity  Goal: Skin integrity is maintained or improved  Description: Target End Date:  Prior to discharge or change in level of care    Document interventions on Skin Risk/Abdoulaye flowsheet groups and corresponding LDA    1.  Assess and monitor skin integrity, appearance and/or temperature  2.  Assess risk factors for impaired skin integrity and/or pressures ulcers  3.  Implement precautions to protect skin integrity in collaboration with interdisciplinary team  4.  Implement pressure ulcer prevention protocol if at risk for skin breakdown  5.  Confirm wound care consult if at risk for skin breakdown  6.  Ensure patient use of pressure relieving devices  (Low air loss bed, waffle overlay, heel protectors, ROHO cushion, etc)  Outcome: Progressing     Problem: Knowledge Deficit - Standard  Goal: Patient and family/care givers will demonstrate understanding of plan of care, disease process/condition, diagnostic tests and medications  Description: Target End Date:  1-3 days or as soon as patient condition allows    Document in Patient Education    1.  Patient and family/caregiver oriented to unit, equipment, visitation policy and means for communicating concern  2.  Complete/review Learning Assessment  3.   Assess knowledge level of disease process/condition, treatment plan, diagnostic tests and medications  4.  Explain disease process/condition, treatment plan, diagnostic tests and medications  Outcome: Progressing     Problem: Fall Risk  Goal: Patient will remain free from falls  Description: Target End Date:  Prior to discharge or change in level of care    Document interventions on the [facility] Fall Risk Assessment    1.  Assess for fall risk factors  2.  Implement fall precautions  Outcome: Progressing     Problem: Provide Safe Environment  Goal: Suicide environmental safety, protocols, policies, and practices will be implemented  Description: Target End Date:  resolve day 1    1.  Remove objects or personal belongings that may cause harm or injury to self or others  2.  Dietary tray modifications (paperware)  3.  Provide a safe environment  4.  Render close patient supervision by sustaining observation or awareness of the patient at all times  Outcome: Progressing       Patient is not progressing towards the following goals:

## 2023-12-31 NOTE — PROGRESS NOTES
"Report received from RN, assumed care at 0715  Pt is A0X3, pt states \" I have no idea what time or day it is\"  Patient states he is still having thoughts of harming himself, when asked if he has a plan he states \" would just go home and probably overdose again\" Patient wants to remain bloodless  Pt declines any SOB, chest pain, new onset of numbness/ tingling   Pt rates pain at 0/10, on a scale of 1-10  Patient states \"no pain, just this twitch on my left side\"  Hospitalist notified of twitch   Pt is voiding adequately and without hesitancy via rea  Pt has hypoactive bowel sounds, last BM PTA  Pt refusing to ambulate   Pt is tolerating a diet, pt denies any nausea/vomiting at this time  Plan of care discussed, all questions answered. 1-1 sitter in place  "

## 2023-12-31 NOTE — CARE PLAN
The patient is Stable - Low risk of patient condition declining or worsening    Shift Goals  Clinical Goals: safety, close observation, rest  Patient Goals: rest  Family Goals: Glenis    Progress made toward(s) clinical / shift goals:    Problem: Knowledge Deficit - Standard  Goal: Patient and family/care givers will demonstrate understanding of plan of care, disease process/condition, diagnostic tests and medications  Outcome: Progressing     Problem: Provide Safe Environment  Goal: Suicide environmental safety, protocols, policies, and practices will be implemented  Outcome: Progressing

## 2023-12-31 NOTE — CARE PLAN
The patient is Stable - Low risk of patient condition declining or worsening    Shift Goals  Clinical Goals: Safety and Hemodynamic stability  Patient Goals: RAINA  Family Goals: RAINA    Progress made toward(s) clinical / shift goals:    Problem: Knowledge Deficit - Standard  Goal: Patient and family/care givers will demonstrate understanding of plan of care, disease process/condition, diagnostic tests and medications  Outcome: Progressing     Problem: Skin Integrity  Goal: Skin integrity is maintained or improved  Outcome: Progressing     Problem: Provide Safe Environment  Goal: Suicide environmental safety, protocols, policies, and practices will be implemented  Outcome: Progressing

## 2024-01-01 ENCOUNTER — APPOINTMENT (OUTPATIENT)
Dept: RADIOLOGY | Facility: MEDICAL CENTER | Age: 54
DRG: 917 | End: 2024-01-01
Attending: INTERNAL MEDICINE
Payer: COMMERCIAL

## 2024-01-01 LAB
ALBUMIN SERPL BCP-MCNC: 3.9 G/DL (ref 3.2–4.9)
BUN SERPL-MCNC: 14 MG/DL (ref 8–22)
CALCIUM ALBUM COR SERPL-MCNC: 8.8 MG/DL (ref 8.5–10.5)
CALCIUM SERPL-MCNC: 8.7 MG/DL (ref 8.5–10.5)
CHLORIDE SERPL-SCNC: 103 MMOL/L (ref 96–112)
CO2 SERPL-SCNC: 20 MMOL/L (ref 20–33)
CREAT SERPL-MCNC: 1.1 MG/DL (ref 0.5–1.4)
ERYTHROCYTE [DISTWIDTH] IN BLOOD BY AUTOMATED COUNT: 41.1 FL (ref 35.9–50)
FLUAV RNA SPEC QL NAA+PROBE: NEGATIVE
FLUBV RNA SPEC QL NAA+PROBE: NEGATIVE
GFR SERPLBLD CREATININE-BSD FMLA CKD-EPI: 80 ML/MIN/1.73 M 2
GLUCOSE BLD STRIP.AUTO-MCNC: 107 MG/DL (ref 65–99)
GLUCOSE BLD STRIP.AUTO-MCNC: 126 MG/DL (ref 65–99)
GLUCOSE BLD STRIP.AUTO-MCNC: 146 MG/DL (ref 65–99)
GLUCOSE BLD STRIP.AUTO-MCNC: 161 MG/DL (ref 65–99)
GLUCOSE SERPL-MCNC: 112 MG/DL (ref 65–99)
HCT VFR BLD AUTO: 42.7 % (ref 42–52)
HGB BLD-MCNC: 14.6 G/DL (ref 14–18)
MCH RBC QN AUTO: 29.6 PG (ref 27–33)
MCHC RBC AUTO-ENTMCNC: 34.2 G/DL (ref 32.3–36.5)
MCV RBC AUTO: 86.6 FL (ref 81.4–97.8)
PHOSPHATE SERPL-MCNC: 3.5 MG/DL (ref 2.5–4.5)
PLATELET # BLD AUTO: 203 K/UL (ref 164–446)
PMV BLD AUTO: 9.5 FL (ref 9–12.9)
POTASSIUM SERPL-SCNC: 3.8 MMOL/L (ref 3.6–5.5)
PROCALCITONIN SERPL-MCNC: 0.1 NG/ML
RBC # BLD AUTO: 4.93 M/UL (ref 4.7–6.1)
RSV RNA SPEC QL NAA+PROBE: NEGATIVE
SARS-COV-2 RNA RESP QL NAA+PROBE: NOTDETECTED
SODIUM SERPL-SCNC: 136 MMOL/L (ref 135–145)
SPECIMEN SOURCE: NORMAL
WBC # BLD AUTO: 6.2 K/UL (ref 4.8–10.8)

## 2024-01-01 PROCEDURE — A9270 NON-COVERED ITEM OR SERVICE: HCPCS | Performed by: INTERNAL MEDICINE

## 2024-01-01 PROCEDURE — 36415 COLL VENOUS BLD VENIPUNCTURE: CPT

## 2024-01-01 PROCEDURE — 85027 COMPLETE CBC AUTOMATED: CPT

## 2024-01-01 PROCEDURE — 80069 RENAL FUNCTION PANEL: CPT

## 2024-01-01 PROCEDURE — 71045 X-RAY EXAM CHEST 1 VIEW: CPT

## 2024-01-01 PROCEDURE — 84145 PROCALCITONIN (PCT): CPT

## 2024-01-01 PROCEDURE — 99232 SBSQ HOSP IP/OBS MODERATE 35: CPT | Performed by: INTERNAL MEDICINE

## 2024-01-01 PROCEDURE — 700102 HCHG RX REV CODE 250 W/ 637 OVERRIDE(OP): Performed by: INTERNAL MEDICINE

## 2024-01-01 PROCEDURE — 770001 HCHG ROOM/CARE - MED/SURG/GYN PRIV*

## 2024-01-01 PROCEDURE — 82962 GLUCOSE BLOOD TEST: CPT

## 2024-01-01 PROCEDURE — 0241U HCHG SARS-COV-2 COVID-19 NFCT DS RESP RNA 4 TRGT MIC: CPT

## 2024-01-01 PROCEDURE — 700111 HCHG RX REV CODE 636 W/ 250 OVERRIDE (IP): Mod: JZ | Performed by: INTERNAL MEDICINE

## 2024-01-01 PROCEDURE — 700105 HCHG RX REV CODE 258

## 2024-01-01 RX ORDER — GUAIFENESIN 200 MG/10ML
10 LIQUID ORAL EVERY 4 HOURS PRN
Status: DISCONTINUED | OUTPATIENT
Start: 2024-01-01 | End: 2024-01-03 | Stop reason: HOSPADM

## 2024-01-01 RX ADMIN — ACETAMINOPHEN 650 MG: 325 TABLET, FILM COATED ORAL at 17:37

## 2024-01-01 RX ADMIN — INSULIN HUMAN 1 UNITS: 100 INJECTION, SOLUTION PARENTERAL at 17:43

## 2024-01-01 RX ADMIN — SODIUM CHLORIDE: 9 INJECTION, SOLUTION INTRAVENOUS at 10:45

## 2024-01-01 RX ADMIN — FAMOTIDINE 20 MG: 10 INJECTION INTRAVENOUS at 05:09

## 2024-01-01 RX ADMIN — ENOXAPARIN SODIUM 40 MG: 100 INJECTION SUBCUTANEOUS at 17:37

## 2024-01-01 RX ADMIN — GUAIFENESIN 200 MG: 100 SOLUTION ORAL at 21:24

## 2024-01-01 ASSESSMENT — ENCOUNTER SYMPTOMS
LOSS OF CONSCIOUSNESS: 1
DIZZINESS: 0
FEVER: 0
CHILLS: 0
NAUSEA: 0
PALPITATIONS: 0
ABDOMINAL PAIN: 0
VOMITING: 0
BACK PAIN: 0
DOUBLE VISION: 0
HEADACHES: 0
BLURRED VISION: 0
SHORTNESS OF BREATH: 0

## 2024-01-01 NOTE — CARE PLAN
"  Problem: Provide Safe Environment  Goal: Suicide environmental safety, protocols, policies, and practices will be implemented  Outcome: Progressing  Flowsheets (Taken 1/1/2024 1400)  Safety Interventions:   Patient Room Close to Nursing Station   Patient Wearing Hospital Clothing   Personal Clothing / Belongings Removed (Comment: Storage Location)   Potentially Dangerous Items Removed from room   Plastic Utensils / Paper Ware Ordered   Provided Safety Education   Discussed no self harm or elopement and safe behavior with patient   Patient Accompanied by Unit Staff when off Unit.   Medically necessary equipment present, hourly room safety check, and post-meal tray check.     Problem: Psychosocial  Goal: Patient's ability to identify and develop effective coping behaviors will improve  Outcome: Not Progressing     Problem: Knowledge Deficit - Standard  Goal: Patient and family/care givers will demonstrate understanding of plan of care, disease process/condition, diagnostic tests and medications  Outcome: Progressing   The patient is Watcher - Medium risk of patient condition declining or worsening    Shift Goals  Clinical Goals: safety, comfort, therapeutic communication  Patient Goals: rest  Family Goals: Glenis    Progress made toward(s) clinical / shift goals:  pt continues to have SI thoughts of self harm and states he\"doesn't want to live\".  Pt refusing to get OOB, declines distraction activities like walking, reading, watching tv etc.  Pt laying in bed staring out window. Declines to talk about what brought him here.    Patient is not progressing towards the following goals:      Problem: Psychosocial  Goal: Patient's ability to identify and develop effective coping behaviors will improve  Outcome: Not Progressing     "

## 2024-01-01 NOTE — CONSULTS
"  [facility] PSYCHIATRIC FOLLOW-UP:(established)  *Reason for admission:    intentional overdose, suicide attempt.  Patient apparently was more conversive with EMS he is here.  He has become quite somnolent.  Told EMS that he took Benadryl (there history suggests more than 1000 mg) as well as unknown quantity of trazodone, Seroquel and risperidone.   *Legal Hold Status: on legal hold                S:  getting sick with an URI. Not sleeping well. Trazodone has worked in the past. Pt says he is SI, he sees a black cat at times \"I dont' know why\". Sometimes feel people are starring.     He uses the word bipolar to mean different things: for ex when in crowds and getting very anxious and panicky says \"I was getting manic\". At other times gives the hx of manic like symptoms.     Would prefer to go to [facility] because at [facility] he was seen by a PA and never a psychiatrist. He didn't like [facility]'s either.    O: Medical ROS (as pertinent):                      *Psychiatric Examination:   Vitals:   Vitals:    12/31/23 1503 12/31/23 2011 01/01/24 0437 01/01/24 0739   BP: 112/85 (!) 143/89 135/86 131/81   Pulse: 91 81 74 78   Resp: 18 18 18 18   Temp: 37.3 °C (99.1 °F) 37.3 °C (99.1 °F) 37 °C (98.6 °F) 37.2 °C (99 °F)   TempSrc: Temporal Temporal Temporal Temporal   SpO2: 99% 94% 95% 93%   Weight:       Height:         General Appearance:  good eye contact, cooperative, and coughing.   Abnormal Movements: none   Gait and Posture: not observed  Speech: within normal limits  Thought Process: normal rate  Associations:   linear  Abnormal or Psychotic Thoughts:  no overt psychosis  Judgement and Insight: fair  Orientation: grossly intact  Recent and Remote Memory: grossly intact  Attention Span and Concentration: intact  Language:fluent  Fund of Knowledge: not tested  Mood and Affect: depressed  SI/HI:  suicidal - yes and homicidal - no        Current Medications:  Scheduled Medications   Medication Dose Frequency    " senna-docusate  2 Tablet BID    enoxaparin (LOVENOX) injection  40 mg DAILY AT 1800    insulin regular  1-6 Units Q6HRS          *ASSESSMENT/RECOMENDATIONS:  1.Borderline Personality disorder with impulsivity, mood changes.    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  2    trazodone 100 mg for sleep with repeat x 1 after 1 hour.  3   transfer inpt as soon as medically cleared        Will continue to follow with you.         Discharge recommendations: inpt psych      If released from [facility]: Discharge Instructions:  -Reviewed safety plan: 911, ER, PCM, MHC, Suicide crisis line  -Please assist with outpatient Psychiatric/substance use follow up appointments at discharge once medically cleared.

## 2024-01-01 NOTE — PROGRESS NOTES
Pt is A&O 4   1:1 sitter in place for legal hold  Pain -   - nausea  Tolerating a CHO diet   Incision -  + Voids via rea   + flatus  - BM  Up x1  SCD's refused  Bed alarm off, pt low fall risk per aixa mohr  Reviewed plan of care with patient, bed in lowest position and locked, pt resting comfortably now, call light within reach, all needs met at this time. Interventions will be executed per plan of care

## 2024-01-01 NOTE — PROGRESS NOTES
Hospital Medicine Daily Progress Note    Date of Service  1/1/2024    Chief Complaint  Bishop Bucio is a 53 y.o. male admitted 12/29/2023 with intentional OD    Hospital Course  54yo Hx of depression and previous suicide attempts who presented after an ingestion of approximately 1g of benadryl and possibly trazodone, seroquel and risperidone    Interval Problem Update  I reviewed the chart along with vitals, labs, imaging, test (both pending and resulted) and recommendations from specialists and interdisciplinary team.  54yo M with prior attempts of suicide by overdose. He presented  12/29/2023 with suicidal overdose  with Benadryl (estimated more than 1 g of Benadryl swallowed), as well as possible overdose with trazodone, Seroquel and risperidone unknown amount. At ED, afebrile, tachycardic, slightly hypertensive. Anticoholinergic toxidrome on evaluation. EKG QTc prolonged. Hypokalemic and hypomagnesemic on labs. Admitted to IMCU for close monitoring. Psychiatry consulted, legal hold extended. Now stable off telemetry.     Sitter at bedside. Has a mild dry  cough. Ordered CXR came back clear, procalcitonin came back 0.10. CoVID/flu/RSV pending. Inpatient Psychiatry pending    I have discussed this patient's plan of care and discharge plan at IDT rounds today with Case Management, Nursing, Nursing leadership, and other members of the IDT team.    Consultants/Specialty  psychiatry    Code Status  Full Code    Disposition  Medically Cleared  I have placed the appropriate orders for post-discharge needs.    Review of Systems  Review of Systems   Unable to perform ROS: Mental status change   Constitutional:  Negative for chills and fever.   Eyes:  Negative for blurred vision and double vision.   Respiratory:  Negative for shortness of breath.    Cardiovascular:  Negative for chest pain and palpitations.   Gastrointestinal:  Negative for abdominal pain, nausea and vomiting.   Genitourinary:  Negative for  dysuria, frequency and urgency.   Musculoskeletal:  Negative for back pain.   Neurological:  Positive for loss of consciousness. Negative for dizziness and headaches.        Physical Exam  Temp:  [36.9 °C (98.4 °F)-37.3 °C (99.1 °F)] 37.2 °C (99 °F)  Pulse:  [74-91] 78  Resp:  [18] 18  BP: (112-143)/(81-89) 131/81  SpO2:  [93 %-99 %] 93 %    Physical Exam  Constitutional:       General: He is not in acute distress.     Appearance: He is well-developed. He is not diaphoretic.   HENT:      Head: Normocephalic and atraumatic.   Eyes:      Conjunctiva/sclera: Conjunctivae normal.   Neck:      Vascular: No JVD.   Cardiovascular:      Rate and Rhythm: Normal rate.      Heart sounds: No murmur heard.     No gallop.   Pulmonary:      Effort: Pulmonary effort is normal. No respiratory distress.      Breath sounds: No stridor. No wheezing or rales.   Abdominal:      Palpations: Abdomen is soft.      Tenderness: There is no abdominal tenderness. There is no guarding or rebound.   Skin:     General: Skin is warm and dry.      Findings: No rash.   Neurological:      Mental Status: He is oriented to person, place, and time.      Comments: Sleeping   Rouses to touch is then alert and oriented.  Nods off rapidly if left to his own devices  Face symmetric  Speech clear and content logical  Pupils mid position and symmettric  No nystagmus  Follows x 4   Psychiatric:         Thought Content: Thought content normal.      Comments: amotivated         Fluids    Intake/Output Summary (Last 24 hours) at 1/1/2024 0854  Last data filed at 1/1/2024 0745  Gross per 24 hour   Intake 480 ml   Output 3925 ml   Net -3445 ml         Laboratory  Recent Labs     12/29/23  2035 12/30/23  0405 01/01/24  0048   WBC 6.1 7.7 6.2   RBC 5.06 4.70 4.93   HEMOGLOBIN 15.2 14.2 14.6   HEMATOCRIT 43.0 40.6* 42.7   MCV 85.0 86.4 86.6   MCH 30.0 30.2 29.6   MCHC 35.3 35.0 34.2   RDW 39.8 40.5 41.1   PLATELETCT 220 209 203   MPV 9.6 9.3 9.5       Recent Labs      12/30/23  0405 12/31/23  0043 01/01/24  0048   SODIUM 138 139 136   POTASSIUM 4.2 5.0 3.8   CHLORIDE 105 107 103   CO2 21 21 20   GLUCOSE 138* 143* 112*   BUN 10 12 14   CREATININE 0.99 1.24 1.10   CALCIUM 8.3* 8.4* 8.7                     Imaging  No orders to display        Assessment/Plan  * Drug overdose, intentional self-harm, initial encounter (HCC)- (present on admission)  Assessment & Plan  53-year-old male with prior history of Benadryl overdose and depression, presented with Benadryl (>1 gram), as well as possible Seroquel trazodone and risperidone overdose  With anticholinergic toxidrome, risk for arrhythmia due to QTc prolongation  Neuro exam neg for nystagmus, increased tone/clonus/rigidity  Psych following. Legal hold extended  Inpatient Psych planned.    Toxic encephalopathy  Assessment & Plan  Secondary to drug overdose  Supportive care  Fall, aspiration, seizure precaution  Monitor respiratory function with pulse oximetry  Exam improved today with improved orientation though still sedated to some degree.  Given anticholinergic OD will need prolonged observation due to risk of delayed abssorption/bezor  Mentation close to baseline      Hypertension  Assessment & Plan  IV labetalol as needed.    Vitals:    12/31/23 1503   BP: 112/85   Pulse: 91   Resp: 18   Temp: 37.3 °C (99.1 °F)   SpO2: 99%     Stable.    Hyperglycemia  Assessment & Plan  Fasting  this am  No Hx of DM  Insulin sliding scale ordered    Hypokalemia- (present on admission)  Assessment & Plan  Potassium 3.1  Follow and replace Mg         VTE prophylaxis: Lovenox SQ    I have performed a physical exam and reviewed and updated ROS and Plan today (1/1/2024). In review of yesterday's note (12/31/2023), there are no changes except as documented above.

## 2024-01-02 LAB
ALBUMIN SERPL BCP-MCNC: 3.9 G/DL (ref 3.2–4.9)
BUN SERPL-MCNC: 16 MG/DL (ref 8–22)
CALCIUM ALBUM COR SERPL-MCNC: 8.7 MG/DL (ref 8.5–10.5)
CALCIUM SERPL-MCNC: 8.6 MG/DL (ref 8.5–10.5)
CHLORIDE SERPL-SCNC: 104 MMOL/L (ref 96–112)
CO2 SERPL-SCNC: 20 MMOL/L (ref 20–33)
CREAT SERPL-MCNC: 1.28 MG/DL (ref 0.5–1.4)
EKG IMPRESSION: NORMAL
ERYTHROCYTE [DISTWIDTH] IN BLOOD BY AUTOMATED COUNT: 40.5 FL (ref 35.9–50)
GFR SERPLBLD CREATININE-BSD FMLA CKD-EPI: 66 ML/MIN/1.73 M 2
GLUCOSE BLD STRIP.AUTO-MCNC: 113 MG/DL (ref 65–99)
GLUCOSE BLD STRIP.AUTO-MCNC: 126 MG/DL (ref 65–99)
GLUCOSE BLD STRIP.AUTO-MCNC: 127 MG/DL (ref 65–99)
GLUCOSE BLD STRIP.AUTO-MCNC: 135 MG/DL (ref 65–99)
GLUCOSE SERPL-MCNC: 109 MG/DL (ref 65–99)
HCT VFR BLD AUTO: 44.5 % (ref 42–52)
HGB BLD-MCNC: 15.3 G/DL (ref 14–18)
MCH RBC QN AUTO: 29.6 PG (ref 27–33)
MCHC RBC AUTO-ENTMCNC: 34.4 G/DL (ref 32.3–36.5)
MCV RBC AUTO: 86.1 FL (ref 81.4–97.8)
PHOSPHATE SERPL-MCNC: 3.7 MG/DL (ref 2.5–4.5)
PLATELET # BLD AUTO: 212 K/UL (ref 164–446)
PMV BLD AUTO: 9.5 FL (ref 9–12.9)
POTASSIUM SERPL-SCNC: 4 MMOL/L (ref 3.6–5.5)
RBC # BLD AUTO: 5.17 M/UL (ref 4.7–6.1)
SODIUM SERPL-SCNC: 136 MMOL/L (ref 135–145)
WBC # BLD AUTO: 6.4 K/UL (ref 4.8–10.8)

## 2024-01-02 PROCEDURE — 99232 SBSQ HOSP IP/OBS MODERATE 35: CPT | Performed by: STUDENT IN AN ORGANIZED HEALTH CARE EDUCATION/TRAINING PROGRAM

## 2024-01-02 PROCEDURE — 700102 HCHG RX REV CODE 250 W/ 637 OVERRIDE(OP): Performed by: STUDENT IN AN ORGANIZED HEALTH CARE EDUCATION/TRAINING PROGRAM

## 2024-01-02 PROCEDURE — 85027 COMPLETE CBC AUTOMATED: CPT

## 2024-01-02 PROCEDURE — 700111 HCHG RX REV CODE 636 W/ 250 OVERRIDE (IP): Mod: JZ | Performed by: INTERNAL MEDICINE

## 2024-01-02 PROCEDURE — 700102 HCHG RX REV CODE 250 W/ 637 OVERRIDE(OP): Performed by: INTERNAL MEDICINE

## 2024-01-02 PROCEDURE — 36415 COLL VENOUS BLD VENIPUNCTURE: CPT

## 2024-01-02 PROCEDURE — 93010 ELECTROCARDIOGRAM REPORT: CPT | Performed by: INTERNAL MEDICINE

## 2024-01-02 PROCEDURE — 93005 ELECTROCARDIOGRAM TRACING: CPT | Performed by: STUDENT IN AN ORGANIZED HEALTH CARE EDUCATION/TRAINING PROGRAM

## 2024-01-02 PROCEDURE — 80069 RENAL FUNCTION PANEL: CPT

## 2024-01-02 PROCEDURE — A9270 NON-COVERED ITEM OR SERVICE: HCPCS | Performed by: STUDENT IN AN ORGANIZED HEALTH CARE EDUCATION/TRAINING PROGRAM

## 2024-01-02 PROCEDURE — 770001 HCHG ROOM/CARE - MED/SURG/GYN PRIV*

## 2024-01-02 PROCEDURE — A9270 NON-COVERED ITEM OR SERVICE: HCPCS | Performed by: INTERNAL MEDICINE

## 2024-01-02 PROCEDURE — 82962 GLUCOSE BLOOD TEST: CPT | Mod: 91

## 2024-01-02 RX ORDER — OLANZAPINE 5 MG/1
10 TABLET ORAL EVERY EVENING
Status: DISCONTINUED | OUTPATIENT
Start: 2024-01-02 | End: 2024-01-02

## 2024-01-02 RX ORDER — TRAZODONE HYDROCHLORIDE 50 MG/1
100 TABLET ORAL
Status: DISCONTINUED | OUTPATIENT
Start: 2024-01-02 | End: 2024-01-03 | Stop reason: HOSPADM

## 2024-01-02 RX ORDER — OLANZAPINE 5 MG/1
10 TABLET ORAL
Status: DISCONTINUED | OUTPATIENT
Start: 2024-01-02 | End: 2024-01-03 | Stop reason: HOSPADM

## 2024-01-02 RX ADMIN — OLANZAPINE 10 MG: 5 TABLET, FILM COATED ORAL at 21:30

## 2024-01-02 RX ADMIN — TRAZODONE HYDROCHLORIDE 100 MG: 50 TABLET ORAL at 21:30

## 2024-01-02 RX ADMIN — DOCUSATE SODIUM 50 MG AND SENNOSIDES 8.6 MG 2 TABLET: 8.6; 5 TABLET, FILM COATED ORAL at 17:31

## 2024-01-02 RX ADMIN — SERTRALINE 50 MG: 50 TABLET, FILM COATED ORAL at 17:30

## 2024-01-02 RX ADMIN — ENOXAPARIN SODIUM 40 MG: 100 INJECTION SUBCUTANEOUS at 17:31

## 2024-01-02 NOTE — PROGRESS NOTES
Placed pt belongings in T4 legal hold cabinet   2 patient belongings bags w/label and black backpack w/label

## 2024-01-02 NOTE — DISCHARGE PLANNING
Case Management Discharge Planning    Admission Date: 12/29/2023  GMLOS: 3.9  ALOS: 4    6-Clicks ADL Score: 12  6-Clicks Mobility Score: 11  PT and/or OT Eval ordered: No  Post-acute Referrals Ordered: No  Post-acute Choice Obtained: No  Has referral(s) been sent to post-acute provider:  No      Anticipated Discharge Dispo: Discharge Disposition: D/T to psych hosp or distinct part unit (65)    DME Needed: No    Action(s) Taken: Updated Provider/Nurse on Discharge Plan    Pt is a new Legal Hold, Legal hold DPA is following.   Section II and III of the Legal Hold doc must be completed so In Pt Psych referral can be initiated. Informed Dr Gonzalez.  Faxed Legal Hold to [facility].       Escalations Completed: None    Medically Clear: Yes    Next Steps:   CM to continue to assist Pt with discharge as needed    Barriers to Discharge:   Legal Hold  Pending Placement    Is the patient up for discharge tomorrow: No

## 2024-01-02 NOTE — DISCHARGE PLANNING
Per Dr. Archuleta patient is medically clear, inpatient mental health referrals cannot be sent until Section II and Section III of legal hold have been filled out and signed.  Once legal hold paperwork is completed, will send out referrals.

## 2024-01-02 NOTE — DISCHARGE PLANNING
Legal Hold     Referral: Legal Hold Court     Intervention: Pt presented for legal hold meeting with  via video conferencing to discuss legal options of contesting hold and meeting with the court doctors tomorrow afternoon, or not contesting legal hold and continuing the hold for one week.  advised that pt's legal hold will be be continued for one week (1/11).       Plan: Pt's legal hold has been continued for one week. Pt awaiting transfer to an in patient psych facility.

## 2024-01-02 NOTE — DISCHARGE PLANNING
[facility] ALERT TEAM DISCHARGE PLANNING NOTE     Date:  1/2/2024  Patient Name:  Bishop Bucio - 53 y.o. - Discharge Planning  MRN:  [id number]   YOB: 1970  ADMISSION DATE:  12/29/2023                 Writer forwarded transfer packet for inpatient psychiatric care to the following community providers:  [facility]              Items included in the transfer packet:              __x__Face Sheet              __x___Pages 1 and 2 of completed legal hold              __x___Alert Team/Psych Assessment              ___x__H&P              ___x_UDS              __x__Blood Alcohol              __x__Vital signs              _n/a___Pregnancy Test (if applicable)              _x___Medications List              _x___Covid Screen

## 2024-01-02 NOTE — CONSULTS
[facility]  PSYCHIATRIC CONSULTATION - Follow-up  Established Patient    DOS: 01/02/24     Reason for Admission:   53 y.o. male with prior attempts of suicidal overdose with Benadryl, depression who presented 12/29/2023 with suicidal overdose  with Benadryl (estimated more than 1 g of Benadryl swallowed), as well as possible overdose with trazodone, Seroquel and risperidone unknown amount   Legal Hold Status: on legal hold - court    CC:   Chief Complaint   Patient presents with    Suicidal Ideation     OD attempt on Benadryl, Trazodone, Seroquel, and Risperidone                S:   Patient observed in bed, sitter at bedside, reports poor sleep, energy, adequate appetite. Continues to endorse depressed mood, admits SI without planning while OP, states he would overdose if he were to leave the facility, continues to express hopelessness, unable to vocalize reasons for living, has limited support, continues to express negative feelings towards brother (only family in the area), including suggesting his brother was the reason for his first overdose. Expresses he has been IP  3x in 1.5 months. Expressing he is having hallucinations of a black cat, states that is consistent when he is manic, often even a precursor to a manic episode, does not present as typical bipolar Sx. Unable to talk about situation leading to hospitalization, state he has missing periods of time around the overdose event. Denies HI/VH, does not appear to be responding to internal preoccupation during encounter. Not currently on any psychiatric medications.      O:   Medical ROS (as pertinent):   Recent Labs     01/01/24 0048 01/02/24  0249   WBC 6.2 6.4   RBC 4.93 5.17   HEMOGLOBIN 14.6 15.3   HEMATOCRIT 42.7 44.5   MCV 86.6 86.1   MCH 29.6 29.6   RDW 41.1 40.5   PLATELETCT 203 212   MPV 9.5 9.5     Recent Labs     12/31/23  0043 01/01/24  0048 01/02/24  0249   SODIUM 139 136 136   POTASSIUM 5.0 3.8 4.0   CHLORIDE 107 103 104    CO2 21 20 20   GLUCOSE 143* 112* 109*   BUN 12 14 16     Recent Labs     23  0043 24  0048 24  0249   ALBUMIN 3.8 3.9 3.9   TBILIRUBIN 0.4  --   --    ALKPHOSPHAT 65  --   --    TOTPROTEIN 6.2  --   --    ALTSGPT 23  --   --    ASTSGOT 21  --   --    CREATININE 1.24 1.10 1.28       EKG:   Results for orders placed or performed during the hospital encounter of 23   EKG   Result Value Ref Range    Report       [facility] Emergency Dept.    Test Date:  2023  Pt Name:    MAR[facility]EL [facility]                Department: ER  MRN:        [id number]                      Room:       [facility]  Gender:     Male                         Technician: [id number]  :        1970                   Requested By:ER TRIAGE PROTOCOL  Order #:    [id number]                    Reading MD: UNRULY TURNER DO    Measurements  Intervals                                Axis  Rate:       108                          P:          50  AZ:         131                          QRS:        15  QRSD:       80                           T:          22  QT:         402  QTc:        539    Interpretive Statements  Sinus tachycardia  Probable left atrial enlargement  Minimal ST depression, anterolateral leads  Prolonged QT interval  Compared to ECG 2023 07:17:54  ST (T wave) deviation now present  Prolonged QT interval now present  Sinus rhythm no longer present  Electronically Signed On 2023 20:54:29 P ST by UNRULY TURNER DO     EKG   Result Value Ref Range    Report       [facility]    Test Date:  2023  Pt Name:    [facility]                Department: [facility]  MRN:        [id number]                      Room:       [facility]  Gender:     Male                         Technician: [id number]  :        1970                   Requested By:IRENA PIERSON  Order #:    [id number]                    Reading MD: Nahid Flores MD    Measurements  Intervals                                 "Axis  Rate:       103                          P:          53  UT:         140                          QRS:        36  QRSD:       81                           T:          -24  QT:         364  QTc:        477    Interpretive Statements  Sinus tachycardia  Borderline repolarization abnormality  Borderline prolonged QT interval  Compared to ECG 12/29/2023 20:26:28  ST (T wave) deviation no longer present  Electronically Signed On 12- 20:32:12 PST by Nahid Flores MD          MSE:   BP (!) 140/81   Pulse 82   Temp 36.3 °C (97.4 °F) (Temporal)   Resp 17   Ht 1.778 m (5' 10\")   Wt 97.4 kg (214 lb 11.7 oz)   SpO2 93%     Constitutional: as noted above  General Appearance/Behavior: 53 y.o. appears normal habitus good eye contact cooperative, No behavioral disturbances  Abnormal Movements: none, no PMA/PMR or tremor observed.  Gait and Posture: not observed  Musculoskeletal: as noted above  Mood: \"awful\"  Affect: Restricted   Speech: normal rate, normal rhythm, normal tone, normal volume, and normal fluency  Language:  spontaneous, comprehends spoken commands, and fluent   Thought Process: Perseverative, Goal Oriented  Thought Content: Admits SI/VH. Denies HI/AH. No e/o delusions, paranoia, or internal preoccupation  Insight/Judgement:  poor/improved  Alert/Orientation: alert, oriented to person, place and time  Attn/Concentration: not tested  MMSE: deferred this visit     Medications:  Scheduled Medications   Medication Dose Frequency    senna-docusate  2 Tablet BID    enoxaparin (LOVENOX) injection  40 mg DAILY AT 1800    insulin regular  1-6 Units Q6HRS       Allergies:   Allergies   Allergen Reactions    Lanolin Acid Glycerin Yanna [Lanolin] Swelling    Pcn [Penicillins] Rash     Per patient     Bloodless      Pt states \"I'm trying to kill myself, why would I want a blood transfusion?\"     Other Food      Saccharine allergy per pt. Pt reports head and stomach aches         Assessment:   Diagnosis:   1. " Intentional drug overdose, initial encounter (HCC)    2. Intentional diphenhydramine overdose, initial encounter (HCC)         Psychiatric:   Depressive disorder, recurrent with psychotic features R/O borderline personality disorder    Medical: as noted by the medical treatment team.      Recommendations:  Legal Status: on legal hold - court    Please transfer patient to inpatient psychiatric hospital when medically cleared and bed is available.    Observation status:   - Telesitter    Visitors: No   Personal belongings: No     Discussed/voalted: PATRICK Gonzalez MD    Medication Recommendations: Final orders as per Treatment Team  Obtain EKG  Start Trazodone 100mg QHS for insomnia  Consider Olanzapine 10mg PO QHS for psychosis, Zoloft 50mg PO QAM for depression start 1/3  Risks/benefits/side effects discussed, patient verbalized understanding.  Medication reconciliation was completed.    Reviewed safety plan: 911, ER, PCM, MHC, suicide crisis line, nursing staff while inpatient.    Will Continue to Follow. Thank you for the consult.

## 2024-01-02 NOTE — DISCHARGE SUMMARY
Discharge Summary    CHIEF COMPLAINT ON ADMISSION  Chief Complaint   Patient presents with    Suicidal Ideation     OD attempt on Benadryl, Trazodone, Seroquel, and Risperidone        Reason for Admission  Toxic encephalopathy due to drug overdose and suicide attempt and intentional self-harm    Admission Date  12/29/2023     CODE STATUS  Full Code    HPI & HOSPITAL COURSE  Bishop Peck is a 53 y.o. male, who presented on 12/29/2023 with suicide attempt by overdose of medications.  This is a pleasant gentleman with a history of depression, anxiety, and prior suicide attempts with overdose with Benadryl.  He presented with an estimated intake of more than 1 g of Benadryl as well as possible overdose with trazodone, quetiapine, and risperidone and unknown amounts.  On presentation, patient was progressively somnolent and tachycardic.  EKG showed sinus tachycardia with prolonged QTc.  Potassium and magnesium were low and were replaced.  Patient was admitted to the immediate care unit for close monitoring and care.    Patient stabilized clinically and was subsequently transferred to the medical floor.  He continued to have severe depression with suicidal ideation and intent.  Psychiatry was consulted and extended patient's legal hold.  Patient was resumed on low-dose sertraline as well as Zyprexa.  His trazodone was resumed at 100 at nighttime which resulted in significant somnolence and grogginess in the morning.  The dosing of trazodone was subsequently decreased upon discharge.  Patient became very anxious about his transfer to the inpatient psychiatric facility and was resumed on hydroxyzine.  He continued to remain medically stable until the day of discharge.  He was maintained with close observation with one-to-one sitter until the day of discharge.    Therefore, he is discharged in good and stable condition to a psychiatric hospital.    The patient met 2-midnight criteria for an inpatient stay at the time  of discharge.    Discharge Date  12/29/2023     FOLLOW UP ITEMS POST DISCHARGE  -As per [facility].  -Follow-up primary care provider following discharge from inpatient psychiatric facility.    DISCHARGE DIAGNOSES  Principal Problem:    Drug overdose, intentional self-harm, initial encounter (HCC) (POA: Yes)  Active Problems:    Hypokalemia (POA: Yes)    Hyperglycemia (POA: Unknown)    Hypertension (POA: Unknown)    Toxic encephalopathy (POA: Unknown)    Anxiety (POA: Yes)    Insomnia due to mental disorder (POA: Yes)  Resolved Problems:    * No resolved hospital problems. *      FOLLOW UP  No future appointments.  [facility]  [address] [facility] [address]  [phone]  Follow up        MEDICATIONS ON DISCHARGE     Medication List        Start taking these medications        Instructions   traZODone 50 MG Tabs  Commonly known as: Desyrel   Take 1 Tablet by mouth every evening.  Dose: 50 mg            Change how you take these medications        Instructions   hydrOXYzine HCl 50 MG Tabs  What changed: reasons to take this  Commonly known as: Atarax   Take 1 Tablet by mouth 3 times a day as needed for Itching.  Dose: 50 mg     sertraline 50 MG Tabs  Start taking on: January 4, 2024  What changed:   medication strength  how much to take  Commonly known as: Zoloft   Take 1 Tablet by mouth every day.  Dose: 50 mg            Continue taking these medications        Instructions   fenofibrate 160 MG tablet  Commonly known as: Triglide   Take 160 mg by mouth every day.  Dose: 160 mg     vitamin D2 (Ergocalciferol) 1.25 MG (24257 UT) Caps capsule  Commonly known as: Drisdol   Take 50,000 Units by mouth every Friday.  Dose: 50,000 Units            Stop taking these medications      SEROquel 50 MG tablet  Generic drug: QUEtiapine     SEROquel  MG XR tablet  Generic drug: quetiapine              Allergies  Allergies   Allergen Reactions    Lanolin Acid Glycerin Yanna [Lanolin]  "Swelling    Pcn [Penicillins] Rash     Per patient     Bloodless      Pt states \"I'm trying to kill myself, why would I want a blood transfusion?\"     Other Food      Saccharine allergy per pt. Pt reports head and stomach aches        DIET  Orders Placed This Encounter   Procedures    Diet Order Diet: Regular; Tray Modifications (optional): Safety Tray - Plastic dishware, utensils unwrapped (Suicide Watch)     Paperware only on meal tray.     Standing Status:   Standing     Number of Occurrences:   1     Order Specific Question:   Diet:     Answer:   Regular [1]     Order Specific Question:   Tray Modifications (optional)     Answer:   Safety Tray - Plastic dishware, utensils unwrapped (Suicide Watch)       ACTIVITY  As tolerated.  Weight bearing as tolerated    LINES, DRAINS, AND WOUNDS  This is an automated list. Peripheral IVs will be removed prior to discharge.  Peripheral IV 12/29/23 20 G Right Antecubital (Active)   Site Assessment Clean;Dry;Intact 01/01/24 1920   Dressing Type Transparent 01/01/24 1920   Line Status Infusing 01/01/24 1920   Dressing Status Clean;Dry;Intact 01/01/24 1920   Dressing Intervention N/A 01/01/24 1920   Dressing Change Due 01/06/24 01/01/24 1920   Date Primary Tubing Changed 12/31/23 01/01/24 1920   NEXT Primary Tubing Change  01/06/24 01/01/24 1920   Date IV Connector(s) Changed 12/30/23 12/30/23 0100   Infiltration Grading ([facility], [facility]) 0 01/01/24 1920   Phlebitis Scale ([facility] Only) 0 01/01/24 1920       Peripheral IV 12/29/23 18 G Left Antecubital (Active)   Site Assessment Clean;Dry;Intact 01/01/24 1920   Dressing Type Transparent 01/01/24 1920   Line Status Saline locked;Flushed;Scrubbed the hub prior to access 01/01/24 1920   Dressing Status Clean;Dry;Intact 01/01/24 1920   Dressing Intervention N/A 01/01/24 1920   Dressing Change Due 01/06/24 01/01/24 1920   Date Primary Tubing Changed 12/30/23 12/30/23 0100   Date Secondary Tubing Changed 12/30/23 12/30/23 0100   NEXT " Primary Tubing Change  01/06/24 12/30/23 0100   NEXT Secondary Tubing Change  12/31/23 12/30/23 0100   Date IV Connector(s) Changed 12/30/23 12/30/23 0100   Infiltration Grading ([facility], [facility]) 0 01/01/24 1920   Phlebitis Scale ([facility] Only) 0 01/01/24 1920     Urethral Catheter Temperature probe 16 Fr. (Active)   Site Assessment Clean;Skin intact 01/01/24 1920   Collection Container Standard drainage bag 01/01/24 1920   Urinary Catheter Care Tamper Evident Seal in Place;Drainage Tube Extended;Drainage Bag Not Overfilled;Drainage Tubing Properly Secured;Drainage Bag Below Bladder Level and Not on Floor 01/01/24 1920   Securement Method Securing device (Describe) 01/01/24 1920   Output (mL) 450 mL 01/02/24 0507         Peripheral IV 12/29/23 20 G Right Antecubital (Active)   Site Assessment Clean;Dry;Intact 01/01/24 1920   Dressing Type Transparent 01/01/24 1920   Line Status Infusing 01/01/24 1920   Dressing Status Clean;Dry;Intact 01/01/24 1920   Dressing Intervention N/A 01/01/24 1920   Dressing Change Due 01/06/24 01/01/24 1920   Date Primary Tubing Changed 12/31/23 01/01/24 1920   NEXT Primary Tubing Change  01/06/24 01/01/24 1920   Date IV Connector(s) Changed 12/30/23 12/30/23 0100   Infiltration Grading ([facility], [facility]) 0 01/01/24 1920   Phlebitis Scale ([facility] Only) 0 01/01/24 1920       Peripheral IV 12/29/23 18 G Left Antecubital (Active)   Site Assessment Clean;Dry;Intact 01/01/24 1920   Dressing Type Transparent 01/01/24 1920   Line Status Saline locked;Flushed;Scrubbed the hub prior to access 01/01/24 1920   Dressing Status Clean;Dry;Intact 01/01/24 1920   Dressing Intervention N/A 01/01/24 1920   Dressing Change Due 01/06/24 01/01/24 1920   Date Primary Tubing Changed 12/30/23 12/30/23 0100   Date Secondary Tubing Changed 12/30/23 12/30/23 0100   NEXT Primary Tubing Change  01/06/24 12/30/23 0100   NEXT Secondary Tubing Change  12/31/23 12/30/23 0100   Date IV Connector(s) Changed 12/30/23 12/30/23 0100    Infiltration Grading ([facility]) 0 01/01/24 1920   Phlebitis Scale ([facility] Only) 0 01/01/24 1920           Urethral Catheter Temperature probe 16 Fr. (Active)   Site Assessment Clean;Skin intact 01/01/24 1920   Collection Container Standard drainage bag 01/01/24 1920   Urinary Catheter Care Tamper Evident Seal in Place;Drainage Tube Extended;Drainage Bag Not Overfilled;Drainage Tubing Properly Secured;Drainage Bag Below Bladder Level and Not on Floor 01/01/24 1920   Securement Method Securing device (Describe) 01/01/24 1920   Output (mL) 450 mL 01/02/24 0507        MENTAL STATUS ON TRANSFER  Alert and orient x 3.  Appropriately conversational.          CONSULTATIONS  Psychiatry    PROCEDURES  None    LABORATORY  Lab Results   Component Value Date    SODIUM 136 01/02/2024    POTASSIUM 4.0 01/02/2024    CHLORIDE 104 01/02/2024    CO2 20 01/02/2024    GLUCOSE 109 (H) 01/02/2024    BUN 16 01/02/2024    CREATININE 1.28 01/02/2024        Lab Results   Component Value Date    WBC 6.4 01/02/2024    HEMOGLOBIN 15.3 01/02/2024    HEMATOCRIT 44.5 01/02/2024    PLATELETCT 212 01/02/2024        Total time of the discharge process 34 minutes.

## 2024-01-03 VITALS
BODY MASS INDEX: 30.74 KG/M2 | HEART RATE: 85 BPM | WEIGHT: 214.73 LBS | OXYGEN SATURATION: 93 % | DIASTOLIC BLOOD PRESSURE: 75 MMHG | SYSTOLIC BLOOD PRESSURE: 113 MMHG | HEIGHT: 70 IN | RESPIRATION RATE: 17 BRPM | TEMPERATURE: 98.4 F

## 2024-01-03 PROBLEM — F41.9 ANXIETY: Status: ACTIVE | Noted: 2024-01-03

## 2024-01-03 PROBLEM — F51.05 INSOMNIA DUE TO MENTAL DISORDER: Status: ACTIVE | Noted: 2024-01-03

## 2024-01-03 LAB
GLUCOSE BLD STRIP.AUTO-MCNC: 139 MG/DL (ref 65–99)
GLUCOSE BLD STRIP.AUTO-MCNC: 141 MG/DL (ref 65–99)

## 2024-01-03 PROCEDURE — 700102 HCHG RX REV CODE 250 W/ 637 OVERRIDE(OP): Performed by: STUDENT IN AN ORGANIZED HEALTH CARE EDUCATION/TRAINING PROGRAM

## 2024-01-03 PROCEDURE — A9270 NON-COVERED ITEM OR SERVICE: HCPCS | Performed by: STUDENT IN AN ORGANIZED HEALTH CARE EDUCATION/TRAINING PROGRAM

## 2024-01-03 PROCEDURE — A9270 NON-COVERED ITEM OR SERVICE: HCPCS | Performed by: INTERNAL MEDICINE

## 2024-01-03 PROCEDURE — 82962 GLUCOSE BLOOD TEST: CPT | Mod: 91

## 2024-01-03 PROCEDURE — 99239 HOSP IP/OBS DSCHRG MGMT >30: CPT | Performed by: STUDENT IN AN ORGANIZED HEALTH CARE EDUCATION/TRAINING PROGRAM

## 2024-01-03 PROCEDURE — 700102 HCHG RX REV CODE 250 W/ 637 OVERRIDE(OP): Performed by: INTERNAL MEDICINE

## 2024-01-03 RX ORDER — HYDROXYZINE HYDROCHLORIDE 25 MG/1
50 TABLET, FILM COATED ORAL 3 TIMES DAILY PRN
Status: DISCONTINUED | OUTPATIENT
Start: 2024-01-03 | End: 2024-01-03 | Stop reason: HOSPADM

## 2024-01-03 RX ORDER — TRAZODONE HYDROCHLORIDE 50 MG/1
50 TABLET ORAL NIGHTLY
Qty: 30 TABLET | Refills: 3 | Status: SHIPPED
Start: 2024-01-03

## 2024-01-03 RX ORDER — HYDROXYZINE 50 MG/1
50 TABLET, FILM COATED ORAL 3 TIMES DAILY PRN
Qty: 30 TABLET | Refills: 0 | Status: SHIPPED
Start: 2024-01-03

## 2024-01-03 RX ADMIN — DOCUSATE SODIUM 50 MG AND SENNOSIDES 8.6 MG 2 TABLET: 8.6; 5 TABLET, FILM COATED ORAL at 06:18

## 2024-01-03 RX ADMIN — SERTRALINE 50 MG: 50 TABLET, FILM COATED ORAL at 06:18

## 2024-01-03 ASSESSMENT — ENCOUNTER SYMPTOMS
DEPRESSION: 1
HALLUCINATIONS: 0
NERVOUS/ANXIOUS: 1
HEADACHES: 0
COUGH: 0
DIZZINESS: 0
ABDOMINAL PAIN: 0
FEVER: 0
SHORTNESS OF BREATH: 0
CHILLS: 0
VOMITING: 0
PALPITATIONS: 0
NAUSEA: 0
MYALGIAS: 0

## 2024-01-03 ASSESSMENT — PATIENT HEALTH QUESTIONNAIRE - PHQ9
7. TROUBLE CONCENTRATING ON THINGS, SUCH AS READING THE NEWSPAPER OR WATCHING TELEVISION: SEVERAL DAYS
3. TROUBLE FALLING OR STAYING ASLEEP OR SLEEPING TOO MUCH: SEVERAL DAYS
6. FEELING BAD ABOUT YOURSELF - OR THAT YOU ARE A FAILURE OR HAVE LET YOURSELF OR YOUR FAMILY DOWN: SEVERAL DAYS
SUM OF ALL RESPONSES TO PHQ9 QUESTIONS 1 AND 2: 2
8. MOVING OR SPEAKING SO SLOWLY THAT OTHER PEOPLE COULD HAVE NOTICED. OR THE OPPOSITE, BEING SO FIGETY OR RESTLESS THAT YOU HAVE BEEN MOVING AROUND A LOT MORE THAN USUAL: SEVERAL DAYS
SUM OF ALL RESPONSES TO PHQ QUESTIONS 1-9: 9
9. THOUGHTS THAT YOU WOULD BE BETTER OFF DEAD, OR OF HURTING YOURSELF: SEVERAL DAYS
5. POOR APPETITE OR OVEREATING: SEVERAL DAYS
2. FEELING DOWN, DEPRESSED, IRRITABLE, OR HOPELESS: SEVERAL DAYS
4. FEELING TIRED OR HAVING LITTLE ENERGY: SEVERAL DAYS
1. LITTLE INTEREST OR PLEASURE IN DOING THINGS: SEVERAL DAYS

## 2024-01-03 NOTE — DISCHARGE PLANNING
[facility] ALERT TEAM DISCHARGE PLANNING NOTE     Date:  1/3/2024  Patient Name:  Bishop Bucio - 53 y.o. - Discharge Planning  MRN:  [id number]   YOB: 1970  ADMISSION DATE:  12/29/2023                 Writer forwarded transfer packet for inpatient psychiatric care to the following community providers:  Stephanie to [facility].              Items included in the transfer packet:              __x__Face Sheet              ___x__Pages 1 and 2 of completed legal hold              ___x__Alert Team/Psych Assessment              ___x__H&P              __x__UDS              __x__Blood Alcohol              _x___Vital signs              __n/a__Pregnancy Test (if applicable)              __x__Medications List              __x__Covid Screen

## 2024-01-03 NOTE — PROGRESS NOTES
Bedside report received from night shift nurse. Assumed care at 0645.   Pt A&Ox4  Tolerating regular diet, denies n/v. Hypoactive bowel sounds, passing flatus, LBM 1/2/24. IV access through 18g LAC and 20g RAC that is running NS @ 100 mL/hr.  Skin intact.  Saturating >90% on RA.  Pt ambulates SBA.  Pain is controlled through medication orders. Updated on plan of care. Safety education provided. Bed locked in low. Call light within reach. Rounding in place.

## 2024-01-03 NOTE — DISCHARGE PLANNING
Case Management Discharge Planning    Admission Date: 12/29/2023  GMLOS: 3.9  ALOS: 5    6-Clicks ADL Score: 12  6-Clicks Mobility Score: 11  PT and/or OT Eval ordered: Yes  Post-acute Referrals Ordered: Yes  Post-acute Choice Obtained: Yes  Has referral(s) been sent to post-acute provider:  Yes      Anticipated Discharge Dispo: Discharge Disposition: D/T to psych hosp or distinct part unit (65)    DME Needed: No    Action(s) Taken: Updated Provider/Nurse on Discharge Plan    Pt has been accepted at [facility] at 13:45 via [facility]  under the service of Dr Gonzalez.     This RN CM prepared  [place]/Transfer packet.    Original Legal Hold Doc  placed inside the packet.     This RN CM informed Marquise Esparza's Brother  of the discharge update via phone.     Escalations Completed: None    Medically Clear: Yes    Next Steps:   CM to continue to assist Pt with discharge as needed    Barriers to Discharge: None    Is the patient up for discharge tomorrow: No

## 2024-01-03 NOTE — PROGRESS NOTES
AxOx4; on room air.  1:1 sitter in the room for legal hold. Legal hold precautions in place.  Denies SOB/chest pain/numbness or tingling.  Verbalizes 0/10 pain.  Skin per flow sheets. No drains.  +void. LBM 1/2 per pt and per report.  Denies N/V. Tolerating regular diet.  Pt ambulates with standby assist.  SCDs refused; lovenox in use for VTE prophylaxis.

## 2024-01-03 NOTE — PROGRESS NOTES
Pt to [facility] w/ [facility]. All belongings accounted for, IVs removed. DC instruction provided.

## 2024-01-03 NOTE — DISCHARGE PLANNING
Alert Team Note:    Contacted [facility] and spoke to Kiarra at Intake to follow up on referral, was adv referral was not received.  Will resend to facility.

## 2024-01-03 NOTE — PROGRESS NOTES
Received report of patient at start of shift. Patient is AOx4, no complaints of pain. Assessment complete. Rea catheter removed at 1405. Patient voiding adequately post rea catheter removal. Patient ambulates with standby assistance. Legal hold continued. Room clear of potentially dangerous items, safety checklist in use, 1:1 sitter at bedside. Patient encouraged to notify staff for any needs/assistance. Frequent rounding completed.

## 2024-01-03 NOTE — CONSULTS
"[facility]  PSYCHIATRIC CONSULTATION - Follow-up  Established Patient    DOS: 01/03/24     Reason for Admission:   53 y.o. male with prior attempts of suicidal overdose with Benadryl, depression who presented 12/29/2023 with suicidal overdose  with Benadryl (estimated more than 1 g of Benadryl swallowed), as well as possible overdose with trazodone, Seroquel and risperidone unknown amount   Legal Hold Status: on legal hold - extended    CC:   Chief Complaint   Patient presents with    Suicidal Ideation     OD attempt on Benadryl, Trazodone, Seroquel, and Risperidone                S:   Patient observed laying in bed, reports minimally improved mood, continues to express SI without planning, agrees would overdose if not in a safe environment. Reports improved sleep, adequate energy, decreased appetite. Denies HI/AH, VH eliminated, denies having any for at least 24H.     Vocalized displeasure with transferring to [facility], states \"it'll be the same thing all over again,\" vocalizing being discharged too soon. Education provided, patient able to engage in active problem solving conversations about how to get needs met, including advocacy for effective medication based on experience, and engaging in conversation about the goal of the provider. Additionally attempted to set realistic expectations within the confines of the health care system including reasonable LOS, offered therapy (and engagement), and aftercare. Patient continues to vocalize hopelessness following conversation, able to vocalize a reasonable plan to increase level of involvement in their care.    O:   Medical ROS (as pertinent):   Recent Labs     01/01/24  0048 01/02/24  0249   WBC 6.2 6.4   RBC 4.93 5.17   HEMOGLOBIN 14.6 15.3   HEMATOCRIT 42.7 44.5   MCV 86.6 86.1   MCH 29.6 29.6   RDW 41.1 40.5   PLATELETCT 203 212   MPV 9.5 9.5     Recent Labs     01/01/24  0048 01/02/24  0249   SODIUM 136 136   POTASSIUM 3.8 4.0   CHLORIDE 103 104   CO2 " 20 20   GLUCOSE 112* 109*   BUN 14 16     Recent Labs     24  0048 24  0249   ALBUMIN 3.9 3.9   CREATININE 1.10 1.28       EKG:   Results for orders placed or performed during the hospital encounter of 23   EKG   Result Value Ref Range    Report       [facility] Emergency Dept.    Test Date:  2023  Pt Name:    MARIVEL VILLAFANA                Department: ER  MRN:        [id number]                      Room:       [facility]  Gender:     Male                         Technician: [id number]  :        1970                   Requested By:ER TRIAGE PROTOCOL  Order #:    [id number]                    Reading MD: UNRULY TURNER DO    Measurements  Intervals                                Axis  Rate:       108                          P:          50  MO:         131                          QRS:        15  QRSD:       80                           T:          22  QT:         402  QTc:        539    Interpretive Statements  Sinus tachycardia  Probable left atrial enlargement  Minimal ST depression, anterolateral leads  Prolonged QT interval  Compared to ECG 2023 07:17:54  ST (T wave) deviation now present  Prolonged QT interval now present  Sinus rhythm no longer present  Electronically Signed On 2023 20:54:29 P ST by UNRULY TURNER DO     EKG   Result Value Ref Range    Report       [facility]    Test Date:  2023  Pt Name:    MARIVEL [facility]                Department: IMCU  MRN:        [id number]                      Room:       [facility]  Gender:     Male                         Technician: [id number]  :        1970                   Requested By:IRENA PIERSON  Order #:    [id number]                    Reading MD: Nahid Flores MD    Measurements  Intervals                                Axis  Rate:       103                          P:          53  MO:         140                          QRS:        36  QRSD:       81                           T:           "-24  QT:         364  QTc:        477    Interpretive Statements  Sinus tachycardia  Borderline repolarization abnormality  Borderline prolonged QT interval  Compared to ECG 2023 20:26:28  ST (T wave) deviation no longer present  Electronically Signed On 2023 20:32:12 PST by Nahid Flores MD     EKG   Result Value Ref Range    Report       [facility]    Test Date:  2024  Pt Name:    MARIVEL VILLAFANA                Department: 141  MRN:        [id number]                      Room:       T406  Gender:     Male                         Technician: ELG  :        1970                   Requested By:CYNTHIA SEGOVIA  Order #:    [id number]                    Reading MD: Gary Benitez MD    Measurements  Intervals                                Axis  Rate:       83                           P:          57  NH:         130                          QRS:        28  QRSD:       83                           T:          31  QT:         342  QTc:        402    Interpretive Statements  Sinus rhythm  Compared to ECG 2023 06:09:38  Sinus tachycardia no longer present  Electronically Signed On 2024 17:20:55 PST by Gary Benitez MD          MSE:   /75   Pulse 85   Temp 36.9 °C (98.4 °F) (Temporal)   Resp 17   Ht 1.778 m (5' 10\")   Wt 97.4 kg (214 lb 11.7 oz)   SpO2 93%     Constitutional: as noted above  General Appearance/Behavior: 53 y.o. appears normal habitus poor eye contact superficially cooperative, No behavioral disturbances  Abnormal Movements: none, no PMA/PMR or tremor observed.  Gait and Posture: not observed  Musculoskeletal: as noted above  Mood: \"same\"  Affect: Restricted   Speech: normal rate, normal rhythm, normal tone, normal volume, and normal fluency  Language:  spontaneous, comprehends spoken commands, and fluent   Thought Process: Perseverative  Thought Content: Admits SI. Denies HI, A/VH. No e/o delusions, paranoia, or internal preoccupation  Insight/Judgement:   poor " "/limited  Alert/Orientation: alert, oriented to person, place and time  Attn/Concentration: normal  MMSE: deferred this visit     Medications:  Scheduled Medications   Medication Dose Frequency    traZODone  100 mg QHS    OLANZapine  10 mg QHS    sertraline  50 mg DAILY    senna-docusate  2 Tablet BID    enoxaparin (LOVENOX) injection  40 mg DAILY AT 1800    insulin regular  1-6 Units Q6HRS       Allergies:   Allergies   Allergen Reactions    Lanolin Acid Glycerin Yanna [Lanolin] Swelling    Pcn [Penicillins] Rash     Per patient     Bloodless      Pt states \"I'm trying to kill myself, why would I want a blood transfusion?\"     Other Food      Saccharine allergy per pt. Pt reports head and stomach aches         Assessment:   Diagnosis:   1. Intentional drug overdose, initial encounter ([facility])    2. Intentional diphenhydramine overdose, initial encounter ([facility])         Psychiatric:   Depressive disorder, recurrent severe  Bipolar disorder by HX    Medical: as noted by the medical treatment team.      Recommendations:  Legal Status: on legal hold - extended    Please transfer patient to inpatient psychiatric hospital when medically cleared and bed is available.    Observation status:   - Telesitter    Visitors: No   Personal belongings: No      Discussed/voalted: PATRICK Gonzalez MD    Medication Recommendations: Final orders as per Treatment Team  Add Hydroxyzine 50mg PO TID PRN for anxiety  Continue Zoloft 50mg daily, Olanzapine 10mg QHS, Trazodone 100mg QHS  Risks/benefits/side effects discussed, patient verbalized understanding.  Medication reconciliation was completed.    Reviewed safety plan: 911, ER, PCM, MHC, suicide crisis line, nursing staff while inpatient.    Will Continue to Follow. Thank you for the consult.  "

## 2024-01-03 NOTE — DISCHARGE PLANNING
Alert Team Note:    Received call from [facility] at [facility] accepting pt to facility, accepting MD is Dr. Gonzalez. Requesting for pt to transfer at 1330.  Notified Dr. Gonzalez, bedside HUBER Rodrigues, and RN SUNSHINE Patel.  Writer to arrange [facility] transport.

## 2024-01-03 NOTE — CARE PLAN
The patient is Stable - Low risk of patient condition declining or worsening    Shift Goals  Clinical Goals: Safety, discharge planning  Patient Goals: rest  Family Goals: Glenis    Progress made toward(s) clinical / shift goals:  Suicide precautions continued, 1:1 sitter at bedside. Case management assisting with discharge planning.    Patient is not progressing towards the following goals: N/A

## 2024-01-03 NOTE — DISCHARGE PLANNING
Legal Hold Transfer     Referral: Legal hold transfer to Mental Health Facility     Intervention: Received call from Christian at [facility] stating they can accept pt to facility.     Pt's accepting physcian is Dr. Gonzalez    Spoke to Mikayla at [facility]     Transport arranged through [facility]     The pt will be picked up at 1345     Notified Bedside HUBER Rodrigues, HUBER Patel, and Dr. Gonzalez of the departure time as well as accepting facility.      Transfer packet and COBRA created with original legal hold and placed on chart.      Plan: Pt will be transferring to [facility] today at 1345 via [facility].

## 2024-01-03 NOTE — CARE PLAN
Problem: Skin Integrity  Goal: Skin integrity is maintained or improved  Outcome: Progressing     Problem: Fall Risk  Goal: Patient will remain free from falls  Outcome: Progressing     Problem: Provide Safe Environment  Goal: Suicide environmental safety, protocols, policies, and practices will be implemented  Outcome: Progressing     Problem: Psychosocial  Goal: Patient's ability to identify and develop effective coping behaviors will improve  Outcome: Progressing     Problem: Pain - Standard  Goal: Alleviation of pain or a reduction in pain to the patient’s comfort goal  Outcome: Progressing     Problem: Psychosocial  Goal: Patient's level of anxiety will decrease  Outcome: Progressing     Problem: Respiratory  Goal: Patient will achieve/maintain optimum respiratory ventilation and gas exchange  Outcome: Progressing     Problem: Fluid Volume  Goal: Fluid volume balance will be maintained  Outcome: Progressing     Problem: Urinary Elimination  Goal: Establish and maintain regular urinary output  Outcome: Progressing     Problem: Bowel Elimination  Goal: Establish and maintain regular bowel function  Outcome: Progressing     Problem: Mobility  Goal: Patient's capacity to carry out activities will improve  Outcome: Progressing   The patient is Stable - Low risk of patient condition declining or worsening    Shift Goals  Clinical Goals: safety; free from thoughts of SI or verbalize concerns  Patient Goals: rest  Family Goals: Glenis    Progress made toward(s) clinical / shift goals:  yes

## 2024-01-04 NOTE — ASSESSMENT & PLAN NOTE
Continues to have suicidal ideation.  No hallucinations.  Sitter at bedside  Restarting trazodone, olanzapine, and Zoloft.  Discussed with CHASIDY Chaves of psychiatry.

## 2024-10-03 NOTE — CARE PLAN
The patient is Stable - Low risk of patient condition declining or worsening    Shift Goals  Clinical Goals: safety  Patient Goals: rest  Family Goals: [place]    Progress made toward(s) clinical / shift goals:  pt remained safe this shift with 1:1 sitter in place, safety checklist in use.     Patient is not progressing towards the following goals:    Pt without Bm since admission,refusing use of stool softeners   Problem: Bowel Elimination  Goal: Establish and maintain regular bowel function  Description: Target End Date:  Prior to discharge or change in level of care    1.   Note date of last BM  2.   Educate about diet, fluid intake, medication and activity to promote bowel function  3.   Educate signs and symptoms of constipation and interventions to implement  4.   Pharmacologic bowel management per provider order  5.   Regular toileting schedule  6.   Upright position for toileting  7.   High fiber diet  8.   Encourage hydration  9.   Collaborate with Clinical Dietician  10. Care and maintenance of ostomy if applicable  Outcome: Not Progressing      Treatment Goal Explanation (Does Not Render In The Note): Stable for the purposes of categorizing medical decision making is defined by the specific treatment goals for an individual patient. A patient that is not at their treatment goal is not stable, even if the condition has not changed and there is no short- term threat to life or function.

## 2025-04-01 ENCOUNTER — OFFICE VISIT (OUTPATIENT)
Dept: URGENT CARE | Facility: CLINIC | Age: 55
End: 2025-04-01
Payer: COMMERCIAL

## 2025-04-01 ENCOUNTER — RESULTS FOLLOW-UP (OUTPATIENT)
Dept: URGENT CARE | Facility: CLINIC | Age: 55
End: 2025-04-01

## 2025-04-01 VITALS
DIASTOLIC BLOOD PRESSURE: 86 MMHG | BODY MASS INDEX: 29.26 KG/M2 | TEMPERATURE: 98.1 F | HEIGHT: 70 IN | RESPIRATION RATE: 20 BRPM | WEIGHT: 204.4 LBS | SYSTOLIC BLOOD PRESSURE: 142 MMHG | OXYGEN SATURATION: 96 % | HEART RATE: 100 BPM

## 2025-04-01 DIAGNOSIS — J06.9 VIRAL URI WITH COUGH: ICD-10-CM

## 2025-04-01 LAB
FLUAV RNA SPEC QL NAA+PROBE: NEGATIVE
FLUBV RNA SPEC QL NAA+PROBE: NEGATIVE
RSV RNA SPEC QL NAA+PROBE: NEGATIVE
SARS-COV-2 RNA RESP QL NAA+PROBE: NEGATIVE

## 2025-04-01 PROCEDURE — 3077F SYST BP >= 140 MM HG: CPT | Performed by: FAMILY MEDICINE

## 2025-04-01 PROCEDURE — 3079F DIAST BP 80-89 MM HG: CPT | Performed by: FAMILY MEDICINE

## 2025-04-01 PROCEDURE — 87637 SARSCOV2&INF A&B&RSV AMP PRB: CPT | Mod: QW | Performed by: FAMILY MEDICINE

## 2025-04-01 PROCEDURE — 99213 OFFICE O/P EST LOW 20 MIN: CPT | Performed by: FAMILY MEDICINE

## 2025-04-01 RX ORDER — LAMOTRIGINE 25 MG/1
TABLET ORAL
COMMUNITY
Start: 2025-02-10

## 2025-04-01 RX ORDER — ESCITALOPRAM OXALATE 10 MG/1
TABLET ORAL
COMMUNITY
Start: 2025-02-10

## 2025-04-01 ASSESSMENT — FIBROSIS 4 INDEX: FIB4 SCORE: 1.14

## 2025-04-01 ASSESSMENT — ENCOUNTER SYMPTOMS: COUGH: 1

## 2025-04-01 NOTE — PROGRESS NOTES
"Subjective:     Bishop Bucio is a 54 y.o. male who presents for Sinus Problem (X 7 day sinus pressure, super runny nose, head aches, loss of appetite, needs work note,  )    HPI  Pt presents for evaluation of an acute problem  Patient with an illness for about a week  Having headache, nasal congestion, sinus pressure  Feels fatigued and with low appetite  No myalgias   No ear pain  Had some diarrhea   Overall made some improvements, but still having nasal congestion and sinus pressure     Review of Systems   Constitutional:  Positive for malaise/fatigue.   HENT:  Positive for congestion.    Respiratory:  Positive for cough.        PMH:  has a past medical history of Complaint of nasal congestion (5/28/2023).  MEDS:   Current Outpatient Medications:     escitalopram (LEXAPRO) 10 MG Tab, , Disp: , Rfl:     metformin (GLUCOPHAGE) 1000 MG tablet, , Disp: , Rfl:     lamoTRIgine (LAMICTAL) 25 MG Tab, , Disp: , Rfl:     hydrOXYzine HCl (ATARAX) 50 MG Tab, Take 1 Tablet by mouth 3 times a day as needed for Itching., Disp: 30 Tablet, Rfl: 0    traZODone (DESYREL) 50 MG Tab, Take 1 Tablet by mouth every evening., Disp: 30 Tablet, Rfl: 3    fenofibrate (TRIGLIDE) 160 MG tablet, Take 160 mg by mouth every day., Disp: , Rfl:     vitamin D2, Ergocalciferol, (DRISDOL) 1.25 MG (14545 UT) Cap capsule, Take 50,000 Units by mouth every Friday., Disp: , Rfl:   ALLERGIES:   Allergies   Allergen Reactions    Lanolin Acid Glycerin Yanna [Lanolin] Swelling    Pcn [Penicillins] Rash     Per patient     Bloodless      Pt states \"I'm trying to kill myself, why would I want a blood transfusion?\"     Other Food      Saccharine allergy per pt. Pt reports head and stomach aches      SURGHX: No past surgical history on file.  SOCHX:  reports that he has quit smoking. His smoking use included cigarettes. He has never used smokeless tobacco. He reports that he does not currently use alcohol. He reports current drug use. Drugs: Inhaled and " "Marijuana.     Objective:   BP (!) 142/86   Pulse 100   Temp 36.7 °C (98.1 °F) (Temporal)   Resp 20   Ht 1.778 m (5' 10\")   Wt 92.7 kg (204 lb 6.4 oz)   SpO2 96%   BMI 29.33 kg/m²     Physical Exam  Constitutional:       General: He is not in acute distress.     Appearance: He is well-developed. He is not diaphoretic.   HENT:      Head: Normocephalic and atraumatic.      Right Ear: Tympanic membrane, ear canal and external ear normal.      Left Ear: Tympanic membrane, ear canal and external ear normal.      Nose: Congestion present.      Mouth/Throat:      Mouth: Mucous membranes are moist.      Pharynx: Oropharynx is clear. No oropharyngeal exudate or posterior oropharyngeal erythema.   Neck:      Trachea: No tracheal deviation.   Cardiovascular:      Rate and Rhythm: Normal rate and regular rhythm.      Heart sounds: Normal heart sounds.   Pulmonary:      Effort: Pulmonary effort is normal. No respiratory distress.      Breath sounds: Normal breath sounds. No wheezing or rales.   Musculoskeletal:         General: Normal range of motion.      Cervical back: Normal range of motion and neck supple. No tenderness.   Lymphadenopathy:      Cervical: No cervical adenopathy.   Skin:     General: Skin is warm and dry.      Findings: No rash.   Neurological:      Mental Status: He is alert.         Assessment/Plan:   Assessment    1. Viral URI with cough  - POCT CEPHEID COV-2, FLU A/B, RSV - PCR    Other orders  - escitalopram (LEXAPRO) 10 MG Tab  - metformin (GLUCOPHAGE) 1000 MG tablet  - lamoTRIgine (LAMICTAL) 25 MG Tab    Patient with viral URI.  No sign of secondary infection.  Lungs clear.  Reviewed supportive care measures and expected course of recovery.  Did obtain testing at patient's request as he is concerned about spreading this to other people.  Testing for COVID/influenza/RSV all negative.  Note for work given and will follow-up in urgent care as needed.    "

## 2025-04-01 NOTE — LETTER
April 1, 2025    To Whom It May Concern:         This is confirmation that Bishop Bucio attended his scheduled appointment with Zeeshan Milligan M.D. on 4/01/25.  Please excuse him from any days missed from work this past week  (starting 3/27/25).  He is cleared to return to work tonight.         If you have any questions please do not hesitate to call me at the phone number listed below.    Sincerely,          Zeeshan Milligan M.D.  [phone]

## 2025-04-14 ENCOUNTER — OFFICE VISIT (OUTPATIENT)
Dept: URGENT CARE | Facility: CLINIC | Age: 55
End: 2025-04-14
Payer: COMMERCIAL

## 2025-04-14 VITALS
SYSTOLIC BLOOD PRESSURE: 126 MMHG | WEIGHT: 199 LBS | HEART RATE: 104 BPM | OXYGEN SATURATION: 98 % | DIASTOLIC BLOOD PRESSURE: 90 MMHG | BODY MASS INDEX: 28.49 KG/M2 | HEIGHT: 70 IN | TEMPERATURE: 97.7 F | RESPIRATION RATE: 20 BRPM

## 2025-04-14 DIAGNOSIS — R11.2 NAUSEA AND VOMITING, UNSPECIFIED VOMITING TYPE: ICD-10-CM

## 2025-04-14 DIAGNOSIS — R05.1 ACUTE COUGH: ICD-10-CM

## 2025-04-14 PROCEDURE — 3080F DIAST BP >= 90 MM HG: CPT | Performed by: PHYSICIAN ASSISTANT

## 2025-04-14 PROCEDURE — 3074F SYST BP LT 130 MM HG: CPT | Performed by: PHYSICIAN ASSISTANT

## 2025-04-14 PROCEDURE — 99213 OFFICE O/P EST LOW 20 MIN: CPT | Performed by: PHYSICIAN ASSISTANT

## 2025-04-14 RX ORDER — ONDANSETRON 4 MG/1
4 TABLET, ORALLY DISINTEGRATING ORAL EVERY 6 HOURS PRN
Qty: 15 TABLET | Refills: 0 | Status: SHIPPED | OUTPATIENT
Start: 2025-04-14

## 2025-04-14 RX ORDER — BENZONATATE 100 MG/1
100 CAPSULE ORAL 3 TIMES DAILY PRN
Qty: 60 CAPSULE | Refills: 0 | Status: SHIPPED | OUTPATIENT
Start: 2025-04-14

## 2025-04-14 RX ORDER — METHYLPREDNISOLONE 4 MG/1
4 TABLET ORAL DAILY
Qty: 21 TABLET | Refills: 0 | Status: SHIPPED | OUTPATIENT
Start: 2025-04-14

## 2025-04-14 RX ORDER — AZITHROMYCIN 250 MG/1
TABLET, FILM COATED ORAL
Qty: 6 TABLET | Refills: 0 | Status: SHIPPED | OUTPATIENT
Start: 2025-04-14

## 2025-04-14 ASSESSMENT — ENCOUNTER SYMPTOMS
SHORTNESS OF BREATH: 0
EYE DISCHARGE: 0
DIZZINESS: 0
DIAPHORESIS: 0
DIARRHEA: 0
ABDOMINAL PAIN: 0
SORE THROAT: 0
HEADACHES: 0
COUGH: 1
SINUS PAIN: 0
CHILLS: 0
VOMITING: 1
CONSTIPATION: 0
WHEEZING: 0
EYE PAIN: 0
NAUSEA: 1
FEVER: 1
EYE REDNESS: 0

## 2025-04-14 ASSESSMENT — FIBROSIS 4 INDEX: FIB4 SCORE: 1.14

## 2025-04-14 NOTE — LETTER
J[facility]  [facility] [place] [id number] [facility] [id number]     April 14, 2025    Patient: Bishop Bucio   YOB: 1970   Date of Visit: 4/14/2025       To Whom It May Concern:    Bishop Bucio was seen and treated in our department on 4/14/2025.  Please excuse from work today    Sincerely,     Fausto Kelsey P.A.-C.

## 2025-04-14 NOTE — PROGRESS NOTES
"  Subjective:     Bishop Bucio  is a 54 y.o. male who presents for Cough (Patient is here today for cough, runny nose, weak, fatigue, fevers and vomiting)       He presents today for cough, runny nose, generalized fatigue ongoing over the last 2 weeks.  Did have a fever within the first few days of symptoms, this has improved at this time.  Has intermittent episodes of nausea and occasional vomiting, this was worsened with eating.  Currently denies any chest pain or shortness of breath, no severe abdominal pains.  Has been using over-the-counter medications for symptom support.  Notes recent close contacts with family members whom have had similar symptoms recently.  Please recall he was seen in the urgent care on 4/1/2025 for similar symptoms.       Review of Systems   Constitutional:  Positive for fever and malaise/fatigue. Negative for chills and diaphoresis.   HENT:  Positive for congestion. Negative for ear discharge, sinus pain and sore throat.    Eyes:  Negative for pain, discharge and redness.   Respiratory:  Positive for cough. Negative for shortness of breath and wheezing.    Cardiovascular:  Negative for chest pain.   Gastrointestinal:  Positive for nausea and vomiting. Negative for abdominal pain, constipation and diarrhea.   Neurological:  Negative for dizziness and headaches.      Allergies   Allergen Reactions    Lanolin Acid Glycerin Yanna [Lanolin] Swelling    Pcn [Penicillins] Rash     Per patient     Bloodless      Pt states \"I'm trying to kill myself, why would I want a blood transfusion?\"     Other Food      Saccharine allergy per pt. Pt reports head and stomach aches      Past Medical History:   Diagnosis Date    Complaint of nasal congestion 5/28/2023        Objective:   BP (!) 126/90   Pulse (!) 104   Temp 36.5 °C (97.7 °F) (Temporal)   Resp 20   Ht 1.778 m (5' 10\")   Wt 90.3 kg (199 lb)   SpO2 98%   BMI 28.55 kg/m²   Physical Exam  Vitals and nursing note reviewed. "   Constitutional:       General: He is not in acute distress.     Appearance: Normal appearance. He is not ill-appearing, toxic-appearing or diaphoretic.   HENT:      Head: Normocephalic.      Right Ear: Tympanic membrane, ear canal and external ear normal. There is no impacted cerumen.      Left Ear: Tympanic membrane, ear canal and external ear normal. There is no impacted cerumen.      Nose: Rhinorrhea present. No congestion.      Mouth/Throat:      Mouth: Mucous membranes are moist.      Pharynx: No oropharyngeal exudate or posterior oropharyngeal erythema.   Eyes:      General: No scleral icterus.        Right eye: No discharge.         Left eye: No discharge.      Conjunctiva/sclera: Conjunctivae normal.   Cardiovascular:      Rate and Rhythm: Normal rate and regular rhythm.   Pulmonary:      Effort: Pulmonary effort is normal. No respiratory distress.      Breath sounds: Normal breath sounds. No stridor. No wheezing, rhonchi or rales.   Abdominal:      General: Abdomen is flat. Bowel sounds are normal. There is no distension.      Palpations: Abdomen is soft.      Tenderness: There is no abdominal tenderness. There is no right CVA tenderness, left CVA tenderness or guarding.   Musculoskeletal:      Cervical back: Neck supple.   Lymphadenopathy:      Cervical: No cervical adenopathy.   Neurological:      General: No focal deficit present.      Mental Status: He is alert and oriented to person, place, and time.   Psychiatric:         Mood and Affect: Mood normal.         Behavior: Behavior normal.         Thought Content: Thought content normal.         Judgment: Judgment normal.             Diagnostic testing: None    Assessment/Plan:     Encounter Diagnoses   Name Primary?    Acute cough     Nausea and vomiting, unspecified vomiting type          Plan for care for today's complaint includes starting the patient on Z-Hever, Medrol Dosepak, Tessalon Perles for acute cough symptom support due to symptom duration  of 2 weeks.  Provide Zofran for intermittent episodes of nausea and vomiting occurring.  Encouraged use of over-the-counter medications for additional symptom relief.  Lung auscultation was normal today, no rales, wheezes, rhonchi. patient was overall well-appearing. Continue to monitor symptoms and return to urgent care or follow-up with primary care provider if symptoms remain ongoing.  Follow-up in the emergency department if symptoms become severe, ER precautions discussed in office today.  Prescription for Z-Hever, Medrol Dosepak, Tessalon Perles, Zofran provided.    Urgent care visit from 4/1/2025 reviewed    See AVS Instructions below for written guidance provided to patient on after-visit management and care in addition to our verbal discussion during the visit.    Please note that this dictation was created using voice recognition software. I have attempted to correct all errors, but there may be sound-alike, spelling, grammar and possibly content errors that I did not discover before finalizing the note.    Fausto Kelsey PA-C